# Patient Record
Sex: MALE | Race: WHITE | Employment: OTHER | URBAN - METROPOLITAN AREA
[De-identification: names, ages, dates, MRNs, and addresses within clinical notes are randomized per-mention and may not be internally consistent; named-entity substitution may affect disease eponyms.]

---

## 2017-02-22 DIAGNOSIS — I48.92 ATRIAL FLUTTER, PAROXYSMAL (HCC): ICD-10-CM

## 2017-02-22 RX ORDER — PRASUGREL 10 MG/1
TABLET, FILM COATED ORAL
Qty: 90 TAB | Refills: 3 | Status: SHIPPED | OUTPATIENT
Start: 2017-02-22 | End: 2017-09-26 | Stop reason: SDUPTHER

## 2017-02-22 RX ORDER — ATORVASTATIN CALCIUM 40 MG/1
TABLET, FILM COATED ORAL
Qty: 90 TAB | Refills: 3 | Status: SHIPPED | OUTPATIENT
Start: 2017-02-22 | End: 2017-12-26 | Stop reason: SDUPTHER

## 2017-02-22 RX ORDER — DILTIAZEM HYDROCHLORIDE 240 MG/1
CAPSULE, COATED, EXTENDED RELEASE ORAL
Qty: 90 CAP | Refills: 3 | Status: SHIPPED | OUTPATIENT
Start: 2017-02-22 | End: 2017-12-26 | Stop reason: SDUPTHER

## 2017-03-10 LAB
% ALBUMIN, 58A: 64 % (ref 54–71)
ALB/GLOBRATIO, 58C: 1.8 (ref 1.15–2.5)
ALBUMIN SERPL-MCNC: 4.4 G/DL (ref 3.7–5.1)
ALP SERPL-CCNC: 93 U/L (ref 35–117)
ALT SERPL-CCNC: 17 U/L
ANION GAP SERPL CALC-SCNC: 8 MMOL/L (ref 6–18)
APO B: APO A1 RATIO, 74: 0.39 (ref 0.61–0.8)
APOLIPOPROTEIN A-1, 6: 182 MG/DL
APOLIPOPROTEIN B , 48: 72 MG/DL
AST SERPL W P-5'-P-CCNC: 22 U/L (ref 5–40)
BILIRUB SERPL-MCNC: 0.9 MG/DL
BUN SERPL-MCNC: 9 MG/DL (ref 6–20)
BUN/CREATININE RATIO,BUCR: 12 (ref 10–27)
CALCIUM SERPL-MCNC: 10.2 MG/DL (ref 8.8–10.5)
CHLORIDE SERPL-SCNC: 100 MMOL/L (ref 98–110)
CO2 SERPL-SCNC: 31 MMOL/L (ref 19–31)
CREAT SERPL-MCNC: 0.8 MG/DL (ref 0.7–1.2)
FASTING-Y/N/HRS: ABNORMAL
FASTING-Y/N/HRS: ABNORMAL
FASTING-Y/N/HRS: NORMAL
FFA FREE FATTY ACIDS, 64: 0.42 MMOL/L
GLOBCALC, 58B: 2.4 G/DL (ref 1.9–3.5)
GLUCOSE SERPL-MCNC: 88 MG/DL (ref 70–99)
HDL 2 SUBCLASS, 65: 33 MG/DL
INSULIN,INS: 4 UU/ML (ref 3–9)
LP-PLA2, 123241: 177 NG/ML
POTASSIUM SERPL-SCNC: 4.2 MMOL/L (ref 3.5–5.3)
PRO BNP NT,BNPNT: 556 PG/ML
PROT SERPL-MCNC: 6.8 G/DL (ref 6.1–8)
SODIUM SERPL-SCNC: 139 MMOL/L (ref 133–145)

## 2017-03-11 LAB
25(OH)D3 SERPL-MCNC: 48 NG/ML (ref 30–100)
CAMPESTEROL, HDL1302: 6.64 UG/ML (ref 2.11–4.43)
CHOLEST SERPL-MCNC: 173 MG/DL
CHOLESTANOL, HDL1304: 3.99 UG/ML (ref 2.02–3.47)
DESMOSTEROL, HDL1306: 0.53 UG/ML
FASTING-Y/N/HRS: ABNORMAL
FASTING-Y/N/HRS: ABNORMAL
FASTING-Y/N/HRS: NORMAL
HDL HDL-P, HDL5001: 43.3 UMOL/L
HDL LDL-P, HDL5000: 758 NMOL/L
HDL SLDL-P, HDL5002: < 200 NMOL/L
HDLC SERPL-MCNC: 83 MG/DL
HSCRP-TX: 0.8 MG/L
LDLC SERPL CALC-MCNC: 78 MG/DL
N-HDL-C: 90 MG/DL
SDLDL: 25.2 MG/DL
SITOSTEROL, HDL1300: 3.9 UG/ML (ref 1.43–3.17)
TRIGL SERPL-MCNC: 70 MG/DL (ref ?–150)

## 2017-03-12 LAB
ALPHA LINOLEIC ACID N3, HDL1202: 0.12 % (ref 0.1–0.4)
CIS-MONO-UNSATURATED FATTY ACID TOTAL, HDL1205: 16.2 (ref 11.5–20.5)
DOCOSAPENTAENOIC ACID N3, HDL1206: 2.78 % (ref 0.6–4.1)
DOCOSAPENTAENOIC ACID N6, HDL1207: 0.39 % (ref 0.1–1.3)
FASTING-Y/N/HRS: NORMAL
OMEGA-3 FATTY ACID TOTAL, HDL1220: 9.6 (ref 0.1–14.1)
OMEGA-3 INDEX, HDL1219: 6.7 (ref 0.1–10.4)
OMEGA-6 FATTY ACID TOTAL, HDL1222: 31.2 (ref 28.6–44.5)
SATURATED FATTY ACID TOTAL, HDL1226: 41.9 (ref 36.6–42)
TRANS FATTY ACID TOTAL, HDL1232: 1 (ref 0.1–1.8)
TRANSLINOLEIC ACID, HDL1229: <0.1 %
TRANSOLEIC ACID, HDL1230: 0.78 % (ref 0.1–1.3)

## 2017-03-20 DIAGNOSIS — I25.10 CORONARY ARTERY DISEASE INVOLVING NATIVE CORONARY ARTERY OF NATIVE HEART WITHOUT ANGINA PECTORIS: Primary | ICD-10-CM

## 2017-03-20 DIAGNOSIS — E78.5 DYSLIPIDEMIA: ICD-10-CM

## 2017-03-21 ENCOUNTER — OFFICE VISIT (OUTPATIENT)
Dept: CARDIOLOGY CLINIC | Age: 67
End: 2017-03-21

## 2017-03-21 VITALS
OXYGEN SATURATION: 98 % | HEIGHT: 70 IN | DIASTOLIC BLOOD PRESSURE: 74 MMHG | RESPIRATION RATE: 16 BRPM | BODY MASS INDEX: 22.62 KG/M2 | HEART RATE: 88 BPM | SYSTOLIC BLOOD PRESSURE: 122 MMHG | WEIGHT: 158 LBS

## 2017-03-21 DIAGNOSIS — I47.1 SVT (SUPRAVENTRICULAR TACHYCARDIA) (HCC): ICD-10-CM

## 2017-03-21 DIAGNOSIS — I48.92 ATRIAL FLUTTER, PAROXYSMAL (HCC): Primary | ICD-10-CM

## 2017-03-21 DIAGNOSIS — E78.5 DYSLIPIDEMIA: ICD-10-CM

## 2017-03-21 DIAGNOSIS — I25.10 CORONARY ARTERY DISEASE INVOLVING NATIVE CORONARY ARTERY OF NATIVE HEART WITHOUT ANGINA PECTORIS: ICD-10-CM

## 2017-03-21 DIAGNOSIS — E55.9 VITAMIN D DEFICIENCY: ICD-10-CM

## 2017-03-21 DIAGNOSIS — J44.9 CHRONIC OBSTRUCTIVE PULMONARY DISEASE, UNSPECIFIED COPD TYPE (HCC): ICD-10-CM

## 2017-03-21 DIAGNOSIS — R07.89 OTHER CHEST PAIN: ICD-10-CM

## 2017-03-21 NOTE — PROGRESS NOTES
Progress Note      Horacio Castro is a 61 y.o. male. Last seen 6 months ago. Problem List  Date Reviewed: 9/13/2016          Codes Class Noted    Atrial flutter, paroxysmal ([facility] ICD-10-CM: I48.92  ICD-9-CM: 427.32  3/31/2016        SVT (supraventricular tachycardia) ICD-10-CM: I47.1  ICD-9-CM: 427.89  6/24/2015        Vitamin D deficiency ICD-10-CM: E55.9  ICD-9-CM: 268.9  3/27/2014        Dyslipidemia ICD-10-CM: E78.5  ICD-9-CM: 272.4  3/27/2014        CAD (coronary artery disease), native coronary artery ICD-10-CM: I25.10  ICD-9-CM: 414.01  3/12/2014        COPD (chronic obstructive pulmonary disease) ([facility] ICD-10-CM: J44.9  ICD-9-CM: 496  2/27/2014              Cardiac testing:  CATH: 2/12/2014: L Main: MLI, LAD: Mid 50%; MLI; Med size; D1 and D2 - MLI (small),   LCflex: Med ; Prox 40%; Mid 40% OM1 - med; distally OM1 divides to 3 branches (prox branch - med)- distal 2 branches - small; RCA: Med; Mid 99%; Distally 40% PDA and PLB - small to med, LVEDP: 11. LVEF: 45%; Ant HK, Inf HK, no signif grad across AV   --- MARCO (Resolute 3 x 22) -> RCA   ECHO: 2/12/14: EF 50%, mild HK basal-mid anteroseptal and basal-mid inferior wall(s). mild MR/TR     Health fair screening 3/13/15, carotid duplex showed mild stenosis, aortic duplex normal, ZENON normal, EKG normal   Echo 6/24/15 - EF 55%, normal wall motion   Holter 7/23/15 - SR , rare episodes of AFL/AF  CT Heart Scan - Calcium score 2041, incidental pulmonary findings (see full report). HPI    Mr. Jarome Osler reports random episodes of left-sided sharp chest pain lasting for few seconds. He has stable pattern of mild-moderate PATEL and wears oxygen intermittently. No issues with Aspirin or Effient. He takes 3 tablets of Fish oil daily. His activity level is limited by lung disease. He continues to work on his diet, avoiding salt and reducing simple carbohydrates as often as possible. He remains smoke-free.  Patient denies any palpitations, syncope, orthopnea, edema or paroxysmal nocturnal dyspnea. Current Outpatient Prescriptions   Medication Sig    atorvastatin (LIPITOR) 40 mg tablet TAKE 1 TABLET NIGHTLY    dilTIAZem CD (CARTIA XT) 240 mg ER capsule TAKE 1 CAPSULE DAILY    prasugrel (EFFIENT) 10 mg tablet TAKE 1 TABLET DAILY    omega-3 fatty acids-vitamin e (FISH OIL) 1,000 mg cap Take 3 Caps by mouth daily. Two capsules in the morning and one capsule in the evening.  cholecalciferol, vitamin D3, (VITAMIN D3) 2,000 unit tab Take  by mouth two (2) times a day.  multivitamin (ONE A DAY) tablet Take 1 tablet by mouth daily.  albuterol (PROVENTIL VENTOLIN) 2.5 mg /3 mL (0.083 %) nebulizer solution 2.5 mg by Nebulization route every four (4) hours as needed for Wheezing or Shortness of Breath.  aspirin 81 mg chewable tablet Take 1 Tab by mouth daily.  guaiFENesin (MUCINEX) 1,200 mg TM12 ER tablet Take 1,200 mg by mouth two (2) times a day.  TIOTROPIUM BROMIDE (SPIRIVA WITH HANDIHALER IN) Take 1 Puff by inhalation daily. No current facility-administered medications for this visit. No Known Allergies     Review of Systems   Constitutional: Negative for weight loss, malaise/fatigue and diaphoresis. Respiratory: Positive for PATEL. Negative for cough, hemoptysis, sputum production, and wheezing. Cardiovascular: Negative for palpitations, orthopnea, claudication, leg swelling and PND. Positive for chest pain. Gastrointestinal: Negative for heartburn, nausea, vomiting, blood in stool and melena. Genitourinary: Negative for dysuria, hematuria and flank pain. Musculoskeletal: Negative for back pain and joint pain. Neurological: Negative for dizziness, focal weakness, seizures, loss of consciousness, weakness and headaches. Endo/Heme/Allergies: Does not bruise/bleed easily. Psychiatric/Behavioral: Negative for mood disorders.     Visit Vitals    /74 (BP 1 Location: Left arm, BP Patient Position: Sitting)    Pulse 88    Resp 16    Ht 5' 10\" (1.778 m)    Wt 158 lb (71.7 kg)    SpO2 98%    BMI 22.67 kg/m2      Wt Readings from Last 3 Encounters:   03/21/17 158 lb (71.7 kg)   09/13/16 157 lb (71.2 kg)   03/30/16 146 lb 9.6 oz (66.5 kg)     Physical Exam   Vitals reviewed. Constitutional: He is oriented to person, place, and time. He appears well-developed. HENT:    Head: Normocephalic. Neck: Neck supple. No JVD present. No tracheal deviation present. Heart: normal rate, regular rhythm, normal S1, S2, no murmurs, rubs, clicks or gallops. Pulses:Carotid pulses are 2+ on the right side, and 2+ on the left side. Pulmonary/Chest: Effort normal and breath sounds normal. He has no wheezes, rhonchi or rales. Abdominal: Soft. Bowel sounds are normal. No tenderness. He has no rebound. Musculoskeletal: He exhibits no edema. Neurological: He is alert and oriented to person, place, and time. Skin: Skin is warm and dry. Psychiatric: He has a normal mood and affect.     Labs drawn 3/10/16    High Risk  Intermediate Risk  Optimal    Total Cholesterol      165    LDL      52    HDL      98    TG      51    Non-HDL      67    Apo B      43    LDL-P      450    Small LDL-P          sdLDL-C      15    Apo A-I      208    HDL-P      42.5    HDL2-C      46    Apo B: Apo A-I ratio      0.21    Lp(a)-P      <50    Lp Cholesterol          Myeloperoxidase          Lp-JD      129    Hs-CRP      0.4    Fibrinogen          proBNP      69    AspirinWorks          Apolipoprotein E          [id number]*2*3*          [id number]*17*          Factor V Leiden          Prothrombin Mutation          Insulin  18        Free Fatty Acid      0.58    Glucose      84    HbA1c          Estimated Average Glucose          25-hydroxy-Vitamin D      58    TSH          Homocysteine      7    Creatinine, serum          Campesterol  5.83        Campesterol Ratio          Sitosterol  4.43        Sitosterol Ratio          Cholestanol      3.31    Cholestanol Ratio        Desmosterol      <0.50 (hypo)    Omega 3                     Cardiographics  EKG 6/12/14 - sinus with PAC's  EKG 12/18/14 - SR, isolated PVC, otherwise normal   EKG 9/13/16 - SR 68    ASSESSMENT and PLAN  Encounter Diagnoses   Name Primary?  Atrial flutter, paroxysmal (HCC) Yes    SVT (supraventricular tachycardia)     Dyslipidemia     Coronary artery disease involving native coronary artery of native heart without angina pectoris     Vitamin D deficiency     Chronic obstructive pulmonary disease, unspecified COPD type ([address]       Mr. Concepción Major has known CAD with MI 2014 treated with RCA stenting. He has had no symptoms of angina at a fair functional capacity limited by COPD. However, I am concerned about his 10 fold elevation in pro-BNP. He has no clear cut findings of HF. Will reassess LV function with echo in the near future. Advanced lipid testing demonstrates optimized lipids and lipoproteins. Elevated insulin level has normalized. He has persistent sterol hyperabsorption. I would not start Zetia unless his Apo(b) and LDL-p climb up into the intermediate risk range. Repeat numbers in 6 months. Phone follow up after reviewing tests. Follow-up Disposition:  Return in about 6 months (around 9/21/2017). Echo near future. [facility] labs with visit.      Written by Nathaly Varghese, as dictated by Sean May MD.   Sean May MD

## 2017-03-21 NOTE — MR AVS SNAPSHOT
Visit Information Date & Time Provider Department Dept. Phone Encounter #  
 3/21/2017  1:20 PM Victorina Hill MD [facility] [phone] [id number] Follow-up Instructions Return in about 6 months (around 9/21/2017). Upcoming Health Maintenance Date Due Hepatitis C Screening 1950 DTaP/Tdap/Td series (1 - Tdap) 5/7/1973 FOBT Q 1 YEAR AGE 50-75 5/7/2002 ZOSTER VACCINE AGE 60> 5/7/2012 INFLUENZA AGE 9 TO ADULT 8/1/2016 Allergies as of 3/21/2017  Review Complete On: 3/21/2017 By: Robbie Andrade LPN No Known Allergies Current Immunizations  Reviewed on 2/14/2014 Name Date Influenza Vaccine 12/14/2013 Pneumococcal Vaccine (Unspecified Type) 12/14/2012 Not reviewed this visit You Were Diagnosed With   
  
 Codes Comments Atrial flutter, paroxysmal (HCC)    -  Primary ICD-10-CM: I48.92 
ICD-9-CM: 427.32   
 SVT (supraventricular tachycardia)     ICD-10-CM: I47.1 ICD-9-CM: 427.89 Dyslipidemia     ICD-10-CM: E78.5 ICD-9-CM: 272.4 Coronary artery disease involving native coronary artery of native heart without angina pectoris     ICD-10-CM: I25.10 ICD-9-CM: 414.01 Vitamin D deficiency     ICD-10-CM: E55.9 ICD-9-CM: 268.9 Chronic obstructive pulmonary disease, unspecified COPD type ([facility]     ICD-10-CM: J44.9 ICD-9-CM: 409 Other chest pain     ICD-10-CM: R07.89 ICD-9-CM: 786.59 Vitals BP Pulse Resp Height(growth percentile) Weight(growth percentile) SpO2  
 122/74 (BP 1 Location: Left arm, BP Patient Position: Sitting) 88 16 5' 10\" (1.778 m) 158 lb (71.7 kg) 98% BMI Smoking Status 22.67 kg/m2 Former Smoker BMI and BSA Data Body Mass Index Body Surface Area  
 22.67 kg/m 2 1.88 m 2 Preferred Pharmacy Pharmacy Name Phone [facility] [address] [place] [facility] [phone] Your Updated Medication List  
  
   
This list is accurate as of: 3/21/17  1:33 PM.  Always use your most recent med list.  
  
  
  
  
 albuterol 2.5 mg /3 mL (0.083 %) nebulizer solution Commonly known as:  PROVENTIL VENTOLIN  
2.5 mg by Nebulization route every four (4) hours as needed for Wheezing or Shortness of Breath. aspirin 81 mg chewable tablet Take 1 Tab by mouth daily. atorvastatin 40 mg tablet Commonly known as:  LIPITOR  
TAKE 1 TABLET NIGHTLY  
  
 dilTIAZem  mg ER capsule Commonly known as:  CARTIA XT  
TAKE 1 CAPSULE DAILY FISH OIL 1,000 mg Cap Generic drug:  omega-3 fatty acids-vitamin e Take 3 Caps by mouth daily. Two capsules in the morning and one capsule in the evening. MUCINEX 1,200 mg Ta12 ER tablet Generic drug:  guaiFENesin Take 1,200 mg by mouth two (2) times a day. multivitamin tablet Commonly known as:  ONE A DAY Take 1 tablet by mouth daily. prasugrel 10 mg tablet Commonly known as:  EFFIENT TAKE 1 TABLET DAILY [address] [place] [address] Take 1 Puff by inhalation daily. VITAMIN D3 2,000 unit Tab Generic drug:  cholecalciferol (vitamin D3) Take  by mouth two (2) times a day. Follow-up Instructions Return in about 6 months (around 9/21/2017). Introducing [facility]! Dear Sebas Chen: Thank you for requesting a [employer] account. Our records indicate that you already have an active [employer] account. You can access your account anytime at [email or web address] Did you know that you can access your hospital and ER discharge instructions at any time in [employer]? You can also review all of your test results from your hospital stay or ER visit. Additional Information If you have questions, please visit the Frequently Asked Questions section of the [employer] website at [email or web address] Remember, [employer] is NOT to be used for urgent needs. For medical emergencies, dial 911. Now available from your iPhone and Android! Please provide this summary of care documentation to your next provider. Your primary care clinician is listed as Eugenia Anguiano. If you have any questions after today's visit, please call [phone].

## 2017-04-03 ENCOUNTER — CLINICAL SUPPORT (OUTPATIENT)
Dept: CARDIOLOGY CLINIC | Age: 67
End: 2017-04-03

## 2017-04-03 DIAGNOSIS — R07.89 OTHER CHEST PAIN: ICD-10-CM

## 2017-04-03 DIAGNOSIS — I25.10 CORONARY ARTERY DISEASE INVOLVING NATIVE CORONARY ARTERY OF NATIVE HEART WITHOUT ANGINA PECTORIS: ICD-10-CM

## 2017-04-03 DIAGNOSIS — I48.92 ATRIAL FLUTTER, PAROXYSMAL (HCC): ICD-10-CM

## 2017-04-03 DIAGNOSIS — E55.9 VITAMIN D DEFICIENCY: ICD-10-CM

## 2017-04-03 DIAGNOSIS — I47.1 SVT (SUPRAVENTRICULAR TACHYCARDIA) (HCC): ICD-10-CM

## 2017-04-03 DIAGNOSIS — J44.9 CHRONIC OBSTRUCTIVE PULMONARY DISEASE, UNSPECIFIED COPD TYPE (HCC): ICD-10-CM

## 2017-04-03 DIAGNOSIS — E78.5 DYSLIPIDEMIA: ICD-10-CM

## 2017-04-07 ENCOUNTER — TELEPHONE (OUTPATIENT)
Dept: CARDIOLOGY CLINIC | Age: 67
End: 2017-04-07

## 2017-04-21 ENCOUNTER — OFFICE VISIT (OUTPATIENT)
Dept: NEUROLOGY | Age: 67
End: 2017-04-21

## 2017-04-21 VITALS
SYSTOLIC BLOOD PRESSURE: 126 MMHG | HEIGHT: 70 IN | HEART RATE: 78 BPM | TEMPERATURE: 98.2 F | DIASTOLIC BLOOD PRESSURE: 70 MMHG | OXYGEN SATURATION: 95 % | BODY MASS INDEX: 23.16 KG/M2 | RESPIRATION RATE: 22 BRPM | WEIGHT: 161.8 LBS

## 2017-04-21 DIAGNOSIS — R93.0 ABNORMAL CT OF THE HEAD: Primary | ICD-10-CM

## 2017-04-21 RX ORDER — MONTELUKAST SODIUM 10 MG/1
10 TABLET ORAL
COMMUNITY

## 2017-04-21 RX ORDER — BUDESONIDE 0.5 MG/2ML
500 INHALANT ORAL 2 TIMES DAILY
COMMUNITY

## 2017-04-21 RX ORDER — ARFORMOTEROL TARTRATE 15 UG/2ML
15 SOLUTION RESPIRATORY (INHALATION) 2 TIMES DAILY
COMMUNITY
End: 2019-02-15

## 2017-04-21 NOTE — PATIENT INSTRUCTIONS
Information Regarding Testing     If you have physican order for a test or a medication denied by your insurance company, this does not mean the test or medication is not appropriate for you as that is a medical decision, not a decision to be made by an insurance company representative or by an [facility] physician who has not interviewed and examined you. This is a decision to be made between you and your physician. The denial of services is a contractual matter between you and your insurance company, not an issue between your physician and the insurance company. If your test or medication is denied, you can take the following steps to help resolve the issue:    1. File a complaint with the [facility] regarding your insurance company's denial of services ordered for you. You can do this either by calling them directly or by completing an on-line complaint form on the [employer]. This can be found at [email or web address]    2. Also file a formal complaint with your insurance company and ask to have the name of the person denying the service so that you may explore a legal option should you be harmed by this denial of service. Again, the fact the insurance company will not pay for the service does not mean it is not medically necessary and I would encourage you to follow through with the plan that was made with your physician    3. File a written complaint with your employer so your employer and benefit manager is aware of the poor coverage they are providing their employees. If you have medicare/medicaid, complain to your representative in the House and to your Basilia Jasso. If we have ordered testing for you, we do not call patients with results and we do not give test results over the phone. We schedule follow up appointments so that your results can be discussed in person and any questions you have regarding them may be addressed.   If something of concern is revealed on your test, we will call you for a sooner follow up appointment. Additionally, results may be found by using the My Chart feature and one of our patient service representatives at the  can give you instructions on how to access this feature of our electronic medical record system. A Healthy Lifestyle: Care Instructions  Your Care Instructions  A healthy lifestyle can help you feel good, stay at a healthy weight, and have plenty of energy for both work and play. A healthy lifestyle is something you can share with your whole family. A healthy lifestyle also can lower your risk for serious health problems, such as high blood pressure, heart disease, and diabetes. You can follow a few steps listed below to improve your health and the health of your family. Follow-up care is a key part of your treatment and safety. Be sure to make and go to all appointments, and call your doctor if you are having problems. Its also a good idea to know your test results and keep a list of the medicines you take. How can you care for yourself at home? · Do not eat too much sugar, fat, or fast foods. You can still have dessert and treats now and then. The goal is moderation. · Start small to improve your eating habits. Pay attention to portion sizes, drink less juice and soda pop, and eat more fruits and vegetables. ¨ Eat a healthy amount of food. A 3-ounce serving of meat, for example, is about the size of a deck of cards. Fill the rest of your plate with vegetables and whole grains. ¨ Limit the amount of soda and sports drinks you have every day. Drink more water when you are thirsty. ¨ Eat at least 5 servings of fruits and vegetables every day. It may seem like a lot, but it is not hard to reach this goal. A serving or helping is 1 piece of fruit, 1 cup of vegetables, or 2 cups of leafy, raw vegetables.  Have an apple or some carrot sticks as an afternoon snack instead of a candy bar. Try to have fruits and/or vegetables at every meal.  · Make exercise part of your daily routine. You may want to start with simple activities, such as walking, bicycling, or slow swimming. Try to be active 30 to 60 minutes every day. You do not need to do all 30 to 60 minutes all at once. For example, you can exercise 3 times a day for 10 or 20 minutes. Moderate exercise is safe for most people, but it is always a good idea to talk to your doctor before starting an exercise program.  · Keep moving. Kalani Shorten the lawn, work in the garden, or [employer]. Take the stairs instead of the elevator at work. · If you smoke, quit. People who smoke have an increased risk for heart attack, stroke, cancer, and other lung illnesses. Quitting is hard, but there are ways to boost your chance of quitting tobacco for good. ¨ Use nicotine gum, patches, or lozenges. ¨ Ask your doctor about stop-smoking programs and medicines. ¨ Keep trying. In addition to reducing your risk of diseases in the future, you will notice some benefits soon after you stop using tobacco. If you have shortness of breath or asthma symptoms, they will likely get better within a few weeks after you quit. · Limit how much alcohol you drink. Moderate amounts of alcohol (up to 2 drinks a day for men, 1 drink a day for women) are okay. But drinking too much can lead to liver problems, high blood pressure, and other health problems. Family health  If you have a family, there are many things you can do together to improve your health. · Eat meals together as a family as often as possible. · Eat healthy foods. This includes fruits, vegetables, lean meats and dairy, and whole grains. · Include your family in your fitness plan. Most people think of activities such as jogging or tennis as the way to fitness, but there are many ways you and your family can be more active. Anything that makes you breathe hard and gets your heart pumping is exercise.  Here are some tips:  ¨ Walk to do errands or to take your child to school or the bus. ¨ Go for a family bike ride after dinner instead of watching TV. Where can you learn more? Go to http://kathleen-donald.info/. Enter O342 in the search box to learn more about \"A Healthy Lifestyle: Care Instructions. \"  Current as of: July 26, 2016  Content Version: 11.2  © 8798-9951 [employer]. Care instructions adapted under license by [employer] (which disclaims liability or warranty for this information). If you have questions about a medical condition or this instruction, always ask your healthcare professional. [place] [address] any warranty or liability for your use of this information. Patient will secure a CD copy of his head CT at the imaging center. Will drop it off and I will take a look at it for myself. Otherwise does not have an exam or history for the white matter disease changes as stipulated by the reading. If there is some type of intriguing aspects or significance to the scan will ask for a revisit but otherwise stipulated controlling risk factors for white matter changes beyond aging such as blood pressure cholesterol avoid secondhand smoke traumatic brain injury etc. revisit will be pended for now.

## 2017-04-21 NOTE — MR AVS SNAPSHOT
Visit Information Date & Time Provider Department Dept. Phone Encounter #  
 4/21/2017 11:00 AM Neal Contreras MD Neurology [facility] [place] [facility] [phone] [id number] Follow-up Instructions Return if symptoms worsen or fail to improve. Your Appointments 9/26/2017  2:00 PM  
ESTABLISHED PATIENT with Joao Blackburn MD  
[facility] ([address]) Appt Note: 6 mo fup  
 [address] [facility] [place] [address]  
54 Rumik Sykes [facility] [address] [address] Upcoming Health Maintenance Date Due Hepatitis C Screening 1950 DTaP/Tdap/Td series (1 - Tdap) 5/7/1973 FOBT Q 1 YEAR AGE 50-75 5/7/2002 ZOSTER VACCINE AGE 60> 5/7/2012 INFLUENZA AGE 9 TO ADULT 8/1/2016 Allergies as of 4/21/2017  Review Complete On: 4/21/2017 By: Neal Contreras MD  
 No Known Allergies Current Immunizations  Reviewed on 2/14/2014 Name Date Influenza Vaccine 12/14/2013 Pneumococcal Vaccine (Unspecified Type) 12/14/2012 Not reviewed this visit You Were Diagnosed With   
  
 Codes Comments Abnormal CT of the head    -  Primary ICD-10-CM: R93.0 ICD-9-CM: 793.0 Vitals BP Pulse Temp Resp Height(growth percentile) Weight(growth percentile) 126/70 78 98.2 °F (36.8 °C) (Oral) 22 5' 10\" (1.778 m) 161 lb 12.8 oz (73.4 kg) SpO2 BMI Smoking Status 95% 23.22 kg/m2 Former Smoker Vitals History BMI and BSA Data Body Mass Index Body Surface Area  
 23.22 kg/m 2 1.9 m 2 Preferred Pharmacy Pharmacy Name Phone [facility] [place], [address] [place] AT [facility] [phone] Your Updated Medication List  
  
   
This list is accurate as of: 4/21/17 11:49 AM.  Always use your most recent med list.  
  
  
  
  
 albuterol 2.5 mg /3 mL (0.083 %) nebulizer solution Commonly known as:  PROVENTIL VENTOLIN  
2.5 mg by Nebulization route every four (4) hours as needed for Wheezing or Shortness of Breath. aspirin 81 mg chewable tablet Take 1 Tab by mouth daily. atorvastatin 40 mg tablet Commonly known as:  LIPITOR  
TAKE 1 TABLET NIGHTLY  
  
 BROVANA 15 mcg/2 mL Nebu neb solution Generic drug:  arformoterol 15 mcg by Nebulization route. budesonide 0.5 mg/2 mL Nbsp Commonly known as:  PULMICORT  
500 mcg by Nebulization route. dilTIAZem  mg ER capsule Commonly known as:  CARTIA XT  
TAKE 1 CAPSULE DAILY FISH OIL 1,000 mg Cap Generic drug:  omega-3 fatty acids-vitamin e Take 3 Caps by mouth daily. Two capsules in the morning and one capsule in the evening.  
  
 montelukast 10 mg tablet Commonly known as:  SINGULAIR Take 10 mg by mouth daily. MUCINEX 1,200 mg Ta12 ER tablet Generic drug:  guaiFENesin Take 1,200 mg by mouth two (2) times a day. multivitamin tablet Commonly known as:  ONE A DAY Take 1 tablet by mouth daily. prasugrel 10 mg tablet Commonly known as:  EFFIENT TAKE 1 TABLET DAILY [address] [place] [address] Take 1 Puff by inhalation daily. VITAMIN D3 2,000 unit Tab Generic drug:  cholecalciferol (vitamin D3) Take  by mouth two (2) times a day. Follow-up Instructions Return if symptoms worsen or fail to improve. Patient Instructions Information Regarding Testing If you have physican order for a test or a medication denied by your insurance company, this does not mean the test or medication is not appropriate for you as that is a medical decision, not a decision to be made by an insurance company representative or by an [employer] physician who has not interviewed and examined you. This is a decision to be made between you and your physician.   
 
The denial of services is a contractual matter between you and your insurance company, not an issue between your physician and the insurance company. If your test or medication is denied, you can take the following steps to help resolve the issue: 1. File a complaint with the [facility] [employer] [employer] regarding your insurance company's denial of services ordered for you. You can do this either by calling them directly or by completing an on-line complaint form on the [employer]. This can be found at [email or web address].Clinked 2. Also file a formal complaint with your insurance company and ask to have the name of the person denying the service so that you may explore a legal option should you be harmed by this denial of service. Again, the fact the insurance company will not pay for the service does not mean it is not medically necessary and I would encourage you to follow through with the plan that was made with your physician 3. File a written complaint with your employer so your employer and benefit manager is aware of the poor coverage they are providing their employees. If you have medicare/medicaid, complain to your representative in the House and to your Basilia Jasso. If we have ordered testing for you, we do not call patients with results and we do not give test results over the phone. We schedule follow up appointments so that your results can be discussed in person and any questions you have regarding them may be addressed. If something of concern is revealed on your test, we will call you for a sooner follow up appointment. Additionally, results may be found by using the My Chart feature and one of our patient service representatives at the  can give you instructions on how to access this feature of our electronic medical record system. A Healthy Lifestyle: Care Instructions Your Care Instructions A healthy lifestyle can help you feel good, stay at a healthy weight, and have plenty of energy for both work and play. A healthy lifestyle is something you can share with your whole family. A healthy lifestyle also can lower your risk for serious health problems, such as high blood pressure, heart disease, and diabetes. You can follow a few steps listed below to improve your health and the health of your family. Follow-up care is a key part of your treatment and safety. Be sure to make and go to all appointments, and call your doctor if you are having problems. Its also a good idea to know your test results and keep a list of the medicines you take. How can you care for yourself at home? · Do not eat too much sugar, fat, or fast foods. You can still have dessert and treats now and then. The goal is moderation. · Start small to improve your eating habits. Pay attention to portion sizes, drink less juice and soda pop, and eat more fruits and vegetables. ¨ Eat a healthy amount of food. A 3-ounce serving of meat, for example, is about the size of a deck of cards. Fill the rest of your plate with vegetables and whole grains. ¨ Limit the amount of soda and sports drinks you have every day. Drink more water when you are thirsty. ¨ Eat at least 5 servings of fruits and vegetables every day. It may seem like a lot, but it is not hard to reach this goal. A serving or helping is 1 piece of fruit, 1 cup of vegetables, or 2 cups of leafy, raw vegetables. Have an apple or some carrot sticks as an afternoon snack instead of a candy bar. Try to have fruits and/or vegetables at every meal. 
· Make exercise part of your daily routine. You may want to start with simple activities, such as walking, bicycling, or slow swimming. Try to be active 30 to 60 minutes every day. You do not need to do all 30 to 60 minutes all at once. For example, you can exercise 3 times a day for 10 or 20 minutes.  Moderate exercise is safe for most people, but it is always a good idea to talk to your doctor before starting an exercise program. 
· Keep moving. Tiffanie Akers the lawn, work in the garden, or Koko[place]. Take the stairs instead of the elevator at work. · If you smoke, quit. People who smoke have an increased risk for heart attack, stroke, cancer, and other lung illnesses. Quitting is hard, but there are ways to boost your chance of quitting tobacco for good. ¨ Use nicotine gum, patches, or lozenges. ¨ Ask your doctor about stop-smoking programs and medicines. ¨ Keep trying. In addition to reducing your risk of diseases in the future, you will notice some benefits soon after you stop using tobacco. If you have shortness of breath or asthma symptoms, they will likely get better within a few weeks after you quit. · Limit how much alcohol you drink. Moderate amounts of alcohol (up to 2 drinks a day for men, 1 drink a day for women) are okay. But drinking too much can lead to liver problems, high blood pressure, and other health problems. Family health If you have a family, there are many things you can do together to improve your health. · Eat meals together as a family as often as possible. · Eat healthy foods. This includes fruits, vegetables, lean meats and dairy, and whole grains. · Include your family in your fitness plan. Most people think of activities such as jogging or tennis as the way to fitness, but there are many ways you and your family can be more active. Anything that makes you breathe hard and gets your heart pumping is exercise. Here are some tips: 
¨ Walk to do errands or to take your child to school or the bus. ¨ Go for a family bike ride after dinner instead of watching TV. Where can you learn more? Go to http://kathleen-donald.info/. Enter O453 in the search box to learn more about \"A Healthy Lifestyle: Care Instructions. \" Current as of: July 26, 2016 Content Version: 11.2 © 0341-6628 Healthwise, Incorporated. Care instructions adapted under license by [employer] (which disclaims liability or warranty for this information). If you have questions about a medical condition or this instruction, always ask your healthcare professional. [address] any warranty or liability for your use of this information. Patient will secure a CD copy of his head CT at the imaging center. Will drop it off and I will take a look at it for myself. Otherwise does not have an exam or history for the white matter disease changes as stipulated by the reading. If there is some type of intriguing aspects or significance to the scan will ask for a revisit but otherwise stipulated controlling risk factors for white matter changes beyond aging such as blood pressure cholesterol avoid secondhand smoke traumatic brain injury etc. revisit will be pended for now. Introducing [facility] & HEALTH SERVICES! Dear Thelam Colby: Thank you for requesting a [employer] account. Our records indicate that you already have an active [employer] account. You can access your account anytime at [email or web address]. Did you know that you can access your hospital and ER discharge instructions at any time in [employer]? You can also review all of your test results from your hospital stay or ER visit. Additional Information If you have questions, please visit the Frequently Asked Questions section of the [employer] website at [email or web address]. Remember, [employer] is NOT to be used for urgent needs. For medical emergencies, dial 911. Now available from your iPhone and Android! Please provide this summary of care documentation to your next provider. Your primary care clinician is listed as Joe Bejarano. If you have any questions after today's visit, please call [phone].

## 2017-04-21 NOTE — PROGRESS NOTES
Neurology Consult      Subjective:      Tamela Morris is a 61 y.o. male who comes in with the following history. Went to his local urgent care and was notable of what was called a quarter size indentation of the right temporalis muscle area without pain without trauma or any attributes. Ended up with a head CT that by description was quoted as showing extensive subcortical and periventricular white matter changes that could represent small vessel ischemia. Thus he is here today on referral from urgent care. Does have background hypertension and cholesterol and was a remote smoker. No diabetes no traumatic brain injury. Does have background coronary artery disease and COPD. No stroke history or TIAs. He is walking his baseline except he has shortness of breath and dyspnea on exertion with his COPD. Balance is good cognition is good. Saw me remotely about 17 years ago at my old practice with essential tremor. Ended up treating it but apparently between taking the drug and the results just decided to live with the tremor. Today's tremor was extremely subtle or mild so I can understand the decision to forego medicine. Went over the significance of white matter changes on imaging and how it might relate to clinical discovery through history and exam performance. I do not see any issues on today's history or exam taking for white matter changes. I did encourage him to keep his vascular risk under control and follow-up with cardiology etc. will arrange a look at the CD of the CT that he will get accomplished through the imaging center on [address] [place] [address]. Current Outpatient Prescriptions   Medication Sig Dispense Refill    arformoterol (BROVANA) 15 mcg/2 mL nebu neb solution 15 mcg by Nebulization route.  budesonide (PULMICORT) 0.5 mg/2 mL nbsp 500 mcg by Nebulization route.  montelukast (SINGULAIR) 10 mg tablet Take 10 mg by mouth daily.       atorvastatin (LIPITOR) 40 mg tablet TAKE 1 TABLET NIGHTLY 90 Tab 3    dilTIAZem CD (CARTIA XT) 240 mg ER capsule TAKE 1 CAPSULE DAILY 90 Cap 3    prasugrel (EFFIENT) 10 mg tablet TAKE 1 TABLET DAILY 90 Tab 3    omega-3 fatty acids-vitamin e (FISH OIL) 1,000 mg cap Take 3 Caps by mouth daily. Two capsules in the morning and one capsule in the evening.  cholecalciferol, vitamin D3, (VITAMIN D3) 2,000 unit tab Take  by mouth two (2) times a day.  multivitamin (ONE A DAY) tablet Take 1 tablet by mouth daily.  albuterol (PROVENTIL VENTOLIN) 2.5 mg /3 mL (0.083 %) nebulizer solution 2.5 mg by Nebulization route every four (4) hours as needed for Wheezing or Shortness of Breath.  aspirin 81 mg chewable tablet Take 1 Tab by mouth daily. 30 Tab 6    guaiFENesin (MUCINEX) 1,200 mg TM12 ER tablet Take 1,200 mg by mouth two (2) times a day.  TIOTROPIUM BROMIDE (SPIRIVA WITH HANDIHALER IN) Take 1 Puff by inhalation daily. No Known Allergies  Past Medical History:   Diagnosis Date    Arrhythmia     Asthma     COPD (chronic obstructive pulmonary disease) (HCC)     severe    Hypertension     Insulin resistance 3/27/2014    Lung mass 9/2012    MAY resection, + mycobacterial orgs, neg malignancy    Melanoma ([facility] 2007    Non-STEMI (non-ST elevated myocardial infarction) ([facility] [facility] 2/14    On home O2 prn    Pneumonia     Snoring     Tinnitus     Tuberculosis     Vitamin D deficiency 3/27/2014      Past Surgical History:   Procedure Laterality Date    HX OTHER SURGICAL      EXC MELANOMA RIGHT CHEEK    HX OTHER SURGICAL      NEEDLE BX LEFT LUNG    HX OTHER SURGICAL  9/11/2012    apical segmentectomy, MAY      Social History     Social History    Marital status: SINGLE     Spouse name: N/A    Number of children: N/A    Years of education: N/A     Occupational History    Not on file.      Social History Main Topics    Smoking status: Former Smoker     Packs/day: 0.25     Years: 40.00     Types: Cigarettes     Quit date: 2014    Smokeless tobacco: Not on file    Alcohol use 15.6 oz/week     21 Cans of beer, 5 Shots of liquor per week    Drug use: No    Sexual activity: No     Other Topics Concern    Not on file     Social History Narrative      Family History   Problem Relation Age of Onset    Glaucoma Mother     Dementia Mother     Heart Attack Father     Heart Disease Father       Visit Vitals    /70    Pulse 78    Temp 98.2 °F (36.8 °C) (Oral)    Resp 22    Ht 5' 10\" (1.778 m)    Wt 73.4 kg (161 lb 12.8 oz)    SpO2 95%    BMI 23.22 kg/m2        Review of Systems:   A comprehensive review of systems was negative except for that written in the HPI. Neuro Exam:     Appearance: The patient is well developed, well nourished, provides a coherent history and is in no acute distress. Mental Status: Oriented to time, place and person. Mood and affect appropriate. Cranial Nerves:   Intact visual fields. Fundi are benign. TIANA, EOM's full, no nystagmus, no ptosis. Facial sensation is normal. Corneal reflexes are intact. Facial movement is symmetric. Hearing is normal bilaterally. Palate is midline with normal sternocleidomastoid and trapezius muscles are normal. Tongue is midline. Motor:  5/5 strength in upper and lower proximal and distal muscles. Normal bulk and tone. No fasciculations. Reflexes:   Deep tendon reflexes 2+/4 and symmetrical.   Sensory:   Normal to touch, pinprick and vibration. Gait:  Normal gait. Tremor:     today had a subtle postural and kinetic tremor noted of both hands . Cerebellar:  No cerebellar signs present. Neurovascular:  Normal heart sounds and regular rhythm, peripheral pulses intact, and no carotid bruits. Patient has a subtle discrepancy in the temporalis muscle size more prominent on the left than the right side. Assessment:   Abnormal head CT. Will get patient to get a copy of this head CT with CT and will drop it off with Mady.   I will review the scan and if there is any issues beyond the reading will arrange a follow-up. Otherwise just went over the issues that go with white matter changes on a head CT that included aging blood pressure cholesterol smoking diabetes traumatic brain injury etc. he does not have a current history or exam that betrays white matter disease as I know it. Does have baseline essential tremor which I saw him for many years ago at my old practice. Plan:   Revisit pended.   Signed by :  Godfrey Bird MD

## 2017-04-21 NOTE — PROGRESS NOTES
Reviewed record in preparation for visit and have necessary documentation  Pt did not bring medication to office visit for review   Advanced Directives, Living Will on file in chart  Opportunity was given for questions

## 2017-09-12 LAB
% ALBUMIN, 58A: 64 % (ref 54–71)
ALB/GLOBRATIO, 58C: 1.81 (ref 1.15–2.5)
ALBUMIN SERPL-MCNC: 4.5 G/DL (ref 3.7–5.1)
ALP SERPL-CCNC: 103 U/L (ref 35–117)
ALT SERPL-CCNC: 16 U/L
ANION GAP SERPL CALC-SCNC: 16 MMOL/L (ref 6–18)
APO B: APO A1 RATIO, 74: 0.34 (ref 0.61–0.8)
APOLIPOPROTEIN A-1, 6: 186 MG/DL
APOLIPOPROTEIN B , 48: 63 MG/DL
AST SERPL W P-5'-P-CCNC: 20 U/L (ref 5–40)
BILIRUB SERPL-MCNC: 0.9 MG/DL
BUN SERPL-MCNC: 6 MG/DL (ref 6–20)
BUN/CREATININE RATIO,BUCR: 9 (ref 10–27)
CALCIUM SERPL-MCNC: 9.8 MG/DL (ref 8.8–10.5)
CHLORIDE SERPL-SCNC: 100 MMOL/L (ref 98–110)
CO2 SERPL-SCNC: 25 MMOL/L (ref 19–31)
CREAT SERPL-MCNC: 0.7 MG/DL (ref 0.7–1.2)
FFA FREE FATTY ACIDS, 64: 0.79 MMOL/L
GLOBCALC, 58B: 2.5 G/DL (ref 1.9–3.5)
GLUCOSE SERPL-MCNC: 94 MG/DL (ref 70–99)
HDL 2 SUBCLASS, 65: 36 MG/DL
INSULIN,INS: 4 UU/ML (ref 3–9)
LP-PLA2, 123241: 119 NG/ML
POTASSIUM SERPL-SCNC: 4.1 MMOL/L (ref 3.5–5.3)
PRO BNP NT,BNPNT: 742 PG/ML
PROT SERPL-MCNC: 6.9 G/DL (ref 6.1–8)
SODIUM SERPL-SCNC: 140 MMOL/L (ref 133–145)

## 2017-09-13 LAB
25(OH)D3 SERPL-MCNC: 49 NG/ML (ref 30–100)
AA2: 13.25 % (ref 10.5–23.3)
ALPHA LINOLEIC ACID N3, HDL1202: 0.17 % (ref 0.1–0.4)
CAMPESTEROL, HDL1302: 5.83 UG/ML (ref 2.11–4.43)
CHOLEST SERPL-MCNC: 159 MG/DL
CHOLESTANOL, HDL1304: 3.58 UG/ML (ref 2.02–3.47)
CIS-MONO-UNSATURATED FATTY ACID TOTAL, HDL1205: 15.7 (ref 11.5–20.5)
DESMOSTEROL, HDL1306: < 0.5 UG/ML
DOCOSAHEXAENOIC ACID N3, HDL1208: 6.25 % (ref 0.1–8.4)
DOCOSAPENTAENOIC ACID N3, HDL1206: 2.46 % (ref 0.6–4.1)
DOCOSAPENTAENOIC ACID N6, HDL1207: 0.49 % (ref 0.1–1.3)
EPA2: 1.19 % (ref 0.1–2.5)
HDL HDL-P, HDL5001: 41.1 UMOL/L
HDL LDL-P, HDL5000: 707 NMOL/L
HDL SLDL-P, HDL5002: < 200 NMOL/L
HDLC SERPL-MCNC: 84 MG/DL
HSCRP-TX: 2.1 MG/L
LDLC SERPL CALC-MCNC: 62 MG/DL
LINOLEIC ACID C6, HDL1216: 12.48 % (ref 4.6–21.3)
N-HDL-C: 75 MG/DL
OMEGA-3 FATTY ACID TOTAL, HDL1220: 10.1 (ref 0.1–14.1)
OMEGA-3 INDEX, HDL1219: 7.4 (ref 0.1–10.4)
OMEGA-6 FATTY ACID TOTAL, HDL1222: 30.3 (ref 28.6–44.5)
SATURATED FATTY ACID TOTAL, HDL1226: 43.1 (ref 36.6–42)
SDLDL: 16.3 MG/DL
SITOSTEROL, HDL1300: 3.24 UG/ML (ref 1.43–3.17)
TRANS FATTY ACID TOTAL, HDL1232: 0.8 (ref 0.1–1.8)
TRANSLINOLEIC ACID, HDL1229: <0.1 %
TRANSOLEIC ACID, HDL1230: 0.59 % (ref 0.1–1.3)
TRIGL SERPL-MCNC: 64 MG/DL (ref ?–150)

## 2017-09-25 ENCOUNTER — DOCUMENTATION ONLY (OUTPATIENT)
Dept: CARDIOLOGY CLINIC | Age: 67
End: 2017-09-25

## 2017-09-25 DIAGNOSIS — E78.5 DYSLIPIDEMIA: ICD-10-CM

## 2017-09-25 DIAGNOSIS — E55.9 VITAMIN D DEFICIENCY: ICD-10-CM

## 2017-09-25 DIAGNOSIS — I25.10 CORONARY ARTERY DISEASE INVOLVING NATIVE CORONARY ARTERY OF NATIVE HEART WITHOUT ANGINA PECTORIS: Primary | ICD-10-CM

## 2017-09-25 NOTE — PROGRESS NOTES
Labs drawn 9/11/17       High Risk Intermediate Risk Optimal   Total Cholesterol   159   LDL   62   HDL   84   TG   64   Non-HDL   75   Apo B   63   LDL-P   707   Small LDL-P   <200   sdLDL-C   16   Apo A-I   186   HDL-P   41.1   HDL2-C   36   Apo B: Apo A-I ratio   0.34   Lp(a)-P      Hs-CRP  2.1    Lp-PLA2   119   NT-proBNP 742     Apolipoprotein E      [id number]*2*7*      [id number]*17*      Factor V Leiden      Prothrombin Mutation      MTHFR      Vitamin D   49   Creatinine, serum   0.7   Campesterol 5.83 (hyper)     Sitosterol 3.24 (hyper)      Cholestanol 3.58 (hyper)     Desmosterol   < 0.50 (hyper)   Glucose   94   Free Fatty Acid 0.79     Insulin   4   Omega 3  7.4

## 2017-09-26 ENCOUNTER — DOCUMENTATION ONLY (OUTPATIENT)
Dept: CARDIOLOGY CLINIC | Age: 67
End: 2017-09-26

## 2017-09-26 ENCOUNTER — OFFICE VISIT (OUTPATIENT)
Dept: CARDIOLOGY CLINIC | Age: 67
End: 2017-09-26

## 2017-09-26 VITALS
OXYGEN SATURATION: 98 % | HEIGHT: 70 IN | BODY MASS INDEX: 23.62 KG/M2 | SYSTOLIC BLOOD PRESSURE: 128 MMHG | DIASTOLIC BLOOD PRESSURE: 82 MMHG | WEIGHT: 165 LBS | HEART RATE: 62 BPM | RESPIRATION RATE: 16 BRPM

## 2017-09-26 DIAGNOSIS — I25.10 CORONARY ARTERY DISEASE INVOLVING NATIVE CORONARY ARTERY OF NATIVE HEART WITHOUT ANGINA PECTORIS: ICD-10-CM

## 2017-09-26 DIAGNOSIS — I48.92 ATRIAL FLUTTER, PAROXYSMAL (HCC): ICD-10-CM

## 2017-09-26 DIAGNOSIS — R06.09 DOE (DYSPNEA ON EXERTION): Primary | ICD-10-CM

## 2017-09-26 DIAGNOSIS — E55.9 VITAMIN D DEFICIENCY: ICD-10-CM

## 2017-09-26 DIAGNOSIS — E78.5 DYSLIPIDEMIA: ICD-10-CM

## 2017-09-26 DIAGNOSIS — I47.1 SVT (SUPRAVENTRICULAR TACHYCARDIA) (HCC): ICD-10-CM

## 2017-09-26 DIAGNOSIS — J44.9 CHRONIC OBSTRUCTIVE PULMONARY DISEASE, UNSPECIFIED COPD TYPE (HCC): ICD-10-CM

## 2017-09-26 NOTE — MR AVS SNAPSHOT
Visit Information Date & Time Provider Department Dept. Phone Encounter #  
 9/26/2017  2:00 PM Merry Reeves MD [facility] [place] [phone] [id number] Follow-up Instructions Return in about 6 months (around 3/26/2018). Upcoming Health Maintenance Date Due Hepatitis C Screening 1950 DTaP/Tdap/Td series (1 - Tdap) 5/7/1973 FOBT Q 1 YEAR AGE 50-75 5/7/2002 ZOSTER VACCINE AGE 60> 3/7/2012 GLAUCOMA SCREENING Q2Y 5/7/2017 MEDICARE YEARLY EXAM 5/7/2017 INFLUENZA AGE 9 TO ADULT 8/1/2017 Pneumococcal 65+ Low/Medium Risk (2 of 2 - PPSV23) 12/14/2017 Allergies as of 9/26/2017  Review Complete On: 9/26/2017 By: Lotus Castañeda LPN No Known Allergies Current Immunizations  Reviewed on 2/14/2014 Name Date Influenza Vaccine 12/14/2013 Pneumococcal Vaccine (Unspecified Type) 12/14/2012 Not reviewed this visit You Were Diagnosed With   
  
 Codes Comments Coronary artery disease involving native coronary artery of native heart without angina pectoris    -  Primary ICD-10-CM: I25.10 ICD-9-CM: 414.01 Atrial flutter, paroxysmal (HCC)     ICD-10-CM: I48.92 
ICD-9-CM: 427.32   
 SVT (supraventricular tachycardia) (HCC)     ICD-10-CM: I47.1 ICD-9-CM: 427.89 Dyslipidemia     ICD-10-CM: E78.5 ICD-9-CM: 272.4 Vitamin D deficiency     ICD-10-CM: E55.9 ICD-9-CM: 268.9 Chronic obstructive pulmonary disease, unspecified COPD type ([facility]     ICD-10-CM: J44.9 ICD-9-CM: 469 Vitals BP Pulse Resp Height(growth percentile) Weight(growth percentile) SpO2  
 128/82 (BP 1 Location: Left arm, BP Patient Position: Sitting) 62 16 5' 10\" (1.778 m) 165 lb (74.8 kg) 98% BMI Smoking Status 23.68 kg/m2 Former Smoker BMI and BSA Data Body Mass Index Body Surface Area  
 23.68 kg/m 2 1.92 m 2 Preferred Pharmacy Pharmacy Name Phone [facility] [address] [place] [facility] [phone] Your Updated Medication List  
  
   
This list is accurate as of: 9/26/17  2:34 PM.  Always use your most recent med list.  
  
  
  
  
 albuterol 2.5 mg /3 mL (0.083 %) nebulizer solution Commonly known as:  PROVENTIL VENTOLIN  
2.5 mg by Nebulization route every four (4) hours as needed for Wheezing or Shortness of Breath. aspirin 81 mg chewable tablet Take 1 Tab by mouth daily. atorvastatin 40 mg tablet Commonly known as:  LIPITOR  
TAKE 1 TABLET NIGHTLY  
  
 BROVANA 15 mcg/2 mL Nebu neb solution Generic drug:  arformoterol 15 mcg by Nebulization route two (2) times a day. budesonide 0.5 mg/2 mL Nbsp Commonly known as:  PULMICORT  
500 mcg by Nebulization route two (2) times a day. dilTIAZem  mg ER capsule Commonly known as:  CARTIA XT  
TAKE 1 CAPSULE DAILY FISH OIL 1,000 mg Cap Generic drug:  omega-3 fatty acids-vitamin e Take 3 Caps by mouth daily. Two capsules in the morning and one capsule in the evening.  
  
 montelukast 10 mg tablet Commonly known as:  SINGULAIR Take 10 mg by mouth daily. MUCINEX 1,200 mg Ta12 ER tablet Generic drug:  guaiFENesin Take 1,200 mg by mouth two (2) times a day. multivitamin tablet Commonly known as:  ONE A DAY Take 1 tablet by mouth daily. prasugrel 10 mg tablet Commonly known as:  EFFIENT TAKE 1 TABLET DAILY [address] [place] [address] Take 1 Puff by inhalation daily. VITAMIN D3 2,000 unit Tab Generic drug:  cholecalciferol (vitamin D3) Take  by mouth two (2) times a day. We Performed the Following AMB POC EKG ROUTINE W/ 12 LEADS, INTER & REP [19761 CPT(R)] Follow-up Instructions Return in about 6 months (around 3/26/2018). Introducing [facility] & HEALTH SERVICES! Dear Sahil Chu: Thank you for requesting a [employer] account. Our records indicate that you already have an active [employer] account. You can access your account anytime at [email or web address] Did you know that you can access your hospital and ER discharge instructions at any time in [employer]? You can also review all of your test results from your hospital stay or ER visit. Additional Information If you have questions, please visit the Frequently Asked Questions section of the [employer] website at [email or web address] Remember, [employer] is NOT to be used for urgent needs. For medical emergencies, dial 911. Now available from your iPhone and Android! Please provide this summary of care documentation to your next provider. Your primary care clinician is listed as Jose Luis London. If you have any questions after today's visit, please call [phone].

## 2017-09-26 NOTE — PROGRESS NOTES
Progress Note      Pedro Jean is a 74 y.o. male. Last seen 6 months ago. Problem List  Date Reviewed: 4/21/2017          Codes Class Noted    Atrial flutter, paroxysmal ([facility] ICD-10-CM: I48.92  ICD-9-CM: 427.32  3/31/2016        SVT (supraventricular tachycardia) ([facility]) ICD-10-CM: I47.1  ICD-9-CM: 427.89  6/24/2015        Vitamin D deficiency ICD-10-CM: E55.9  ICD-9-CM: 268.9  3/27/2014        Dyslipidemia ICD-10-CM: E78.5  ICD-9-CM: 272.4  3/27/2014        CAD (coronary artery disease), native coronary artery ICD-10-CM: I25.10  ICD-9-CM: 414.01  3/12/2014        COPD (chronic obstructive pulmonary disease) ([facility] ICD-10-CM: J44.9  ICD-9-CM: 496  2/27/2014              Cardiac testing:  CATH: 2/12/2014: L Main: MLI, LAD: Mid 50%; MLI; Med size; D1 and D2 - MLI (small),   LCflex: Med ; Prox 40%; Mid 40% OM1 - med; distally OM1 divides to 3 branches (prox branch - med)- distal 2 branches - small; RCA: Med; Mid 99%; Distally 40% PDA and PLB - small to med, LVEDP: 11. LVEF: 45%; Ant HK, Inf HK, no signif grad across AV   --- MARCO (Resolute 3 x 22) -> RCA   ECHO: 2/12/14: EF 50%, mild HK basal-mid anteroseptal and basal-mid inferior wall(s). mild MR/TR     Health fair screening 3/13/15, carotid duplex showed mild stenosis, aortic duplex normal, ZENON normal, EKG normal   Echo 6/24/15 - EF 55%, normal wall motion   Holter 7/23/15 - SR , rare episodes of AFL/AF  CT Heart Scan - Calcium score 2041, incidental pulmonary findings (see full report). Echo 4/3/17 - EF 55%. No WMA. Grade 1 diastolic dysfunction. HPI     He has chronic, progressively worsening PATEL due to COPD which can wax and wane. He occasionally wheezes. He occasionally has chest pain at rest for which will resolve after taking ASA. He has not been exercising and notes he was released from pulmonary rehab in the past after having a \"bad episode. \" He is limited from most activity by PATEL.  Patient denies any exertional chest pain, palpitations, syncope, orthopnea, edema or paroxysmal nocturnal dyspnea. COPD followed by Dr. Emy Vera. Next appointment is scheduled for January. Current Outpatient Prescriptions   Medication Sig    prasugrel (EFFIENT) 10 mg tablet TAKE 1 TABLET DAILY    arformoterol (BROVANA) 15 mcg/2 mL nebu neb solution 15 mcg by Nebulization route two (2) times a day.  budesonide (PULMICORT) 0.5 mg/2 mL nbsp 500 mcg by Nebulization route two (2) times a day.  montelukast (SINGULAIR) 10 mg tablet Take 10 mg by mouth daily.  atorvastatin (LIPITOR) 40 mg tablet TAKE 1 TABLET NIGHTLY    dilTIAZem CD (CARTIA XT) 240 mg ER capsule TAKE 1 CAPSULE DAILY    omega-3 fatty acids-vitamin e (FISH OIL) 1,000 mg cap Take 3 Caps by mouth daily. Two capsules in the morning and one capsule in the evening.  cholecalciferol, vitamin D3, (VITAMIN D3) 2,000 unit tab Take  by mouth two (2) times a day.  multivitamin (ONE A DAY) tablet Take 1 tablet by mouth daily.  albuterol (PROVENTIL VENTOLIN) 2.5 mg /3 mL (0.083 %) nebulizer solution 2.5 mg by Nebulization route every four (4) hours as needed for Wheezing or Shortness of Breath.  aspirin 81 mg chewable tablet Take 1 Tab by mouth daily.  guaiFENesin (MUCINEX) 1,200 mg TM12 ER tablet Take 1,200 mg by mouth two (2) times a day.  TIOTROPIUM BROMIDE (SPIRIVA WITH HANDIHALER IN) Take 1 Puff by inhalation daily. No current facility-administered medications for this visit. No Known Allergies     Review of Systems   Constitutional: Negative for weight loss, malaise/fatigue and diaphoresis. Respiratory: Positive for chronic PATEL. Negative for cough, hemoptysis, sputum production, and wheezing. Cardiovascular: Negative for palpitations, orthopnea, claudication, leg swelling and PND. Positive for chest pain. Gastrointestinal: Negative for heartburn, nausea, vomiting, blood in stool and melena. Genitourinary: Negative for dysuria, hematuria and flank pain. Musculoskeletal: Negative for back pain and joint pain. Neurological: Negative for dizziness, focal weakness, seizures, loss of consciousness, weakness and headaches. Endo/Heme/Allergies: Does not bruise/bleed easily. Psychiatric/Behavioral: Negative for mood disorders. Visit Vitals    /82 (BP 1 Location: Left arm, BP Patient Position: Sitting)    Pulse 62    Resp 16    Ht 5' 10\" (1.778 m)    Wt 165 lb (74.8 kg)    SpO2 98%    BMI 23.68 kg/m2      Wt Readings from Last 3 Encounters:   09/26/17 165 lb (74.8 kg)   04/21/17 161 lb 12.8 oz (73.4 kg)   03/21/17 158 lb (71.7 kg)     Physical Exam   Vitals reviewed. Constitutional: He is oriented to person, place, and time. He appears well-developed. HENT:    Head: Normocephalic. Neck: Neck supple. No JVD present. No tracheal deviation present. Heart: normal rate, regular rhythm, normal S1, S2, no murmurs, rubs, clicks or gallops. Pulses:Carotid pulses are 2+ on the right side, and 2+ on the left side. Pulmonary/Chest: Effort normal and breath sounds normal. He has no wheezes, rhonchi or rales. Abdominal: Soft. Bowel sounds are normal. No tenderness. He has no rebound. Musculoskeletal: He exhibits no edema. Neurological: He is alert and oriented to person, place, and time. Skin: Skin is warm and dry. Psychiatric: He has a normal mood and affect.     Labs drawn 9/11/17       High Risk Intermediate Risk Optimal   Total Cholesterol     159   LDL     62   HDL     84   TG     64   Non-HDL     75   Apo B     63   LDL-P     707   Small LDL-P     <200   sdLDL-C     16   Apo A-I     186   HDL-P     41.1   HDL2-C     36   Apo B: Apo A-I ratio     0.34   Lp(a)-P         Hs-CRP   2.1     Lp-PLA2     119   NT-proBNP 742       Apolipoprotein E         [id number]*3*4*         [id number]*17*         Factor V Leiden         Prothrombin Mutation         MTHFR         Vitamin D     49   Creatinine, serum     0.7   Campesterol 5.83 (hyper)       Sitosterol 3.24 (hyper)        Cholestanol 3.58 (hyper)       Desmosterol     < 0.50 (hyper)   Glucose     94   Free Fatty Acid 0.79       Insulin     4   Omega 3   7.4           Labs drawn 3/10/16    High Risk  Intermediate Risk  Optimal    Total Cholesterol      165    LDL      52    HDL      98    TG      51    Non-HDL      67    Apo B      43    LDL-P      450    Small LDL-P          sdLDL-C      15    Apo A-I      208    HDL-P      42.5    HDL2-C      46    Apo B: Apo A-I ratio      0.21    Lp(a)-P      <50    Lp Cholesterol          Myeloperoxidase          Lp-JD      129    Hs-CRP      0.4    Fibrinogen          proBNP      69    AspirinWorks          Apolipoprotein E          NPY1Z02*2*3*          NBH6H54*17*          Factor V Leiden          Prothrombin Mutation          Insulin  18        Free Fatty Acid      0.58    Glucose      84    HbA1c          Estimated Average Glucose          25-hydroxy-Vitamin D      58    TSH          Homocysteine      7    Creatinine, serum          Campesterol  5.83        Campesterol Ratio          Sitosterol  4.43        Sitosterol Ratio          Cholestanol      3.31    Cholestanol Ratio          Desmosterol      <0.50 (hypo)    Omega 3                     Cardiographics  EKG 6/12/14 - sinus with PAC's  EKG 12/18/14 - SR, isolated PVC, otherwise normal   EKG 9/13/16 - SR 68  EKG 9/26/17- SR 63    ASSESSMENT and PLAN  Encounter Diagnoses   Name Primary?  Atrial flutter, paroxysmal ([address]     Coronary artery disease involving native coronary artery of native heart without angina pectoris Yes    SVT (supraventricular tachycardia) (HCC)     Dyslipidemia     Vitamin D deficiency     Chronic obstructive pulmonary disease, unspecified COPD type ([address]     PATEL (dyspnea on exertion)       Mr. Coral Marrero has known CAD with MI 2014 treated with RCA stenting. He has no clearcut symptoms of angina but has PATEL attributed to advanced COPD. Echo April 2017 demonstrated normal LV function.  ProBNP remains chronically elevated although he has no hx of HF. Will evaluate ischemic potential with dobutamine cardiolite in the near future. Advanced lipid testing demonstrates optimized lipids and lipoproteins. He has persistent sterol hyperabsorption. Insulin markers remain normal. Repeat numbers in 6 months. We discussed his COPD. He has pulmonary follow up with Dr. Suman Smith in January. Pulmonary rehab in the past has not been particularly helpful. Phone follow up after reviewing tests. Follow-up Disposition:  Return in about 6 months (around 3/26/2018). [facility] labs with visit.      Written by Rayshawn Pang, as dictated by Viridiana Maciel MD.   Viridiana Maciel MD

## 2017-09-30 RX ORDER — PRASUGREL 10 MG/1
TABLET, FILM COATED ORAL
Qty: 90 TAB | Refills: 3 | Status: SHIPPED | OUTPATIENT
Start: 2017-09-30 | End: 2017-10-17 | Stop reason: ALTCHOICE

## 2017-10-05 ENCOUNTER — TELEPHONE (OUTPATIENT)
Dept: CARDIOLOGY CLINIC | Age: 67
End: 2017-10-05

## 2017-10-12 ENCOUNTER — CLINICAL SUPPORT (OUTPATIENT)
Dept: CARDIOLOGY CLINIC | Age: 67
End: 2017-10-12

## 2017-10-12 VITALS — BODY MASS INDEX: 23.68 KG/M2 | WEIGHT: 165 LBS

## 2017-10-12 DIAGNOSIS — R06.09 DOE (DYSPNEA ON EXERTION): Primary | ICD-10-CM

## 2017-10-12 DIAGNOSIS — J44.9 CHRONIC OBSTRUCTIVE PULMONARY DISEASE, UNSPECIFIED COPD TYPE (HCC): ICD-10-CM

## 2017-10-12 DIAGNOSIS — I25.10 CORONARY ARTERY DISEASE INVOLVING NATIVE CORONARY ARTERY OF NATIVE HEART WITHOUT ANGINA PECTORIS: ICD-10-CM

## 2017-10-12 RX ORDER — DOBUTAMINE HYDROCHLORIDE 200 MG/100ML
0-10 INJECTION INTRAVENOUS ONCE
Qty: 10 ML | Refills: 0
Start: 2017-10-12 | End: 2017-10-12

## 2017-10-12 NOTE — PROGRESS NOTES
See scanned report. Dr. Beto Valencia ordered study and Dr. Beto Valencia read study. ID verified per protocol. Test and risks explained. Consent signed after all patient questions answered. Per protocol, Dobutamine started 10 mcg/kg/min and ended at 30 mcg/kg/min. Atropine 0.5 mgs IV given. Patient tolerated test without symptoms. At 8 minutes in recovey, patient without symptoms or complaints voiced. Did utilize o2 2L/M NC during test- uses O2 at home.

## 2017-10-13 ENCOUNTER — CLINICAL SUPPORT (OUTPATIENT)
Dept: CARDIOLOGY CLINIC | Age: 67
End: 2017-10-13

## 2017-10-13 DIAGNOSIS — I25.10 CORONARY ARTERY DISEASE INVOLVING NATIVE CORONARY ARTERY OF NATIVE HEART WITHOUT ANGINA PECTORIS: Primary | ICD-10-CM

## 2017-10-13 DIAGNOSIS — I47.1 SVT (SUPRAVENTRICULAR TACHYCARDIA) (HCC): ICD-10-CM

## 2017-10-13 DIAGNOSIS — J44.9 CHRONIC OBSTRUCTIVE PULMONARY DISEASE, UNSPECIFIED COPD TYPE (HCC): ICD-10-CM

## 2017-10-13 DIAGNOSIS — R06.09 DOE (DYSPNEA ON EXERTION): ICD-10-CM

## 2017-10-13 DIAGNOSIS — I48.92 ATRIAL FLUTTER, PAROXYSMAL (HCC): ICD-10-CM

## 2017-10-13 DIAGNOSIS — E78.5 DYSLIPIDEMIA: ICD-10-CM

## 2017-10-16 ENCOUNTER — TELEPHONE (OUTPATIENT)
Dept: CARDIOLOGY CLINIC | Age: 67
End: 2017-10-16

## 2017-10-16 DIAGNOSIS — I48.92 ATRIAL FLUTTER, PAROXYSMAL (HCC): Primary | ICD-10-CM

## 2017-10-16 NOTE — TELEPHONE ENCOUNTER
Cardiac testing  CATH: 2/12/2014: L Main: MLI, LAD: Mid 50%; MLI; Med size; D1 and D2 - MLI (small),   LCflex: Med ; Prox 40%; Mid 40% OM1 - med; distally OM1 divides to 3 branches (prox branch - med)- distal 2 branches - small; RCA: Med; Mid 99%; Distally 40% PDA and PLB - small to med, LVEDP: 11. LVEF: 45%; Ant HK, Inf HK, no signif grad across AV   --- MARCO (Resolute 3 x 22) -> RCA   ECHO: 2/12/14: EF 50%, mild HK basal-mid anteroseptal and basal-mid inferior wall(s). mild MR/TR     Health fair screening 3/13/15, carotid duplex showed mild stenosis, aortic duplex normal, ZENON normal, EKG normal   Echo 6/24/15 - EF 55%, normal wall motion   Holter 7/23/15 - SR , rare episodes of AFL/AF  CT Heart Scan - Calcium score 2041, incidental pulmonary findings (see full report). Echo 4/3/17 - EF 55%. No WMA. Grade 1 diastolic dysfunction. Dobutamine cardiolite 10/12/17 - normal perfusion, EF 61%, but new AF noted at rest, persistent with stress      No ischemia, normal EF but new AF, asx. Echo April was normal with normal atrial dimensions. Favor stroke prevention with NOAC - would replace Effient with NOAC, continue aspirin 81/d    Message left for patient to call.

## 2017-10-17 NOTE — TELEPHONE ENCOUNTER
I spoke with Mr. Barbara Stewart re: stress test results. Notified him of normal perfusion study. Normal EF. Found to be in AF at baseline and then during stress test. Discussed this finding with him - he notes that he has been told that he has had atrial flutter back when he had a heart attack in 2014. Never on AC. Denies any bleeding or bruising concerns on ASA and Effient since that time. Discussed Dr. Chasidy Bassett recommendations to begin Eliquis 5 mg BID. Will discontinue Effient at that time. Continue ASA 81 mg daily. Advised him to continue to monitor for any increased in bruising or abnormal bleeding. He voices understanding of the plan. Will submit Rx to [facility] pharmacy. Lab slip mailed to Mr. Barbara Stewart for baseline CBC. Last evaluated in 2015. Will arrange for him to return to see us in the office in 3 months time with another repeat CBC at that time. Advised him to call with any questions or concerns prior to that time.

## 2017-11-10 LAB
ERYTHROCYTE [DISTWIDTH] IN BLOOD BY AUTOMATED COUNT: 14.7 % (ref 12.3–15.4)
HCT VFR BLD AUTO: 40.5 % (ref 37.5–51)
HGB BLD-MCNC: 13.6 G/DL (ref 12.6–17.7)
MCH RBC QN AUTO: 32.2 PG (ref 26.6–33)
MCHC RBC AUTO-ENTMCNC: 33.6 G/DL (ref 31.5–35.7)
MCV RBC AUTO: 96 FL (ref 79–97)
PLATELET # BLD AUTO: 246 X10E3/UL (ref 150–379)
RBC # BLD AUTO: 4.23 X10E6/UL (ref 4.14–5.8)
WBC # BLD AUTO: 8 X10E3/UL (ref 3.4–10.8)

## 2017-11-16 ENCOUNTER — TELEPHONE (OUTPATIENT)
Dept: CARDIOLOGY CLINIC | Age: 67
End: 2017-11-16

## 2017-11-16 NOTE — TELEPHONE ENCOUNTER
Kaveh Linares, ROSS Dixon, RUFINO                   Please notify Mr. Cosme Lopez that CBC looks nice and stable.  Continue newly Rx Eliquis and ASA.  Follow up in 3 months with a repeat CBC 1-2 weeks prior to that visit as well.  Remind him to continue to monitor for any bleeding concerns and call us with any issues prior to his return.         Patient notified. He voices understanding.

## 2017-12-22 ENCOUNTER — TELEPHONE (OUTPATIENT)
Dept: CARDIOLOGY CLINIC | Age: 67
End: 2017-12-22

## 2017-12-22 DIAGNOSIS — I48.92 ATRIAL FLUTTER, PAROXYSMAL (HCC): ICD-10-CM

## 2017-12-22 NOTE — TELEPHONE ENCOUNTER
Patient called in stating that his insurance company needs his [facility] [facility] [facility] Insurance company won't let them transfer.   Phone [phone]

## 2017-12-26 RX ORDER — DILTIAZEM HYDROCHLORIDE 240 MG/1
CAPSULE, COATED, EXTENDED RELEASE ORAL
Qty: 90 CAP | Refills: 3 | Status: SHIPPED | OUTPATIENT
Start: 2017-12-26 | End: 2018-12-05 | Stop reason: SDUPTHER

## 2017-12-26 RX ORDER — ATORVASTATIN CALCIUM 40 MG/1
TABLET, FILM COATED ORAL
Qty: 90 TAB | Refills: 3 | Status: SHIPPED | OUTPATIENT
Start: 2017-12-26 | End: 2019-01-29 | Stop reason: SDUPTHER

## 2018-01-09 DIAGNOSIS — I48.92 ATRIAL FLUTTER, PAROXYSMAL (HCC): ICD-10-CM

## 2018-04-10 LAB
% ALBUMIN, 58A: 66 % (ref 54–71)
ALB/GLOBRATIO, 58C: 1.9 (ref 1.15–2.5)
ALBUMIN SERPL-MCNC: 4.5 G/DL (ref 3.7–5.1)
ALP SERPL-CCNC: 98 U/L (ref 35–117)
ALT SERPL-CCNC: 18 U/L
ANION GAP SERPL CALC-SCNC: 12 MMOL/L (ref 6–18)
APOLIPOPROTEIN A-1, 6: 169 MG/DL
APOLIPOPROTEIN B , 48: 58 MG/DL
AST SERPL W P-5'-P-CCNC: 18 U/L (ref 5–40)
BILIRUB SERPL-MCNC: 0.8 MG/DL
BUN SERPL-MCNC: 7 MG/DL (ref 6–20)
BUN/CREATININE RATIO,BUCR: 10 (ref 10–27)
CALCIUM SERPL-MCNC: 10.1 MG/DL (ref 8.8–10.5)
CHLORIDE SERPL-SCNC: 98 MMOL/L (ref 98–110)
CHOLEST SERPL-MCNC: 150 MG/DL
CO2 SERPL-SCNC: 31 MMOL/L (ref 19–31)
CREAT SERPL-MCNC: 0.7 MG/DL (ref 0.7–1.2)
CRP SERPL HS-MCNC: 1.1 MG/L
FFA FREE FATTY ACIDS, 64: 0.43 MMOL/L
GLOBCALC, 58B: 2.4 G/DL (ref 1.9–3.5)
GLUCOSE SERPL-MCNC: 90 MG/DL (ref 70–99)
HDL 2 SUBCLASS, 65: 40 MG/DL
HDLC SERPL-MCNC: 83 MG/DL
INSULIN,INS: 4 UU/ML (ref 3–9)
LDLC SERPL CALC-MCNC: 56 MG/DL
LP-PLA2, 123241: 91 NG/ML
NON-HDL CHOLESTEROL, 011976: 67 MG/DL
POTASSIUM SERPL-SCNC: 4.2 MMOL/L (ref 3.5–5.3)
PRO BNP NT,BNPNT: 449 PG/ML
PROT SERPL-MCNC: 6.8 G/DL (ref 6.1–8)
SMALL DENSE LDL, 47: 16 MG/DL
SODIUM SERPL-SCNC: 141 MMOL/L (ref 133–145)
TRIGL SERPL-MCNC: 61 MG/DL (ref ?–150)
VIT D 25-HYDROXY, VDLT: 64 NG/ML (ref 30–100)

## 2018-04-11 LAB
AA2: 13.87 % (ref 10.5–23.3)
ALPHA LINOLEIC ACID N3, HDL1202: 0.13 % (ref 0.1–0.4)
CIS-MONO-UNSATURATED FATTY ACID TOTAL, HDL1205: 16.2 (ref 11.5–20.5)
DOCOSAHEXAENOIC ACID N3, HDL1208: 6.92 % (ref 0.1–8.4)
DOCOSAPENTAENOIC ACID N3, HDL1206: 2.46 % (ref 0.6–4.1)
DOCOSAPENTAENOIC ACID N6, HDL1207: 0.46 % (ref 0.1–1.3)
EPA2: 1.12 % (ref 0.1–2.5)
HDL HDL-P, HDL5001: 36.6 UMOL/L
HDL LDL-P, HDL5000: 509 NMOL/L
HDL SLDL-P, HDL5002: < 200 NMOL/L
LINOLEIC ACID C6, HDL1216: 10.36 % (ref 4.6–21.3)
OMEGA-3 FATTY ACID TOTAL, HDL1220: 10.6 (ref 0.1–14.1)
OMEGA-3 INDEX, HDL1219: 8 (ref 0.1–10.4)
OMEGA-6 FATTY ACID TOTAL, HDL1222: 28.7 (ref 28.6–44.5)
SATURATED FATTY ACID TOTAL, HDL1226: 43.7 (ref 36.6–42)
TRANS FATTY ACID TOTAL, HDL1232: 0.7 (ref 0.1–1.8)
TRANSLINOLEIC ACID, HDL1229: <0.1 %
TRANSOLEIC ACID, HDL1230: 0.52 % (ref 0.1–1.3)

## 2018-04-12 LAB
CAMPESTEROL, HDL1302: 4.67 UG/ML (ref 2.11–4.43)
CHOLESTANOL, HDL1304: 3.29 UG/ML (ref 2.02–3.47)
DESMOSTEROL, HDL1306: < 0.5 UG/ML
SITOSTEROL, HDL1300: 2.68 UG/ML (ref 1.43–3.17)

## 2018-04-24 ENCOUNTER — DOCUMENTATION ONLY (OUTPATIENT)
Dept: CARDIOLOGY CLINIC | Age: 68
End: 2018-04-24

## 2018-04-24 NOTE — PROGRESS NOTES
Labs drawn 4/9/18       High Risk Intermediate Risk Optimal   Total Cholesterol   150   LDL   56   HDL   83   TG   61   Non-HDL   67   Apo B   58   LDL-P   509   Small LDL-P   <200   sdLDL-C   16   Apo A-I   169   HDL-P  36.6    HDL2-C   40   Apo B: Apo A-I ratio      Lp(a)-P      Hs-CRP  1.1    Lp-PLA2   91   NT-proBNP  449    Apolipoprotein E      [id number]*1*1*      [id number]*17*      Factor V Leiden      Prothrombin Mutation      MTHFR      25-hydroxy-Vitamin D   64   Creatinine, serum   0.7   Campesterol 4.67     Sitosterol   2.68   Cholestanol   3.29   Desmosterol   <0.50(hypo)   Glucose   90   Free Fatty Acid   0.43   Insulin   4   Omega 3  8.0

## 2018-06-01 ENCOUNTER — OFFICE VISIT (OUTPATIENT)
Dept: CARDIOLOGY CLINIC | Age: 68
End: 2018-06-01

## 2018-06-01 VITALS
RESPIRATION RATE: 16 BRPM | DIASTOLIC BLOOD PRESSURE: 78 MMHG | OXYGEN SATURATION: 95 % | HEIGHT: 70 IN | SYSTOLIC BLOOD PRESSURE: 130 MMHG | WEIGHT: 155.4 LBS | HEART RATE: 88 BPM | BODY MASS INDEX: 22.25 KG/M2

## 2018-06-01 DIAGNOSIS — I47.1 SVT (SUPRAVENTRICULAR TACHYCARDIA) (HCC): Primary | ICD-10-CM

## 2018-06-01 DIAGNOSIS — E78.5 DYSLIPIDEMIA: ICD-10-CM

## 2018-06-01 DIAGNOSIS — E55.9 VITAMIN D DEFICIENCY: ICD-10-CM

## 2018-06-01 DIAGNOSIS — I25.10 CORONARY ARTERY DISEASE INVOLVING NATIVE CORONARY ARTERY OF NATIVE HEART WITHOUT ANGINA PECTORIS: ICD-10-CM

## 2018-06-01 DIAGNOSIS — I48.92 ATRIAL FLUTTER, PAROXYSMAL (HCC): ICD-10-CM

## 2018-06-01 DIAGNOSIS — I48.91 ATRIAL FIBRILLATION, UNSPECIFIED TYPE (HCC): ICD-10-CM

## 2018-06-01 DIAGNOSIS — I25.10 CORONARY ARTERY DISEASE INVOLVING NATIVE CORONARY ARTERY OF NATIVE HEART WITHOUT ANGINA PECTORIS: Primary | ICD-10-CM

## 2018-06-01 DIAGNOSIS — R07.89 OTHER CHEST PAIN: ICD-10-CM

## 2018-06-01 DIAGNOSIS — J44.9 CHRONIC OBSTRUCTIVE PULMONARY DISEASE, UNSPECIFIED COPD TYPE (HCC): ICD-10-CM

## 2018-06-01 RX ORDER — NITROGLYCERIN 0.4 MG/1
0.4 TABLET SUBLINGUAL
Qty: 1 BOTTLE | Refills: 6 | Status: SHIPPED | OUTPATIENT
Start: 2018-06-01 | End: 2020-09-28 | Stop reason: SDUPTHER

## 2018-06-01 NOTE — PATIENT INSTRUCTIONS
Please keep a close eye on your weight loss. This may be something to discuss with Dr. Daniel Alves if it persists. Keep an eye on your frequency of chest pain. I am going to provide you with a new Rx for sublingual nitroglycerin. Use these tablets, as per the bottle instructions, with any onset of chest pain. We will plan to see you again in 6 months but please call with any questions or concerns.

## 2018-06-01 NOTE — MR AVS SNAPSHOT
[address] [facility] [address] [facility] 
[phone] Patient: Johnna Hutson MRN: [id number] GED:[birth year] Visit Information Date & Time Provider Department Dept. Phone Encounter #  
 6/1/2018  2:40 PM Cruz Farfan MD [facility] Corinne Lyn [phone] [id number] Upcoming Health Maintenance Date Due Hepatitis C Screening 1950 DTaP/Tdap/Td series (1 - Tdap) 5/7/1973 FOBT Q 1 YEAR AGE 50-75 5/7/2002 ZOSTER VACCINE AGE 60> 3/7/2012 GLAUCOMA SCREENING Q2Y 5/7/2017 Pneumococcal 65+ Low/Medium Risk (2 of 2 - PPSV23) 12/14/2017 MEDICARE YEARLY EXAM 3/14/2018 Influenza Age 5 to Adult 8/1/2018 Allergies as of 6/1/2018  Review Complete On: 6/1/2018 By: Amita Blood NP No Known Allergies Current Immunizations  Reviewed on 2/14/2014 Name Date Influenza Vaccine 12/14/2013 Pneumococcal Vaccine (Unspecified Type) 12/14/2012 Not reviewed this visit You Were Diagnosed With   
  
 Codes Comments SVT (supraventricular tachycardia) ([facility]    -  Primary ICD-10-CM: I47.1 ICD-9-CM: 427.89 Coronary artery disease involving native coronary artery of native heart without angina pectoris     ICD-10-CM: I25.10 ICD-9-CM: 414.01 Dyslipidemia     ICD-10-CM: E78.5 ICD-9-CM: 272.4 Vitamin D deficiency     ICD-10-CM: E55.9 ICD-9-CM: 268.9 Chronic obstructive pulmonary disease, unspecified COPD type ([facility]     ICD-10-CM: J44.9 ICD-9-CM: 538 Atrial flutter, paroxysmal (HCC)     ICD-10-CM: I48.92 
ICD-9-CM: 427.32 Atrial fibrillation, unspecified type ([facility]     ICD-10-CM: I48.91 
ICD-9-CM: 427.31 Vitals BP Pulse Resp Height(growth percentile) Weight(growth percentile) SpO2  
 130/78 (BP 1 Location: Left arm, BP Patient Position: Sitting) 88 16 5' 10\" (1.778 m) 155 lb 6.4 oz (70.5 kg) 95% BMI Smoking Status 22.3 kg/m2 Former Smoker Vitals History BMI and BSA Data Body Mass Index Body Surface Area  
 22.3 kg/m 2 1.87 m 2 Preferred Pharmacy Pharmacy Name Phone [facility] [address] [facility] Kelsi Nair, [address] [place] [address] [phone] Your Updated Medication List  
  
   
This list is accurate as of 6/1/18  3:18 PM.  Always use your most recent med list.  
  
  
  
  
 albuterol 2.5 mg /3 mL (0.083 %) nebulizer solution Commonly known as:  PROVENTIL VENTOLIN  
2.5 mg by Nebulization route every four (4) hours as needed for Wheezing or Shortness of Breath. apixaban 5 mg tablet Commonly known as:  Amanda Haver Take 1 Tab by mouth two (2) times a day. Indications: PULMONARY THROMBOEMBOLISM PREVENTION  
  
 aspirin 81 mg chewable tablet Take 1 Tab by mouth daily. atorvastatin 40 mg tablet Commonly known as:  LIPITOR  
TAKE 1 TABLET NIGHTLY  
  
 BROVANA 15 mcg/2 mL Nebu neb solution Generic drug:  arformoterol 15 mcg by Nebulization route two (2) times a day. budesonide 0.5 mg/2 mL Nbsp Commonly known as:  PULMICORT  
500 mcg by Nebulization route two (2) times a day. dilTIAZem  mg ER capsule Commonly known as:  CARTIA XT  
TAKE 1 CAPSULE DAILY FISH OIL 1,000 mg Cap Generic drug:  omega-3 fatty acids-vitamin e Take 3 Caps by mouth daily. Two capsules in the morning and one capsule in the evening.  
  
 montelukast 10 mg tablet Commonly known as:  SINGULAIR Take 10 mg by mouth daily. MUCINEX 1,200 mg Ta12 ER tablet Generic drug:  guaiFENesin Take 1,200 mg by mouth two (2) times a day. multivitamin tablet Commonly known as:  ONE A DAY Take 1 tablet by mouth daily. [address] [place], [place] [address]way Take 1 Puff by inhalation daily. VITAMIN D3 2,000 unit Tab Generic drug:  cholecalciferol (vitamin D3) Take  by mouth two (2) times a day. Patient Instructions Please keep a close eye on your weight loss. This may be something to discuss with Dr. Andra Chambers if it persists. Keep an eye on your frequency of chest pain. I am going to provide you with a new Rx for sublingual nitroglycerin. Use these tablets, as per the bottle instructions, with any onset of chest pain. We will plan to see you again in 6 months but please call with any questions or concerns. Introducing [facility]! Dear Anali Nichols: Thank you for requesting a [employer] account. Our records indicate that you already have an active [employer] account. You can access your account anytime at [email or web address] Did you know that you can access your hospital and ER discharge instructions at any time in [employer]? You can also review all of your test results from your hospital stay or ER visit. Additional Information If you have questions, please visit the Frequently Asked Questions section of the [employer] website at [email or web address] Remember, [employer] is NOT to be used for urgent needs. For medical emergencies, dial 911. Now available from your iPhone and Android! Please provide this summary of care documentation to your next provider. Your primary care clinician is listed as Kenneth Marin. If you have any questions after today's visit, please call [phone].

## 2018-06-01 NOTE — PROGRESS NOTES
Progress Note      Jd Alexis is a 79 y.o. male. Last seen 9 months ago. Problem List  Date Reviewed: 6/1/2018          Codes Class Noted    Atrial flutter, paroxysmal ([facility] ICD-10-CM: I48.92  ICD-9-CM: 427.32  3/31/2016        SVT (supraventricular tachycardia) ([facility]) ICD-10-CM: I47.1  ICD-9-CM: 427.89  6/24/2015        Vitamin D deficiency ICD-10-CM: E55.9  ICD-9-CM: 268.9  3/27/2014        Dyslipidemia ICD-10-CM: E78.5  ICD-9-CM: 272.4  3/27/2014        CAD (coronary artery disease), native coronary artery ICD-10-CM: I25.10  ICD-9-CM: 414.01  3/12/2014        COPD (chronic obstructive pulmonary disease) ([facility] ICD-10-CM: J44.9  ICD-9-CM: 496  2/27/2014              Cardiac testing:  CATH: 2/12/2014: L Main: MLI, LAD: Mid 50%; MLI; Med size; D1 and D2 - MLI (small),   LCflex: Med ; Prox 40%; Mid 40% OM1 - med; distally OM1 divides to 3 branches (prox branch - med)- distal 2 branches - small; RCA: Med; Mid 99%; Distally 40% PDA and PLB - small to med, LVEDP: 11. LVEF: 45%; Ant HK, Inf HK, no signif grad across AV   --- MARCO (Resolute 3 x 22) -> RCA   ECHO: 2/12/14: EF 50%, mild HK basal-mid anteroseptal and basal-mid inferior wall(s). mild MR/TR     Health fair screening 3/13/15, carotid duplex showed mild stenosis, aortic duplex normal, ZENON normal, EKG normal   Echo 6/24/15 - EF 55%, normal wall motion   Holter 7/23/15 - SR , rare episodes of AFL/AF  CT Heart Scan - Calcium score 2041, incidental pulmonary findings (see full report). Echo 4/3/17 - EF 55%. No WMA. Grade 1 diastolic dysfunction. Dobutamine cardiolite 10/12/17 - normal perfusion, EF 61%, but new AF noted at rest, persistent with stress    HPI  Mr. Gloria continues to lead a mostly sedentary lifestyle due to significant PATEL in the setting of COPD. He continues to have \"good and bad days\". He reports intermittent wheezing. He occasionally has chest pain at rest for which will resolve after taking ASA. No exertional component.  No change in frequency or intensity in angina. He has not been exercising and notes he was released from pulmonary rehab in the past after having a \"bad episode. \"    He denies any palpitations, syncope, orthopnea, edema or paroxysmal nocturnal dyspnea. Notes a weight loss of 10 pounds since his last visit. He reports appetite is fair. States that his weight has been as low as 130 when he was first diagnosed with COPD. No nausea, vomiting or abdominal pain. Normal bowel pattern. He denies any bleeding or bruising concerns, now on Eliquis. COPD followed by Dr. Pretty Ascencio. Current Outpatient Prescriptions   Medication Sig    apixaban (ELIQUIS) 5 mg tablet Take 1 Tab by mouth two (2) times a day. Indications: PULMONARY THROMBOEMBOLISM PREVENTION    atorvastatin (LIPITOR) 40 mg tablet TAKE 1 TABLET NIGHTLY    dilTIAZem CD (CARTIA XT) 240 mg ER capsule TAKE 1 CAPSULE DAILY    arformoterol (BROVANA) 15 mcg/2 mL nebu neb solution 15 mcg by Nebulization route two (2) times a day.  budesonide (PULMICORT) 0.5 mg/2 mL nbsp 500 mcg by Nebulization route two (2) times a day.  montelukast (SINGULAIR) 10 mg tablet Take 10 mg by mouth daily.  omega-3 fatty acids-vitamin e (FISH OIL) 1,000 mg cap Take 3 Caps by mouth daily. Two capsules in the morning and one capsule in the evening.  cholecalciferol, vitamin D3, (VITAMIN D3) 2,000 unit tab Take  by mouth two (2) times a day.  multivitamin (ONE A DAY) tablet Take 1 tablet by mouth daily.  albuterol (PROVENTIL VENTOLIN) 2.5 mg /3 mL (0.083 %) nebulizer solution 2.5 mg by Nebulization route every four (4) hours as needed for Wheezing or Shortness of Breath.  aspirin 81 mg chewable tablet Take 1 Tab by mouth daily.  guaiFENesin (MUCINEX) 1,200 mg TM12 ER tablet Take 1,200 mg by mouth two (2) times a day.  TIOTROPIUM BROMIDE (SPIRIVA WITH HANDIHALER IN) Take 1 Puff by inhalation daily.      No current facility-administered medications for this visit. No Known Allergies     Review of Systems  Constitutional: Negative for weight loss, malaise/fatigue and diaphoresis. Respiratory: Positive for chronic PATEL. Negative for cough, hemoptysis, sputum production, and wheezing. Cardiovascular: Negative for palpitations, orthopnea, claudication, leg swelling and PND. Positive for chest pain. Gastrointestinal: Negative for heartburn, nausea, vomiting, blood in stool and melena. Genitourinary: Negative for dysuria, hematuria and flank pain. Musculoskeletal: Negative for back pain and joint pain. Neurological: Negative for dizziness, focal weakness, seizures, loss of consciousness, weakness and headaches. Endo/Heme/Allergies: Does not bruise/bleed easily. Psychiatric/Behavioral: Negative for mood disorders. Visit Vitals    /78 (BP 1 Location: Left arm, BP Patient Position: Sitting)    Pulse 88    Resp 16    Ht 5' 10\" (1.778 m)    Wt 155 lb 6.4 oz (70.5 kg)    SpO2 95%    BMI 22.3 kg/m2      Wt Readings from Last 3 Encounters:   06/01/18 155 lb 6.4 oz (70.5 kg)   10/12/17 165 lb (74.8 kg)   09/26/17 165 lb (74.8 kg)     Physical Exam  Vitals reviewed. Constitutional: He is oriented to person, place, and time. He appears well-developed. HENT:    Head: Normocephalic. Neck: Neck supple. No JVD present. No tracheal deviation present. Heart: normal rate, regular rhythm, normal S1, S2, no murmurs, rubs, clicks or gallops. Pulses:Carotid pulses are 2+ on the right side, and 2+ on the left side. Pulmonary/Chest: Effort normal and breath sounds normal. He has no wheezes, rhonchi or rales. Abdominal: Soft. Bowel sounds are normal. No tenderness. He has no rebound. Musculoskeletal: He exhibits no edema. Neurological: He is alert and oriented to person, place, and time. Skin: Skin is warm and dry. Psychiatric: He has a normal mood and affect.     Labs drawn 9/11/17    High Risk Intermediate Risk Optimal   Total Cholesterol     159   LDL     62   HDL     84   TG     64   Non-HDL     75   Apo B     63   LDL-P     707   Small LDL-P     <200   sdLDL-C     16   Apo A-I     186   HDL-P     41.1   HDL2-C     36   Apo B: Apo A-I ratio     0.34   Lp(a)-P         Hs-CRP   2.1     Lp-PLA2     119   NT-proBNP 742       Apolipoprotein E         [id number]*9*3*         [id number]*17*         Factor V Leiden         Prothrombin Mutation         MTHFR         Vitamin D     49   Creatinine, serum     0.7   Campesterol 5.83 (hyper)       Sitosterol 3.24 (hyper)        Cholestanol 3.58 (hyper)       Desmosterol     < 0.50 (hyper)   Glucose     94   Free Fatty Acid 0.79       Insulin     4   Omega 3   7.4        Labs drawn 4/9/18    High Risk Intermediate Risk Optimal   Total Cholesterol     150   LDL     56   HDL     83   TG     61   Non-HDL     67   Apo B     58   LDL-P     509   Small LDL-P     <200   sdLDL-C     16   Apo A-I     169   HDL-P   36.6     HDL2-C     40   Apo B: Apo A-I ratio         Lp(a)-P         Hs-CRP   1.1     Lp-PLA2     91   NT-proBNP   449     Apolipoprotein E         [id number]*2*0*         [id number]*17*         Factor V Leiden         Prothrombin Mutation         MTHFR         25-hydroxy-Vitamin D     64   Creatinine, serum     0.7   Campesterol 4.67       Sitosterol     2.68   Cholestanol     3.29   Desmosterol     <0.50(hypo)   Glucose     90   Free Fatty Acid     0.43   Insulin     4   Omega 3   8.0       Cardiographics  EKG 6/12/14 - sinus with PAC's  EKG 12/18/14 - SR, isolated PVC, otherwise normal   EKG 9/13/16 - SR 68  EKG 9/26/17- SR 63    ASSESSMENT and PLAN  Encounter Diagnoses   Name Primary?     SVT (supraventricular tachycardia) (HCC) Yes    Coronary artery disease involving native coronary artery of native heart without angina pectoris     Dyslipidemia     Vitamin D deficiency     Chronic obstructive pulmonary disease, unspecified COPD type ([address]     Atrial flutter, paroxysmal (HCC)     Atrial fibrillation, unspecified type [facility])       Mr. Cosme Lopez has known CAD with MI 2014 treated with RCA stenting. Pattern of non-exertional angina and PATEL is unchanged. Dobutamine Cardiolite 10/2017 showed normal perfusion. Continue ASA and statin therapy. Encouraged him to follow a heart healthy diet and remain as active as possible. Rx for SL nitroglycerin should his angina occur with exertion or not resolve with rest.  Reviewed dosing instructions and potential side effects. New onset AF, discovered during Cardiolite 10/2017. No s/sxs of recurrent AF. SR by exam today. Continue stroke prevention with Eliquis 5 mg BID. Repeat advanced lipid testing demonstrates optimized lipids and lipoproteins. 2/3 sterol markers have not normalized. Insulin markers remain normal. CPR has improved and is near normal.  ProBNP remains chronically elevated, likely now attributed to AF. Echo April 2017 demonstrated normal LV function. EF with Cardiolite 10/2017 was normal. Repeat numbers in 6 months. Encouraged him to closely monitor his weight loss and advised that he discuss this with other specialists. Follow-up Disposition:  Return in about 6 months (around 12/1/2018).     ROSS Thompson MD

## 2018-06-01 NOTE — PROGRESS NOTES
Chief Complaint   Patient presents with    Follow-up     3 mths,SVT,    Coronary Artery Disease    Cholesterol Problem    Irregular Heart Beat     paraoxysmal     1. Have you been to the ER, urgent care clinic since your last visit? Hospitalized since your last visit? No    2. Have you seen or consulted any other health care providers outside of the [facility] since your last visit? Include any pap smears or colon screening.  No    Visit Vitals    /78 (BP 1 Location: Left arm, BP Patient Position: Sitting)    Pulse 88    Resp 16    Ht 5' 10\" (1.778 m)    Wt 155 lb 6.4 oz (70.5 kg)    SpO2 95%    BMI 22.3 kg/m2

## 2018-06-05 PROBLEM — R07.89 OTHER CHEST PAIN: Status: ACTIVE | Noted: 2018-06-05

## 2018-07-26 ENCOUNTER — HOSPITAL ENCOUNTER (OUTPATIENT)
Dept: CT IMAGING | Age: 68
Discharge: HOME | End: 2018-07-26
Attending: NURSE PRACTITIONER
Payer: MEDICARE

## 2018-07-26 DIAGNOSIS — R06.02 SHORTNESS OF BREATH: ICD-10-CM

## 2018-07-26 PROCEDURE — 71250 CT THORAX DX C-: CPT

## 2018-11-21 LAB
% ALBUMIN, 58A: 67 % (ref 54–71)
ALB/GLOBRATIO, 58C: 2.06 (ref 1.15–2.5)
ALBUMIN SERPL-MCNC: 4.7 G/DL (ref 3.7–5.1)
ALP SERPL-CCNC: 103 U/L (ref 35–117)
ALT SERPL-CCNC: 19 U/L
ANION GAP SERPL CALC-SCNC: 18 MMOL/L (ref 6–18)
APOLIPOPROTEIN A-1, 6: 182 MG/DL
APOLIPOPROTEIN B , 48: 60 MG/DL
AST SERPL W P-5'-P-CCNC: 20 U/L (ref 5–40)
BILIRUB SERPL-MCNC: 0.8 MG/DL
BUN SERPL-MCNC: 10 MG/DL (ref 6–20)
BUN/CREATININE RATIO,BUCR: 13 (ref 10–27)
CALCIUM SERPL-MCNC: 10.3 MG/DL (ref 8.8–10.5)
CHLORIDE SERPL-SCNC: 99 MMOL/L (ref 98–110)
CO2 SERPL-SCNC: 25 MMOL/L (ref 19–31)
CREAT SERPL-MCNC: 0.7 MG/DL (ref 0.7–1.2)
EGFRAACREAT: 112 ML/MIN/1.73M^2
EGFRNACREAT: 96 ML/MIN/1.73M^2
FFA FREE FATTY ACIDS, 64: 0.51 MMOL/L
GLOBCALC, 58B: 2.3 G/DL (ref 1.9–3.5)
GLUCOSE SERPL-MCNC: 88 MG/DL (ref 70–99)
HOMOCYSTEINE,PLASMA,HOMCY: 7 UMOL/L
INSULIN,INS: 5 UU/ML (ref 3–9)
LP-PLA2 ACTIVITY: 105 (NMOL/MIN/ML) (ref 196–225)
MPOAU: 278 PMOL/L
POTASSIUM SERPL-SCNC: 4.4 MMOL/L (ref 3.5–5.3)
PRO BNP NT,BNPNT: 378 PG/ML
PROT SERPL-MCNC: 7 G/DL (ref 6.1–8)
SODIUM SERPL-SCNC: 143 MMOL/L (ref 133–145)

## 2018-11-23 LAB
CAMPESTEROL, HDL1302: 4.32 UG/ML (ref 2.11–4.43)
CHOLESTANOL, HDL1304: 3.27 UG/ML (ref 2.02–3.47)
DESMOSTEROL, HDL1306: < 0.5 UG/ML
HDL HDL-P, HDL5001: 39.5 UMOL/L
HDL LDL-P, HDL5000: 569 NMOL/L
HDL SLDL-P, HDL5002: <200 NMOL/L
LP(A)-P, HDL4000: 73 NMOL/L
SITOSTEROL, HDL1300: 2.31 UG/ML (ref 1.43–3.17)

## 2018-11-24 LAB
AA2: 14.58 % (ref 10.5–23.3)
ALPHA LINOLEIC ACID N3, HDL1202: <0.1 %
CIS-MONO-UNSATURATED FATTY ACID TOTAL, HDL1205: 16.8 (ref 11.5–20.5)
DOCOSAHEXAENOIC ACID N3, HDL1208: 7 % (ref 0.1–8.4)
DOCOSAPENTAENOIC ACID N3, HDL1206: 2.49 % (ref 0.6–4.1)
DOCOSAPENTAENOIC ACID N6, HDL1207: 0.54 % (ref 0.1–1.3)
EPA2: 1.13 % (ref 0.1–2.5)
LINOLEIC ACID C6, HDL1216: 10.25 % (ref 4.6–21.3)
OMEGA-3 FATTY ACID TOTAL, HDL1220: 10.7 (ref 0.1–14.1)
OMEGA-3 INDEX, HDL1219: 8.1 (ref 0.1–10.4)
OMEGA-6 FATTY ACID TOTAL, HDL1222: 29.8 (ref 28.6–44.5)
SATURATED FATTY ACID TOTAL, HDL1226: 42 (ref 36.6–42)
TRANS FATTY ACID TOTAL, HDL1232: 0.7 (ref 0.1–1.8)
TRANSLINOLEIC ACID, HDL1229: <0.1 %
TRANSOLEIC ACID, HDL1230: 0.45 % (ref 0.1–1.3)

## 2018-11-25 LAB
25(OH)D3 SERPL-MCNC: 65 NG/ML (ref 30–100)
CHOLEST SERPL-MCNC: 153 MG/DL
HDL2: 33 MG/DL
HDLC SERPL-MCNC: 87 MG/DL
HDLC SERPL-MCNC: 87 MG/DL
HSCRP-TX: 1 MG/L
LDL-CT: 52 MG/DL
N-HDL-C: 66 MG/DL
SDLDL: 15 MG/DL
TRIGL SERPL-MCNC: 69 MG/DL (ref ?–150)

## 2018-11-30 ENCOUNTER — HOSPITAL ENCOUNTER (OUTPATIENT)
Dept: PULMONOLOGY | Age: 68
Discharge: HOME | End: 2018-11-30
Attending: INTERNAL MEDICINE
Payer: MEDICARE

## 2018-11-30 DIAGNOSIS — I48.92 ATRIAL FLUTTER, PAROXYSMAL (HCC): ICD-10-CM

## 2018-11-30 DIAGNOSIS — J44.9 COPD, SEVERE (HCC): ICD-10-CM

## 2018-11-30 LAB
ARTERIAL PATENCY WRIST A: YES
BASE EXCESS BLDA CALC-SCNC: 6.2 MMOL/L
BDY SITE: ABNORMAL
GAS FLOW.O2 O2 DELIVERY SYS: 2 L/MIN
HCO3 BLDA-SCNC: 33 MMOL/L (ref 22–26)
PCO2 BLDA: 54 MMHG (ref 35–45)
PH BLDA: 7.4 [PH] (ref 7.35–7.45)
PO2 BLDA: 102 MMHG (ref 80–100)
SAO2 % BLD: 98 % (ref 92–97)
SAO2% DEVICE SAO2% SENSOR NAME: ABNORMAL
SPECIMEN SITE: ABNORMAL

## 2018-11-30 PROCEDURE — 36600 WITHDRAWAL OF ARTERIAL BLOOD: CPT

## 2018-11-30 PROCEDURE — 82803 BLOOD GASES ANY COMBINATION: CPT

## 2018-11-30 RX ORDER — APIXABAN 5 MG/1
TABLET, FILM COATED ORAL
Qty: 180 TAB | Refills: 0 | Status: SHIPPED | OUTPATIENT
Start: 2018-11-30 | End: 2019-03-01 | Stop reason: SDUPTHER

## 2018-12-03 ENCOUNTER — DOCUMENTATION ONLY (OUTPATIENT)
Dept: CARDIOLOGY CLINIC | Age: 68
End: 2018-12-03

## 2018-12-03 DIAGNOSIS — I48.91 ATRIAL FIBRILLATION, UNSPECIFIED TYPE (HCC): ICD-10-CM

## 2018-12-03 DIAGNOSIS — E78.5 DYSLIPIDEMIA: Primary | ICD-10-CM

## 2018-12-03 DIAGNOSIS — I25.10 CORONARY ARTERY DISEASE INVOLVING NATIVE CORONARY ARTERY OF NATIVE HEART WITHOUT ANGINA PECTORIS: ICD-10-CM

## 2018-12-03 NOTE — PROGRESS NOTES
Labs drawn 11/20/18       High Risk Intermediate Risk Optimal   Total Cholesterol   153   LDL   52   HDL   87   TG   69   Non-HDL   66   Apo B   60   LDL-P   569   Small LDL-P   <200   sdLDL-C   15   Apo A-I   182   HDL-P   39.5   HDL2-C   33   Apo B: Apo A-I ratio      Lp(a)-P   73   Hs-CRP  1.0    Lp-PLA2   105   Myeloperoxidase  278    NT-proBNP  378    Apolipoprotein E      [id number]*5*4*      [id number]*17*      Factor V Leiden      Prothrombin Mutation      MTHFR      25-hydroxy-Vitamin D   65   Homocysteine   7   Creatinine, serum   0.7   Campesterol   4.32   Sitosterol   2.31   Cholestanol   3.27   Desmosterol   <0.50 (hypo)   Glucose   88   Free Fatty Acid   0.51   Insulin   5   Omega 3   8.1

## 2018-12-03 NOTE — PROGRESS NOTES
Progress Note      Valentín Pyle is a 79 y.o. male. Last seen 6 months ago. Problem List  Date Reviewed: 12/6/2018          Codes Class Noted    Atrial flutter, paroxysmal ([facility] ICD-10-CM: I48.92  ICD-9-CM: 427.32  3/31/2016        SVT (supraventricular tachycardia) (HCC) ICD-10-CM: I47.1  ICD-9-CM: 427.89  6/24/2015        Vitamin D deficiency ICD-10-CM: E55.9  ICD-9-CM: 268.9  3/27/2014        Dyslipidemia ICD-10-CM: E78.5  ICD-9-CM: 272.4  3/27/2014        CAD (coronary artery disease), native coronary artery ICD-10-CM: I25.10  ICD-9-CM: 414.01  3/12/2014        COPD (chronic obstructive pulmonary disease) ([facility] ICD-10-CM: J44.9  ICD-9-CM: 665  2/27/2014        Atrial fibrillation ([facility] ICD-10-CM: I48.91  ICD-9-CM: 427.31  2/16/2014              Cardiac testing:  CATH: 2/12/2014: L Main: MLI, LAD: Mid 50%; MLI; Med size; D1 and D2 - MLI (small),   LCflex: Med ; Prox 40%; Mid 40% OM1 - med; distally OM1 divides to 3 branches (prox branch - med)- distal 2 branches - small; RCA: Med; Mid 99%; Distally 40% PDA and PLB - small to med, LVEDP: 11. LVEF: 45%; Ant HK, Inf HK, no signif grad across AV   --- MARCO (Resolute 3 x 22) -> RCA   ECHO: 2/12/14: EF 50%, mild HK basal-mid anteroseptal and basal-mid inferior wall(s). mild MR/TR     Health fair screening 3/13/15, carotid duplex showed mild stenosis, aortic duplex normal, ZENON normal, EKG normal   Echo 6/24/15 - EF 55%, normal wall motion   Holter 7/23/15 - SR , rare episodes of AFL/AF  CT Heart Scan - Calcium score 2041, incidental pulmonary findings (see full report). Echo 4/3/17 - EF 55%. No WMA. Grade 1 diastolic dysfunction. Dobutamine cardiolite 10/12/17 - normal perfusion, EF 61%, but new AF noted at rest, persistent with stress  EKG 12/4/18 - AF 76s    HPI  Mr. Denise Guerin continues to have significant shortness of breath due to COPD, followed by Dr. Debi Douglas. He occasionally uses oxygen. He enjoys spending time at the [employer] close to his house. Patient denies any exertional chest pain, palpitations, syncope, orthopnea, edema or paroxysmal nocturnal dyspnea. Current Outpatient Medications   Medication Sig    glycopyrrolate-formoterol (BEVESPI AEROSPHERE) 9-4.8 mcg HFAA Take 2 Puffs by inhalation two (2) times a day.  ELIQUIS 5 mg tablet TAKE 1 TAB BY MOUTH TWO TIMES A DAY. INDICATIONS: PULMONARY THROMBOEMBOLISM PREVENTION    nitroglycerin (NITROSTAT) 0.4 mg SL tablet 1 Tab by SubLINGual route every five (5) minutes as needed for Chest Pain. Up to 3 doses.  atorvastatin (LIPITOR) 40 mg tablet TAKE 1 TABLET NIGHTLY    budesonide (PULMICORT) 0.5 mg/2 mL nbsp 500 mcg by Nebulization route two (2) times a day.  montelukast (SINGULAIR) 10 mg tablet Take 10 mg by mouth daily.  omega-3 fatty acids-vitamin e (FISH OIL) 1,000 mg cap Take 3 Caps by mouth daily. Two capsules in the morning and one capsule in the evening.  cholecalciferol, vitamin D3, (VITAMIN D3) 2,000 unit tab Take  by mouth two (2) times a day.  multivitamin (ONE A DAY) tablet Take 1 tablet by mouth daily.  albuterol (PROVENTIL VENTOLIN) 2.5 mg /3 mL (0.083 %) nebulizer solution 2.5 mg by Nebulization route every four (4) hours as needed for Wheezing or Shortness of Breath.  aspirin 81 mg chewable tablet Take 1 Tab by mouth daily.  guaiFENesin (MUCINEX) 1,200 mg TM12 ER tablet Take 1,200 mg by mouth two (2) times a day.  dilTIAZem CD (CARDIZEM CD) 240 mg ER capsule TAKE 1 CAPSULE BY MOUTH DAILY    arformoterol (BROVANA) 15 mcg/2 mL nebu neb solution 15 mcg by Nebulization route two (2) times a day.  TIOTROPIUM BROMIDE (SPIRIVA WITH HANDIHALER IN) Take 1 Puff by inhalation daily. No current facility-administered medications for this visit. No Known Allergies     Review of Systems  Constitutional: Negative for weight loss, malaise/fatigue and diaphoresis. Respiratory: Positive for chronic PATEL.   Negative for cough, hemoptysis, sputum production, and wheezing. Cardiovascular: Negative for palpitations, orthopnea, claudication, leg swelling and PND. Gastrointestinal: Negative for heartburn, nausea, vomiting, blood in stool and melena. Genitourinary: Negative for dysuria, hematuria and flank pain. Musculoskeletal: Negative for back pain and joint pain. Neurological: Negative for dizziness, focal weakness, seizures, loss of consciousness, weakness and headaches. Endo/Heme/Allergies: Does not bruise/bleed easily. Psychiatric/Behavioral: Negative for mood disorders. Visit Vitals  /80 (BP 1 Location: Left arm, BP Patient Position: Sitting)   Pulse 69   Ht 5' 10\" (1.778 m)   Wt 159 lb (72.1 kg)   SpO2 93%   BMI 22.81 kg/m²      Wt Readings from Last 3 Encounters:   12/04/18 159 lb (72.1 kg)   06/01/18 155 lb 6.4 oz (70.5 kg)   10/12/17 165 lb (74.8 kg)     Physical Exam  Vitals reviewed. Constitutional: He is oriented to person, place, and time. He appears well-developed. HENT:    Head: Normocephalic. Neck: Neck supple. No JVD present. No tracheal deviation present. Heart: Irregular S1, S2. No murmurs, rubs, clicks or gallops. Pulses:Carotid pulses are 2+ on the right side, and 2+ on the left side. Pulmonary/Chest: Effort normal and breath sounds normal. He has no wheezes, rhonchi or rales. Abdominal: Soft. Bowel sounds are normal. No tenderness. He has no rebound. Musculoskeletal: He exhibits no edema. Neurological: He is alert and oriented to person, place, and time. Skin: Skin is warm and dry. Psychiatric: He has a normal mood and affect.     Labs drawn 9/11/17    High Risk Intermediate Risk Optimal   Total Cholesterol     159   LDL     62   HDL     84   TG     64   Non-HDL     75   Apo B     63   LDL-P     707   Small LDL-P     <200   sdLDL-C     16   Apo A-I     186   HDL-P     41.1   HDL2-C     36   Apo B: Apo A-I ratio     0.34   Lp(a)-P         Hs-CRP   2.1     Lp-PLA2     119   NT-proBNP 742       Apolipoprotein E         [id number]*2*6*         [id number]*17*         Factor V Leiden         Prothrombin Mutation         MTHFR         Vitamin D     49   Creatinine, serum     0.7   Campesterol 5.83 (hyper)       Sitosterol 3.24 (hyper)        Cholestanol 3.58 (hyper)       Desmosterol     < 0.50 (hyper)   Glucose     94   Free Fatty Acid 0.79       Insulin     4   Omega 3   7.4        Labs drawn 4/9/18    High Risk Intermediate Risk Optimal   Total Cholesterol     150   LDL     56   HDL     83   TG     61   Non-HDL     67   Apo B     58   LDL-P     509   Small LDL-P     <200   sdLDL-C     16   Apo A-I     169   HDL-P   36.6     HDL2-C     40   Apo B: Apo A-I ratio         Lp(a)-P         Hs-CRP   1.1     Lp-PLA2     91   NT-proBNP   449     Apolipoprotein E         [id number]*3*8*         [id number]*17*         Factor V Leiden         Prothrombin Mutation         MTHFR         25-hydroxy-Vitamin D     64   Creatinine, serum     0.7   Campesterol 4.67       Sitosterol     2.68   Cholestanol     3.29   Desmosterol     <0.50(hypo)   Glucose     90   Free Fatty Acid     0.43   Insulin     4   Omega 3   8.0       Cardiographics  EKG 6/12/14 - sinus with PAC's  EKG 12/18/14 - SR, isolated PVC, otherwise normal   EKG 9/13/16 - SR 68  EKG 9/26/17- SR 63  EKG 12/4/18 - AF 70s    ASSESSMENT and PLAN  Encounter Diagnoses   Name Primary?  Atrial flutter, paroxysmal (HCC) Yes    SVT (supraventricular tachycardia) ([address]     Coronary artery disease involving native coronary artery of native heart without angina pectoris     Dyslipidemia     Chronic obstructive pulmonary disease, unspecified COPD type [facility])       Mr. Neel Grijalva has known CAD with MI 2014 treated with RCA stenting. Dobutamine Cardiolite 10/2017 showed normal perfusion. He has no sxs of angina, but his functional capacity is limited by severe COPD. Continue ASA and statin therapy. Paroxysmal AF, first noted during his stress test one year ago.  He is back in AF today, rate controlled and completely asx. Rhythm control unlikely due to advancing COPD. Continue Diltiazem plus Eliquis. Severe COPD managed by Dr. Kendall Neal. Dyslipidemia. Optimized lipids and lipoproteins. Sterol and insulin marker remain normal. Continue Atorva 40 mg/d. Repeat numbers in 6 months. Follow-up Disposition:  Return in about 6 months (around 6/4/2019). Written by Marilee Evans, as dictated by Dr. Shaq Foreman.      Shaq Foreman MD

## 2018-12-04 ENCOUNTER — OFFICE VISIT (OUTPATIENT)
Dept: CARDIOLOGY CLINIC | Age: 68
End: 2018-12-04

## 2018-12-04 VITALS
HEIGHT: 70 IN | DIASTOLIC BLOOD PRESSURE: 80 MMHG | SYSTOLIC BLOOD PRESSURE: 110 MMHG | WEIGHT: 159 LBS | OXYGEN SATURATION: 93 % | BODY MASS INDEX: 22.76 KG/M2 | HEART RATE: 69 BPM

## 2018-12-04 DIAGNOSIS — I48.92 ATRIAL FLUTTER, PAROXYSMAL (HCC): Primary | ICD-10-CM

## 2018-12-04 DIAGNOSIS — E78.5 DYSLIPIDEMIA: ICD-10-CM

## 2018-12-04 DIAGNOSIS — I25.10 CORONARY ARTERY DISEASE INVOLVING NATIVE CORONARY ARTERY OF NATIVE HEART WITHOUT ANGINA PECTORIS: ICD-10-CM

## 2018-12-04 DIAGNOSIS — I47.1 SVT (SUPRAVENTRICULAR TACHYCARDIA) (HCC): ICD-10-CM

## 2018-12-04 DIAGNOSIS — J44.9 CHRONIC OBSTRUCTIVE PULMONARY DISEASE, UNSPECIFIED COPD TYPE (HCC): ICD-10-CM

## 2018-12-04 NOTE — PROGRESS NOTES
Patient has shortness of breath     Visit Vitals  /80 (BP 1 Location: Left arm, BP Patient Position: Sitting)   Pulse 69   Ht 5' 10\" (1.778 m)   Wt 159 lb (72.1 kg)   SpO2 93%   BMI 22.81 kg/m²

## 2018-12-06 RX ORDER — DILTIAZEM HYDROCHLORIDE 240 MG/1
CAPSULE, COATED, EXTENDED RELEASE ORAL
Qty: 90 CAP | Refills: 1 | Status: SHIPPED | OUTPATIENT
Start: 2018-12-06 | End: 2019-06-19 | Stop reason: SDUPTHER

## 2019-01-29 RX ORDER — ATORVASTATIN CALCIUM 40 MG/1
TABLET, FILM COATED ORAL
Qty: 90 TAB | Refills: 1 | Status: SHIPPED | OUTPATIENT
Start: 2019-01-29 | End: 2019-07-26 | Stop reason: SDUPTHER

## 2019-02-15 ENCOUNTER — HOSPITAL ENCOUNTER (INPATIENT)
Age: 69
LOS: 6 days | Discharge: NURSING, REHAB OR LONG-TERM CARE | DRG: 193 | End: 2019-02-21
Attending: EMERGENCY MEDICINE | Admitting: INTERNAL MEDICINE
Payer: MEDICARE

## 2019-02-15 ENCOUNTER — APPOINTMENT (OUTPATIENT)
Dept: GENERAL RADIOLOGY | Age: 69
DRG: 193 | End: 2019-02-15
Attending: PHYSICIAN ASSISTANT
Payer: MEDICARE

## 2019-02-15 DIAGNOSIS — Z71.89 DNR (DO NOT RESUSCITATE) DISCUSSION: ICD-10-CM

## 2019-02-15 DIAGNOSIS — J44.1 COPD EXACERBATION (HCC): ICD-10-CM

## 2019-02-15 DIAGNOSIS — Z71.89 ADVANCED CARE PLANNING/COUNSELING DISCUSSION: ICD-10-CM

## 2019-02-15 DIAGNOSIS — R06.02 SOB (SHORTNESS OF BREATH): ICD-10-CM

## 2019-02-15 DIAGNOSIS — Z71.89 GOALS OF CARE, COUNSELING/DISCUSSION: ICD-10-CM

## 2019-02-15 DIAGNOSIS — J18.9 COMMUNITY ACQUIRED PNEUMONIA OF RIGHT LOWER LOBE OF LUNG: Primary | ICD-10-CM

## 2019-02-15 PROBLEM — J96.21 ACUTE ON CHRONIC RESPIRATORY FAILURE WITH HYPOXEMIA (HCC): Status: ACTIVE | Noted: 2019-02-15

## 2019-02-15 PROBLEM — R79.89 ELEVATED LACTIC ACID LEVEL: Status: ACTIVE | Noted: 2019-02-15

## 2019-02-15 PROBLEM — R65.10 SIRS (SYSTEMIC INFLAMMATORY RESPONSE SYNDROME) (HCC): Status: ACTIVE | Noted: 2019-02-15

## 2019-02-15 LAB
ALBUMIN SERPL-MCNC: 3.3 G/DL (ref 3.5–5)
ALBUMIN/GLOB SERPL: 0.8 {RATIO} (ref 1.1–2.2)
ALP SERPL-CCNC: 109 U/L (ref 45–117)
ALT SERPL-CCNC: 20 U/L (ref 12–78)
ANION GAP SERPL CALC-SCNC: 10 MMOL/L (ref 5–15)
ARTERIAL PATENCY WRIST A: ABNORMAL
AST SERPL-CCNC: 15 U/L (ref 15–37)
BASE EXCESS BLDA CALC-SCNC: 6.9 MMOL/L
BASOPHILS # BLD: 0 K/UL (ref 0–0.1)
BASOPHILS NFR BLD: 0 % (ref 0–1)
BDY SITE: ABNORMAL
BILIRUB SERPL-MCNC: 1 MG/DL (ref 0.2–1)
BUN SERPL-MCNC: 13 MG/DL (ref 6–20)
BUN/CREAT SERPL: 16 (ref 12–20)
CALCIUM SERPL-MCNC: 10 MG/DL (ref 8.5–10.1)
CHLORIDE SERPL-SCNC: 98 MMOL/L (ref 97–108)
CO2 SERPL-SCNC: 31 MMOL/L (ref 21–32)
COMMENT, HOLDF: NORMAL
COMMENT, HOLDF: NORMAL
CREAT SERPL-MCNC: 0.82 MG/DL (ref 0.7–1.3)
DIFFERENTIAL METHOD BLD: ABNORMAL
EOSINOPHIL # BLD: 0.2 K/UL (ref 0–0.4)
EOSINOPHIL NFR BLD: 1 % (ref 0–7)
ERYTHROCYTE [DISTWIDTH] IN BLOOD BY AUTOMATED COUNT: 14 % (ref 11.5–14.5)
GAS FLOW.O2 O2 DELIVERY SYS: 4 L/MIN
GLOBULIN SER CALC-MCNC: 4.3 G/DL (ref 2–4)
GLUCOSE SERPL-MCNC: 98 MG/DL (ref 65–100)
HCO3 BLDA-SCNC: 33 MMOL/L (ref 22–26)
HCT VFR BLD AUTO: 41 % (ref 36.6–50.3)
HGB BLD-MCNC: 13.6 G/DL (ref 12.1–17)
IMM GRANULOCYTES # BLD AUTO: 0 K/UL
IMM GRANULOCYTES NFR BLD AUTO: 0 %
LACTATE BLD-SCNC: 2.05 MMOL/L (ref 0.4–2)
LACTATE SERPL-SCNC: 1.4 MMOL/L (ref 0.4–2)
LYMPHOCYTES # BLD: 0.6 K/UL (ref 0.8–3.5)
LYMPHOCYTES NFR BLD: 4 % (ref 12–49)
MCH RBC QN AUTO: 31.7 PG (ref 26–34)
MCHC RBC AUTO-ENTMCNC: 33.2 G/DL (ref 30–36.5)
MCV RBC AUTO: 95.6 FL (ref 80–99)
MONOCYTES # BLD: 1.9 K/UL (ref 0–1)
MONOCYTES NFR BLD: 12 % (ref 5–13)
NEUTS SEG # BLD: 13.1 K/UL (ref 1.8–8)
NEUTS SEG NFR BLD: 83 % (ref 32–75)
NRBC # BLD: 0 K/UL (ref 0–0.01)
NRBC BLD-RTO: 0 PER 100 WBC
PCO2 BLDA: 53 MMHG (ref 35–45)
PH BLDA: 7.41 [PH] (ref 7.35–7.45)
PLATELET # BLD AUTO: 236 K/UL (ref 150–400)
PMV BLD AUTO: 9.6 FL (ref 8.9–12.9)
PO2 BLDA: 90 MMHG (ref 80–100)
POTASSIUM SERPL-SCNC: 3.7 MMOL/L (ref 3.5–5.1)
PROT SERPL-MCNC: 7.6 G/DL (ref 6.4–8.2)
RBC # BLD AUTO: 4.29 M/UL (ref 4.1–5.7)
RBC MORPH BLD: ABNORMAL
SAMPLES BEING HELD,HOLD: NORMAL
SAMPLES BEING HELD,HOLD: NORMAL
SAO2 % BLD: 97 % (ref 92–97)
SAO2% DEVICE SAO2% SENSOR NAME: ABNORMAL
SODIUM SERPL-SCNC: 139 MMOL/L (ref 136–145)
SPECIMEN SITE: ABNORMAL
TROPONIN I SERPL-MCNC: <0.05 NG/ML
WBC # BLD AUTO: 15.8 K/UL (ref 4.1–11.1)

## 2019-02-15 PROCEDURE — 85025 COMPLETE CBC W/AUTO DIFF WBC: CPT

## 2019-02-15 PROCEDURE — 36600 WITHDRAWAL OF ARTERIAL BLOOD: CPT

## 2019-02-15 PROCEDURE — 74011250637 HC RX REV CODE- 250/637: Performed by: INTERNAL MEDICINE

## 2019-02-15 PROCEDURE — 74011000250 HC RX REV CODE- 250: Performed by: PHYSICIAN ASSISTANT

## 2019-02-15 PROCEDURE — 65660000000 HC RM CCU STEPDOWN

## 2019-02-15 PROCEDURE — 71046 X-RAY EXAM CHEST 2 VIEWS: CPT

## 2019-02-15 PROCEDURE — 93005 ELECTROCARDIOGRAM TRACING: CPT

## 2019-02-15 PROCEDURE — 87040 BLOOD CULTURE FOR BACTERIA: CPT

## 2019-02-15 PROCEDURE — 80053 COMPREHEN METABOLIC PANEL: CPT

## 2019-02-15 PROCEDURE — 94640 AIRWAY INHALATION TREATMENT: CPT

## 2019-02-15 PROCEDURE — 87633 RESP VIRUS 12-25 TARGETS: CPT

## 2019-02-15 PROCEDURE — 84484 ASSAY OF TROPONIN QUANT: CPT

## 2019-02-15 PROCEDURE — 74011250636 HC RX REV CODE- 250/636: Performed by: PHYSICIAN ASSISTANT

## 2019-02-15 PROCEDURE — 83605 ASSAY OF LACTIC ACID: CPT

## 2019-02-15 PROCEDURE — 99285 EMERGENCY DEPT VISIT HI MDM: CPT

## 2019-02-15 PROCEDURE — 74011000250 HC RX REV CODE- 250: Performed by: INTERNAL MEDICINE

## 2019-02-15 PROCEDURE — 36415 COLL VENOUS BLD VENIPUNCTURE: CPT

## 2019-02-15 PROCEDURE — 82803 BLOOD GASES ANY COMBINATION: CPT

## 2019-02-15 RX ORDER — SODIUM CHLORIDE 0.9 % (FLUSH) 0.9 %
5-40 SYRINGE (ML) INJECTION EVERY 8 HOURS
Status: DISCONTINUED | OUTPATIENT
Start: 2019-02-15 | End: 2019-02-21 | Stop reason: HOSPADM

## 2019-02-15 RX ORDER — NALOXONE HYDROCHLORIDE 0.4 MG/ML
0.4 INJECTION, SOLUTION INTRAMUSCULAR; INTRAVENOUS; SUBCUTANEOUS AS NEEDED
Status: DISCONTINUED | OUTPATIENT
Start: 2019-02-15 | End: 2019-02-21 | Stop reason: HOSPADM

## 2019-02-15 RX ORDER — GUAIFENESIN 600 MG/1
1200 TABLET, EXTENDED RELEASE ORAL 2 TIMES DAILY
Status: DISCONTINUED | OUTPATIENT
Start: 2019-02-15 | End: 2019-02-21 | Stop reason: HOSPADM

## 2019-02-15 RX ORDER — GLUCOSAM/CHONDRO/HERB 149/HYAL 750-100 MG
2 TABLET ORAL DAILY
COMMUNITY

## 2019-02-15 RX ORDER — DILTIAZEM HYDROCHLORIDE 240 MG/1
240 CAPSULE, COATED, EXTENDED RELEASE ORAL DAILY
Status: DISCONTINUED | OUTPATIENT
Start: 2019-02-16 | End: 2019-02-17

## 2019-02-15 RX ORDER — IPRATROPIUM BROMIDE AND ALBUTEROL SULFATE 2.5; .5 MG/3ML; MG/3ML
3 SOLUTION RESPIRATORY (INHALATION)
Status: DISCONTINUED | OUTPATIENT
Start: 2019-02-16 | End: 2019-02-21 | Stop reason: HOSPADM

## 2019-02-15 RX ORDER — MELATONIN
1000
COMMUNITY
End: 2022-04-18

## 2019-02-15 RX ORDER — THERA TABS 400 MCG
1 TAB ORAL DAILY
Status: DISCONTINUED | OUTPATIENT
Start: 2019-02-16 | End: 2019-02-21 | Stop reason: HOSPADM

## 2019-02-15 RX ORDER — MELATONIN
2000 DAILY
Status: DISCONTINUED | OUTPATIENT
Start: 2019-02-16 | End: 2019-02-21 | Stop reason: HOSPADM

## 2019-02-15 RX ORDER — SODIUM CHLORIDE 0.9 % (FLUSH) 0.9 %
5-40 SYRINGE (ML) INJECTION AS NEEDED
Status: DISCONTINUED | OUTPATIENT
Start: 2019-02-15 | End: 2019-02-21 | Stop reason: HOSPADM

## 2019-02-15 RX ORDER — ATORVASTATIN CALCIUM 20 MG/1
40 TABLET, FILM COATED ORAL
Status: DISCONTINUED | OUTPATIENT
Start: 2019-02-15 | End: 2019-02-21 | Stop reason: HOSPADM

## 2019-02-15 RX ORDER — GUAIFENESIN 100 MG/5ML
81 LIQUID (ML) ORAL
Status: DISCONTINUED | OUTPATIENT
Start: 2019-02-15 | End: 2019-02-21 | Stop reason: HOSPADM

## 2019-02-15 RX ORDER — ALBUTEROL SULFATE 0.83 MG/ML
2.5 SOLUTION RESPIRATORY (INHALATION)
Status: DISCONTINUED | OUTPATIENT
Start: 2019-02-15 | End: 2019-02-21 | Stop reason: HOSPADM

## 2019-02-15 RX ORDER — ACETAMINOPHEN 325 MG/1
650 TABLET ORAL
Status: DISCONTINUED | OUTPATIENT
Start: 2019-02-15 | End: 2019-02-21 | Stop reason: HOSPADM

## 2019-02-15 RX ORDER — GLUCOSAM/CHONDRO/HERB 149/HYAL 750-100 MG
1 TABLET ORAL
Status: DISCONTINUED | OUTPATIENT
Start: 2019-02-15 | End: 2019-02-21 | Stop reason: HOSPADM

## 2019-02-15 RX ORDER — IPRATROPIUM BROMIDE AND ALBUTEROL SULFATE 2.5; .5 MG/3ML; MG/3ML
3 SOLUTION RESPIRATORY (INHALATION)
Status: COMPLETED | OUTPATIENT
Start: 2019-02-15 | End: 2019-02-15

## 2019-02-15 RX ORDER — GLUCOSAM/CHONDRO/HERB 149/HYAL 750-100 MG
2 TABLET ORAL DAILY
Status: DISCONTINUED | OUTPATIENT
Start: 2019-02-16 | End: 2019-02-21 | Stop reason: HOSPADM

## 2019-02-15 RX ORDER — MONTELUKAST SODIUM 10 MG/1
10 TABLET ORAL
Status: DISCONTINUED | OUTPATIENT
Start: 2019-02-15 | End: 2019-02-21 | Stop reason: HOSPADM

## 2019-02-15 RX ORDER — MELATONIN
1000
Status: DISCONTINUED | OUTPATIENT
Start: 2019-02-15 | End: 2019-02-21 | Stop reason: HOSPADM

## 2019-02-15 RX ORDER — BUDESONIDE 0.5 MG/2ML
500 INHALANT ORAL
Status: DISCONTINUED | OUTPATIENT
Start: 2019-02-15 | End: 2019-02-21 | Stop reason: HOSPADM

## 2019-02-15 RX ORDER — GLUCOSAM/CHONDRO/HERB 149/HYAL 750-100 MG
1 TABLET ORAL
COMMUNITY
End: 2019-08-15 | Stop reason: DRUGHIGH

## 2019-02-15 RX ORDER — GUAIFENESIN 100 MG/5ML
81 LIQUID (ML) ORAL
COMMUNITY

## 2019-02-15 RX ORDER — MELATONIN
2000 DAILY
COMMUNITY

## 2019-02-15 RX ADMIN — IPRATROPIUM BROMIDE AND ALBUTEROL SULFATE 3 ML: .5; 3 SOLUTION RESPIRATORY (INHALATION) at 20:43

## 2019-02-15 RX ADMIN — AZITHROMYCIN MONOHYDRATE 500 MG: 500 INJECTION, POWDER, LYOPHILIZED, FOR SOLUTION INTRAVENOUS at 20:44

## 2019-02-15 RX ADMIN — METHYLPREDNISOLONE SODIUM SUCCINATE 125 MG: 125 INJECTION, POWDER, FOR SOLUTION INTRAMUSCULAR; INTRAVENOUS at 20:41

## 2019-02-15 RX ADMIN — SODIUM CHLORIDE 1000 ML: 900 INJECTION, SOLUTION INTRAVENOUS at 20:26

## 2019-02-15 RX ADMIN — APIXABAN 5 MG: 5 TABLET, FILM COATED ORAL at 22:38

## 2019-02-15 RX ADMIN — VITAMIN D, TAB 1000IU (100/BT) 1000 UNITS: 25 TAB at 22:40

## 2019-02-15 RX ADMIN — ATORVASTATIN CALCIUM 40 MG: 20 TABLET, FILM COATED ORAL at 22:42

## 2019-02-15 RX ADMIN — ASPIRIN 81 MG 81 MG: 81 TABLET ORAL at 22:39

## 2019-02-15 RX ADMIN — BUDESONIDE 500 MCG: 0.5 INHALANT RESPIRATORY (INHALATION) at 22:44

## 2019-02-15 RX ADMIN — GUAIFENESIN 1200 MG: 600 TABLET, EXTENDED RELEASE ORAL at 22:40

## 2019-02-15 RX ADMIN — CEFTRIAXONE 2 G: 2 INJECTION, POWDER, FOR SOLUTION INTRAMUSCULAR; INTRAVENOUS at 20:48

## 2019-02-15 RX ADMIN — MONTELUKAST 10 MG: 10 TABLET, FILM COATED ORAL at 22:41

## 2019-02-15 NOTE — ED TRIAGE NOTES
Arrives via EMS with cc of SOB x 3 days and pursed lip breathing. [facility] came to evaluate pt and sent him here. Per pr, his temp PTA was 100.8. Pt is on 2.5 L O2 at baseline. Pt did home neb PTA (3 times since noon). PMH COPD and Afib

## 2019-02-16 LAB
ALBUMIN SERPL-MCNC: 2.7 G/DL (ref 3.5–5)
ALBUMIN/GLOB SERPL: 0.7 {RATIO} (ref 1.1–2.2)
ALP SERPL-CCNC: 93 U/L (ref 45–117)
ALT SERPL-CCNC: 16 U/L (ref 12–78)
ANION GAP SERPL CALC-SCNC: 9 MMOL/L (ref 5–15)
AST SERPL-CCNC: 13 U/L (ref 15–37)
B PERT DNA SPEC QL NAA+PROBE: NOT DETECTED
BASOPHILS # BLD: 0 K/UL (ref 0–0.1)
BASOPHILS NFR BLD: 0 % (ref 0–1)
BILIRUB SERPL-MCNC: 0.6 MG/DL (ref 0.2–1)
BUN SERPL-MCNC: 14 MG/DL (ref 6–20)
BUN/CREAT SERPL: 21 (ref 12–20)
C PNEUM DNA SPEC QL NAA+PROBE: NOT DETECTED
CALCIUM SERPL-MCNC: 9.6 MG/DL (ref 8.5–10.1)
CHLORIDE SERPL-SCNC: 101 MMOL/L (ref 97–108)
CO2 SERPL-SCNC: 30 MMOL/L (ref 21–32)
CREAT SERPL-MCNC: 0.66 MG/DL (ref 0.7–1.3)
DIFFERENTIAL METHOD BLD: ABNORMAL
EOSINOPHIL # BLD: 0 K/UL (ref 0–0.4)
EOSINOPHIL NFR BLD: 0 % (ref 0–7)
ERYTHROCYTE [DISTWIDTH] IN BLOOD BY AUTOMATED COUNT: 14.1 % (ref 11.5–14.5)
FLUAV H1 2009 PAND RNA SPEC QL NAA+PROBE: NOT DETECTED
FLUAV H1 RNA SPEC QL NAA+PROBE: NOT DETECTED
FLUAV H3 RNA SPEC QL NAA+PROBE: NOT DETECTED
FLUAV SUBTYP SPEC NAA+PROBE: NOT DETECTED
FLUBV RNA SPEC QL NAA+PROBE: NOT DETECTED
GLOBULIN SER CALC-MCNC: 4.1 G/DL (ref 2–4)
GLUCOSE SERPL-MCNC: 121 MG/DL (ref 65–100)
HADV DNA SPEC QL NAA+PROBE: NOT DETECTED
HCOV 229E RNA SPEC QL NAA+PROBE: NOT DETECTED
HCOV HKU1 RNA SPEC QL NAA+PROBE: NOT DETECTED
HCOV NL63 RNA SPEC QL NAA+PROBE: NOT DETECTED
HCOV OC43 RNA SPEC QL NAA+PROBE: NOT DETECTED
HCT VFR BLD AUTO: 37.9 % (ref 36.6–50.3)
HGB BLD-MCNC: 12.8 G/DL (ref 12.1–17)
HMPV RNA SPEC QL NAA+PROBE: NOT DETECTED
HPIV1 RNA SPEC QL NAA+PROBE: NOT DETECTED
HPIV2 RNA SPEC QL NAA+PROBE: NOT DETECTED
HPIV3 RNA SPEC QL NAA+PROBE: NOT DETECTED
HPIV4 RNA SPEC QL NAA+PROBE: NOT DETECTED
IMM GRANULOCYTES # BLD AUTO: 0.1 K/UL (ref 0–0.04)
IMM GRANULOCYTES NFR BLD AUTO: 1 % (ref 0–0.5)
LYMPHOCYTES # BLD: 0.4 K/UL (ref 0.8–3.5)
LYMPHOCYTES NFR BLD: 3 % (ref 12–49)
M PNEUMO DNA SPEC QL NAA+PROBE: NOT DETECTED
MAGNESIUM SERPL-MCNC: 1.9 MG/DL (ref 1.6–2.4)
MCH RBC QN AUTO: 32.2 PG (ref 26–34)
MCHC RBC AUTO-ENTMCNC: 33.8 G/DL (ref 30–36.5)
MCV RBC AUTO: 95.2 FL (ref 80–99)
MONOCYTES # BLD: 0.4 K/UL (ref 0–1)
MONOCYTES NFR BLD: 3 % (ref 5–13)
NEUTS SEG # BLD: 13.8 K/UL (ref 1.8–8)
NEUTS SEG NFR BLD: 93 % (ref 32–75)
NRBC # BLD: 0 K/UL (ref 0–0.01)
NRBC BLD-RTO: 0 PER 100 WBC
PHOSPHATE SERPL-MCNC: 3.4 MG/DL (ref 2.6–4.7)
PLATELET # BLD AUTO: 229 K/UL (ref 150–400)
PMV BLD AUTO: 9.7 FL (ref 8.9–12.9)
POTASSIUM SERPL-SCNC: 4.1 MMOL/L (ref 3.5–5.1)
PROT SERPL-MCNC: 6.8 G/DL (ref 6.4–8.2)
RBC # BLD AUTO: 3.98 M/UL (ref 4.1–5.7)
RBC MORPH BLD: ABNORMAL
RSV RNA SPEC QL NAA+PROBE: NOT DETECTED
RV+EV RNA SPEC QL NAA+PROBE: NOT DETECTED
SODIUM SERPL-SCNC: 140 MMOL/L (ref 136–145)
WBC # BLD AUTO: 14.7 K/UL (ref 4.1–11.1)

## 2019-02-16 PROCEDURE — 86738 MYCOPLASMA ANTIBODY: CPT

## 2019-02-16 PROCEDURE — 80053 COMPREHEN METABOLIC PANEL: CPT

## 2019-02-16 PROCEDURE — 74011250637 HC RX REV CODE- 250/637: Performed by: INTERNAL MEDICINE

## 2019-02-16 PROCEDURE — 74011250636 HC RX REV CODE- 250/636: Performed by: PHYSICIAN ASSISTANT

## 2019-02-16 PROCEDURE — 36415 COLL VENOUS BLD VENIPUNCTURE: CPT

## 2019-02-16 PROCEDURE — 74011000250 HC RX REV CODE- 250: Performed by: INTERNAL MEDICINE

## 2019-02-16 PROCEDURE — 87449 NOS EACH ORGANISM AG IA: CPT

## 2019-02-16 PROCEDURE — 74011000258 HC RX REV CODE- 258: Performed by: INTERNAL MEDICINE

## 2019-02-16 PROCEDURE — 65660000000 HC RM CCU STEPDOWN

## 2019-02-16 PROCEDURE — 87899 AGENT NOS ASSAY W/OPTIC: CPT

## 2019-02-16 PROCEDURE — 87070 CULTURE OTHR SPECIMN AEROBIC: CPT

## 2019-02-16 PROCEDURE — 74011250636 HC RX REV CODE- 250/636: Performed by: INTERNAL MEDICINE

## 2019-02-16 PROCEDURE — 84100 ASSAY OF PHOSPHORUS: CPT

## 2019-02-16 PROCEDURE — 74011000258 HC RX REV CODE- 258: Performed by: PHYSICIAN ASSISTANT

## 2019-02-16 PROCEDURE — 94640 AIRWAY INHALATION TREATMENT: CPT

## 2019-02-16 PROCEDURE — 85025 COMPLETE CBC W/AUTO DIFF WBC: CPT

## 2019-02-16 PROCEDURE — 83735 ASSAY OF MAGNESIUM: CPT

## 2019-02-16 RX ORDER — DILTIAZEM HYDROCHLORIDE 30 MG/1
60 TABLET, FILM COATED ORAL ONCE
Status: COMPLETED | OUTPATIENT
Start: 2019-02-16 | End: 2019-02-16

## 2019-02-16 RX ORDER — ARFORMOTEROL TARTRATE 15 UG/2ML
15 SOLUTION RESPIRATORY (INHALATION)
Status: DISCONTINUED | OUTPATIENT
Start: 2019-02-16 | End: 2019-02-21 | Stop reason: HOSPADM

## 2019-02-16 RX ADMIN — OMEGA-3 FATTY ACIDS CAP 1000 MG 1 CAPSULE: 1000 CAP at 21:31

## 2019-02-16 RX ADMIN — BUDESONIDE 500 MCG: 0.5 INHALANT RESPIRATORY (INHALATION) at 07:55

## 2019-02-16 RX ADMIN — METHYLPREDNISOLONE SODIUM SUCCINATE 60 MG: 40 INJECTION, POWDER, FOR SOLUTION INTRAMUSCULAR; INTRAVENOUS at 21:30

## 2019-02-16 RX ADMIN — APIXABAN 5 MG: 5 TABLET, FILM COATED ORAL at 08:02

## 2019-02-16 RX ADMIN — ATORVASTATIN CALCIUM 40 MG: 20 TABLET, FILM COATED ORAL at 21:29

## 2019-02-16 RX ADMIN — VITAMIN D, TAB 1000IU (100/BT) 1000 UNITS: 25 TAB at 21:29

## 2019-02-16 RX ADMIN — Medication 10 ML: at 16:22

## 2019-02-16 RX ADMIN — GUAIFENESIN 1200 MG: 600 TABLET, EXTENDED RELEASE ORAL at 08:01

## 2019-02-16 RX ADMIN — VITAMIN D, TAB 1000IU (100/BT) 2000 UNITS: 25 TAB at 08:01

## 2019-02-16 RX ADMIN — MONTELUKAST 10 MG: 10 TABLET, FILM COATED ORAL at 21:31

## 2019-02-16 RX ADMIN — IPRATROPIUM BROMIDE AND ALBUTEROL SULFATE 3 ML: .5; 3 SOLUTION RESPIRATORY (INHALATION) at 23:39

## 2019-02-16 RX ADMIN — IPRATROPIUM BROMIDE AND ALBUTEROL SULFATE 3 ML: .5; 3 SOLUTION RESPIRATORY (INHALATION) at 19:22

## 2019-02-16 RX ADMIN — CEFTRIAXONE SODIUM 2 G: 2 INJECTION, POWDER, FOR SOLUTION INTRAMUSCULAR; INTRAVENOUS at 19:43

## 2019-02-16 RX ADMIN — IPRATROPIUM BROMIDE AND ALBUTEROL SULFATE 3 ML: .5; 3 SOLUTION RESPIRATORY (INHALATION) at 15:27

## 2019-02-16 RX ADMIN — APIXABAN 5 MG: 5 TABLET, FILM COATED ORAL at 18:45

## 2019-02-16 RX ADMIN — BUDESONIDE 500 MCG: 0.5 INHALANT RESPIRATORY (INHALATION) at 19:22

## 2019-02-16 RX ADMIN — METHYLPREDNISOLONE SODIUM SUCCINATE 60 MG: 40 INJECTION, POWDER, FOR SOLUTION INTRAMUSCULAR; INTRAVENOUS at 16:21

## 2019-02-16 RX ADMIN — ASPIRIN 81 MG 81 MG: 81 TABLET ORAL at 21:28

## 2019-02-16 RX ADMIN — Medication 10 ML: at 01:27

## 2019-02-16 RX ADMIN — THERA TABS 1 TABLET: TAB at 08:01

## 2019-02-16 RX ADMIN — Medication 10 ML: at 21:27

## 2019-02-16 RX ADMIN — METHYLPREDNISOLONE SODIUM SUCCINATE 60 MG: 40 INJECTION, POWDER, FOR SOLUTION INTRAMUSCULAR; INTRAVENOUS at 04:34

## 2019-02-16 RX ADMIN — METHYLPREDNISOLONE SODIUM SUCCINATE 60 MG: 40 INJECTION, POWDER, FOR SOLUTION INTRAMUSCULAR; INTRAVENOUS at 08:01

## 2019-02-16 RX ADMIN — DILTIAZEM HYDROCHLORIDE 240 MG: 120 CAPSULE, COATED, EXTENDED RELEASE ORAL at 08:01

## 2019-02-16 RX ADMIN — OMEGA-3 FATTY ACIDS CAP 1000 MG 1 CAPSULE: 1000 CAP at 00:13

## 2019-02-16 RX ADMIN — OMEGA-3 FATTY ACIDS CAP 1000 MG 2 CAPSULE: 1000 CAP at 09:37

## 2019-02-16 RX ADMIN — AZITHROMYCIN MONOHYDRATE 500 MG: 500 INJECTION, POWDER, LYOPHILIZED, FOR SOLUTION INTRAVENOUS at 19:41

## 2019-02-16 RX ADMIN — ARFORMOTEROL TARTRATE 15 MCG: 15 SOLUTION RESPIRATORY (INHALATION) at 11:21

## 2019-02-16 RX ADMIN — IPRATROPIUM BROMIDE AND ALBUTEROL SULFATE 3 ML: .5; 3 SOLUTION RESPIRATORY (INHALATION) at 01:27

## 2019-02-16 RX ADMIN — DILTIAZEM HYDROCHLORIDE 2.5 MG/HR: 5 INJECTION INTRAVENOUS at 10:12

## 2019-02-16 RX ADMIN — IPRATROPIUM BROMIDE AND ALBUTEROL SULFATE 3 ML: .5; 3 SOLUTION RESPIRATORY (INHALATION) at 07:55

## 2019-02-16 RX ADMIN — DILTIAZEM HYDROCHLORIDE 60 MG: 30 TABLET, FILM COATED ORAL at 11:21

## 2019-02-16 RX ADMIN — Medication 10 ML: at 04:34

## 2019-02-16 RX ADMIN — GUAIFENESIN 1200 MG: 600 TABLET, EXTENDED RELEASE ORAL at 21:30

## 2019-02-16 NOTE — PROGRESS NOTES
Tyrone Abdullahi [place] [address] 
[address], [address] [place] [address] 
[phone] Medical Progress Note NAME: Valdemar Vasquez :  1950 MRM:  [id number] Date/Time: 2019  11:03 AM 
 
  
Subjective: Chief Complaint:  F/u sob Pt admitted overnight for sob. His breathing is the same as last night, no worse. Denies sputum pdn. No cp. HR elevated in ER. Objective:  
 
 
Vitals:  
 
 
  
Last 24hrs VS reviewed since prior progress note. Most recent are: 
 
Visit Vitals /68 Pulse (!) 106 Temp 97.9 °F (36.6 °C) Resp 13 SpO2 95% SpO2 Readings from Last 6 Encounters:  
19 95% 18 93% 18 95% 17 98% 17 95% 17 98% O2 Flow Rate (L/min): 4 l/min Intake/Output Summary (Last 24 hours) at 2019 1103 Last data filed at 2019 9867 Gross per 24 hour Intake 1240 ml Output 500 ml Net 740 ml Exam:  
 
Physical Exam: 
 
Gen:  Well-developed, well-nourished, in no acute distress HEENT:  Pink conjunctivae, PERRL, hearing intact to voice, moist mucous membranes Neck:  Supple, without masses, thyroid non-tender Resp:  No accessory muscle use, decreased air movement. No wheezing Card:  No murmurs, normal S1, S2 without thrills, bruits or peripheral edema Abd:  Soft, non-tender, non-distended, normoactive bowel sounds are present Musc:  No cyanosis or clubbing Skin:  No rashes or ulcers, skin turgor is good Neuro:  No focal deficits, follows commands appropriately Psych:  Good insight, oriented to person, place and time, alert Telemetry reviewed:   AFIB Medications Reviewed: (see below) Lab Data Reviewed: (see below) 
 
______________________________________________________________________ Medications:  
 
Current Facility-Administered Medications Medication Dose Route Frequency  dilTIAZem (CARDIZEM) 125 mg in dextrose 5% 125 mL infusion  0-15 mg/hr IntraVENous TITRATE  arformoterol (BROVANA) neb solution 15 mcg  15 mcg Nebulization BID RT  
 dilTIAZem (CARDIZEM) IR tablet 60 mg  60 mg Oral ONCE  
 azithromycin (ZITHROMAX) 500 mg in 0.9% sodium chloride (MBP/ADV) 250 mL  500 mg IntraVENous Q24H  cefTRIAXone (ROCEPHIN) 2 g in 0.9% sodium chloride (MBP/ADV) 50 mL  2 g IntraVENous Q24H  
 sodium chloride (NS) flush 5-40 mL  5-40 mL IntraVENous Q8H  
 sodium chloride (NS) flush 5-40 mL  5-40 mL IntraVENous PRN  
 acetaminophen (TYLENOL) tablet 650 mg  650 mg Oral Q6H PRN  
 naloxone (NARCAN) injection 0.4 mg  0.4 mg IntraVENous PRN  
 albuterol-ipratropium (DUO-NEB) 2.5 MG-0.5 MG/3 ML  3 mL Nebulization Q4H RT  
 albuterol (PROVENTIL VENTOLIN) nebulizer solution 2.5 mg  2.5 mg Nebulization Q2H PRN  
 methylPREDNISolone (PF) (SOLU-MEDROL) injection 60 mg  60 mg IntraVENous Q6H  
 aspirin chewable tablet 81 mg  81 mg Oral QHS  atorvastatin (LIPITOR) tablet 40 mg  40 mg Oral QHS  budesonide (PULMICORT) 500 mcg/2 ml nebulizer suspension  500 mcg Nebulization BID RT  
 cholecalciferol (VITAMIN D3) tablet 1,000 Units  1,000 Units Oral QHS  cholecalciferol (VITAMIN D3) tablet 2,000 Units  2,000 Units Oral DAILY  dilTIAZem CD (CARDIZEM CD) capsule 240 mg  240 mg Oral DAILY  apixaban (ELIQUIS) tablet 5 mg  5 mg Oral BID  
 guaiFENesin ER (MUCINEX) tablet 1,200 mg  1,200 mg Oral BID  montelukast (SINGULAIR) tablet 10 mg  10 mg Oral QHS  therapeutic multivitamin (THERAGRAN) tablet 1 Tab  1 Tab Oral DAILY  omega 3-DHA-EPA-fish oil 1,000 mg (120 mg-180 mg) capsule 1 Cap  1 Cap Oral QHS  omega 3-DHA-EPA-fish oil 1,000 mg (120 mg-180 mg) capsule 2 Cap  2 Cap Oral DAILY Current Outpatient Medications Medication Sig  
 aspirin 81 mg chewable tablet Take 81 mg by mouth nightly.  cholecalciferol (VITAMIN D3) 1,000 unit tablet Take 1,000 Units by mouth nightly.  omega 3-DHA-EPA-fish oil 1,000 mg (120 mg-180 mg) capsule Take 1 Cap by mouth nightly.  omega 3-DHA-EPA-fish oil 1,000 mg (120 mg-180 mg) capsule Take 2 Caps by mouth daily.  cholecalciferol (VITAMIN D3) 1,000 unit tablet Take 2,000 Units by mouth daily.  atorvastatin (LIPITOR) 40 mg tablet TAKE 1 TABLET NIGHTLY  dilTIAZem CD (CARDIZEM CD) 240 mg ER capsule TAKE 1 CAPSULE BY MOUTH DAILY  glycopyrrolate-formoterol (BEVESPI AEROSPHERE) 9-4.8 mcg HFAA Take 2 Puffs by inhalation two (2) times a day.  ELIQUIS 5 mg tablet TAKE 1 TAB BY MOUTH TWO TIMES A DAY. INDICATIONS: PULMONARY THROMBOEMBOLISM PREVENTION  nitroglycerin (NITROSTAT) 0.4 mg SL tablet 1 Tab by SubLINGual route every five (5) minutes as needed for Chest Pain. Up to 3 doses.  budesonide (PULMICORT) 0.5 mg/2 mL nbsp 500 mcg by Nebulization route two (2) times a day.  montelukast (SINGULAIR) 10 mg tablet Take 10 mg by mouth nightly.  multivitamin (ONE A DAY) tablet Take 1 tablet by mouth daily.  albuterol (PROVENTIL VENTOLIN) 2.5 mg /3 mL (0.083 %) nebulizer solution 2.5 mg by Nebulization route every four (4) hours as needed for Wheezing or Shortness of Breath.  guaiFENesin (MUCINEX) 1,200 mg TM12 ER tablet Take 1,200 mg by mouth two (2) times a day. Lab Review:  
 
Recent Labs  
  02/16/19 
0445 02/15/19 
1902 WBC 14.7* 15.8* HGB 12.8 13.6 HCT 37.9 41.0  
 236 Recent Labs  
  02/16/19 
0445 02/15/19 
1902  139  
K 4.1 3.7  98 CO2 30 31 * 98 BUN 14 13 CREA 0.66* 0.82 CA 9.6 10.0 MG 1.9  --   
PHOS 3.4  --   
ALB 2.7* 3.3* SGOT 13* 15 ALT 16 20 No components found for: [place] Assessment / Plan:  
 
 Acute on chronic respiratory failure with hypoxemia: on 2.5 L home O2 chronically. Supplemental O2 to maintain SpO2 >90%. Treat COPD and PNA as below. Pulm consult 
  
 COPD exacerbation: likely precipitated by PNA.  Continue duo-nebs, IV steroids. Continue home Singulair, LABA/ICS. Abx for PNA as below 
  
 Pneumonia: continue CTX/azithromycin for CAP. Send sputum culture, PNA workup (Legionella and Strep Pneumo urine ag, Mycoplasma IgM). RVP pending 
  
  Atrial fibrillation: rate elevated this morning. Give additional dose po dilt. Goal rate is 110 or less. Can start dilt gtt if needed. Continue diltiazem, Eliquis.   
  
  CAD (coronary artery disease), native coronary artery: no CP. On ASA, but not on BB likely due to COPD. Continue statin 
  
  Elevated lactic acid level: NOT from sepsis. Resolved  
  
  SIRS (systemic inflammatory response syndrome) ([facility]): No sepsis, but due to infectious process. Monitor closely on treatment as above Total time spent with patient: 36 Minutes Care Plan discussed with: Patient and Nursing Staff Discussed:  Care Plan Prophylaxis:  Lovenox Disposition:  Home w/Family 
        
___________________________________________________ Attending Physician: Jason Rivas MD

## 2019-02-16 NOTE — PROGRESS NOTES
SHIFT REPORT: 
1900- Bedside shift change report given to Shai Alfaro RN (oncoming nurse) by Naun Navarro RN (offgoing nurse). Report included the following information SBAR, Kardex, Procedure Summary, Intake/Output, Recent Results and Cardiac Rhythm. SHIFT SUMMARY: 
0100-venous blood drawn for AM labs. ; pt gets very sob when standing to void; take a good 10\" to recover END OF SHIFT REPORT: 
0700-Bedside shift change report given to Shabbir Rock RN (oncoming nurse) by Shai Alfaro RN (offgoing nurse). Report included the following information SBAR, Kardex, Procedure Summary, Intake/Output, Recent Results and Cardiac Rhythm .

## 2019-02-16 NOTE — ED PROVIDER NOTES
Yasmine Ravi is a 79 y.o. male  who presents by private vehicle to ER with c/o Patient presents with: 
Shortness of Breath Fever Patient presents with complaints of shortness of breath, cough and fever. Patient with history of COPD, wears 3L of oxygen at home. Patient seen by Adan borja and sent to ED for further evaluation. He specifically denies any , nausea, vomiting, chest pain,  headache, rash, diarrhea, abdominal pain, urinary/bowel changes, sweating or weight loss. PCP: Danielle Garner MD  
PMHx significant for: Past Medical History: 
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 
: Non-STEMI (non-ST elevated myocardial infarction) ([facility] 
prn: On home O2 No date: Pneumonia No date: Snoring No date: Tinnitus No date: Tuberculosis 3/27/2014: Vitamin D deficiency PSHx significant for: Past Surgical History: 
No date: HX OTHER SURGICAL Comment:  EXC MELANOMA RIGHT CHEEK No date: HX OTHER SURGICAL Comment:  NEEDLE BX LEFT LUNG 
2012: HX OTHER SURGICAL Comment:  apical segmentectomy, MAY Social Hx: Tobacco use: Social History Tobacco Use Smoking status: Former Smoker Packs/day: 0.25 Years: 40.00 Pack years: 10 Types: Cigarettes Quit date:  Years since quittin.1 Smokeless tobacco: Never Used 
; EtOH use: The patient states he drinks 0 per week.; Illicit Drug use: Allergies: 
No Known Allergies There are no other complaints, changes or physical findings at this time. Past Medical History:  
Diagnosis Date  Arrhythmia  Asthma  COPD (chronic obstructive pulmonary disease) (HCC) severe  Hypertension  Insulin resistance 3/27/2014  Lung mass 2012 MAY resection, + mycobacterial orgs, neg malignancy  Melanoma ([facility]   Non-STEMI (non-ST elevated myocardial infarction) ([facility]   On home O2 prn  Pneumonia  Snoring  Tinnitus  Tuberculosis  Vitamin D deficiency 3/27/2014 Past Surgical History:  
Procedure Laterality Date  HX OTHER SURGICAL EXC MELANOMA RIGHT CHEEK  
 HX OTHER SURGICAL NEEDLE BX LEFT LUNG  
 HX OTHER SURGICAL  2012  
 apical segmentectomy, MAY Family History:  
Problem Relation Age of Onset  Glaucoma Mother  Dementia Mother  Heart Attack Father  Heart Disease Father Social History Socioeconomic History  Marital status: SINGLE Spouse name: Not on file  Number of children: Not on file  Years of education: Not on file  Highest education level: Not on file Social Needs  Financial resource strain: Not on file  Food insecurity - worry: Not on file  Food insecurity - inability: Not on file  Transportation needs - medical: Not on file  Transportation needs - non-medical: Not on file Occupational History  Not on file Tobacco Use  Smoking status: Former Smoker Packs/day: 0.25 Years: 40.00 Pack years: 10.00 Types: Cigarettes Last attempt to quit:  Years since quittin.1  Smokeless tobacco: Never Used Substance and Sexual Activity  Alcohol use: Yes Alcohol/week: 15.6 oz Types: 21 Cans of beer, 5 Shots of liquor per week  Drug use: No  
 Sexual activity: No  
Other Topics Concern  Not on file Social History Narrative  Not on file ALLERGIES: Patient has no known allergies. Review of Systems Constitutional: Positive for chills and fever. Negative for activity change and appetite change. HENT: Negative for congestion and sore throat. Respiratory: Positive for shortness of breath and wheezing. Negative for cough. Cardiovascular: Negative for chest pain. Gastrointestinal: Negative for abdominal pain, diarrhea, nausea and vomiting. Genitourinary: Negative for dysuria. Musculoskeletal: Negative for arthralgias and myalgias. Skin: Negative for color change. Neurological: Negative for dizziness. Psychiatric/Behavioral: The patient is not nervous/anxious. All other systems reviewed and are negative. Vitals:  
 02/15/19 1845 02/15/19 1847 02/15/19 1849 BP: (!) 116/98 (!) 116/98 Pulse:  (!) 119 (!) 118 Resp:  28 24 Temp:  98.1 °F (36.7 °C) SpO2:  93% (!) 87% Physical Exam  
Constitutional: He is oriented to person, place, and time. He appears well-developed and well-nourished. He appears ill. He appears distressed. HENT:  
Head: Normocephalic and atraumatic. Right Ear: External ear normal.  
Left Ear: External ear normal.  
Mouth/Throat: Oropharynx is clear and moist. No oropharyngeal exudate. Eyes: Conjunctivae and EOM are normal. Pupils are equal, round, and reactive to light. Right eye exhibits no discharge. Left eye exhibits no discharge. No scleral icterus. Neck: Normal range of motion. Neck supple. No tracheal deviation present. No thyromegaly present. Cardiovascular: Normal heart sounds and intact distal pulses. An irregularly irregular rhythm present. Tachycardia present. No murmur heard. Pulmonary/Chest: Accessory muscle usage present. Tachypnea noted. No respiratory distress. He has wheezes. He has no rales. Abdominal: Soft. Bowel sounds are normal. He exhibits no distension. There is no tenderness. There is no rebound and no guarding. Musculoskeletal: Normal range of motion. He exhibits no edema or tenderness. Lymphadenopathy:  
  He has no cervical adenopathy. Neurological: He is alert and oriented to person, place, and time. No cranial nerve deficit. Coordination normal.  
Skin: Skin is warm. No rash noted. No erythema. Psychiatric: He has a normal mood and affect. His behavior is normal. Judgment and thought content normal.  
Nursing note and vitals reviewed. MDM Number of Diagnoses or Management Options Community acquired pneumonia of right lower lobe of lung ([facility]):  
COPD exacerbation [facility]):  
Diagnosis management comments: 10:57 PM 
Patient is being admitted to the hospital.  The results of their tests and reasons for their admission have been discussed with them and/or available family. They convey agreement and understanding for the need to be admitted and for their admission diagnosis. Consultation has been made with the inpatient physician specialist for hospitalization. LABORATORY TESTS: 
Recent Results (from the past 12 hour(s)) -SAMPLES BEING HELD Collection Time: 02/15/19  7:02 PM 
     Result                      Value             Ref Range SAMPLES BEING HELD          BLUE,RED,SST COMMENT Add-on orders for these samples will be processed based on acceptable specimen integrity and analyte stability, which may vary by analyte. -CBC WITH AUTOMATED DIFF Collection Time: 02/15/19  7:02 PM 
     Result                      Value             Ref Range WBC                         15.8 (H)          4.1 - 11.1 K* 
     RBC                         4.29              4.10 - 5.70 * HGB                         13.6              12.1 - 17.0 * HCT                         41.0              36.6 - 50.3 % MCV                         95.6              80.0 - 99.0 * MCH                         31.7              26.0 - 34.0 * MCHC                        33.2              30.0 - 36.5 * RDW                         14.0              11.5 - 14.5 % PLATELET                    236               150 - 400 K/*      MPV                         9.6               8.9 - 12.9 FL 
 NRBC                        0.0               0  WBC ABSOLUTE NRBC               0.00              0.00 - 0.01 * NEUTROPHILS                 83 (H)            32 - 75 % LYMPHOCYTES                 4 (L)             12 - 49 % MONOCYTES                   12                5 - 13 % EOSINOPHILS                 1                 0 - 7 % BASOPHILS                   0                 0 - 1 % IMMATURE GRANULOCYTES       0                 % ABS. NEUTROPHILS            13.1 (H)          1.8 - 8.0 K/* ABS. LYMPHOCYTES            0.6 (L)           0.8 - 3.5 K/* ABS. MONOCYTES              1.9 (H)           0.0 - 1.0 K/* ABS. EOSINOPHILS            0.2               0.0 - 0.4 K/* ABS. BASOPHILS              0.0               0.0 - 0.1 K/* ABS. IMM. GRANS.            0.0               K/UL          
     DF                          MANUAL                          
     RBC COMMENTS                                                
 NORMOCYTIC, NORMOCHROMIC 
-METABOLIC PANEL, COMPREHENSIVE Collection Time: 02/15/19  7:02 PM 
     Result                      Value             Ref Range Sodium                      139               136 - 145 mm* Potassium                   3.7               3.5 - 5.1 mm* Chloride                    98                97 - 108 mmo* CO2                         31                21 - 32 mmol* Anion gap                   10                5 - 15 mmol/L Glucose                     98                65 - 100 mg/* BUN                         13                6 - 20 MG/DL Creatinine                  0.82              0.70 - 1.30 * BUN/Creatinine ratio        16                12 - 20 GFR est AA                  >60               >60 ml/min/1* GFR est non-AA              >60               >60 ml/min/1* Calcium                     10.0              8.5 - 10.1 M* Bilirubin, total            1.0               0.2 - 1.0 MG* ALT (SGPT)                  20                12 - 78 U/L   
     AST (SGOT)                  15                15 - 37 U/L Alk. phosphatase            109               45 - 117 U/L Protein, total              7.6               6.4 - 8.2 g/* Albumin                     3.3 (L)           3.5 - 5.0 g/* Globulin                    4.3 (H)           2.0 - 4.0 g/* A-G Ratio                   0.8 (L)           1.1 - 2.2     
-TROPONIN I Collection Time: 02/15/19  7:02 PM 
     Result                      Value             Ref Range Troponin-I, Qt.             <0.05             <0.05 ng/mL   
-POC LACTIC ACID Collection Time: 02/15/19  7:03 PM 
     Result                      Value             Ref Range Lactic Acid (POC)           2.05 (HH)         0.40 - 2.00 * 
-BLOOD GAS, ARTERIAL Collection Time: 02/15/19  7:25 PM 
     Result                      Value             Ref Range pH                          7.41              7.35 - 7.45   
     PCO2                        53 (H)            35.0 - 45.0 * PO2                         90                80 - 100 mmHg O2 SAT                      97                92 - 97 % BICARBONATE                 33 (H)            22 - 26 mmol* BASE EXCESS                 6.9               mmol/L        
     O2 METHOD                   NASAL O2                        
     O2 FLOW RATE                4.00              L/min Sample source               ARTERIAL                        
     SITE                        RIGHT RADIAL KELSEY'S TEST                N/A IMAGING RESULTS: 
See chart MEDICATIONS GIVEN: 
Medications 
azithromycin (ZITHROMAX) 500 mg in 0.9% sodium chloride (MBP/ADV) 250 mL (500 mg IntraVENous New Bag 2/15/19 2044) cefTRIAXone (ROCEPHIN) 2 g in 0.9% sodium chloride (MBP/ADV) 50 mL (not administered) 
sodium chloride 0.9 % bolus infusion 1,000 mL (1,000 mL IntraVENous New Bag 2/15/19 2026) 
sodium chloride (NS) flush 5-40 mL (not administered) 
sodium chloride (NS) flush 5-40 mL (not administered) 
acetaminophen (TYLENOL) tablet 650 mg (not administered) 
naloxone (NARCAN) injection 0.4 mg (not administered) 
albuterol-ipratropium (DUO-NEB) 2.5 MG-0.5 MG/3 ML (not administered) 
albuterol (PROVENTIL VENTOLIN) nebulizer solution 2.5 mg (not administered) methylPREDNISolone (PF) (SOLU-MEDROL) injection 60 mg (not administered) aspirin chewable tablet 81 mg (81 mg Oral Given 2/15/19 2239) 
atorvastatin (LIPITOR) tablet 40 mg (40 mg Oral Given 2/15/19 2242) budesonide (PULMICORT) 500 mcg/2 ml nebulizer suspension (500 mcg Nebulization Given 2/15/19 2244) cholecalciferol (VITAMIN D3) tablet 1,000 Units (1,000 Units Oral Given 2/15/19 2240) cholecalciferol (VITAMIN D3) tablet 2,000 Units (not administered) dilTIAZem CD (CARDIZEM CD) capsule 240 mg (not administered) 
apixaban (ELIQUIS) tablet 5 mg (5 mg Oral Given 2/15/19 2238) guaiFENesin ER (MUCINEX) tablet 1,200 mg (1,200 mg Oral Given 2/15/19 2240) montelukast (SINGULAIR) tablet 10 mg (10 mg Oral Given 2/15/19 2241) therapeutic multivitamin (THERAGRAN) tablet 1 Tab (not administered) omega 3-DHA-EPA-fish oil 1,000 mg (120 mg-180 mg) capsule 1 Cap (not administered) omega 3-DHA-EPA-fish oil 1,000 mg (120 mg-180 mg) capsule 2 Cap (not administered) 
albuterol-ipratropium (DUO-NEB) 2.5 MG-0.5 MG/3 ML (3 mL Nebulization Given 2/15/19 2043) methylPREDNISolone (PF) (Solu-MEDROL) injection 125 mg (125 mg IntraVENous Given 2/15/19 2041) cefTRIAXone (ROCEPHIN) 2 g in sterile water (preservative free) 20 mL IV syringe (2 g IntraVENous Given 2/15/19 2048) IMPRESSION: 
 Community acquired pneumonia of right lower lobe of lung ([facility]  (primary encounter diagnosis) COPD exacerbation ([facility] PLAN: 
1. Admit to hospital 
 
 
  
Amount and/or Complexity of Data Reviewed Clinical lab tests: reviewed and ordered Tests in the radiology section of CPT®: reviewed and ordered Tests in the medicine section of CPT®: ordered and reviewed Procedures CONSULT NOTE:  
7:48 PM 
[facility] spoke with Dr. Pamella Cordero, Specialty: Hospitalist 
Discussed pt's hx, disposition, and available diagnostic and imaging results. Reviewed care plans. Consultant agrees with plans as outlined. Will see for admission.

## 2019-02-16 NOTE — ED NOTES
Bedside and Verbal shift change report given to Saud Hobbs (oncoming nurse) by Yamileth Arellano (offgoing nurse). Report included the following information SBAR, ED Summary, Intake/Output, MAR, Recent Results and Cardiac Rhythm AFib.

## 2019-02-16 NOTE — H&P
[facility] [address], [place] [address] 
[phone] Hospitalist Admission Note NAME:  Yasmine Ravi :   1950 MRN:  [id number] PCP:  Danielle Garner MD  
 
Date/Time:  2/15/2019 11:39 PM 
 
 
  
Assessment / Plan:   
  
 Acute on chronic respiratory failure with hypoxemia: on 2.5 L home O2. Supplemental O2 to maintain SpO2 >90%. Treat COPD and PNA as below. Pulm consult COPD exacerbation: likely precipitated by PNA. Start duo-nebs, IV steroids. Continue home Singulair, LABA/ICS. Abx for PNA as below Pneumonia: Start CTX/azithromycin for CAP. Send sputum culture, PNA workup (Legionella and Strep Pneumo urine ag, Mycoplasma IgM). Rule out Flu Atrial fibrillation ([facility]): with RVR triggered by resp failure. Continue diltiazem, Eliquis. CAD (coronary artery disease), native coronary artery: no CP. On ASA, but not on BB likely due to COPD. Continue statin Elevated lactic acid level: NOT from sepsis. Resolved SIRS (systemic inflammatory response syndrome) ([facility]): No sepsis, but due to infectious process. Monitor closely on treatment as above Code Status: FULL Surrogate decision maker: family ED notes and lab results reviewed. Total time spent with patient: 70 Minutes Time spent in the care of this patient included reviewing records, discussing with nursing, obtaining history and examining the patient, and discussing treatment plans, with >50% time spent counseling/coordinating care Risk of deterioration: High Care Plan discussed with: ED provider, Patient, Nursing Staff and >50% of time spent in counseling and coordination of care Discussed:  Care Plan and D/C Planning Prophylaxis:  Eliquis Disposition:  Home w/Family Subjective: CHIEF COMPLAINT: dyspnea HISTORY OF PRESENT ILLNESS:    
 Mr. Carter Johnston is a 79 y.o. male w/ hx of COPD on 2.5L home O2, AF, HTN who presents with dyspnea. Started 3 days ago, constant, worse with exertion, associated with cough productive of green sputum. No CP. No fevers or chills. ED workup showed pneumonia, SIRS. Mr. Carter Johnston is admitted for further evaluation and management of acute on chronic resp failure from PNA and COPD exacerbation. Past Medical History:  
Diagnosis Date  Arrhythmia  Asthma  COPD (chronic obstructive pulmonary disease) (HCC) severe  Hypertension  Insulin resistance 3/27/2014  Lung mass 2012 MAY resection, + mycobacterial orgs, neg malignancy  Melanoma ([facility]   Non-STEMI (non-ST elevated myocardial infarction) ([facility]   On home O2 prn  Pneumonia  Snoring  Tinnitus  Tuberculosis  Vitamin D deficiency 3/27/2014 Past Surgical History:  
Procedure Laterality Date  HX OTHER SURGICAL EXC MELANOMA RIGHT CHEEK  
 HX OTHER SURGICAL NEEDLE BX LEFT LUNG  
 HX OTHER SURGICAL  2012  
 apical segmentectomy, AMY Social History Tobacco Use  Smoking status: Former Smoker Packs/day: 0.25 Years: 40.00 Pack years: 10.00 Types: Cigarettes Last attempt to quit:  Years since quittin.1  Smokeless tobacco: Never Used Substance Use Topics  Alcohol use: Yes Alcohol/week: 15.6 oz Types: 21 Cans of beer, 5 Shots of liquor per week Family History Problem Relation Age of Onset  Glaucoma Mother  Dementia Mother  Heart Attack Father  Heart Disease Father No Known Allergies Prior to Admission medications Medication Sig Start Date End Date Taking? Authorizing Provider  
aspirin 81 mg chewable tablet Take 81 mg by mouth nightly. Yes Provider, Historical  
cholecalciferol (VITAMIN D3) 1,000 unit tablet Take 1,000 Units by mouth nightly.    Yes Provider, Historical  
 omega 3-DHA-EPA-fish oil 1,000 mg (120 mg-180 mg) capsule Take 1 Cap by mouth nightly. Yes Provider, Historical  
omega 3-DHA-EPA-fish oil 1,000 mg (120 mg-180 mg) capsule Take 2 Caps by mouth daily. Yes Provider, Historical  
cholecalciferol (VITAMIN D3) 1,000 unit tablet Take 2,000 Units by mouth daily. Yes Provider, Historical  
atorvastatin (LIPITOR) 40 mg tablet TAKE 1 TABLET NIGHTLY 1/29/19  Yes Meghna Andrade MD  
dilTIAZem CD (CARDIZEM CD) 240 mg ER capsule TAKE 1 CAPSULE BY MOUTH DAILY 12/6/18  Yes Meghna Andrade MD  
glycopyrrolate-formoterol (BEVESPI AEROSPHERE) 9-4.8 mcg HFAA Take 2 Puffs by inhalation two (2) times a day. Yes Provider, Historical  
ELIQUIS 5 mg tablet TAKE 1 TAB BY MOUTH TWO TIMES A DAY. INDICATIONS: PULMONARY THROMBOEMBOLISM PREVENTION 11/30/18  Yes Meghna Andrade MD  
nitroglycerin (NITROSTAT) 0.4 mg SL tablet 1 Tab by SubLINGual route every five (5) minutes as needed for Chest Pain. Up to 3 doses. 6/1/18  Yes Tanja Ryder NP  
budesonide (PULMICORT) 0.5 mg/2 mL nbsp 500 mcg by Nebulization route two (2) times a day. Yes Provider, Historical  
montelukast (SINGULAIR) 10 mg tablet Take 10 mg by mouth nightly. Yes Provider, Historical  
multivitamin (ONE A DAY) tablet Take 1 tablet by mouth daily. Yes Provider, Historical  
albuterol (PROVENTIL VENTOLIN) 2.5 mg /3 mL (0.083 %) nebulizer solution 2.5 mg by Nebulization route every four (4) hours as needed for Wheezing or Shortness of Breath. Yes Provider, Historical  
guaiFENesin (MUCINEX) 1,200 mg TM12 ER tablet Take 1,200 mg by mouth two (2) times a day. Yes Provider, Historical  
 
 
Review of Systems: 
(bold if positive, if negative) Gen:  Eyes:  ENT:  CVS:  Pulm:  Cough, dyspnea, sputum,wheezingGI:   
:   
MS:  Skin:  Psych:  Endo:   
Hem:  Renal:   
Neuro:    
 
 
  
Objective: VITALS:   
Vital signs reviewed; most recent are: 
 
Visit Vitals BP (!) 116/98 (BP 1 Location: Right arm, BP Patient Position: At rest) Pulse (!) 118 Temp 98.1 °F (36.7 °C) Resp 24 SpO2 (!) 87% SpO2 Readings from Last 6 Encounters:  
02/15/19 (!) 87% 12/04/18 93% 06/01/18 95% 09/26/17 98% 04/21/17 95% 03/21/17 98% O2 Flow Rate (L/min): 4 l/min No intake or output data in the 24 hours ending 02/15/19 4549 Exam:  
 
Physical Exam: 
 
Gen:  Well-developed, well-nourished, in no acute distress HEENT:  No scleral icterus, PERRL, hearing intact to voice, moist mucous membranes Neck:  Supple, without masses. Thyroid non-tender Resp:  No accessory muscle use. Increased WOB. Diminished breath sounds with wheezing and rhonchi 
Card: tachycardic, irregularly irregular. Without murmurs, rubs, or gallops. No peripheral lower extremity edema. No JVD. Peripheral pulses in tact. Abd:  Normoactive bowel sounds. Soft, non-tender, non-distended. No rebound, no guarding. No appreciable hepatosplenomegaly Lymph:  No cervical adenopathy Musc:  No cyanosis or clubbing Skin:  No rashes or ulcers; turgor intact. Cap refill ~2 secs Neuro:  Cranial nerves are grossly intact, no focal motor weakness, follows commands appropriately Psych:  Good insight, normal affect. Alert, oriented x 3. Answers questions appropriately Labs: 
 
Recent Labs  
  02/15/19 
1902 WBC 15.8* HGB 13.6 HCT 41.0  
 Recent Labs  
  02/15/19 
1902   
K 3.7 CL 98  
CO2 31  
GLU 98 BUN 13  
CREA 0.82  
CA 10.0 ALB 3.3* SGOT 15 ALT 20 No components found for: [place] Recent Labs  
  02/15/19 
1925 PH 7.41  
PCO2 53* PO2 90 HCO3 33* No results for input(s): INR in the last 72 hours. No lab exists for component: INREXT Lab Results Component Value Date/Time Specimen Description: NARES 02/12/2014 05:30 AM  
 Specimen Description: BLOOD 02/12/2014 12:08 AM  
 
Lab Results Component Value Date/Time Culture result: MIXED SKIN AYO ISOLATED 06/04/2015 11:15 PM  
 Culture result: NO GROWTH 6 DAYS 06/04/2015 09:21 PM  
 Culture result: HEAVY 10/28/2014 07:40 PM  
 Culture result: NORMAL RESPIRATORY AYO 10/28/2014 07:40 PM  
 
All other current labs reviewed in the computer. Imaging/Studies: CXR: RLL PNA Imaging personally reviewed EKG: AF with RVR EKG personally reviewed 
 
___________________________________________________ Attending Physician: Ele Smith MD

## 2019-02-16 NOTE — PROGRESS NOTES
[facility]: MED RECONCILIATION Medications added:  
 
· none Medications removed: · Arformoterol nebs - patient states his insurance would not pay for this and uses Bevespi inhaler in addition to budesonide nebs instead · Spiriva inhaler Medications adjusted: · Vitamin D3 - takes 2000 units in the morning and 1000 units at night · Fish oil - takes 2 caps in the morning and 1 cap at night · Aspirin 81 mg QHS (instead of in the morning) · Montelukast 10 mg QHS (instead of in the morning) Information obtained from: patient, Rx query Significant PMH/Disease States:  
Past Medical History:  
Diagnosis Date  Arrhythmia  Asthma  COPD (chronic obstructive pulmonary disease) (HCC) severe  Hypertension  Insulin resistance 3/27/2014  Lung mass 9/2012 MAY resection, + mycobacterial orgs, neg malignancy  Melanoma ([facility] [address] 2007  Non-STEMI (non-ST elevated myocardial infarction) ([facility] 2/14  On home O2 prn  Pneumonia  Snoring  Tinnitus  Tuberculosis  Vitamin D deficiency 3/27/2014 Chief Complaint for this Admission: Chief Complaint Patient presents with  Shortness of Breath  Fever Allergies: Patient has no known allergies. Prior to Admission Medications:  
 
Medication Documentation Review Audit Reviewed by Nahomy Munoz, PHARMD (Pharmacist) on 02/15/19 at 2004 Medication Sig Documenting Provider Last Dose Status Taking? albuterol (PROVENTIL VENTOLIN) 2.5 mg /3 mL (0.083 %) nebulizer solution 2.5 mg by Nebulization route every four (4) hours as needed for Wheezing or Shortness of Breath. Provider, Historical 2/15/2019 Unknown time Active Yes  
aspirin 81 mg chewable tablet Take 81 mg by mouth nightly.  Provider, Historical 2/14/2019 pm Active Yes  
atorvastatin (LIPITOR) 40 mg tablet TAKE 1 TABLET NIGHTLY Shira Carter MD 2/14/2019 pm Active Yes  
budesonide (PULMICORT) 0.5 mg/2 mL nbsp 500 mcg by Nebulization route two (2) times a day. Provider, Historical 2/15/2019 am Active Yes  
cholecalciferol (VITAMIN D3) 1,000 unit tablet Take 1,000 Units by mouth nightly. Provider, Historical 2/14/2019 pm Active Yes  
cholecalciferol (VITAMIN D3) 1,000 unit tablet Take 2,000 Units by mouth daily. Provider, Historical 2/15/2019 am Active Yes  
dilTIAZem CD (CARDIZEM CD) 240 mg ER capsule TAKE 1 CAPSULE BY MOUTH DAILY Anali Goddard MD 2/15/2019 am Active Yes ELIQUIS 5 mg tablet TAKE 1 TAB BY MOUTH TWO TIMES A DAY. INDICATIONS: PULMONARY THROMBOEMBOLISM PREVENTION Anali Goddard MD 2/15/2019 am Active Yes  
glycopyrrolate-formoterol (BEVESPI AEROSPHERE) 9-4.8 mcg HFAA Take 2 Puffs by inhalation two (2) times a day. Provider, Historical 2/15/2019 am Active Yes Med Note ([facility]   Fri Feb 15, 2019  8:01 PM) Pharmacy does not carry. Patient states that he is unable to bring this inhaler in at this time. guaiFENesin (MUCINEX) 1,200 mg TM12 ER tablet Take 1,200 mg by mouth two (2) times a day. Provider, Historical 2/15/2019 am Active Yes  
montelukast (SINGULAIR) 10 mg tablet Take 10 mg by mouth nightly. Provider, Historical 2/14/2019 pm Active Yes  
multivitamin (ONE A DAY) tablet Take 1 tablet by mouth daily. Provider, Historical 2/15/2019 am Active Yes  
nitroglycerin (NITROSTAT) 0.4 mg SL tablet 1 Tab by SubLINGual route every five (5) minutes as needed for Chest Pain. Up to 3 doses. Tushar Reyes NP  Active Yes  
omega 3-DHA-EPA-fish oil 1,000 mg (120 mg-180 mg) capsule Take 1 Cap by mouth nightly. Provider, Historical 2/14/2019 pm Active Yes  
omega 3-DHA-EPA-fish oil 1,000 mg (120 mg-180 mg) capsule Take 2 Caps by mouth daily. Provider, Historical 2/15/2019 am Active Yes Thank you for the consult, 
William Armstrong D, [address]

## 2019-02-16 NOTE — CONSULTS
[facility]  Pulmonary, Critical Care, and Sleep Medicine    Initial Patient Consult    Name: Edith Calle MRN: [id number]   : 1950 Hospital: [address] [facility]   Date: 2019        IMPRESSION:   · Pneumonia  · COPD with exacerbation  · Acute on chronic resp failure  · Chronic hypercapnea: compensated on ABG      RECOMMENDATIONS:   · Agree with treatment for community acquired pneumonia  · Taper steroids in the morning if improved  · Frequent nebs  · Follow up microbiologic labs (viral panel negative)  · He was set up with home non-invasive ventilation yesterday. He is currently well compensated so does not require acute NIV now. Will plan to resume home NIV upon discharge for long term management of his chronic resp failure     Subjective: This patient has been seen and evaluated at the request of Dr. Daryle Setter for COPD. Patient is a 79 y.o. male who presented with 7 days of worsening SOB. He has known COPD and respiratory failure with 2.5 LPM home O2 requirements and compensated hypercapnea (pCO2 54 in 2018). + cough with yellow/green sputum. No fever or chills. Currently he is resting comfortably on 4 lpm and feels a bit better. The patient is known to our office and was to be started on home non-invasive ventilation but he delayed set up until yesterday. He did not have much time to use the device. He has long standing significant dyspnea that continues to worsen.     PCO2 53 this admission, pH 7.41      Past Medical History:   Diagnosis Date    Arrhythmia     Asthma     COPD (chronic obstructive pulmonary disease) ([facility]     severe    Hypertension     Insulin resistance 3/27/2014    Lung mass 2012    MAY resection, + mycobacterial orgs, neg malignancy    Melanoma ([facility] [address]     Non-STEMI (non-ST elevated myocardial infarction) ([facility]     On home O2 prn    Pneumonia     Snoring     Tinnitus     Tuberculosis     Vitamin D deficiency 3/27/2014    Sebastien Harrington fibrillation    Past Surgical History:   Procedure Laterality Date    HX OTHER SURGICAL      EXC MELANOMA RIGHT CHEEK    HX OTHER SURGICAL      NEEDLE BX LEFT LUNG    HX OTHER SURGICAL  9/11/2012    apical segmentectomy, MAY      Prior to Admission medications    Medication Sig Start Date End Date Taking? Authorizing Provider   aspirin 81 mg chewable tablet Take 81 mg by mouth nightly. Yes Provider, Historical   cholecalciferol (VITAMIN D3) 1,000 unit tablet Take 1,000 Units by mouth nightly. Yes Provider, Historical   omega 3-DHA-EPA-fish oil 1,000 mg (120 mg-180 mg) capsule Take 1 Cap by mouth nightly. Yes Provider, Historical   omega 3-DHA-EPA-fish oil 1,000 mg (120 mg-180 mg) capsule Take 2 Caps by mouth daily. Yes Provider, Historical   cholecalciferol (VITAMIN D3) 1,000 unit tablet Take 2,000 Units by mouth daily. Yes Provider, Historical   atorvastatin (LIPITOR) 40 mg tablet TAKE 1 TABLET NIGHTLY 1/29/19  Yes Issa Perez MD   dilTIAZem CD (CARDIZEM CD) 240 mg ER capsule TAKE 1 CAPSULE BY MOUTH DAILY 12/6/18  Yes Issa Perez MD   glycopyrrolate-formoterol (BEVESPI AEROSPHERE) 9-4.8 mcg HFAA Take 2 Puffs by inhalation two (2) times a day. Yes Provider, Historical   ELIQUIS 5 mg tablet TAKE 1 TAB BY MOUTH TWO TIMES A DAY. INDICATIONS: PULMONARY THROMBOEMBOLISM PREVENTION 11/30/18  Yes Issa Perez MD   nitroglycerin (NITROSTAT) 0.4 mg SL tablet 1 Tab by SubLINGual route every five (5) minutes as needed for Chest Pain. Up to 3 doses. 6/1/18  Yes Ever ROSS Contreras   budesonide (PULMICORT) 0.5 mg/2 mL nbsp 500 mcg by Nebulization route two (2) times a day. Yes Provider, Historical   montelukast (SINGULAIR) 10 mg tablet Take 10 mg by mouth nightly. Yes Provider, Historical   multivitamin (ONE A DAY) tablet Take 1 tablet by mouth daily.    Yes Provider, Historical   albuterol (PROVENTIL VENTOLIN) 2.5 mg /3 mL (0.083 %) nebulizer solution 2.5 mg by Nebulization route every four (4) hours as needed for Wheezing or Shortness of Breath. Yes Provider, Historical   guaiFENesin (MUCINEX) 1,200 mg TM12 ER tablet Take 1,200 mg by mouth two (2) times a day. Yes Provider, Historical     No Known Allergies   Social History     Tobacco Use    Smoking status: Former Smoker     Packs/day: 0.25     Years: 40.00     Pack years: 10.00     Types: Cigarettes     Last attempt to quit:      Years since quittin.1    Smokeless tobacco: Never Used   Substance Use Topics    Alcohol use:  Yes     Alcohol/week: 15.6 oz     Types: 21 Cans of beer, 5 Shots of liquor per week      Family History   Problem Relation Age of Onset    Glaucoma Mother     Dementia Mother     Heart Attack Father     Heart Disease Father         Current Facility-Administered Medications   Medication Dose Route Frequency    dilTIAZem (CARDIZEM) 125 mg in dextrose 5% 125 mL infusion  0-15 mg/hr IntraVENous TITRATE    arformoterol (BROVANA) neb solution 15 mcg  15 mcg Nebulization BID RT    azithromycin (ZITHROMAX) 500 mg in 0.9% sodium chloride (MBP/ADV) 250 mL  500 mg IntraVENous Q24H    cefTRIAXone (ROCEPHIN) 2 g in 0.9% sodium chloride (MBP/ADV) 50 mL  2 g IntraVENous Q24H    sodium chloride (NS) flush 5-40 mL  5-40 mL IntraVENous Q8H    albuterol-ipratropium (DUO-NEB) 2.5 MG-0.5 MG/3 ML  3 mL Nebulization Q4H RT    methylPREDNISolone (PF) (SOLU-MEDROL) injection 60 mg  60 mg IntraVENous Q6H    aspirin chewable tablet 81 mg  81 mg Oral QHS    atorvastatin (LIPITOR) tablet 40 mg  40 mg Oral QHS    budesonide (PULMICORT) 500 mcg/2 ml nebulizer suspension  500 mcg Nebulization BID RT    cholecalciferol (VITAMIN D3) tablet 1,000 Units  1,000 Units Oral QHS    cholecalciferol (VITAMIN D3) tablet 2,000 Units  2,000 Units Oral DAILY    dilTIAZem CD (CARDIZEM CD) capsule 240 mg  240 mg Oral DAILY    apixaban (ELIQUIS) tablet 5 mg  5 mg Oral BID    guaiFENesin ER (MUCINEX) tablet 1,200 mg  1,200 mg Oral BID    montelukast (SINGULAIR) tablet 10 mg  10 mg Oral QHS    therapeutic multivitamin (THERAGRAN) tablet 1 Tab  1 Tab Oral DAILY    omega 3-DHA-EPA-fish oil 1,000 mg (120 mg-180 mg) capsule 1 Cap  1 Cap Oral QHS    omega 3-DHA-EPA-fish oil 1,000 mg (120 mg-180 mg) capsule 2 Cap  2 Cap Oral DAILY       Review of Systems:  No fever, chills ,abd pain or chest pain. No rash. No neuro changes. Further 12 pt ros negative except as per the HPI. Objective:   Vital Signs:    Visit Vitals  /78   Pulse (!) 108   Temp 98 °F (36.7 °C)   Resp 26   SpO2 97%       O2 Device: Nasal cannula   O2 Flow Rate (L/min): 4 l/min   Temp (24hrs), Av.9 °F (36.6 °C), Min:97.6 °F (36.4 °C), Max:98.1 °F (36.7 °C)         Physical Exam:   General:  Alert, cooperative, no distress, no accessory muscle use   Head:  Normocephalic, atraumatic. Eyes:  EOMs intact. Nose: Nares normal.   Nasal O2   Throat: Lips normal.   Neck: Nontender   Back:   Symmetric   Lungs:   Severely diminished breath sounds, no wheezes heard   Chest wall:  No tenderness or deformity. Heart:  irregular   Abdomen:   Soft, non-tender. Extremities: no cyanosis or edema.        Skin: Dry       Neurologic: Grossly nonfocal     Data review:     Lab Results   Component Value Date/Time    WBC 14.7 (H) 2019 04:45 AM    HGB 12.8 2019 04:45 AM    HCT 37.9 2019 04:45 AM    PLATELET 218  04:45 AM    MCV 95.2 2019 04:45 AM     Lab Results   Component Value Date/Time    Sodium 140 2019 04:45 AM    Potassium 4.1 2019 04:45 AM    Chloride 101 2019 04:45 AM    CO2 30 2019 04:45 AM    Anion gap 9 2019 04:45 AM    Glucose 121 (H) 2019 04:45 AM    BUN 14 2019 04:45 AM    Creatinine 0.66 (L) 2019 04:45 AM    BUN/Creatinine ratio 21 (H) 2019 04:45 AM    GFR est AA >60 2019 04:45 AM    GFR est non-AA >60 2019 04:45 AM    Calcium 9.6 2019 04:45 AM     COAGS:  No results found for: APTT, PTP, INR        Imaging:  I have personally reviewed the patients radiographs and have reviewed the reports.   Hyperinflation, RLL infiltrate         Donnie Valdivia MD

## 2019-02-16 NOTE — PROGRESS NOTES
Shift Summary Bedside and Verbal shift change report given to Mauri (oncoming nurse) by Yana Figueroa (offgoing nurse). Report included the following information SBAR, Kardex, MAR, Recent Results and Cardiac Rhythm  . Patient has not voided overnight. Patient able to stand side of bed with 500 cc out . Urine sent Patient does not have any sputum out Patient very short of breath. Oxygen drops in to the low 80's with minimal activity. Patient recovers slowly with pursed lipped breathing. Heart rate remains high. Patient given AM Cardizem with no change. MD notified. [address] Patient in bed resting quietly heart rate in the low 100's -110's at rest. Breathing less labored. Message sent to pharmacy for Cardizem gtt. Heart rate still slightly above 100. Gtt increased to 5mL/hr. Sputum cx sent Cardizem gtt stopped TRANSFER - OUT REPORT: 
 
Verbal report given to [facility] RN(name) on [facility]  being transferred to [facility](unit) for routine progression of care Report consisted of patients Situation, Background, Assessment and  
Recommendations(SBAR). Information from the following report(s) SBAR, Kardex, [facility], Recent Results and Cardiac Rhythm   was reviewed with the receiving nurse. Lines:  
Peripheral IV 02/15/19 Left Antecubital (Active) Site Assessment Clean, dry, & intact 2/16/2019  7:22 AM  
Phlebitis Assessment 0 2/16/2019  7:22 AM  
Infiltration Assessment 0 2/16/2019  7:22 AM  
Dressing Status Clean, dry, & intact 2/16/2019  7:22 AM  
Dressing Type Transparent 2/16/2019  7:22 AM  
Hub Color/Line Status Pink;Flushed 2/16/2019  7:22 AM  
Action Taken Open ports on tubing capped 2/16/2019  7:22 AM  
Alcohol Cap Used Yes 2/16/2019  7:22 AM  
   
Peripheral IV 02/15/19 Right Antecubital (Active) Site Assessment Clean, dry, & intact 2/16/2019  7:22 AM  
Phlebitis Assessment 0 2/16/2019  7:22 AM  
Infiltration Assessment 0 2/16/2019  7:22 AM  
 Dressing Status Clean, dry, & intact 2/16/2019  7:22 AM  
Dressing Type Transparent 2/16/2019  7:22 AM  
Hub Color/Line Status Pink 2/16/2019  7:22 AM  
Action Taken Open ports on tubing capped 2/16/2019  7:22 AM  
Alcohol Cap Used Yes 2/16/2019  7:22 AM  
  
 
Opportunity for questions and clarification was provided. Patient transported with: 
 Monitor O2 @   liters Registered Nurse

## 2019-02-16 NOTE — ED NOTES
Verbal shift change report given to [facility] (oncoming nurse) by Timothy Peacock (offgoing nurse). Report included the following information SBAR, Kardex, ED Summary and MAR.

## 2019-02-17 PROBLEM — R79.89 ELEVATED LACTIC ACID LEVEL: Status: RESOLVED | Noted: 2019-02-15 | Resolved: 2019-02-17

## 2019-02-17 LAB
ANION GAP SERPL CALC-SCNC: 11 MMOL/L (ref 5–15)
ATRIAL RATE: 104 BPM
BUN SERPL-MCNC: 15 MG/DL (ref 6–20)
BUN/CREAT SERPL: 17 (ref 12–20)
CALCIUM SERPL-MCNC: 9.7 MG/DL (ref 8.5–10.1)
CALCULATED R AXIS, ECG10: 73 DEGREES
CALCULATED T AXIS, ECG11: 12 DEGREES
CHLORIDE SERPL-SCNC: 97 MMOL/L (ref 97–108)
CO2 SERPL-SCNC: 28 MMOL/L (ref 21–32)
CREAT SERPL-MCNC: 0.89 MG/DL (ref 0.7–1.3)
DIAGNOSIS, 93000: NORMAL
ERYTHROCYTE [DISTWIDTH] IN BLOOD BY AUTOMATED COUNT: 14.1 % (ref 11.5–14.5)
GLUCOSE SERPL-MCNC: 133 MG/DL (ref 65–100)
HCT VFR BLD AUTO: 36.8 % (ref 36.6–50.3)
HGB BLD-MCNC: 12.4 G/DL (ref 12.1–17)
MCH RBC QN AUTO: 32.2 PG (ref 26–34)
MCHC RBC AUTO-ENTMCNC: 33.7 G/DL (ref 30–36.5)
MCV RBC AUTO: 95.6 FL (ref 80–99)
NRBC # BLD: 0 K/UL (ref 0–0.01)
NRBC BLD-RTO: 0 PER 100 WBC
PLATELET # BLD AUTO: 245 K/UL (ref 150–400)
PMV BLD AUTO: 9.6 FL (ref 8.9–12.9)
POTASSIUM SERPL-SCNC: 3.5 MMOL/L (ref 3.5–5.1)
Q-T INTERVAL, ECG07: 300 MS
QRS DURATION, ECG06: 86 MS
QTC CALCULATION (BEZET), ECG08: 413 MS
RBC # BLD AUTO: 3.85 M/UL (ref 4.1–5.7)
SODIUM SERPL-SCNC: 136 MMOL/L (ref 136–145)
VENTRICULAR RATE, ECG03: 114 BPM
WBC # BLD AUTO: 19.7 K/UL (ref 4.1–11.1)

## 2019-02-17 PROCEDURE — 74011250636 HC RX REV CODE- 250/636: Performed by: PHYSICIAN ASSISTANT

## 2019-02-17 PROCEDURE — 77010033678 HC OXYGEN DAILY

## 2019-02-17 PROCEDURE — 80048 BASIC METABOLIC PNL TOTAL CA: CPT

## 2019-02-17 PROCEDURE — 65660000000 HC RM CCU STEPDOWN

## 2019-02-17 PROCEDURE — 74011250636 HC RX REV CODE- 250/636: Performed by: INTERNAL MEDICINE

## 2019-02-17 PROCEDURE — 74011250637 HC RX REV CODE- 250/637: Performed by: INTERNAL MEDICINE

## 2019-02-17 PROCEDURE — 85027 COMPLETE CBC AUTOMATED: CPT

## 2019-02-17 PROCEDURE — 94640 AIRWAY INHALATION TREATMENT: CPT

## 2019-02-17 PROCEDURE — 74011000250 HC RX REV CODE- 250: Performed by: INTERNAL MEDICINE

## 2019-02-17 PROCEDURE — 36415 COLL VENOUS BLD VENIPUNCTURE: CPT

## 2019-02-17 PROCEDURE — 74011000258 HC RX REV CODE- 258: Performed by: PHYSICIAN ASSISTANT

## 2019-02-17 RX ORDER — POTASSIUM CHLORIDE 7.45 MG/ML
10 INJECTION INTRAVENOUS
Status: COMPLETED | OUTPATIENT
Start: 2019-02-17 | End: 2019-02-17

## 2019-02-17 RX ORDER — DILTIAZEM HYDROCHLORIDE 300 MG/1
300 CAPSULE, COATED, EXTENDED RELEASE ORAL DAILY
Status: DISCONTINUED | OUTPATIENT
Start: 2019-02-17 | End: 2019-02-21 | Stop reason: HOSPADM

## 2019-02-17 RX ADMIN — IPRATROPIUM BROMIDE AND ALBUTEROL SULFATE 3 ML: .5; 3 SOLUTION RESPIRATORY (INHALATION) at 19:48

## 2019-02-17 RX ADMIN — Medication 10 ML: at 17:13

## 2019-02-17 RX ADMIN — CEFTRIAXONE SODIUM 2 G: 2 INJECTION, POWDER, FOR SOLUTION INTRAMUSCULAR; INTRAVENOUS at 20:28

## 2019-02-17 RX ADMIN — VITAMIN D, TAB 1000IU (100/BT) 2000 UNITS: 25 TAB at 09:04

## 2019-02-17 RX ADMIN — Medication 10 ML: at 22:51

## 2019-02-17 RX ADMIN — MONTELUKAST 10 MG: 10 TABLET, FILM COATED ORAL at 22:50

## 2019-02-17 RX ADMIN — METHYLPREDNISOLONE SODIUM SUCCINATE 60 MG: 40 INJECTION, POWDER, FOR SOLUTION INTRAMUSCULAR; INTRAVENOUS at 17:17

## 2019-02-17 RX ADMIN — ATORVASTATIN CALCIUM 40 MG: 20 TABLET, FILM COATED ORAL at 22:50

## 2019-02-17 RX ADMIN — ALBUTEROL SULFATE 2.5 MG: 2.5 SOLUTION RESPIRATORY (INHALATION) at 14:00

## 2019-02-17 RX ADMIN — ASPIRIN 81 MG 81 MG: 81 TABLET ORAL at 22:50

## 2019-02-17 RX ADMIN — IPRATROPIUM BROMIDE AND ALBUTEROL SULFATE 3 ML: .5; 3 SOLUTION RESPIRATORY (INHALATION) at 11:19

## 2019-02-17 RX ADMIN — OMEGA-3 FATTY ACIDS CAP 1000 MG 2 CAPSULE: 1000 CAP at 09:03

## 2019-02-17 RX ADMIN — POTASSIUM CHLORIDE 10 MEQ: 10 INJECTION, SOLUTION INTRAVENOUS at 09:05

## 2019-02-17 RX ADMIN — METHYLPREDNISOLONE SODIUM SUCCINATE 60 MG: 40 INJECTION, POWDER, FOR SOLUTION INTRAMUSCULAR; INTRAVENOUS at 04:01

## 2019-02-17 RX ADMIN — APIXABAN 5 MG: 5 TABLET, FILM COATED ORAL at 09:04

## 2019-02-17 RX ADMIN — METHYLPREDNISOLONE SODIUM SUCCINATE 60 MG: 40 INJECTION, POWDER, FOR SOLUTION INTRAMUSCULAR; INTRAVENOUS at 22:51

## 2019-02-17 RX ADMIN — POTASSIUM CHLORIDE 10 MEQ: 10 INJECTION, SOLUTION INTRAVENOUS at 10:05

## 2019-02-17 RX ADMIN — BUDESONIDE 500 MCG: 0.5 INHALANT RESPIRATORY (INHALATION) at 07:15

## 2019-02-17 RX ADMIN — METHYLPREDNISOLONE SODIUM SUCCINATE 60 MG: 40 INJECTION, POWDER, FOR SOLUTION INTRAMUSCULAR; INTRAVENOUS at 10:05

## 2019-02-17 RX ADMIN — GUAIFENESIN 1200 MG: 600 TABLET, EXTENDED RELEASE ORAL at 09:03

## 2019-02-17 RX ADMIN — THERA TABS 1 TABLET: TAB at 09:04

## 2019-02-17 RX ADMIN — POTASSIUM CHLORIDE 10 MEQ: 10 INJECTION, SOLUTION INTRAVENOUS at 13:25

## 2019-02-17 RX ADMIN — DILTIAZEM HYDROCHLORIDE 300 MG: 300 CAPSULE, COATED, EXTENDED RELEASE ORAL at 09:04

## 2019-02-17 RX ADMIN — VITAMIN D, TAB 1000IU (100/BT) 1000 UNITS: 25 TAB at 22:54

## 2019-02-17 RX ADMIN — BUDESONIDE 500 MCG: 0.5 INHALANT RESPIRATORY (INHALATION) at 19:48

## 2019-02-17 RX ADMIN — APIXABAN 5 MG: 5 TABLET, FILM COATED ORAL at 17:18

## 2019-02-17 RX ADMIN — OMEGA-3 FATTY ACIDS CAP 1000 MG 1 CAPSULE: 1000 CAP at 22:50

## 2019-02-17 RX ADMIN — AZITHROMYCIN MONOHYDRATE 500 MG: 500 INJECTION, POWDER, LYOPHILIZED, FOR SOLUTION INTRAVENOUS at 20:30

## 2019-02-17 RX ADMIN — IPRATROPIUM BROMIDE AND ALBUTEROL SULFATE 3 ML: .5; 3 SOLUTION RESPIRATORY (INHALATION) at 07:15

## 2019-02-17 RX ADMIN — POTASSIUM CHLORIDE 10 MEQ: 10 INJECTION, SOLUTION INTRAVENOUS at 11:30

## 2019-02-17 RX ADMIN — GUAIFENESIN 1200 MG: 600 TABLET, EXTENDED RELEASE ORAL at 22:50

## 2019-02-17 NOTE — PROGRESS NOTES
[facility] DAILY PROGRESS NOTE Name: Evelio Alvarenga : 1950 MRN: [id number] Date: 2019 10:59 AM  
I have reviewed the flowsheet and previous days notes. The patient feels a little worse than when I saw him yesterday. Feels more congested. Vital Signs:   
Visit Vitals /75 (BP 1 Location: Right arm, BP Patient Position: At rest) Pulse 85 Temp 97.6 °F (36.4 °C) Resp 14 SpO2 97% O2 Device: Nasal cannula O2 Flow Rate (L/min): 4 l/min Temp (24hrs), Av.7 °F (36.5 °C), Min:97 °F (36.1 °C), Max:98 °F (36.7 °C) Intake/Output:  
Last shift:      No intake/output data recorded. Last 3 shifts: 02/15 1901 -  0700 In: 2040 [P.O.:490; I.V.:1550] Out: 975 [Urine:975] Intake/Output Summary (Last 24 hours) at 2019 1059 Last data filed at 2019 8465 Gross per 24 hour Intake 800 ml Output 475 ml Net 325 ml Physical Exam: 
General:  Alert, cooperative, resting and in no apparent distress Head:  Normocephalic, atraumatic. Eyes:  EOMs intact. Nose: Nares normal.  
Nasal O2 Lungs:   Scattered wheezes, diminished throughout Chest wall:  No tenderness or deformity. Heart:  Regular rate and rhythm Abdomen:   Soft, non-tender. Extremities: No edema. Skin: Dry Neurologic: Grossly nonfocal  
 
 
DATA:  
Current Facility-Administered Medications Medication Dose Route Frequency  dilTIAZem CD (CARDIZEM CD) capsule 300 mg  300 mg Oral DAILY  potassium chloride 10 mEq in 100 ml IVPB  10 mEq IntraVENous Q1H  
 dilTIAZem (CARDIZEM) 125 mg in dextrose 5% 125 mL infusion  0-15 mg/hr IntraVENous TITRATE  arformoterol (BROVANA) neb solution 15 mcg  15 mcg Nebulization BID RT  
 azithromycin (ZITHROMAX) 500 mg in 0.9% sodium chloride (MBP/ADV) 250 mL  500 mg IntraVENous Q24H  cefTRIAXone (ROCEPHIN) 2 g in 0.9% sodium chloride (MBP/ADV) 50 mL  2 g IntraVENous Q24H  sodium chloride (NS) flush 5-40 mL  5-40 mL IntraVENous Q8H  
 sodium chloride (NS) flush 5-40 mL  5-40 mL IntraVENous PRN  
 acetaminophen (TYLENOL) tablet 650 mg  650 mg Oral Q6H PRN  
 naloxone (NARCAN) injection 0.4 mg  0.4 mg IntraVENous PRN  
 albuterol-ipratropium (DUO-NEB) 2.5 MG-0.5 MG/3 ML  3 mL Nebulization Q4H RT  
 albuterol (PROVENTIL VENTOLIN) nebulizer solution 2.5 mg  2.5 mg Nebulization Q2H PRN  
 methylPREDNISolone (PF) (SOLU-MEDROL) injection 60 mg  60 mg IntraVENous Q6H  
 aspirin chewable tablet 81 mg  81 mg Oral QHS  atorvastatin (LIPITOR) tablet 40 mg  40 mg Oral QHS  budesonide (PULMICORT) 500 mcg/2 ml nebulizer suspension  500 mcg Nebulization BID RT  
 cholecalciferol (VITAMIN D3) tablet 1,000 Units  1,000 Units Oral QHS  cholecalciferol (VITAMIN D3) tablet 2,000 Units  2,000 Units Oral DAILY  apixaban (ELIQUIS) tablet 5 mg  5 mg Oral BID  
 guaiFENesin ER (MUCINEX) tablet 1,200 mg  1,200 mg Oral BID  montelukast (SINGULAIR) tablet 10 mg  10 mg Oral QHS  therapeutic multivitamin (THERAGRAN) tablet 1 Tab  1 Tab Oral DAILY  omega 3-DHA-EPA-fish oil 1,000 mg (120 mg-180 mg) capsule 1 Cap  1 Cap Oral QHS  omega 3-DHA-EPA-fish oil 1,000 mg (120 mg-180 mg) capsule 2 Cap  2 Cap Oral DAILY Labs: 
Recent Labs  
  02/17/19 
0101 02/16/19 
0445 02/15/19 
1902 WBC 19.7* 14.7* 15.8* HGB 12.4 12.8 13.6 HCT 36.8 37.9 41.0  
 229 236 Recent Labs  
  02/17/19 
0101 02/16/19 
0445 02/15/19 
1902  140 139  
K 3.5 4.1 3.7 CL 97 101 98 CO2 28 30 31 * 121* 98 BUN 15 14 13 CREA 0.89 0.66* 0.82 CA 9.7 9.6 10.0 MG  --  1.9  --   
PHOS  --  3.4  --   
ALB  --  2.7* 3.3* SGOT  --  13* 15 ALT  --  16 20 Recent Labs  
  02/15/19 
1925 PH 7.41  
PCO2 53* PO2 90 HCO3 33* IMPRESSION:  
· Pneumonia: RLL 
· COPD with exacerbation · Acute on chronic resp failure · Chronic hypercapnea: compensated on ABG PLAN:  
· Continue current treatment for community acquired pneumonia · Continue current steroid dose · Frequent nebs · Follow up microbiologic labs (viral panel negative) · He was set up with home non-invasive ventilation yesterday. He is currently  compensated so does not require acute NIV now. Discussed option of using NIV here and he prefers to wait. Will plan to resume home NIV upon discharge for long term management of his chronic resp failure · Asif Bates MD

## 2019-02-17 NOTE — PROGRESS NOTES
Tyrone Lucas [facility] [place] [address] 
[address] 
[phone] Medical Progress Note NAME: Khoa Car :  1950 MRM:  [id number] Date/Time: 2019 Subjective: Chief Complaint:  F/u sob No acute events. Sob continues along with sore throat. No chest pain. Not ready to go home Objective:  
 
 
Vitals:  
 
 
  
Last 24hrs VS reviewed since prior progress note. Most recent are: 
 
Visit Vitals /75 (BP 1 Location: Right arm, BP Patient Position: At rest) Pulse 85 Temp 97.6 °F (36.4 °C) Resp 14 SpO2 97% SpO2 Readings from Last 6 Encounters:  
19 97% 18 93% 18 95% 17 98% 17 95% 17 98% O2 Flow Rate (L/min): 4 l/min Intake/Output Summary (Last 24 hours) at 2019 1338 Last data filed at 2019 1130 Gross per 24 hour Intake 1495 ml Output 475 ml Net 1020 ml Exam:  
 
Physical Exam: 
 
Gen:  Well-developed, well-nourished, in no acute distress HEENT:  Pink conjunctivae, PERRL, hearing intact to voice, moist mucous membranes Neck:  Supple, without masses, thyroid non-tender Resp:  No accessory muscle use, decreased air movement. No wheezing Card:  No murmurs, normal S1, S2 without thrills, bruits or peripheral edema Abd:  Soft, non-tender, non-distended, normoactive bowel sounds are present Musc:  No cyanosis or clubbing Skin:  No rashes or ulcers, skin turgor is good Neuro:  No focal deficits, follows commands appropriately Psych:  Good insight, oriented to person, place and time, alert Telemetry reviewed:   AFIB Medications Reviewed: (see below) Lab Data Reviewed: (see below) 
 
______________________________________________________________________ Medications:  
 
Current Facility-Administered Medications Medication Dose Route Frequency  dilTIAZem CD (CARDIZEM CD) capsule 300 mg  300 mg Oral DAILY  potassium chloride 10 mEq in 100 ml IVPB  10 mEq IntraVENous Q1H  
 dilTIAZem (CARDIZEM) 125 mg in dextrose 5% 125 mL infusion  0-15 mg/hr IntraVENous TITRATE  arformoterol (BROVANA) neb solution 15 mcg  15 mcg Nebulization BID RT  
 azithromycin (ZITHROMAX) 500 mg in 0.9% sodium chloride (MBP/ADV) 250 mL  500 mg IntraVENous Q24H  cefTRIAXone (ROCEPHIN) 2 g in 0.9% sodium chloride (MBP/ADV) 50 mL  2 g IntraVENous Q24H  
 sodium chloride (NS) flush 5-40 mL  5-40 mL IntraVENous Q8H  
 sodium chloride (NS) flush 5-40 mL  5-40 mL IntraVENous PRN  
 acetaminophen (TYLENOL) tablet 650 mg  650 mg Oral Q6H PRN  
 naloxone (NARCAN) injection 0.4 mg  0.4 mg IntraVENous PRN  
 albuterol-ipratropium (DUO-NEB) 2.5 MG-0.5 MG/3 ML  3 mL Nebulization Q4H RT  
 albuterol (PROVENTIL VENTOLIN) nebulizer solution 2.5 mg  2.5 mg Nebulization Q2H PRN  
 methylPREDNISolone (PF) (SOLU-MEDROL) injection 60 mg  60 mg IntraVENous Q6H  
 aspirin chewable tablet 81 mg  81 mg Oral QHS  atorvastatin (LIPITOR) tablet 40 mg  40 mg Oral QHS  budesonide (PULMICORT) 500 mcg/2 ml nebulizer suspension  500 mcg Nebulization BID RT  
 cholecalciferol (VITAMIN D3) tablet 1,000 Units  1,000 Units Oral QHS  cholecalciferol (VITAMIN D3) tablet 2,000 Units  2,000 Units Oral DAILY  apixaban (ELIQUIS) tablet 5 mg  5 mg Oral BID  
 guaiFENesin ER (MUCINEX) tablet 1,200 mg  1,200 mg Oral BID  montelukast (SINGULAIR) tablet 10 mg  10 mg Oral QHS  therapeutic multivitamin (THERAGRAN) tablet 1 Tab  1 Tab Oral DAILY  omega 3-DHA-EPA-fish oil 1,000 mg (120 mg-180 mg) capsule 1 Cap  1 Cap Oral QHS  omega 3-DHA-EPA-fish oil 1,000 mg (120 mg-180 mg) capsule 2 Cap  2 Cap Oral DAILY Lab Review:  
 
Recent Labs  
  02/17/19 
0101 02/16/19 
0445 02/15/19 
1902 WBC 19.7* 14.7* 15.8* HGB 12.4 12.8 13.6 HCT 36.8 37.9 41.0  
 229 236 Recent Labs  
  02/17/19 
0101 02/16/19 
0445 02/15/19 
1902  140 139  
K 3.5 4.1 3.7 CL 97 101 98 CO2 28 30 31 * 121* 98 BUN 15 14 13 CREA 0.89 0.66* 0.82 CA 9.7 9.6 10.0 MG  --  1.9  --   
PHOS  --  3.4  --   
ALB  --  2.7* 3.3* SGOT  --  13* 15 ALT  --  16 20 No components found for: [place] Assessment / Plan:  
 
 Acute on chronic respiratory failure with hypoxemia: on 2.5 L home O2 chronically. Supplemental O2 to maintain SpO2 >90%. Treat COPD and PNA as below. Pulm consult 
  
 COPD exacerbation: likely precipitated by PNA. Continue duo-nebs, IV steroids. Continue home Singulair, LABA/ICS. Abx for PNA as below 
  
 Pneumonia: continue CTX/azithromycin for CAP. Send sputum culture, PNA workup (Legionella and Strep Pneumo urine ag, Mycoplasma IgM) pending. RVP negative 
  
  Atrial fibrillation: rate is improved on increased dose po dilt. Continue diltiazem, Eliquis.   
  
  CAD (coronary artery disease), native coronary artery: no CP. On ASA, but not on BB likely due to COPD. Continue statin 
  
  Elevated lactic acid level: NOT from sepsis. Resolved  
  
  SIRS (systemic inflammatory response syndrome) ([address]): No sepsis, but due to infectious process. wb  Rising due to steroids Total time spent with patient: 30 Minutes Care Plan discussed with: Patient and Nursing Staff Discussed:  Care Plan Prophylaxis:  Lovenox Disposition:  Home w/Family 
        
___________________________________________________ Attending Physician: Juancarlos Stewart MD

## 2019-02-18 LAB
ANION GAP SERPL CALC-SCNC: 8 MMOL/L (ref 5–15)
BACTERIA SPEC CULT: NORMAL
BUN SERPL-MCNC: 12 MG/DL (ref 6–20)
BUN/CREAT SERPL: 17 (ref 12–20)
CALCIUM SERPL-MCNC: 10 MG/DL (ref 8.5–10.1)
CHLORIDE SERPL-SCNC: 94 MMOL/L (ref 97–108)
CO2 SERPL-SCNC: 30 MMOL/L (ref 21–32)
CREAT SERPL-MCNC: 0.7 MG/DL (ref 0.7–1.3)
FLUID CULTURE, SPNG2: NORMAL
GLUCOSE SERPL-MCNC: 124 MG/DL (ref 65–100)
GRAM STN SPEC: NORMAL
L PNEUMO1 AG UR QL IA: NEGATIVE
MAGNESIUM SERPL-MCNC: 1.9 MG/DL (ref 1.6–2.4)
ORGANISM ID, SPNG3: NORMAL
PLEASE NOTE, SPNG4: NORMAL
POTASSIUM SERPL-SCNC: 4.1 MMOL/L (ref 3.5–5.1)
S PNEUM AG SPEC QL LA: NEGATIVE
SERVICE CMNT-IMP: NORMAL
SODIUM SERPL-SCNC: 132 MMOL/L (ref 136–145)
SPECIMEN SOURCE: NORMAL
SPECIMEN SOURCE: NORMAL
SPECIMEN, SPNG1: NORMAL

## 2019-02-18 PROCEDURE — 65660000000 HC RM CCU STEPDOWN

## 2019-02-18 PROCEDURE — 97165 OT EVAL LOW COMPLEX 30 MIN: CPT | Performed by: OCCUPATIONAL THERAPIST

## 2019-02-18 PROCEDURE — 94640 AIRWAY INHALATION TREATMENT: CPT

## 2019-02-18 PROCEDURE — 80048 BASIC METABOLIC PNL TOTAL CA: CPT

## 2019-02-18 PROCEDURE — 74011250636 HC RX REV CODE- 250/636: Performed by: PHYSICIAN ASSISTANT

## 2019-02-18 PROCEDURE — 77010033678 HC OXYGEN DAILY

## 2019-02-18 PROCEDURE — 74011250636 HC RX REV CODE- 250/636: Performed by: INTERNAL MEDICINE

## 2019-02-18 PROCEDURE — 83735 ASSAY OF MAGNESIUM: CPT

## 2019-02-18 PROCEDURE — 74011250636 HC RX REV CODE- 250/636: Performed by: NURSE PRACTITIONER

## 2019-02-18 PROCEDURE — 74011000250 HC RX REV CODE- 250: Performed by: INTERNAL MEDICINE

## 2019-02-18 PROCEDURE — 74011250637 HC RX REV CODE- 250/637: Performed by: INTERNAL MEDICINE

## 2019-02-18 PROCEDURE — 74011000258 HC RX REV CODE- 258: Performed by: PHYSICIAN ASSISTANT

## 2019-02-18 PROCEDURE — 97535 SELF CARE MNGMENT TRAINING: CPT | Performed by: OCCUPATIONAL THERAPIST

## 2019-02-18 PROCEDURE — 36415 COLL VENOUS BLD VENIPUNCTURE: CPT

## 2019-02-18 PROCEDURE — 97161 PT EVAL LOW COMPLEX 20 MIN: CPT

## 2019-02-18 PROCEDURE — 74011250637 HC RX REV CODE- 250/637

## 2019-02-18 RX ORDER — MAG HYDROX/ALUMINUM HYD/SIMETH 200-200-20
30 SUSPENSION, ORAL (FINAL DOSE FORM) ORAL
Status: DISCONTINUED | OUTPATIENT
Start: 2019-02-18 | End: 2019-02-21 | Stop reason: HOSPADM

## 2019-02-18 RX ADMIN — BUDESONIDE 500 MCG: 0.5 INHALANT RESPIRATORY (INHALATION) at 20:24

## 2019-02-18 RX ADMIN — ALUMINUM HYDROXIDE AND MAGNESIUM HYDROXIDE: 200; 200 SUSPENSION ORAL at 01:29

## 2019-02-18 RX ADMIN — VITAMIN D, TAB 1000IU (100/BT) 1000 UNITS: 25 TAB at 21:45

## 2019-02-18 RX ADMIN — Medication 10 ML: at 05:16

## 2019-02-18 RX ADMIN — DILTIAZEM HYDROCHLORIDE 300 MG: 300 CAPSULE, COATED, EXTENDED RELEASE ORAL at 10:01

## 2019-02-18 RX ADMIN — IPRATROPIUM BROMIDE AND ALBUTEROL SULFATE 3 ML: .5; 3 SOLUTION RESPIRATORY (INHALATION) at 15:04

## 2019-02-18 RX ADMIN — Medication 10 ML: at 14:00

## 2019-02-18 RX ADMIN — THERA TABS 1 TABLET: TAB at 10:01

## 2019-02-18 RX ADMIN — IPRATROPIUM BROMIDE AND ALBUTEROL SULFATE 3 ML: .5; 3 SOLUTION RESPIRATORY (INHALATION) at 00:22

## 2019-02-18 RX ADMIN — OMEGA-3 FATTY ACIDS CAP 1000 MG 1 CAPSULE: 1000 CAP at 21:46

## 2019-02-18 RX ADMIN — OMEGA-3 FATTY ACIDS CAP 1000 MG 2 CAPSULE: 1000 CAP at 10:01

## 2019-02-18 RX ADMIN — APIXABAN 5 MG: 5 TABLET, FILM COATED ORAL at 10:01

## 2019-02-18 RX ADMIN — Medication 10 ML: at 21:46

## 2019-02-18 RX ADMIN — GUAIFENESIN 1200 MG: 600 TABLET, EXTENDED RELEASE ORAL at 21:44

## 2019-02-18 RX ADMIN — VITAMIN D, TAB 1000IU (100/BT) 2000 UNITS: 25 TAB at 10:01

## 2019-02-18 RX ADMIN — GUAIFENESIN 1200 MG: 600 TABLET, EXTENDED RELEASE ORAL at 10:01

## 2019-02-18 RX ADMIN — CEFTRIAXONE SODIUM 2 G: 2 INJECTION, POWDER, FOR SOLUTION INTRAMUSCULAR; INTRAVENOUS at 20:33

## 2019-02-18 RX ADMIN — METHYLPREDNISOLONE SODIUM SUCCINATE 60 MG: 125 INJECTION, POWDER, FOR SOLUTION INTRAMUSCULAR; INTRAVENOUS at 21:44

## 2019-02-18 RX ADMIN — ASPIRIN 81 MG 81 MG: 81 TABLET ORAL at 21:44

## 2019-02-18 RX ADMIN — IPRATROPIUM BROMIDE AND ALBUTEROL SULFATE 3 ML: .5; 3 SOLUTION RESPIRATORY (INHALATION) at 07:17

## 2019-02-18 RX ADMIN — BUDESONIDE 500 MCG: 0.5 INHALANT RESPIRATORY (INHALATION) at 07:17

## 2019-02-18 RX ADMIN — IPRATROPIUM BROMIDE AND ALBUTEROL SULFATE 3 ML: .5; 3 SOLUTION RESPIRATORY (INHALATION) at 20:24

## 2019-02-18 RX ADMIN — ATORVASTATIN CALCIUM 40 MG: 20 TABLET, FILM COATED ORAL at 21:44

## 2019-02-18 RX ADMIN — AZITHROMYCIN MONOHYDRATE 500 MG: 500 INJECTION, POWDER, LYOPHILIZED, FOR SOLUTION INTRAVENOUS at 20:32

## 2019-02-18 RX ADMIN — METHYLPREDNISOLONE SODIUM SUCCINATE 60 MG: 40 INJECTION, POWDER, FOR SOLUTION INTRAMUSCULAR; INTRAVENOUS at 10:01

## 2019-02-18 RX ADMIN — MONTELUKAST 10 MG: 10 TABLET, FILM COATED ORAL at 21:45

## 2019-02-18 RX ADMIN — ALUMINUM HYDROXIDE, MAGNESIUM HYDROXIDE, AND SIMETHICONE 30 ML: 200; 200; 20 SUSPENSION ORAL at 01:29

## 2019-02-18 RX ADMIN — APIXABAN 5 MG: 5 TABLET, FILM COATED ORAL at 18:33

## 2019-02-18 RX ADMIN — IPRATROPIUM BROMIDE AND ALBUTEROL SULFATE 3 ML: .5; 3 SOLUTION RESPIRATORY (INHALATION) at 11:09

## 2019-02-18 RX ADMIN — METHYLPREDNISOLONE SODIUM SUCCINATE 60 MG: 40 INJECTION, POWDER, FOR SOLUTION INTRAMUSCULAR; INTRAVENOUS at 05:15

## 2019-02-18 RX ADMIN — IPRATROPIUM BROMIDE AND ALBUTEROL SULFATE 3 ML: .5; 3 SOLUTION RESPIRATORY (INHALATION) at 04:45

## 2019-02-18 NOTE — PROGRESS NOTES
Occupational Therapy EVALUATION/discharge Patient: Louise Rojas (87 y.o. male) Date: 2/18/2019 Primary Diagnosis: Acute on chronic respiratory failure with hypoxemia ([facility] [address] [J96.21] Precautions:  Fall ASSESSMENT:  
Based on the objective data described below, the patient presents at an overall close supervision and set-up level with LE ADLs, toileting and functional mobility. He demonstrated fair safety awareness and no LOB during session. Pt on 4L O2 with sats 94% at rest and as low as 84% with ADL activity. Educated pt on energy conservation techniques to increase his safety and independence with all functional tasks. Written handout provided and pt discussed ways he already utilizes these techniques at home. Further skilled acute occupational therapy is not indicated at this time. Discharge Recommendations: Pt may benefit from [facility] home safety assessment and then OP pulmonary rehab Further Equipment Recommendations for Discharge: TBD SUBJECTIVE:  
Patient stated I am ok.  OBJECTIVE DATA SUMMARY:  
HISTORY:  
Past Medical History:  
Diagnosis Date  Arrhythmia  Asthma  COPD (chronic obstructive pulmonary disease) (HCC) severe  Hypertension  Insulin resistance 3/27/2014  Lung mass 9/2012 MAY resection, + mycobacterial orgs, neg malignancy  Melanoma ([facility] [address] 2007  Non-STEMI (non-ST elevated myocardial infarction) ([facility] [address] 2/14  On home O2 prn  Pneumonia  Snoring  Tinnitus  Tuberculosis  Vitamin D deficiency 3/27/2014 Past Surgical History:  
Procedure Laterality Date  HX OTHER SURGICAL EXC MELANOMA RIGHT CHEEK  
 HX OTHER SURGICAL NEEDLE BX LEFT LUNG  
 HX OTHER SURGICAL  9/11/2012  
 apical segmentectomy, MAY Prior Level of Function/Environment/Context: Sedentary, mod I with ADLs, uses delivery services and fast food for meals, drives Home Situation Home Environment: Private residence # Steps to Enter: 3 Rails to Enter: Yes Hand Rails : Bilateral 
One/Two Story Residence: Two story, live on 1st floor Living Alone: Yes Support Systems: Friends \ neighbors Patient Expects to be Discharged to[de-identified] Private residence Current DME Used/Available at Home: Cane, straight, Nebulizer, Oxygen, portable(2.5L O2 during day and Trilogy machine at night) Tub or Shower Type: Tub/Shower combination Hand dominance: Right EXAMINATION OF PERFORMANCE DEFICITS: 
Cognitive/Behavioral Status: 
Neurologic State: Alert; Appropriate for age Orientation Level: Oriented X4 Cognition: Appropriate for age attention/concentration; Appropriate decision making; Appropriate safety awareness; Follows commands Perception: Appears intact Perseveration: No perseveration noted Safety/Judgement: Awareness of environment; Fall prevention; Insight into deficits Hearing: Auditory Auditory Impairment: None Vision/Perceptual:   
Acuity: Within Defined Limits Corrective Lenses: Glasses Range of Motion: 
AROM: Within functional limits Strength: 
Strength: Generally decreased, functional 
  
  
  
  
 
Coordination: 
  
Fine Motor Skills-Upper: Left Impaired;Right Impaired Gross Motor Skills-Upper: Left Intact; Right Intact Tone & Sensation: 
Tone: Normal 
Sensation: Intact Balance: 
Sitting: Intact Standing: Intact; With support Functional Mobility and Transfers for ADLs:Bed Mobility: 
Supine to Sit: Supervision Transfers: 
Sit to Stand: Supervision Stand to Sit: Contact guard assistance Bed to Chair: Contact guard assistance Bathroom Mobility: Supervision/set up Toilet Transfer : Supervision; Additional time ADL Assessment: 
Feeding: Modified independent Oral Facial Hygiene/Grooming: Supervision(standing and sitting) Bathing: Stand-by assistance; Additional time(seated) Upper Body Dressing: Setup Lower Body Dressing: Stand-by assistance Toileting: Stand by assistance ADL Intervention and task modifications: 
Patient instructed and indicated understanding energy conservation techniques to increase independence and safety during all ADLs for end goal of returning back home. Provided instruction body is like a jar of marbles, marbles represent energy, at end of day need as many marbles as possible to obtain a good night sleep, REM sleep is when the body repairs itself. This ensures a full jar of marbles, full of energy when wake up to start a new day. Use energy conservation techniques during ADLs so can increase participation in life activities patient prefers, to ensure more frequent good days. If having a bad day, evaluate tasks completed day before and re-plan how to save energy to complete same tasks, for example if going grocery shopping do not complete full bathing/dressing/grooming. Visual handout provided. Patient indicated understanding by stating tasks already completing to save energy ie sitting to don all clothing. Cognitive Retraining Safety/Judgement: Awareness of environment; Fall prevention; Insight into deficits Functional Measure: 
Barthel Index: 
 
Bathin Bladder: 10 Bowels: 10 
Groomin Dressin Feeding: 10 Mobility: 0 Stairs: 0 Toilet Use: 5 Transfer (Bed to Chair and Back): 10 Total: 55 The Barthel ADL Index: Guidelines 1. The index should be used as a record of what a patient does, not as a record of what a patient could do. 2. The main aim is to establish degree of independence from any help, physical or verbal, however minor and for whatever reason. 3. The need for supervision renders the patient not independent. 4. A patient's performance should be established using the best available evidence. Asking the patient, friends/relatives and nurses are the usual sources, but direct observation and common sense are also important. However direct testing is not needed. 5. Usually the patient's performance over the preceding 24-48 hours is important, but occasionally longer periods will be relevant. 6. Middle categories imply that the patient supplies over 50 per cent of the effort. 7. Use of aids to be independent is allowed. Howard Ferrari., Barthel, D.W. (8845). Functional evaluation: the Barthel Index. [facility] (14)2. Pankaj Huang mark MIHIR Brown, Safia Burrell., Elbert Del Toro, [place], [address] (1999). Measuring the change indisability after inpatient rehabilitation; comparison of the responsiveness of the Barthel Index and Functional [place] Measure. Journal of Neurology, Neurosurgery, and Psychiatry, 66(4), 404-253. Venita Cunningham, NADAN.ANDREW, JUICE Dejesus, & Sebastian Otoole M.A. (2004.) Assessment of post-stroke quality of life in cost-effectiveness studies: The usefulness of the Barthel Index and the EuroQoL-5D. [facility], 13, 968-29 Occupational Therapy Evaluation Charge Determination History Examination Decision-Making LOW Complexity : Brief history review  MEDIUM Complexity : 3-5 performance deficits relating to physical, cognitive , or psychosocial skils that result in activity limitations and / or participation restrictions MEDIUM Complexity : Patient may present with comorbidities that affect occupational performnce. Miniml to moderate modification of tasks or assistance (eg, physical or verbal ) with assesment(s) is necessary to enable patient to complete evaluation Based on the above components, the patient evaluation is determined to be of the following complexity level: LOW Pain: 
Pain Scale 1: Numeric (0 - 10) Pain Intensity 1: 0 Activity Tolerance:  
Fair Please refer to the flowsheet for vital signs taken during this treatment. After treatment:  
[x]  Patient left in no apparent distress sitting up in chair 
[]  Patient left in no apparent distress in bed 
[x]  Call bell left within reach [x]  Nursing notified 
[]  Caregiver present 
[]  Bed alarm activated COMMUNICATION/EDUCATION:  
Communication/Collaboration: 
[x]      Home safety education was provided and the patient/caregiver indicated understanding. [x]      Patient/family have participated as able and agree with findings and recommendations. []      Patient is unable to participate in plan of care at this time. Findings and recommendations were discussed with: Registered Nurse Ana Cross OT Time Calculation: 27 mins

## 2019-02-18 NOTE — PROGRESS NOTES
[facility] DAILY PROGRESS NOTE Name: Naila Ruiz : 1950 MRN: [id number] Date: 2019 10:59 AM  
 
Subjective: 
Breathing doesn't feel much different. Nebs are helping. Coughing but not productive. Doesn't feel congested. Doesn't think he has been wheezing. Afebrile HR 100s, SPO2 93% 4LNC WBC 19.7 Na 132 Sputum cx NRF prelim RVP negative Vital Signs:   
Visit Vitals /89 (BP 1 Location: Right arm, BP Patient Position: At rest) Pulse (!) 101 Temp 98.2 °F (36.8 °C) Resp 16 Ht 5' 10\" (1.778 m) Wt 67.8 kg (149 lb 9.3 oz) SpO2 93% BMI 21.46 kg/m² O2 Device: Nasal cannula O2 Flow Rate (L/min): 4 l/min Temp (24hrs), Av.3 °F (36.8 °C), Min:97.9 °F (36.6 °C), Max:98.9 °F (37.2 °C) Intake/Output:  
Last shift:      No intake/output data recorded. Last 3 shifts:  1901 -  0700 In: 3145 [P.O.:2295; I.V.:850] Out: 2375 [Urine:2375] Intake/Output Summary (Last 24 hours) at 2019 1142 Last data filed at 2019 0430 Gross per 24 hour Intake 1650 ml Output 1900 ml Net -250 ml Physical Exam: 
General:  Alert, cooperative, resting and in no apparent distress Head:  Normocephalic, atraumatic. Eyes:  EOMs intact. Nose: Nares normal.  
Nasal O2 Lungs:   Scattered wheezes, diminished throughout Chest wall:  No tenderness or deformity. Heart:  Regular rate and rhythm Abdomen:   Soft, non-tender. Extremities: No edema. Skin: Dry Neurologic: Grossly nonfocal  
 
 
DATA:  
Current Facility-Administered Medications Medication Dose Route Frequency  alum-mag hydroxide-simeth (MYLANTA) oral suspension 30 mL  30 mL Oral Q4H PRN  
 dilTIAZem CD (CARDIZEM CD) capsule 300 mg  300 mg Oral DAILY  dilTIAZem (CARDIZEM) 125 mg in dextrose 5% 125 mL infusion  0-15 mg/hr IntraVENous TITRATE  arformoterol (BROVANA) neb solution 15 mcg  15 mcg Nebulization BID RT  
  azithromycin (ZITHROMAX) 500 mg in 0.9% sodium chloride (MBP/ADV) 250 mL  500 mg IntraVENous Q24H  cefTRIAXone (ROCEPHIN) 2 g in 0.9% sodium chloride (MBP/ADV) 50 mL  2 g IntraVENous Q24H  
 sodium chloride (NS) flush 5-40 mL  5-40 mL IntraVENous Q8H  
 sodium chloride (NS) flush 5-40 mL  5-40 mL IntraVENous PRN  
 acetaminophen (TYLENOL) tablet 650 mg  650 mg Oral Q6H PRN  
 naloxone (NARCAN) injection 0.4 mg  0.4 mg IntraVENous PRN  
 albuterol-ipratropium (DUO-NEB) 2.5 MG-0.5 MG/3 ML  3 mL Nebulization Q4H RT  
 albuterol (PROVENTIL VENTOLIN) nebulizer solution 2.5 mg  2.5 mg Nebulization Q2H PRN  
 methylPREDNISolone (PF) (SOLU-MEDROL) injection 60 mg  60 mg IntraVENous Q6H  
 aspirin chewable tablet 81 mg  81 mg Oral QHS  atorvastatin (LIPITOR) tablet 40 mg  40 mg Oral QHS  budesonide (PULMICORT) 500 mcg/2 ml nebulizer suspension  500 mcg Nebulization BID RT  
 cholecalciferol (VITAMIN D3) tablet 1,000 Units  1,000 Units Oral QHS  cholecalciferol (VITAMIN D3) tablet 2,000 Units  2,000 Units Oral DAILY  apixaban (ELIQUIS) tablet 5 mg  5 mg Oral BID  
 guaiFENesin ER (MUCINEX) tablet 1,200 mg  1,200 mg Oral BID  montelukast (SINGULAIR) tablet 10 mg  10 mg Oral QHS  therapeutic multivitamin (THERAGRAN) tablet 1 Tab  1 Tab Oral DAILY  omega 3-DHA-EPA-fish oil 1,000 mg (120 mg-180 mg) capsule 1 Cap  1 Cap Oral QHS  omega 3-DHA-EPA-fish oil 1,000 mg (120 mg-180 mg) capsule 2 Cap  2 Cap Oral DAILY Labs: 
Recent Labs  
  02/17/19 
0101 02/16/19 
0445 02/15/19 
1902 WBC 19.7* 14.7* 15.8* HGB 12.4 12.8 13.6 HCT 36.8 37.9 41.0  
 229 236 Recent Labs  
  02/18/19 
0107 02/17/19 
0101 02/16/19 
0445 02/15/19 
1902 * 136 140 139  
K 4.1 3.5 4.1 3.7 CL 94* 97 101 98 CO2 30 28 30 31 * 133* 121* 98 BUN 12 15 14 13 CREA 0.70 0.89 0.66* 0.82  
CA 10.0 9.7 9.6 10.0 MG 1.9  --  1.9  --   
PHOS  --   --  3.4  --   
 ALB  --   --  2.7* 3.3* SGOT  --   --  13* 15 ALT  --   --  16 20 Recent Labs  
  02/15/19 
1925 PH 7.41  
PCO2 53* PO2 90 HCO3 33* IMPRESSION:  
· Pneumonia: RLL 
· COPD with exacerbation · Acute on chronic resp failure · Chronic hypercapnea: compensated on ABG PLAN:  
· O2 to keep SPO2 >88% · Continue azithro/rocephin · Will  Wean IVCS some- 60Q8hr · Frequent nebs · OOB · Follow up microbiologic labs (viral panel negative) · He was set up with home non-invasive ventilation yesterday. He is currently  compensated so does not require acute NIV now. Discussed option of using NIV here and he prefers to wait. Will plan to resume home NIV upon discharge for long term management of his chronic resp failure Jenni Hawk NP

## 2019-02-18 NOTE — PROGRESS NOTES
Tyrone Lucas [facility] [address] 
[address] 
[phone] Medical Progress Note NAME: Franklin Peralta :  1950 MRM:  [id number] Date/Time: 2019  7:53 AM 
 
 
  
Subjective: Chief Complaint:  SOB: moderate, constant, worse with exertion, overall improved ROS: 
(bold if positive, if negative) SOB/PATEL Tolerating PT  Tolerating Diet Objective:  
 
 
Vitals:  
 
 
  
Last 24hrs VS reviewed since prior progress note. Most recent are: 
 
Visit Vitals /66 (BP 1 Location: Right arm, BP Patient Position: At rest;Supine; Head of bed elevated (Comment degrees)) Pulse 90 Temp 98.2 °F (36.8 °C) Resp 20 SpO2 97% SpO2 Readings from Last 6 Encounters:  
19 97% 18 93% 18 95% 17 98% 17 95% 17 98% O2 Flow Rate (L/min): 4 l/min Intake/Output Summary (Last 24 hours) at 2019 6923 Last data filed at 2019 0430 Gross per 24 hour Intake 2345 ml Output 1900 ml Net 445 ml Exam:  
 
Physical Exam: 
 
Gen:  Well-developed, well-nourished, frail, elderly, chronically ill-appearing, in mild respiratory distress HEENT:  Pink conjunctivae, PERRL, hearing intact to voice, moist mucous membranes Neck:  Supple, without masses, thyroid non-tender Resp:  No accessory muscle use, diffuse wheezing and rhonchi with prolonged expiratory phase Card:  No murmurs, normal S1, S2 without thrills, bruits or peripheral edema Abd:  Soft, non-tender, non-distended, normoactive bowel sounds are present, no palpable organomegaly and no detectable hernias Lymph:  No cervical or inguinal adenopathy Musc:  No cyanosis or clubbing Skin:  No rashes or ulcers, skin turgor is good Neuro:  Cranial nerves are grossly intact, no focal motor weakness, follows commands appropriately Psych:  Good insight, oriented to person, place and time, alert Telemetry reviewed:   AFIB Medications Reviewed: (see below) Lab Data Reviewed: (see below) 
 
______________________________________________________________________ Medications:  
 
Current Facility-Administered Medications Medication Dose Route Frequency  alum-mag hydroxide-simeth (MYLANTA) oral suspension 30 mL  30 mL Oral Q4H PRN  
 dilTIAZem CD (CARDIZEM CD) capsule 300 mg  300 mg Oral DAILY  dilTIAZem (CARDIZEM) 125 mg in dextrose 5% 125 mL infusion  0-15 mg/hr IntraVENous TITRATE  arformoterol (BROVANA) neb solution 15 mcg  15 mcg Nebulization BID RT  
 azithromycin (ZITHROMAX) 500 mg in 0.9% sodium chloride (MBP/ADV) 250 mL  500 mg IntraVENous Q24H  cefTRIAXone (ROCEPHIN) 2 g in 0.9% sodium chloride (MBP/ADV) 50 mL  2 g IntraVENous Q24H  
 sodium chloride (NS) flush 5-40 mL  5-40 mL IntraVENous Q8H  
 sodium chloride (NS) flush 5-40 mL  5-40 mL IntraVENous PRN  
 acetaminophen (TYLENOL) tablet 650 mg  650 mg Oral Q6H PRN  
 naloxone (NARCAN) injection 0.4 mg  0.4 mg IntraVENous PRN  
 albuterol-ipratropium (DUO-NEB) 2.5 MG-0.5 MG/3 ML  3 mL Nebulization Q4H RT  
 albuterol (PROVENTIL VENTOLIN) nebulizer solution 2.5 mg  2.5 mg Nebulization Q2H PRN  
 methylPREDNISolone (PF) (SOLU-MEDROL) injection 60 mg  60 mg IntraVENous Q6H  
 aspirin chewable tablet 81 mg  81 mg Oral QHS  atorvastatin (LIPITOR) tablet 40 mg  40 mg Oral QHS  budesonide (PULMICORT) 500 mcg/2 ml nebulizer suspension  500 mcg Nebulization BID RT  
 cholecalciferol (VITAMIN D3) tablet 1,000 Units  1,000 Units Oral QHS  cholecalciferol (VITAMIN D3) tablet 2,000 Units  2,000 Units Oral DAILY  apixaban (ELIQUIS) tablet 5 mg  5 mg Oral BID  
 guaiFENesin ER (MUCINEX) tablet 1,200 mg  1,200 mg Oral BID  montelukast (SINGULAIR) tablet 10 mg  10 mg Oral QHS  therapeutic multivitamin (THERAGRAN) tablet 1 Tab  1 Tab Oral DAILY  omega 3-DHA-EPA-fish oil 1,000 mg (120 mg-180 mg) capsule 1 Cap  1 Cap Oral QHS  omega 3-DHA-EPA-fish oil 1,000 mg (120 mg-180 mg) capsule 2 Cap  2 Cap Oral DAILY Lab Review:  
 
Recent Labs  
  02/17/19 
0101 02/16/19 
0445 02/15/19 
1902 WBC 19.7* 14.7* 15.8* HGB 12.4 12.8 13.6 HCT 36.8 37.9 41.0  
 229 236 Recent Labs  
  02/18/19 
0107 02/17/19 
0101 02/16/19 
0445 02/15/19 
1902 * 136 140 139  
K 4.1 3.5 4.1 3.7 CL 94* 97 101 98 CO2 30 28 30 31 * 133* 121* 98 BUN 12 15 14 13 CREA 0.70 0.89 0.66* 0.82  
CA 10.0 9.7 9.6 10.0 MG 1.9  --  1.9  --   
PHOS  --   --  3.4  --   
ALB  --   --  2.7* 3.3* TBILI  --   --  0.6 1.0  
SGOT  --   --  13* 15 ALT  --   --  16 20 No results found for: [facility] Recent Labs  
  02/15/19 
1925 PH 7.41  
PCO2 53* PO2 90 HCO3 33* No results for input(s): INR in the last 72 hours. No lab exists for component: INREXT Lab Results Component Value Date/Time Specimen Description: NARES 02/12/2014 05:30 AM  
 Specimen Description: BLOOD 02/12/2014 12:08 AM  
 
Lab Results Component Value Date/Time Culture result: HEAVY NORMAL RESPIRATORY AYO SO FAR 02/16/2019 11:00 AM  
 Culture result: NO GROWTH 3 DAYS 02/15/2019 10:45 PM  
 Culture result: MIXED SKIN AYO ISOLATED 06/04/2015 11:15 PM  
 
 
 
  
Assessment:  
 
Principal Problem: 
  Pneumonia (2/15/2019) Active Problems: COPD exacerbation ([facility] (2/27/2014) CAD (coronary artery disease), native coronary artery (3/12/2014) Atrial flutter, paroxysmal ([facility] (3/31/2016) Acute on chronic respiratory failure with hypoxemia ([facility] (2/15/2019) SIRS (systemic inflammatory response syndrome) ([facility] (2/15/2019) Plan:  
 
Principal Problem: 
  Pneumonia (2/15/2019) - continue antibiotics as above - pneumonia work up not revealing so far - Pulmonary following Active Problems: COPD exacerbation ([facility] (2/27/2014) - continue steroids and bronchodilators CAD (coronary artery disease), native coronary artery (3/12/2014) - stable, continue cardiac meds Atrial flutter, paroxysmal ([facility] (3/31/2016) 
 - in afib with rate controlled 
 - continue anticoagulation Acute on chronic respiratory failure with hypoxemia ([facility] (2/15/2019) 
 - wean oxygen to baseline SIRS (systemic inflammatory response syndrome) ([facility] (2/15/2019) 
 - due to steroids, etc 
 - follow Debilty 
 - easily winded just sitting up on side of bed to urinate 
 - start PT/OT Total time spent in patient care: 25 minutes Care Plan discussed with: Patient, Care Manager and Nursing Staff Discussed:  Code Status, Care Plan and D/C Planning Prophylaxis:  Eliquis Disposition:   PT, OT, RN 
        
___________________________________________________ Attending Physician: Adrianne Hua MD

## 2019-02-18 NOTE — PROGRESS NOTES
Bedside and Verbal shift change report given to Shailesh Rashid RN (oncoming nurse) by Mandeep Louis RN (offgoing nurse). Report included the following information SBAR, Kardex, ED Summary, Intake/Output, Recent Results, Med Rec Status and Cardiac Rhythm A-Fib.  
 
0115: Pt alert, oriented, denies pain. Pt reported heartburn after coughing. Notified Dr. Adiel Worley of pt status. Dr. Salas Height Mylanta 30 ml every 4 hours prn. 
 
0230: Pt resting quietly, denies heartburn. Bedside and Verbal shift change report given to Adele Osullivan RN (oncoming nurse) by Shailesh Rashid RN (offgoing nurse). Report included the following information SBAR, Kardex, ED Summary, Intake/Output, Recent Results, Med Rec Status and Cardiac Rhythm A-Fib.

## 2019-02-18 NOTE — PROGRESS NOTES
Problem: Mobility Impaired (Adult and Pediatric) Goal: *Acute Goals and Plan of Care (Insert Text) Physical Therapy Goals Initiated 2/18/2019 1. Patient will move from supine to sit and sit to supine  in bed with independence within 7 day(s). 2.  Patient will transfer from bed to chair and chair to bed with independence using the least restrictive device within 7 day(s). 3.  Patient will perform sit to stand with independence within 7 day(s). 4.  Patient will ambulate with modified independence for 50 feet with the least restrictive device within 7 day(s). 5.  Patient will ascend/descend 3 stairs with 1 handrail(s) with supervision/set-up within 7 day(s). physical Therapy EVALUATION Patient: Jenny Carbajal (63 y.o. male) Date: 2/18/2019 Primary Diagnosis: Acute on chronic respiratory failure with hypoxemia ([facility] [J96.21] Precautions:   Fall ASSESSMENT : 
Based on the objective data described below, the patient presents with generalized weakness, decreased O2 sats and increased WOB with activity, and limited functional ambulation following admission for acute on chronic respiratory failure with hypoxemia. Patient came to ER on 2/15/19 after 3 days of SOB. Chest xray revealed RLL pneumonia. Today he was received in bed with O2 on 4 liters and slightly labored. He was able to sit on edge of bed and ambulate 3 feet to recliner with CGA. O2 on at 4 liters at all times. At rest, patient maintained 90% or better and  -106. With activity he desats to 84% and HR increased to 117. Encouraged pursed lip breathing and it takes time to recover. He was left comfortably sitting in recliner, , 90% and 133/89. Chair alarm in place. At baseline, patient lives in his own home and uses 2.5 liters of O2 PRN. He was independent with ambulation. He may benefit from OP pulmonary rehab. PT will continue to see patient to increase strength and activity as well as monitor O2 sats. Gobbler Dials Patient will benefit from skilled intervention to address the above impairments. Patients rehabilitation potential is considered to be Fair Factors which may influence rehabilitation potential include:  
[]         None noted 
[]         Mental ability/status [x]         Medical condition 
[x]         Home/family situation and support systems 
[]         Safety awareness 
[]         Pain tolerance/management 
[]         Other: PLAN : 
Recommendations and Planned Interventions: 
[x]           Bed Mobility Training             []    Neuromuscular Re-Education 
[x]           Transfer Training                   []    Orthotic/Prosthetic Training 
[x]           Gait Training                         []    Modalities [x]           Therapeutic Exercises           []    Edema Management/Control 
[]           Therapeutic Activities            []    Patient and Family Training/Education 
[]           Other (comment): Frequency/Duration: Patient will be followed by physical therapy  5 times a week to address goals. Discharge Recommendations: Outpatient Pulmonary Rehab Further Equipment Recommendations for Discharge: none at this time; he may require increased O2 usage at home SUBJECTIVE:  
Patient stated I don't know how much I will be able to do.  OBJECTIVE DATA SUMMARY:  
HISTORY:   
Past Medical History:  
Diagnosis Date  Arrhythmia  Asthma  COPD (chronic obstructive pulmonary disease) (HCC) severe  Hypertension  Insulin resistance 3/27/2014  Lung mass 9/2012 MAY resection, + mycobacterial orgs, neg malignancy  Melanoma ([facility] 2007  Non-STEMI (non-ST elevated myocardial infarction) ([facility] 2/14  On home O2 prn  Pneumonia  Snoring  Tinnitus  Tuberculosis  Vitamin D deficiency 3/27/2014 Past Surgical History:  
Procedure Laterality Date  HX OTHER SURGICAL EXC MELANOMA RIGHT CHEEK  
 HX OTHER SURGICAL  NEEDLE BX LEFT LUNG  
  HX OTHER SURGICAL  2012  
 apical segmentectomy, MAY Prior Level of Function/Home Situation: independent Personal factors and/or comorbidities impacting plan of care: medical history and condition Home Situation Home Environment: Private residence # Steps to Enter: 3 Rails to Enter: Yes One/Two Story Residence: Two story, live on 1st floor Living Alone: Yes Support Systems: Friends \ neighbors Patient Expects to be Discharged to[de-identified] Private residence Current DME Used/Available at Home: Nebulizer, Oxygen, portable, Other (comment)(oxygen concentrator) EXAMINATION/PRESENTATION/DECISION MAKING: Critical Behavior: 
Neurologic State: Alert Orientation Level: Appropriate for age, Oriented X4 Cognition: Appropriate decision making, Appropriate for age attention/concentration, Appropriate safety awareness, Follows commands Safety/Judgement: Insight into deficits Hearing: Auditory Auditory Impairment: NoneSkin:  Not fully observed Range Of Motion: 
AROM: Within functional limits Strength:   
Strength: Generally decreased, functional 
  
  
  
  
  
  
Tone & Sensation:  
Tone: Normal 
  
  
  
  
Sensation: Intact Functional Mobility: 
Bed Mobility: 
  
Supine to Sit: Supervision Transfers: 
Sit to Stand: Contact guard assistance Stand to Sit: Contact guard assistance Bed to Chair: Contact guard assistance Balance:  
Sitting: Intact Standing: Intact; With supportAmbulation/Gait Training:Distance (ft): 3 Feet (ft) Assistive Device: Gait belt Ambulation - Level of Assistance: Contact guard assistance Therapeutic Exercises:  
Pursed lip breathing Functional Measure: 
Barthel Index: 
 
Bathin Bladder: 10 Bowels: 10 
Groomin Dressin Feeding: 10 Mobility: 0 Stairs: 0 Toilet Use: 5 Transfer (Bed to Chair and Back): 10 Total: 50 
 
 
 
 The Barthel ADL Index: Guidelines 1. The index should be used as a record of what a patient does, not as a record of what a patient could do. 2. The main aim is to establish degree of independence from any help, physical or verbal, however minor and for whatever reason. 3. The need for supervision renders the patient not independent. 4. A patient's performance should be established using the best available evidence. Asking the patient, friends/relatives and nurses are the usual sources, but direct observation and common sense are also important. However direct testing is not needed. 5. Usually the patient's performance over the preceding 24-48 hours is important, but occasionally longer periods will be relevant. 6. Middle categories imply that the patient supplies over 50 per cent of the effort. 7. Use of aids to be independent is allowed. Jenae Mendoza., Barthel, DBriannaW. (8687). Functional evaluation: the Barthel Index. [facility] (14)2. JULIETTE MarsF, Lennie Gonzales., Joana Osler., [place], [address] [place] [address] (1999). Measuring the change indisability after inpatient rehabilitation; comparison of the responsiveness of the Barthel Index and Functional [place] Measure. Journal of Neurology, Neurosurgery, and Psychiatry, 66(4), 598-862. Diana Harper, N.J.A, JUICE Dejesus, & Jana Dela Cruz M.A. (2004.) Assessment of post-stroke quality of life in cost-effectiveness studies: The usefulness of the Barthel Index and the EuroQoL-5D. [facility], 13, 036-61 Physical Therapy Evaluation Charge Determination History Examination Presentation Decision-Making MEDIUM  Complexity : 1-2 comorbidities / personal factors will impact the outcome/ POC  LOW Complexity : 1-2 Standardized tests and measures addressing body structure, function, activity limitation and / or participation in recreation  MEDIUM Complexity : Evolving with changing characteristics  Other outcome measures Barthel  MEDIUM Based on the above components, the patient evaluation is determined to be of the following complexity level: LOW Pain: 
Pain Scale 1: Numeric (0 - 10) Pain Intensity 1: 0 Activity Tolerance:  
Limited due to SOB and low O2 sats Please refer to the flowsheet for vital signs taken during this treatment. After treatment:  
[x]         Patient left in no apparent distress sitting up in chair 
[]         Patient left in no apparent distress in bed 
[x]         Call bell left within reach [x]         Nursing notified 
[]         Caregiver present [x]         Chair alarm activated COMMUNICATION/EDUCATION:  
The patients plan of care was discussed with: Registered Nurse and Certified Nursing Assistant/Patient Care Technician. [x]         Fall prevention education was provided and the patient/caregiver indicated understanding. []         Patient/family have participated as able in goal setting and plan of care. [x]         Patient/family agree to work toward stated goals and plan of care. []         Patient understands intent and goals of therapy, but is neutral about his/her participation. []         Patient is unable to participate in goal setting and plan of care. Thank you for this referral. 
Aditi Copeland, PT Time Calculation: 20 mins

## 2019-02-18 NOTE — PROGRESS NOTES
2- Reason for Admission:  Dyspnes RRAT Score:  11 Plan for utilizing home health:  TBD Likelihood of Readmission:  Low Transition of Care Plan:  TBD I met with the pt to determine potential discharge needs. The pt lives alone in a 2-story house but his bedroom and bath are on the first floor. He claims he is independent with his ADL's, uses no assistive devices to mobilize and drives. He has stationary and portable oxygen concentrators he purchased on his own from [employer]. He has no family close by but does have friends available to assist if needed. He said he has been to out-pt pulmonary rehab at [facility] in the past. [facility] [place]. He has prescription drug coverage and gets his medications from [facility] on [address] [place] [address]. CM will continue to follow and assist with discharge planning as needed. Care Management Interventions PCP Verified by CM: Yes([facility]-no nurse navigator) Discharge Durable Medical Equipment: No(Has stationary and portable oxygen concentrators he purchased on his own thru [employer]) Physical Therapy Consult: Yes Occupational Therapy Consult: Yes Current Support Network: Lives Alone, Own Home Confirm Follow Up Transport: Friends Plan discussed with Pt/Family/Caregiver: Yes Discharge Location Discharge Placement: (Wants to return home) AARON Abbasi, CM

## 2019-02-18 NOTE — PROGRESS NOTES
Nutrition Assessment: 
 
RECOMMENDATIONS/INTERVENTION(S):  
Continue cardiac diet Monitor PO intakes, weight- please weigh pt Added Ensure Enlive (chocolate) for added kcal/protein while pt has low intakes. ASSESSMENT:  
2/18: 79 yr old male admitted for COPD exacerbation PMhx: COPD, CAD, PNA, DLD. MST received for weight loss. Pt states he lost a significant mount of weight in 2012 when COPD started. Pt went from 170 lbs to 140 lbs. Since then pt states he's gradually lost weight but over last year or so has been stable. Weight hx indicates pt usually between 155-165 over last 2 yrs. Pt states he ate fruit cup and some yogurt today and appetite has been low. Added Ensure enlive for ease of kcal intakes while appetite is poor and increased energy needs with COPD. Pt with difficulty talking 2/2 coughing/catching breath. Please weight pt on standing scale. No n/v, no c/d (last BM 2/15), no chew/swallow difficulties. Denies food allergies. Na 132, , 133, 121 mg/dL. SUBJECTIVE/OBJECTIVE:  
Diet Order: Cardiac 
% Eaten:   
Patient Vitals for the past 168 hrs: 
 % Diet Eaten 02/17/19 1130 100 % 02/17/19 0905 250 % 02/16/19 0938 25 % Pertinent Medications: [x] Reviewed Labs reviewed:  [x] Anthropometrics: Height: 5' 10\" (177.8 cm) Weight: 74.8 kg (165 lb)(pt stated- please weigh on bedscale/standing scale) IBW (%IBW):   ( ) UBW (%UBW):   (  %) BMI: Body mass index is 23.68 kg/m². This BMI is indicative of: 
 
 [] Underweight    [x] Normal    [] Overweight    []  Obesity    []  Extreme Obesity (BMI>40) Estimated Nutrition Needs (Based on): 1997 Kcals/day(BMR(1536x1.3)) , 75 g(-90g/day(1.0-1.2g/kg)) Protein Carbohydrate: At Least 130 g/day  Fluids: 2000 mL/day (1mL/kg rounded to 50 mL) Last BM: 2/15   [x]Active     []Hyperactive  []Hypoactive       [] Absent   BS Skin:    [x] Intact   [] Incision  [] Breakdown   [] DTI   [] Tears/Excoriation/Abrasion  []Edema [] Other: Wt Readings from Last 30 Encounters:  
02/18/19 74.8 kg (165 lb) 12/04/18 72.1 kg (159 lb) 06/01/18 70.5 kg (155 lb 6.4 oz) 10/12/17 74.8 kg (165 lb)  
09/26/17 74.8 kg (165 lb) 04/21/17 73.4 kg (161 lb 12.8 oz)  
03/21/17 71.7 kg (158 lb)  
09/13/16 71.2 kg (157 lb)  
03/30/16 66.5 kg (146 lb 9.6 oz)  
07/20/15 67.9 kg (149 lb 9.6 oz) 06/04/15 74.8 kg (165 lb)  
01/28/15 72.1 kg (159 lb) 12/18/14 72.1 kg (159 lb) 10/29/14 66.4 kg (146 lb 4.8 oz) 06/12/14 66 kg (145 lb 6.4 oz) 03/27/14 66.7 kg (147 lb)  
03/12/14 68.5 kg (151 lb)  
02/26/14 69.4 kg (153 lb) 02/16/14 63.9 kg (140 lb 12.8 oz) 10/08/12 77.1 kg (170 lb)  
09/25/12 77.1 kg (170 lb)  
09/11/12 77.1 kg (170 lb) 08/21/12 78.3 kg (172 lb 9.6 oz) 05/07/12 74.8 kg (165 lb) NUTRITION DIAGNOSES:  
Problem:  Predicted suboptimal energy intake Etiology: related to decreased ability to consume sufficient energy Signs/Symptoms: as evidenced by low PO intakes, low appetite, difficulty breathing/eating same time NUTRITION INTERVENTIONS: 
Meals/Snacks: General/healthful diet   Supplements: Commercial supplement GOAL:  
pt will consume >50% of meals and ONS within 3-5 days Cultural, Anabaptist, or Ethnic Dietary Needs: None LEARNING NEEDS (Diet, Food/Nutrient-Drug Interaction):  
 [x] None Identified 
 [] Identified and Education Provided/Documented 
 [] Identified and Pt declined/was not appropriate [x] Interdisciplinary Care Plan Reviewed/Documented  
 [x] Discharge Needs: high kcal meals, low Na 
 [] No Nutrition Related Discharge Needs NUTRITION RISK:  
Pt Is At Nutrition Risk  [x] No Nutrition Risk Identified  [] PT SEEN FOR:  
 []  MD Consult: []Calorie Count []Diabetic Diet Education []Diet Education []Electrolyte Management []General Nutrition Management and Supplements []Management of Tube Feeding []TPN Recommendations [x]  RN Referral:  [x]MST score >=2 
   []Enteral/Parenteral Nutrition PTA []Pregnant: Gestational DM or Multigestation  
              [] Pressure Ulcer 
   
[]  Low BMI      []  Length of Stay       [] Dysphagia Diet     [] Ventilator      [] Follow-Up Previous Recommendations: 
 [] Implemented          [] Not Implemented          [x] Not Applicable Previous Goal: 
 [] Met              [] Progressing Towards Goal              [] Not Progressing Towards Goal   [x] Not Applicable José Miguel Ivory RD Pager: [phone] Office: [phone]

## 2019-02-19 ENCOUNTER — APPOINTMENT (OUTPATIENT)
Dept: GENERAL RADIOLOGY | Age: 69
DRG: 193 | End: 2019-02-19
Payer: MEDICARE

## 2019-02-19 LAB
ANION GAP SERPL CALC-SCNC: 3 MMOL/L (ref 5–15)
BUN SERPL-MCNC: 14 MG/DL (ref 6–20)
BUN/CREAT SERPL: 22 (ref 12–20)
CALCIUM SERPL-MCNC: 9.8 MG/DL (ref 8.5–10.1)
CHLORIDE SERPL-SCNC: 99 MMOL/L (ref 97–108)
CO2 SERPL-SCNC: 34 MMOL/L (ref 21–32)
CREAT SERPL-MCNC: 0.64 MG/DL (ref 0.7–1.3)
ERYTHROCYTE [DISTWIDTH] IN BLOOD BY AUTOMATED COUNT: 13.8 % (ref 11.5–14.5)
GLUCOSE SERPL-MCNC: 129 MG/DL (ref 65–100)
HCT VFR BLD AUTO: 36.5 % (ref 36.6–50.3)
HGB BLD-MCNC: 12.4 G/DL (ref 12.1–17)
M PNEUMO IGG SER IA-ACNC: <100 U/ML (ref 0–99)
M PNEUMO IGM SER IA-ACNC: <770 U/ML (ref 0–769)
MCH RBC QN AUTO: 31.9 PG (ref 26–34)
MCHC RBC AUTO-ENTMCNC: 34 G/DL (ref 30–36.5)
MCV RBC AUTO: 93.8 FL (ref 80–99)
NRBC # BLD: 0 K/UL (ref 0–0.01)
NRBC BLD-RTO: 0 PER 100 WBC
PLATELET # BLD AUTO: 283 K/UL (ref 150–400)
PMV BLD AUTO: 9.3 FL (ref 8.9–12.9)
POTASSIUM SERPL-SCNC: 4.6 MMOL/L (ref 3.5–5.1)
RBC # BLD AUTO: 3.89 M/UL (ref 4.1–5.7)
SODIUM SERPL-SCNC: 136 MMOL/L (ref 136–145)
WBC # BLD AUTO: 14.9 K/UL (ref 4.1–11.1)

## 2019-02-19 PROCEDURE — 74011250637 HC RX REV CODE- 250/637: Performed by: INTERNAL MEDICINE

## 2019-02-19 PROCEDURE — 97116 GAIT TRAINING THERAPY: CPT

## 2019-02-19 PROCEDURE — 74011250636 HC RX REV CODE- 250/636: Performed by: PHYSICIAN ASSISTANT

## 2019-02-19 PROCEDURE — 71045 X-RAY EXAM CHEST 1 VIEW: CPT

## 2019-02-19 PROCEDURE — 85027 COMPLETE CBC AUTOMATED: CPT

## 2019-02-19 PROCEDURE — 74011000258 HC RX REV CODE- 258: Performed by: PHYSICIAN ASSISTANT

## 2019-02-19 PROCEDURE — 74011250636 HC RX REV CODE- 250/636: Performed by: NURSE PRACTITIONER

## 2019-02-19 PROCEDURE — 36415 COLL VENOUS BLD VENIPUNCTURE: CPT

## 2019-02-19 PROCEDURE — 74011000250 HC RX REV CODE- 250: Performed by: INTERNAL MEDICINE

## 2019-02-19 PROCEDURE — 94640 AIRWAY INHALATION TREATMENT: CPT

## 2019-02-19 PROCEDURE — 97530 THERAPEUTIC ACTIVITIES: CPT

## 2019-02-19 PROCEDURE — 80048 BASIC METABOLIC PNL TOTAL CA: CPT

## 2019-02-19 PROCEDURE — 65660000000 HC RM CCU STEPDOWN

## 2019-02-19 PROCEDURE — 77010033678 HC OXYGEN DAILY

## 2019-02-19 RX ADMIN — Medication 10 ML: at 21:15

## 2019-02-19 RX ADMIN — METHYLPREDNISOLONE SODIUM SUCCINATE 60 MG: 125 INJECTION, POWDER, FOR SOLUTION INTRAMUSCULAR; INTRAVENOUS at 06:36

## 2019-02-19 RX ADMIN — METHYLPREDNISOLONE SODIUM SUCCINATE 40 MG: 125 INJECTION, POWDER, FOR SOLUTION INTRAMUSCULAR; INTRAVENOUS at 21:14

## 2019-02-19 RX ADMIN — IPRATROPIUM BROMIDE AND ALBUTEROL SULFATE 3 ML: .5; 3 SOLUTION RESPIRATORY (INHALATION) at 16:16

## 2019-02-19 RX ADMIN — APIXABAN 5 MG: 5 TABLET, FILM COATED ORAL at 09:43

## 2019-02-19 RX ADMIN — DILTIAZEM HYDROCHLORIDE 300 MG: 300 CAPSULE, COATED, EXTENDED RELEASE ORAL at 09:42

## 2019-02-19 RX ADMIN — GUAIFENESIN 1200 MG: 600 TABLET, EXTENDED RELEASE ORAL at 09:42

## 2019-02-19 RX ADMIN — ASPIRIN 81 MG 81 MG: 81 TABLET ORAL at 21:14

## 2019-02-19 RX ADMIN — ATORVASTATIN CALCIUM 40 MG: 20 TABLET, FILM COATED ORAL at 21:14

## 2019-02-19 RX ADMIN — IPRATROPIUM BROMIDE AND ALBUTEROL SULFATE 3 ML: .5; 3 SOLUTION RESPIRATORY (INHALATION) at 00:07

## 2019-02-19 RX ADMIN — BUDESONIDE 500 MCG: 0.5 INHALANT RESPIRATORY (INHALATION) at 20:52

## 2019-02-19 RX ADMIN — IPRATROPIUM BROMIDE AND ALBUTEROL SULFATE 3 ML: .5; 3 SOLUTION RESPIRATORY (INHALATION) at 04:17

## 2019-02-19 RX ADMIN — CEFTRIAXONE SODIUM 2 G: 2 INJECTION, POWDER, FOR SOLUTION INTRAMUSCULAR; INTRAVENOUS at 20:42

## 2019-02-19 RX ADMIN — Medication 10 ML: at 15:17

## 2019-02-19 RX ADMIN — VITAMIN D, TAB 1000IU (100/BT) 1000 UNITS: 25 TAB at 21:14

## 2019-02-19 RX ADMIN — IPRATROPIUM BROMIDE AND ALBUTEROL SULFATE 3 ML: .5; 3 SOLUTION RESPIRATORY (INHALATION) at 07:13

## 2019-02-19 RX ADMIN — IPRATROPIUM BROMIDE AND ALBUTEROL SULFATE 3 ML: .5; 3 SOLUTION RESPIRATORY (INHALATION) at 11:07

## 2019-02-19 RX ADMIN — OMEGA-3 FATTY ACIDS CAP 1000 MG 2 CAPSULE: 1000 CAP at 09:42

## 2019-02-19 RX ADMIN — METHYLPREDNISOLONE SODIUM SUCCINATE 40 MG: 125 INJECTION, POWDER, FOR SOLUTION INTRAMUSCULAR; INTRAVENOUS at 15:15

## 2019-02-19 RX ADMIN — VITAMIN D, TAB 1000IU (100/BT) 2000 UNITS: 25 TAB at 09:42

## 2019-02-19 RX ADMIN — APIXABAN 5 MG: 5 TABLET, FILM COATED ORAL at 17:10

## 2019-02-19 RX ADMIN — Medication 10 ML: at 06:36

## 2019-02-19 RX ADMIN — OMEGA-3 FATTY ACIDS CAP 1000 MG 1 CAPSULE: 1000 CAP at 21:14

## 2019-02-19 RX ADMIN — THERA TABS 1 TABLET: TAB at 09:42

## 2019-02-19 RX ADMIN — IPRATROPIUM BROMIDE AND ALBUTEROL SULFATE 3 ML: .5; 3 SOLUTION RESPIRATORY (INHALATION) at 20:52

## 2019-02-19 RX ADMIN — MONTELUKAST 10 MG: 10 TABLET, FILM COATED ORAL at 21:14

## 2019-02-19 RX ADMIN — BUDESONIDE 500 MCG: 0.5 INHALANT RESPIRATORY (INHALATION) at 07:13

## 2019-02-19 RX ADMIN — AZITHROMYCIN MONOHYDRATE 500 MG: 500 INJECTION, POWDER, LYOPHILIZED, FOR SOLUTION INTRAVENOUS at 19:20

## 2019-02-19 RX ADMIN — GUAIFENESIN 1200 MG: 600 TABLET, EXTENDED RELEASE ORAL at 21:14

## 2019-02-19 NOTE — PROGRESS NOTES
Palliative Medicine Code Status: Full Code Advance Care Planning: 
Advance Care Planning 2/19/2019 Patient's Healthcare Decision Maker is: Named in scanned ACP document:  Sister Perico Arteaga Confirm Advance Directive Yes, on file Patient / Family Encounter Documentation Participants (names): Pt, Palliative Medicine (Dr. Marcellus Ta, [address]) Narrative: Pt was awake in bed, wearing jeans and shoes along with his hospital gown. Pt had O2 in place via NC, conversation was limited due to sob. Pt lives alone, sister Herlinda Jones (only sibling) lives in [place], [place]. Pt worked for [employer] for 30+ yrs, stated he took early snf at age 60 because his medical condition no longer allowed him to keep up with the fast pace of his job. Pt continues to drive but shared that he is having increased difficulty even going out for a bite to eat, is no longer able to engage in activities he once enjoyed. Pt verbalized understanding of the progressive nature of his disease, inquired whether lung transplant would be an option, asked about success rates. Pt has AMD in place which appoints sister Herlinda Jones as his sole Medical POA. Pt indicated he has not given much consideration to code status, stated he supposes he would prefer to allow natural death (DNR), though will give more thought to the matter. Psychosocial Issues Identified/ Resilience Factors: Coping with advanced illness, has limited support though stated he could call on friends/family if needed. Goals of Care / Plan:  Pt stated he does not feel ready to leave the hospital yet, indicated some anxiety re: going home. Pt has been to Pulmonary Rehab in the past but reports he \"scared\" the staff when coughing up mucous. Concern has been expressed re: pt's ability to get to and from outpt pulmonary rehab, PT today states inpt pulmonary rehab might be a better option than outpt.   Palliative team will follow for support and ongoing goals of care conversations. Discussed with Dr. Josefina Orta, RN, and with [facility]. Thank you for including Palliative Medicine in the care of Mr. Lenny Gallegos. Jose Urena LCSW, [facility]- 
[phone])

## 2019-02-19 NOTE — PROGRESS NOTES
Spiritual Care Assessment/Progress Note [address] 
 
 
NAME: Zoraida Turner      MRN: [id number] AGE: 79 y.o. SEX: male Anabaptist Affiliation: Jainism  
Language: [place] 2/19/2019     Total Time (in minutes): 10 Spiritual Assessment begun in SF 3 PRO CARE TELE 2 through conversation with: 
  
    [x]Patient        [] Family    [] Friend(s) Reason for Consult: Palliative Care, Initial/Spiritual Assessment Spiritual beliefs: (Please include comment if needed) [x] Identifies with a ivory tradition: Adventism    
   [] Supported by a ivory community:        
   [] Claims no spiritual orientation:       
   [] Seeking spiritual identity:            
   [] Adheres to an individual form of spirituality:       
   [] Not able to assess:                   
 
    
Identified resources for coping:  
   [] Prayer                           
   [] Music                  [] Guided Imagery [x] Family/friends                 [] Pet visits [] Devotional reading                         [] Unknown 
   [] Other:                                        
 
 
Interventions offered during this visit: (See comments for more details) Patient Interventions: Affirmation of emotions/emotional suffering, Coping skills reviewed/reinforced, Iconic (affirming the presence of God/Higher Power) Plan of Care: 
 
 [] Support spiritual and/or cultural needs  
 [] Support AMD and/or advance care planning process    
 [] Support grieving process 
 [] Coordinate Rites and/or Rituals  
 [] Coordination with community clergy [] No spiritual needs identified at this time 
 [] Detailed Plan of Care below (See Comments)  [] Make referral to Music Therapy 
[] Make referral to Pet Therapy    
[] Make referral to Addiction services 
[] Make referral to [facility] 
[] Make referral to Spiritual Care Partner 
[] No future visits requested       
[x] Follow up visits as needed Comments:  visit for initial spiritual assessment, palliative care consultation. Patient sitting up in bed, resting and watching television. Good eye contact, smiling, friendly. Says he is feeling better. Provided spiritual presence and listening as he spoke of his current thoughts, feelings, and concerns. Also spoke of the events leading to this hopsitalisation. Lives alone. Close friends provide support and comfort. Appeared encouraged as a result of this visit and expressed gratitude leroy this visit. Visited by Rev. Pedro Haji, [facility]  paging service: [phone])

## 2019-02-19 NOTE — PROGRESS NOTES
Received a phone call from [place] with [address] [place] [address] has a Trilogy through them, She will need a call at discharge [phone].  STEPHANIE Luo

## 2019-02-19 NOTE — PROGRESS NOTES
Transitional Care Team: Initial G Note Date of Assessment: 02/19/19 Time of Assessment:  11:15 AM 
Hospital Nurse Navigator: Raúl Beltran RN Tracey Sauceda is a 79 y.o. male inpatient at [facility] with Acute on chronic respiratory failure with hypoxemia ([facility] [address] [J96.21] on  2/15/2019. This Nurse Navigator accessed chart to offer support and placement in the [address] [place] [address] Team bridges the gaps in care and education surrounding discharge from the acute care facility. The objective is to empower the patient and family in taking a proactive role in the task of preventing readmission within the first thirty days after discharge from the acute care setting. The team is also involved in the efforts to reduce readmission to the acute care setting after stabilization and discharge from the acute care environment either to the skilled nursing facilities or community. Primary Diagnosis Pneumonia COPD PLAN: 
A written individual care plan including education materials, a calendar individualized with their follow up appointments with primary care providers, specialist, and telephone numbers of healthcare personal that are available for them to ask questions during the recovery phase. Request palliative consult. The following opportunities were noted following assessment: 1. Patient's PCP is  at [facility]. He has not seen him in about 1 year. He follows up with Dr. Ese Ferrari and Dr. Precious Otero but not frequently. Patient has used [facility] in the past and is agreeable to having them come to his home. We discussed HH or H2H, he has not had Home health in the past but is open to having if needed. 2. Patient lives alone and has friends and neighbors who assist him, but no family that live locally. His surrogate health care agent is sister, Misa Bradford who lives in [facility]. Patient has oxygen 2.5L continuous at home and has a new trilogy machine that was delivered the day before admission. 3. Patient manages his own medications at home and denies any issues affording medications. EDUCATION / INTERVENTIONS OFFERED:   
 
1. Medication reconciliation was not performed. 2. Patient / Family was instructed on specific signs/symptoms to look for with regards to worsening of their medical conditions. Learning was assessed using teach back. 3. Patient / Family were given all educational material from the [facility] with services identified for complete wrap around services during their 30 day recovery phase. The patient and I discussed wrap around services including nurse navigation, care coordination, home health, transitional care appointments with their primary care provider and specialist team and the role of [facility]. Patient education focused on readmission zones as described as: The Red Zone: High risk for readmission, days 1-21 The Yellow Zone: Moderate risk for readmission, days 22-29 The Green Zone: Lower risk for readmission, days 30 and after 4. Advance Care Planning:  reviewed and current BARRIERS IDENTIFIED: 
 
Valdemar Vasquez expressed the following barriers to his discharge: PCP relationship, support system, transportation, depression Pt will be discharging to [facility]. The TC Team will follow patient from a distance while inpatient as well as be available for further transition disposition as needed. The [facility] TEAM will continue to offer support during the 30- 90 day discharge from acute care setting. Will notified Ambulatory [facility] Nurse Navigator when discharge plan in place. Past Medical History:  
Diagnosis Date  Arrhythmia  Asthma  COPD (chronic obstructive pulmonary disease) (HCC) severe  Hypertension  Insulin resistance 3/27/2014  Lung mass 9/2012 MAY resection, + mycobacterial orgs, neg malignancy  Melanoma ([facility] [address] 2007  Non-STEMI (non-ST elevated [facility] infarction) [facility] 2/14  On home O2 prn  Pneumonia  Snoring  Tinnitus  Tuberculosis  Vitamin D deficiency 3/27/2014

## 2019-02-19 NOTE — PROGRESS NOTES
Problem: Mobility Impaired (Adult and Pediatric) Goal: *Acute Goals and Plan of Care (Insert Text) Physical Therapy Goals Initiated 2/18/2019 1. Patient will move from supine to sit and sit to supine  in bed with independence within 7 day(s). 2.  Patient will transfer from bed to chair and chair to bed with independence using the least restrictive device within 7 day(s). 3.  Patient will perform sit to stand with independence within 7 day(s). 4.  Patient will ambulate with modified independence for 50 feet with the least restrictive device within 7 day(s). 5.  Patient will ascend/descend 3 stairs with 1 handrail(s) with supervision/set-up within 7 day(s). physical Therapy TREATMENT Patient: Khoa Car (89 y.o. male) Date: 2/19/2019 Diagnosis: Acute on chronic respiratory failure with hypoxemia (HCC) [J96.21] Pneumonia Precautions: Fall Chart, physical therapy assessment, plan of care and goals were reviewed. ASSESSMENT: 
Patient cleared for PT. Received in bed on 2.5 liters. Patient stood and ambulated in room about 10 feet with CGA on 2.5 liters but desats and required increased O2 to 3.5 liters to recover. At rest, pre-activity on 2.5 liters: 92%,  Seated on edge of bed on 2.5 liters: 88%,  After ambulation in room on 2.5 liters, seated in recliner: 82%,  Recovery seated in recliner on 3.5 liters: 91%,  Patient practicing pursed lip breathing but with slow recovery and increased WOB noted. Notified nursing of patients progression. He refused chair alarm and nursing aware. He agrees to call for assistance when getting up. Patient discouraged at his slow progress. Recommend pulmonary rehab; inpatient may be more appropriate than OP. Progression toward goals: 
[]    Improving appropriately and progressing toward goals [x]    Improving slowly and progressing toward goals 
[]    Not making progress toward goals and plan of care will be adjusted PLAN: 
Patient continues to benefit from skilled intervention to address the above impairments. Continue treatment per established plan of care. Discharge Recommendations:  Rehab Further Equipment Recommendations for Discharge:  none SUBJECTIVE:  
Patient stated This is the worse I have ever been.  OBJECTIVE DATA SUMMARY:  
Critical Behavior: 
Neurologic State: Alert, Appropriate for age, Eyes open spontaneously Orientation Level: Appropriate for age, Oriented X4 Cognition: Appropriate decision making, Appropriate for age attention/concentration, Appropriate safety awareness, Follows commands Safety/Judgement: Awareness of environment, Fall prevention, Insight into deficits Functional Mobility Training: 
Bed Mobility: 
  
Supine to Sit: Supervision Transfers: 
Sit to Stand: Supervision Stand to Sit: Contact guard assistance Bed to Chair: Contact guard assistance Balance: 
Sitting: Intact Standing: Intact; With supportAmbulation/Gait Training: 
Distance (ft): 10 Feet (ft) Assistive Device: Gait belt Ambulation - Level of Assistance: Contact guard assistance Gait Abnormalities: Path deviations Therapeutic Exercises:  
Pursed lip breathing Pain: 
Pain Scale 1: Numeric (0 - 10) Pain Intensity 1: 0 Activity Tolerance:  
Gets SOB easily with decreased O2 sats. Please refer to the flowsheet for vital signs taken during this treatment. After treatment:  
[x]    Patient left in no apparent distress sitting up in chair 
[]    Patient left in no apparent distress in bed 
[x]    Call bell left within reach [x]    Nursing notified 
[]    Caregiver present 
[]    Bed alarm activated COMMUNICATION/COLLABORATION:  
The patients plan of care was discussed with: Registered Nurse America Ramirez, PT Time Calculation: 23 mins

## 2019-02-19 NOTE — PROGRESS NOTES
Bedside and Verbal shift change report given to Elliott Tim RN (oncoming nurse) by Kristin Toure RN (offgoing nurse). Report included the following information SBAR, Kardex, ED Summary, Intake/Output, Recent Results, Med Rec Status and Cardiac Rhythm A-Fib. 
 
19:30: Pt sitting in chair; denies pain. Pt reports intermittent SOB while resting; pt reports frustration and concern that his current respiratory treatments are not adequately effective. 2027: Assisted pt to bed; pt PATEL, Spo2 decreased to 86-89 for 30 sec before returning to 94-95 on 4 L O2 per NC humidified. Bedside and Verbal shift change report given to Kristin Toure RN (oncoming nurse) by Elliott Tim RN (offgoing nurse). Report included the following information SBAR, Kardex, ED Summary, Intake/Output, Recent Results, Med Rec Status and Cardiac Rhythm A-Fib/A-Flutter.

## 2019-02-19 NOTE — PROGRESS NOTES
[facility] PROGRESS NOTEPulmonary, Critical Care, and Sleep Medicine Name: Ladi Rodriguez MRN: [id number] : 1950 Hospital: [facility] Date: 2019  Admission Date: 2/15/2019 Chart and notes reviewed. Data reviewed. I review the patient's current medications in the medical record at each encounter. I have evaluated and examined the patient. Overnight events reviewed: 
Afebrile Sats 88% on 4L WBC 14.9 - better Resp cx no growth Blood cx no growth x 4 days Pna work up negative ROS: Feeling a little better this morning. Still with significant PATEL, worse that baseline. Denies fever or chills. Reports nonproductive cough. Denies CP. Denies abd pain or LE pain/swelling. 12 point ROS reviewed and negative except as noted above. Past Medical History:  
Diagnosis Date  Arrhythmia  Asthma  COPD (chronic obstructive pulmonary disease) (HCC) severe  Hypertension  Insulin resistance 3/27/2014  Lung mass 2012 MAY resection, + mycobacterial orgs, neg malignancy  Melanoma ([facility] [address]   Non-STEMI (non-ST elevated myocardial infarction) ([facility] [address] [facility]   On home O2 prn  Pneumonia  Snoring  Tinnitus  Tuberculosis  Vitamin D deficiency 3/27/2014 Past Surgical History:  
Procedure Laterality Date  HX OTHER SURGICAL EXC MELANOMA RIGHT CHEEK  
 HX OTHER SURGICAL NEEDLE BX LEFT LUNG  
 HX OTHER SURGICAL  2012  
 apical segmentectomy, MAY Family History Problem Relation Age of Onset  Glaucoma Mother  Dementia Mother  Heart Attack Father  Heart Disease Father Social History Tobacco Use  Smoking status: Former Smoker Packs/day: 0.25 Years: 40.00 Pack years: 10.00 Types: Cigarettes Last attempt to quit:  Years since quittin.1  Smokeless tobacco: Never Used Substance Use Topics  Alcohol use:  Yes  
 Alcohol/week: 15.6 oz Types: 21 Cans of beer, 5 Shots of liquor per week No Known Allergies Current Facility-Administered Medications Medication Dose Route Frequency  alum-mag hydroxide-simeth (MYLANTA) oral suspension 30 mL  30 mL Oral Q4H PRN  
 methylPREDNISolone (PF) (SOLU-MEDROL) injection 60 mg  60 mg IntraVENous Q8H  
 dilTIAZem CD (CARDIZEM CD) capsule 300 mg  300 mg Oral DAILY  dilTIAZem (CARDIZEM) 125 mg in dextrose 5% 125 mL infusion  0-15 mg/hr IntraVENous TITRATE  arformoterol (BROVANA) neb solution 15 mcg  15 mcg Nebulization BID RT  
 azithromycin (ZITHROMAX) 500 mg in 0.9% sodium chloride (MBP/ADV) 250 mL  500 mg IntraVENous Q24H  cefTRIAXone (ROCEPHIN) 2 g in 0.9% sodium chloride (MBP/ADV) 50 mL  2 g IntraVENous Q24H  
 sodium chloride (NS) flush 5-40 mL  5-40 mL IntraVENous Q8H  
 sodium chloride (NS) flush 5-40 mL  5-40 mL IntraVENous PRN  
 acetaminophen (TYLENOL) tablet 650 mg  650 mg Oral Q6H PRN  
 naloxone (NARCAN) injection 0.4 mg  0.4 mg IntraVENous PRN  
 albuterol-ipratropium (DUO-NEB) 2.5 MG-0.5 MG/3 ML  3 mL Nebulization Q4H RT  
 albuterol (PROVENTIL VENTOLIN) nebulizer solution 2.5 mg  2.5 mg Nebulization Q2H PRN  
 aspirin chewable tablet 81 mg  81 mg Oral QHS  atorvastatin (LIPITOR) tablet 40 mg  40 mg Oral QHS  budesonide (PULMICORT) 500 mcg/2 ml nebulizer suspension  500 mcg Nebulization BID RT  
 cholecalciferol (VITAMIN D3) tablet 1,000 Units  1,000 Units Oral QHS  cholecalciferol (VITAMIN D3) tablet 2,000 Units  2,000 Units Oral DAILY  apixaban (ELIQUIS) tablet 5 mg  5 mg Oral BID  
 guaiFENesin ER (MUCINEX) tablet 1,200 mg  1,200 mg Oral BID  montelukast (SINGULAIR) tablet 10 mg  10 mg Oral QHS  therapeutic multivitamin (THERAGRAN) tablet 1 Tab  1 Tab Oral DAILY  omega 3-DHA-EPA-fish oil 1,000 mg (120 mg-180 mg) capsule 1 Cap  1 Cap Oral QHS  omega 3-DHA-EPA-fish oil 1,000 mg (120 mg-180 mg) capsule 2 Cap  2 Cap Oral DAILY Vital Signs: 
Visit Vitals /72 (BP 1 Location: Right arm) Comment: Simultaneous filing. User may not have seen previous data. Pulse 99 Comment: Pt finished breatrhing teatment. Temp 98.3 °F (36.8 °C) Resp 21 Comment: Simultaneous filing. User may not have seen previous data. Ht 5' 10\" (1.778 m) Wt 67.8 kg (149 lb 9.3 oz) SpO2 (!) 88% BMI 21.46 kg/m² O2 Device: Nasal cannula O2 Flow Rate (L/min): 4 l/min(titrated to 2.5L) Temp (24hrs), Av.1 °F (36.7 °C), Min:97.6 °F (36.4 °C), Max:98.3 °F (36.8 °C) Intake/Output:  
Last shift:      No intake/output data recorded. Last 3 shifts:  1901 -  0700 In: 3600.5 [P.O.:2990; I.V.:610.5] Out: 5094 [[id number]] Intake/Output Summary (Last 24 hours) at 2019 1488 Last data filed at 2019 0600 Gross per 24 hour Intake 1940 ml Output 2175 ml Net -235 ml Physical Exam:  
General:  Alert, cooperative, no distress, appears stated age. Head:  Normocephalic, without obvious abnormality, atraumatic. Eyes:  Conjunctivae/corneas clear. Nose: Nares normal. Septum midline. Mucosa normal.  
Throat: Lips, mucosa, and tongue normal.   
Neck: Supple, symmetrical, trachea midline, no adenopathy. Lungs:   Diminished, no wheeze appreciated this morning. Chest wall:  No tenderness or deformity. Heart:  Regular rate and rhythm, S1, S2 normal, no murmur, click, rub or gallop. Abdomen:   Soft, non-tender. Bowel sounds normal. No masses,  No organomegaly. Extremities: Extremities normal, atraumatic, no cyanosis or edema. Pulses: 2+ and symmetric all extremities. Skin: Skin color, texture, turgor normal. No rashes or lesions Lymph nodes: Cervical, supraclavicular nodes normal.  
Neurologic: Grossly nonfocal  
 
DATA: 
MAR reviewed and pertinent medications noted or modified as needed Labs: 
Recent Labs  
  19 
0430 19 
0101 WBC 14.9* 19.7* HGB 12.4 12.4 HCT 36.5* 36.8  245 Recent Labs  
  02/19/19 
0430 02/18/19 
0107 02/17/19 
0101  132* 136  
K 4.6 4.1 3.5 CL 99 94* 97  
CO2 34* 30 28 * 124* 133* BUN 14 12 15 CREA 0.64* 0.70 0.89 CA 9.8 10.0 9.7 MG  --  1.9  --   
 
 
Imaging: No new images this morning. IMPRESSION:  
· Acute/chronic hypoxic hypercapnic respiratory failure · COPD exacerbation · RLL pneumonia · PAF PLAN:  
· Continue O2 and wean as able to keep sats 88-94% · Continue scheduled Duonebs and Brovana/Pulmicort nebs · Continue Zithromax/CTX through tomorrow · Wean Solumedrol to 40 mg q 8hr · Continue Singulair and Mucinex · Repeat CXR 
· OOB/PT/OT 
· Outpatient pulmonary rehab · He was set up with home non-invasive ventilation on 2/15. [facility] is currently  compensated so does not require acute NIV now.  Discussed option of using NIV here and he prefers to wait. Will plan to resume home NIV upon discharge for long term management of his chronic resp failure · DVT prophylaxis: Lovely Rodriguez

## 2019-02-19 NOTE — PROGRESS NOTES
Tyrone Abdullahi [place] 81 
[facility], [address] [place] [address] 
[phone] Medical Progress Note NAME: Jaguar Calderón :  1950 MRM:  [id number] Date/Time: 2019  8:47 AM  
 
  
Subjective: Chief Complaint:  SOB: moderate, constant, worse with exertion, overall improved ROS: 
(bold if positive, if negative) SOB/PATEL Tolerating PT  Tolerating Diet Objective:  
 
 
Vitals:  
 
 
  
Last 24hrs VS reviewed since prior progress note. Most recent are: 
 
Visit Vitals /72 (BP 1 Location: Right arm) Pulse 99 Temp 98.3 °F (36.8 °C) Resp 21 Ht 5' 10\" (1.778 m) Wt 67.8 kg (149 lb 9.3 oz) SpO2 (!) 88% BMI 21.46 kg/m² SpO2 Readings from Last 6 Encounters:  
19 (!) 88% 18 93% 18 95% 17 98% 17 95% 17 98% O2 Flow Rate (L/min): 4 l/min(titrated to 2.5L) Intake/Output Summary (Last 24 hours) at 2019 3387 Last data filed at 2019 0600 Gross per 24 hour Intake 1940 ml Output 2175 ml Net -235 ml Exam:  
 
Physical Exam: 
 
Gen:  Well-developed, well-nourished, frail, elderly, chronically ill-appearing, in mild respiratory distress HEENT:  Pink conjunctivae, PERRL, hearing intact to voice, moist mucous membranes Neck:  Supple, without masses, thyroid non-tender Resp:  No accessory muscle use, diffuse wheezing and rhonchi with prolonged expiratory phase Card:  No murmurs, normal S1, S2 without thrills, bruits or peripheral edema Abd:  Soft, non-tender, non-distended, normoactive bowel sounds are present, no palpable organomegaly and no detectable hernias Lymph:  No cervical or inguinal adenopathy Musc:  No cyanosis or clubbing Skin:  No rashes or ulcers, skin turgor is good Neuro:  Cranial nerves are grossly intact, no focal motor weakness, follows commands appropriately Psych:  Good insight, oriented to person, place and time, alert Telemetry reviewed:   AFIB Medications Reviewed: (see below) Lab Data Reviewed: (see below) 
 
______________________________________________________________________ Medications:  
 
Current Facility-Administered Medications Medication Dose Route Frequency  methylPREDNISolone (PF) (Solu-MEDROL) injection 40 mg  40 mg IntraVENous Q8H  
 alum-mag hydroxide-simeth (MYLANTA) oral suspension 30 mL  30 mL Oral Q4H PRN  
 dilTIAZem CD (CARDIZEM CD) capsule 300 mg  300 mg Oral DAILY  dilTIAZem (CARDIZEM) 125 mg in dextrose 5% 125 mL infusion  0-15 mg/hr IntraVENous TITRATE  arformoterol (BROVANA) neb solution 15 mcg  15 mcg Nebulization BID RT  
 azithromycin (ZITHROMAX) 500 mg in 0.9% sodium chloride (MBP/ADV) 250 mL  500 mg IntraVENous Q24H  cefTRIAXone (ROCEPHIN) 2 g in 0.9% sodium chloride (MBP/ADV) 50 mL  2 g IntraVENous Q24H  
 sodium chloride (NS) flush 5-40 mL  5-40 mL IntraVENous Q8H  
 sodium chloride (NS) flush 5-40 mL  5-40 mL IntraVENous PRN  
 acetaminophen (TYLENOL) tablet 650 mg  650 mg Oral Q6H PRN  
 naloxone (NARCAN) injection 0.4 mg  0.4 mg IntraVENous PRN  
 albuterol-ipratropium (DUO-NEB) 2.5 MG-0.5 MG/3 ML  3 mL Nebulization Q4H RT  
 albuterol (PROVENTIL VENTOLIN) nebulizer solution 2.5 mg  2.5 mg Nebulization Q2H PRN  
 aspirin chewable tablet 81 mg  81 mg Oral QHS  atorvastatin (LIPITOR) tablet 40 mg  40 mg Oral QHS  budesonide (PULMICORT) 500 mcg/2 ml nebulizer suspension  500 mcg Nebulization BID RT  
 cholecalciferol (VITAMIN D3) tablet 1,000 Units  1,000 Units Oral QHS  cholecalciferol (VITAMIN D3) tablet 2,000 Units  2,000 Units Oral DAILY  apixaban (ELIQUIS) tablet 5 mg  5 mg Oral BID  
 guaiFENesin ER (MUCINEX) tablet 1,200 mg  1,200 mg Oral BID  montelukast (SINGULAIR) tablet 10 mg  10 mg Oral QHS  therapeutic multivitamin (THERAGRAN) tablet 1 Tab  1 Tab Oral DAILY  omega 3-DHA-EPA-fish oil 1,000 mg (120 mg-180 mg) capsule 1 Cap  1 Cap Oral QHS  omega 3-DHA-EPA-fish oil 1,000 mg (120 mg-180 mg) capsule 2 Cap  2 Cap Oral DAILY Lab Review:  
 
Recent Labs  
  02/19/19 
0430 02/17/19 
0101 WBC 14.9* 19.7* HGB 12.4 12.4 HCT 36.5* 36.8  245 Recent Labs  
  02/19/19 
0430 02/18/19 
0107 02/17/19 
0101  132* 136  
K 4.6 4.1 3.5 CL 99 94* 97  
CO2 34* 30 28 * 124* 133* BUN 14 12 15 CREA 0.64* 0.70 0.89 CA 9.8 10.0 9.7 MG  --  1.9  -- No results found for: Verónica Russo No results for input(s): PH, PCO2, PO2, HCO3, FIO2 in the last 72 hours. No results for input(s): INR in the last 72 hours. No lab exists for component: INREXT, INREXT Lab Results Component Value Date/Time Specimen Description: NARES 02/12/2014 05:30 AM  
 Specimen Description: BLOOD 02/12/2014 12:08 AM  
 
Lab Results Component Value Date/Time Culture result: HEAVY NORMAL RESPIRATORY AYO 02/16/2019 11:00 AM  
 Culture result: NO GROWTH 4 DAYS 02/15/2019 10:45 PM  
 Culture result: MIXED SKIN AYO ISOLATED 06/04/2015 11:15 PM  
 
 
 
  
Assessment:  
 
Principal Problem: 
  Pneumonia (2/15/2019) Active Problems: [facility] exacerb[facility] (2/27/2014) CAD (coronary artery disease), native coronary artery (3/12/2014) Atrial flutter, paroxysmal ([facility] (3/31/2016) Acute on chronic respiratory failure with hypoxemia ([facility] (2/15/2019) SIRS (systemic inflammatory response syndrome) ([facility] (2/15/2019) Plan:  
 
Principal Problem: 
  Pneumonia (2/15/2019) - continue antibiotics as above - pneumonia work up negative for source - Pulmonary following Active Problems: COPD exacerbation ([facility] (2/27/2014) - continue steroids and bronchodilators CAD (coronary artery disease), native coronary artery (3/12/2014) - stable, continue cardiac meds Atrial flutter, paroxysmal ([facility] (3/31/2016) 
 - in afib with rate controlled 
 - continue anticoagulation Acute on chronic respiratory failure with hypoxemia [facility] (2/15/2019) 
 - wean oxygen to baseline SIRS (systemic inflammatory response syndrome) ([facility] (2/15/2019) 
 - due to steroids, etc 
 - follow Debilty 
 - slowly improving 
 - continue PT/OT, arrange outpatient rehab Total time spent in patient care: 25 minutes Care Plan discussed with: Patient, Care Manager, Nursing Staff and Lovely Laureano Discussed:  Code Status, Care Plan and D/C Planning Prophylaxis:  Eliquis Disposition:   PT, OT, RN 
        
___________________________________________________ Attending Physician: Dasha Yoder MD

## 2019-02-19 NOTE — PROGRESS NOTES
707 Pt's relative [facility] ) was updated with pt's move from rm # [phone] to rm # [id number].  She wants to be called when pt is leaving the hospital.

## 2019-02-19 NOTE — ACP (ADVANCE CARE PLANNING)
Primary Decision Maker [facility] Agent):   Primary Decision Maker: [place] [facility] - [phone]  [x] Named in a scanned document   [] Legal Next of Kin  [] Guardian    ACP documents you current have include:  [x] Advance Directive or Living Will  [] Durable Do Not Resuscitate  [] Physician Orders for Scope of Treatment (POST)  [x] Medical Power of   [] Other

## 2019-02-20 ENCOUNTER — APPOINTMENT (OUTPATIENT)
Dept: GENERAL RADIOLOGY | Age: 69
DRG: 193 | End: 2019-02-20
Attending: INTERNAL MEDICINE
Payer: MEDICARE

## 2019-02-20 PROCEDURE — 74011000250 HC RX REV CODE- 250: Performed by: INTERNAL MEDICINE

## 2019-02-20 PROCEDURE — 74011250636 HC RX REV CODE- 250/636: Performed by: PHYSICIAN ASSISTANT

## 2019-02-20 PROCEDURE — 94640 AIRWAY INHALATION TREATMENT: CPT

## 2019-02-20 PROCEDURE — 71045 X-RAY EXAM CHEST 1 VIEW: CPT

## 2019-02-20 PROCEDURE — 74011250637 HC RX REV CODE- 250/637: Performed by: INTERNAL MEDICINE

## 2019-02-20 PROCEDURE — 94760 N-INVAS EAR/PLS OXIMETRY 1: CPT

## 2019-02-20 PROCEDURE — 97116 GAIT TRAINING THERAPY: CPT

## 2019-02-20 PROCEDURE — 76450000000

## 2019-02-20 PROCEDURE — 77010033678 HC OXYGEN DAILY

## 2019-02-20 PROCEDURE — 97530 THERAPEUTIC ACTIVITIES: CPT

## 2019-02-20 PROCEDURE — 65660000000 HC RM CCU STEPDOWN

## 2019-02-20 RX ADMIN — GUAIFENESIN 1200 MG: 600 TABLET, EXTENDED RELEASE ORAL at 21:21

## 2019-02-20 RX ADMIN — IPRATROPIUM BROMIDE AND ALBUTEROL SULFATE 3 ML: .5; 3 SOLUTION RESPIRATORY (INHALATION) at 23:15

## 2019-02-20 RX ADMIN — Medication 10 ML: at 13:31

## 2019-02-20 RX ADMIN — VITAMIN D, TAB 1000IU (100/BT) 1000 UNITS: 25 TAB at 21:21

## 2019-02-20 RX ADMIN — ATORVASTATIN CALCIUM 40 MG: 20 TABLET, FILM COATED ORAL at 21:21

## 2019-02-20 RX ADMIN — GUAIFENESIN 1200 MG: 600 TABLET, EXTENDED RELEASE ORAL at 08:13

## 2019-02-20 RX ADMIN — BUDESONIDE 500 MCG: 0.5 INHALANT RESPIRATORY (INHALATION) at 19:26

## 2019-02-20 RX ADMIN — Medication 10 ML: at 06:25

## 2019-02-20 RX ADMIN — OMEGA-3 FATTY ACIDS CAP 1000 MG 2 CAPSULE: 1000 CAP at 08:13

## 2019-02-20 RX ADMIN — APIXABAN 5 MG: 5 TABLET, FILM COATED ORAL at 08:13

## 2019-02-20 RX ADMIN — IPRATROPIUM BROMIDE AND ALBUTEROL SULFATE 3 ML: .5; 3 SOLUTION RESPIRATORY (INHALATION) at 03:18

## 2019-02-20 RX ADMIN — IPRATROPIUM BROMIDE AND ALBUTEROL SULFATE 3 ML: .5; 3 SOLUTION RESPIRATORY (INHALATION) at 07:58

## 2019-02-20 RX ADMIN — APIXABAN 5 MG: 5 TABLET, FILM COATED ORAL at 18:06

## 2019-02-20 RX ADMIN — IPRATROPIUM BROMIDE AND ALBUTEROL SULFATE 3 ML: .5; 3 SOLUTION RESPIRATORY (INHALATION) at 19:26

## 2019-02-20 RX ADMIN — IPRATROPIUM BROMIDE AND ALBUTEROL SULFATE 3 ML: .5; 3 SOLUTION RESPIRATORY (INHALATION) at 00:06

## 2019-02-20 RX ADMIN — METHYLPREDNISOLONE SODIUM SUCCINATE 40 MG: 125 INJECTION, POWDER, FOR SOLUTION INTRAMUSCULAR; INTRAVENOUS at 21:22

## 2019-02-20 RX ADMIN — MONTELUKAST 10 MG: 10 TABLET, FILM COATED ORAL at 21:21

## 2019-02-20 RX ADMIN — Medication 10 ML: at 21:25

## 2019-02-20 RX ADMIN — OMEGA-3 FATTY ACIDS CAP 1000 MG 1 CAPSULE: 1000 CAP at 21:41

## 2019-02-20 RX ADMIN — ARFORMOTEROL TARTRATE 15 MCG: 15 SOLUTION RESPIRATORY (INHALATION) at 07:58

## 2019-02-20 RX ADMIN — DILTIAZEM HYDROCHLORIDE 300 MG: 300 CAPSULE, COATED, EXTENDED RELEASE ORAL at 08:13

## 2019-02-20 RX ADMIN — METHYLPREDNISOLONE SODIUM SUCCINATE 40 MG: 125 INJECTION, POWDER, FOR SOLUTION INTRAMUSCULAR; INTRAVENOUS at 06:24

## 2019-02-20 RX ADMIN — VITAMIN D, TAB 1000IU (100/BT) 2000 UNITS: 25 TAB at 08:13

## 2019-02-20 RX ADMIN — IPRATROPIUM BROMIDE AND ALBUTEROL SULFATE 3 ML: .5; 3 SOLUTION RESPIRATORY (INHALATION) at 16:27

## 2019-02-20 RX ADMIN — METHYLPREDNISOLONE SODIUM SUCCINATE 40 MG: 125 INJECTION, POWDER, FOR SOLUTION INTRAMUSCULAR; INTRAVENOUS at 13:31

## 2019-02-20 RX ADMIN — THERA TABS 1 TABLET: TAB at 08:13

## 2019-02-20 RX ADMIN — ASPIRIN 81 MG 81 MG: 81 TABLET ORAL at 21:21

## 2019-02-20 RX ADMIN — IPRATROPIUM BROMIDE AND ALBUTEROL SULFATE 3 ML: .5; 3 SOLUTION RESPIRATORY (INHALATION) at 11:40

## 2019-02-20 RX ADMIN — BUDESONIDE 500 MCG: 0.5 INHALANT RESPIRATORY (INHALATION) at 07:58

## 2019-02-20 NOTE — PROGRESS NOTES
Palliative Medicine Consult Patient Name: Margarette Ramirez YOB: 1950 Date of Initial Consult: 2/19/19 Reason for Consult: care decisions Requesting Provider: Dr. Tara Smith Primary Care Physician: Alesia Romero MD 
  
 SUMMARY:  
Margarette Ramirez is a 79 y.o. with a past history of advanced COPD, chronic respiratory failure, CAD, aflutter, who was admitted on 2/15/2019 from home with a diagnosis of a/c respiratory failure. Current medical issues leading to Palliative Medicine involvement include: care decisions. Chart reviewed/HPI-patient admitted to the hospital with a/c respiratory failure/pneumonia/copd exacerbation. Patient has had progressive SOB for several weeks and struggling even doing basic ADLs. NIV(trilogy) ventilation has been arranged for home but delivered the day before admission. SH-lives alone. Sister lives in [facility]. Retired from [employer] at the age of 60 secondary to his COPD PALLIATIVE DIAGNOSES:  
1. GOC discussion 2. DNR discussion 3. Debility 4. SOB 5. ACP review 6. Advanced COPD 7. Chronic respiratory failure PLAN:  
1. Met with patient. Dr. Fatoumata Delgado did talk with him earlier about potential transplant and he felt like all of his questions answered. Now agreeable to consider pulmonary rehab.  
2. GOC-continue all current tx. Hopeful to have some recovery to his prior baseline so that he can at least be able to do ADLs, etc and hoping pulmonary rehab may help. 3. AMD-in the chart with his sister Paulette Gil as MPOA 4. Code status-we again reviewed resuscitation and what that would entail. He continues to want to think about this and he understands right now, he is a full code. 5. Symptom management-inpatient team managing 6. Psychosocial-seems to have limited community support. Sister does not live local. Our team will continue to follow and see how he is responding to tx 7. Discussed with Dr. Fatoumata Delgado, bedside nurse 8. Initial consult note routed to primary continuity provider 9. Communicated plan of care with: Palliative IDT 
 
 
 GOALS OF CARE / TREATMENT PREFERENCES:  
[====Goals of Care====] GOALS OF CARE: 
Patient/Health Care Proxy Stated Goals: Prolong life TREATMENT PREFERENCES:  
Code Status: Full Code Advance Care Planning: 
Advance Care Planning 2/19/2019 Patient's Healthcare Decision Maker is: Named in scanned ACP document Confirm Advance Directive Yes, on file Medical Interventions: Full interventions Other Instructions: The palliative care team has discussed with patient / health care proxy about goals of care / treatment preferences for patient. 
[====Goals of Care====] HISTORY:  
 
History obtained from: chart, patient CHIEF COMPLAINT: sob HPI/SUBJECTIVE: The patient is:  
[x] Verbal and participatory [] Non-participatory due to:  
Patient is awake and alert. Denies any pain but is SOB even with talking 2/20-feeling a little better. Breathing slightly improved. Clinical Pain Assessment (nonverbal scale for severity on nonverbal patients):  
Clinical Pain Assessment Severity: 0 FUNCTIONAL ASSESSMENT:  
 
Palliative Performance Scale (PPS): PPS: 50 PSYCHOSOCIAL/SPIRITUAL SCREENING:  
 
Advance Care Planning: 
Advance Care Planning 2/19/2019 Patient's Healthcare Decision Maker is: Named in scanned ACP document Confirm Advance Directive Yes, on file Any spiritual / [employer] concerns: 
[] Yes /  [x] No 
 
Caregiver Burnout: 
[] Yes /  [] No /  [x] No Caregiver Present Anticipatory grief assessment:  
[x] Normal  / [] Maladaptive ESAS Anxiety: Anxiety: 0 
 
ESAS Depression: Depression: 0 REVIEW OF SYSTEMS:  
 
Positive and pertinent negative findings in ROS are noted above in HPI. The following systems were [x] reviewed / [] unable to be reviewed as noted in HPI Other findings are noted below. Systems: constitutional, ears/nose/mouth/throat, respiratory, gastrointestinal, genitourinary, musculoskeletal, integumentary, neurologic, psychiatric, endocrine. Positive findings noted below. Modified ESAS Completed by: provider Fatigue: 3 Drowsiness: 0 Depression: 0 Pain: 0 Anxiety: 0 Nausea: 0 Anorexia: 0 Dyspnea: 6 Constipation: No  
     
 
 
 PHYSICAL EXAM:  
 
From RN flowsheet: 
Wt Readings from Last 3 Encounters:  
02/18/19 149 lb 9.3 oz (67.8 kg) 12/04/18 159 lb (72.1 kg) 06/01/18 155 lb 6.4 oz (70.5 kg) Blood pressure 134/76, pulse 99, temperature 98.2 °F (36.8 °C), resp. rate 20, height 5' 10\" (1.778 m), weight 149 lb 9.3 oz (67.8 kg), SpO2 91 %. Pain Scale 1: Numeric (0 - 10) Pain Intensity 1: 0 Last bowel movement, if known:  
 
Constitutional: thin, SOB does seems improved and able to talk with us much better without significant SOB Eyes: pupils equal, anicteric ENMT: no nasal discharge, moist mucous membranes Cardiovascular: regular rhythm, distal pulses intact Respiratory: breathing slightly labored, symmetric Gastrointestinal: soft non-tender, +bowel sounds Musculoskeletal: no deformity, no tenderness to palpation Skin: warm, dry Neurologic: following commands, moving all extremities Psychiatric: full affect, no hallucinations Other: 
 
 
 HISTORY:  
 
Principal Problem: 
  Pneumonia (2/15/2019) Active Problems: COPD exacerbation ([address] (2/27/2014) CAD (coronary artery disease), native coronary artery (3/12/2014) Atrial flutter, paroxysmal ([address] (3/31/2016) Acute on chronic respiratory failure with hypoxemia ([address] (2/15/2019) SIRS (systemic inflammatory response syndrome) ([address] (2/15/2019) Past Medical History:  
Diagnosis Date  Arrhythmia  Asthma  COPD (chronic obstructive pulmonary disease) (HCC) severe  Hypertension  Insulin resistance 3/27/2014  Lung mass 9/2012 MAY resection, + mycobacterial orgs, neg malignancy  Melanoma ([facility]   Non-STEMI (non-ST elevated myocardial infarction) ([facility]   On home O2 prn  Pneumonia  Snoring  Tinnitus  Tuberculosis  Vitamin D deficiency 3/27/2014 Past Surgical History:  
Procedure Laterality Date  HX OTHER SURGICAL EXC MELANOMA RIGHT CHEEK  
 HX OTHER SURGICAL NEEDLE BX LEFT LUNG  
 HX OTHER SURGICAL  2012  
 apical segmentectomy, MAY Family History Problem Relation Age of Onset  Glaucoma Mother  Dementia Mother  Heart Attack Father  Heart Disease Father History reviewed, no pertinent family history. Social History Tobacco Use  Smoking status: Former Smoker Packs/day: 0.25 Years: 40.00 Pack years: 10.00 Types: Cigarettes Last attempt to quit:  Years since quittin.1  Smokeless tobacco: Never Used Substance Use Topics  Alcohol use: Yes Alcohol/week: 15.6 oz Types: 21 Cans of beer, 5 Shots of liquor per week No Known Allergies Current Facility-Administered Medications Medication Dose Route Frequency  methylPREDNISolone (PF) (Solu-MEDROL) injection 40 mg  40 mg IntraVENous Q8H  
 alum-mag hydroxide-simeth (MYLANTA) oral suspension 30 mL  30 mL Oral Q4H PRN  
 dilTIAZem CD (CARDIZEM CD) capsule 300 mg  300 mg Oral DAILY  arformoterol (BROVANA) neb solution 15 mcg  15 mcg Nebulization BID RT  
 sodium chloride (NS) flush 5-40 mL  5-40 mL IntraVENous Q8H  
 sodium chloride (NS) flush 5-40 mL  5-40 mL IntraVENous PRN  
 acetaminophen (TYLENOL) tablet 650 mg  650 mg Oral Q6H PRN  
 naloxone (NARCAN) injection 0.4 mg  0.4 mg IntraVENous PRN  
 albuterol-ipratropium (DUO-NEB) 2.5 MG-0.5 MG/3 ML  3 mL Nebulization Q4H RT  
 albuterol (PROVENTIL VENTOLIN) nebulizer solution 2.5 mg  2.5 mg Nebulization Q2H PRN  
 aspirin chewable tablet 81 mg  81 mg Oral QHS  atorvastatin (LIPITOR) tablet 40 mg  40 mg Oral QHS  budesonide (PULMICORT) 500 mcg/2 ml nebulizer suspension  500 mcg Nebulization BID RT  
 cholecalciferol (VITAMIN D3) tablet 1,000 Units  1,000 Units Oral QHS  cholecalciferol (VITAMIN D3) tablet 2,000 Units  2,000 Units Oral DAILY  apixaban (ELIQUIS) tablet 5 mg  5 mg Oral BID  
 guaiFENesin ER (MUCINEX) tablet 1,200 mg  1,200 mg Oral BID  montelukast (SINGULAIR) tablet 10 mg  10 mg Oral QHS  therapeutic multivitamin (THERAGRAN) tablet 1 Tab  1 Tab Oral DAILY  omega 3-DHA-EPA-fish oil 1,000 mg (120 mg-180 mg) capsule 1 Cap  1 Cap Oral QHS  omega 3-DHA-EPA-fish oil 1,000 mg (120 mg-180 mg) capsule 2 Cap  2 Cap Oral DAILY  
 
 
 
 LAB AND IMAGING FINDINGS:  
 
Lab Results Component Value Date/Time WBC 14.9 (H) 02/19/2019 04:30 AM  
 HGB 12.4 02/19/2019 04:30 AM  
 PLATELET 820 63/45/4754 04:30 AM  
 
Lab Results Component Value Date/Time Sodium 136 02/19/2019 04:30 AM  
 Potassium 4.6 02/19/2019 04:30 AM  
 Chloride 99 02/19/2019 04:30 AM  
 CO2 34 (H) 02/19/2019 04:30 AM  
 BUN 14 02/19/2019 04:30 AM  
 Creatinine 0.64 (L) 02/19/2019 04:30 AM  
 Calcium 9.8 02/19/2019 04:30 AM  
 Magnesium 1.9 02/18/2019 01:07 AM  
 Phosphorus 3.4 02/16/2019 04:45 AM  
  
Lab Results Component Value Date/Time AST (SGOT) 13 (L) 02/16/2019 04:45 AM  
 Alk. phosphatase 93 02/16/2019 04:45 AM  
 Protein, total 6.8 02/16/2019 04:45 AM  
 Albumin 2.7 (L) 02/16/2019 04:45 AM  
 Globulin 4.1 (H) 02/16/2019 04:45 AM  
 
Lab Results Component Value Date/Time INR 1.0 02/13/2014 03:00 AM  
 Prothrombin time 10.7 02/13/2014 03:00 AM  
 aPTT 27.3 02/13/2014 03:00 AM  
  
No results found for: IRON, FE, TIBC, IBCT, PSAT, FERR Lab Results Component Value Date/Time pH 7.41 02/15/2019 07:25 PM  
 PCO2 53 (H) 02/15/2019 07:25 PM  
 PO2 90 02/15/2019 07:25 PM  
 
No components found for: [place] Lab Results Component Value Date/Time  10/28/2014 09:25 AM  
 CK - MB 2.7 10/28/2014 09:25 AM  
  
 
 
   
 
Total time: 25 
Counseling / coordination time, spent as noted above: 20 
> 50% counseling / coordination?: yes Prolonged service was provided for  []30 min   []75 min in face to face time in the presence of the patient, spent as noted above. Time Start:  
Time End:  
Note: this can only be billed with 23453 (initial) or 09344 (follow up). If multiple start / stop times, list each separately.

## 2019-02-20 NOTE — PROGRESS NOTES
1900 
Bedside and Verbal shift change report given to [facility]  (oncoming nurse) by Emma Min RN (offgoing nurse). Report included the following information SBAR, Kardex, Procedure Summary, Intake/Output, MAR, Accordion, Recent Results and Cardiac Rhythm Afib. Patient resting on bed. Initial assessment done. Still short of breath. Sats at 89% on 2.5L. Adjusted the O2 to 3L to keep sats above 90. Visit Vitals /73 Pulse 95 Temp 97.4 °F (36.3 °C) Resp 18 Ht 5' 10\" (1.778 m) Wt 67.8 kg (149 lb 9.3 oz) SpO2 95% BMI 21.46 kg/m²  
 
0700 Bedside and Verbal shift change report given to [facility] (oncoming nurse) by Justin Velez RN (offgoing nurse). Report included the following information SBAR, Kardex, Procedure Summary, Intake/Output, MAR, Accordion and Recent Results.

## 2019-02-20 NOTE — PROGRESS NOTES
Problem: Patient Education: Go to Patient Education Activity Goal: Patient/Family Education 
physical Therapy TREATMENT Patient: Sidra Saha (51 y.o. male) Date: 2/20/2019 Diagnosis: Acute on chronic respiratory failure with hypoxemia (HCC) [J96.21] Pneumonia Precautions: Fall Chart, physical therapy assessment, plan of care and goals were reviewed. ASSESSMENT: 
Pt sitting up in bed on arrival of PT. Pt SPO2 94% resting comfortably in bed on 3.5L of O2. Pt instructed to move slowly coming to sit independently. Pt became fatigued quickly with SPO2 remaining at 94% on 3.5 L. After resting 40 seconds. Pt stood with CGA and ambulated at slow pace CGA to window in room. Pt dropped to 86% on 3.5L after only 15ft Pt performed pursed lip breathing standing at window with SPO2 slowly increasing 89%. Pts SPO2 dropped again ambulating back to bed to 85%. Pt returned to 92% on 3.5L after sitting 2 minutes. Pt fatigues very quickly with mobility. Progress slow. Progression toward goals: 
[]    Improving appropriately and progressing toward goals [x]    Improving slowly and progressing toward goals 
[]    Not making progress toward goals and plan of care will be adjusted PLAN: 
Patient continues to benefit from skilled intervention to address the above impairments. Continue treatment per established plan of care. Discharge Recommendations:   Pulmonary Rehab Further Equipment Recommendations for Discharge: SUBJECTIVE:  
 
 
OBJECTIVE DATA SUMMARY:  
Critical Behavior: 
Neurologic State: Alert Orientation Level: Oriented X4 Cognition: Appropriate decision making, Appropriate for age attention/concentration, Appropriate safety awareness, Follows commands Safety/Judgement: Awareness of environment, Fall prevention, Insight into deficits Functional Mobility Training: 
Bed Mobility: 
  
Supine to Sit: Independent Transfers: 
Sit to Stand: Contact guard assistance Stand to Sit: Contact guard assistance Balance: 
Sitting: Intact Standing: IntactAmbulation/Gait Training: 
Distance (ft): 30 Feet (ft) Assistive Device: Gait belt Ambulation - Level of Assistance: Contact guard assistance Gait Abnormalities: Decreased step clearance Pain: 
Pain Scale 1: Numeric (0 - 10) Pain Intensity 1: 0 Activity Tolerance:  
Pt tolerated treatment fair to poor. Please refer to the flowsheet for vital signs taken during this treatment. After treatment:  
[]    Patient left in no apparent distress sitting up in chair 
[x]    Patient left in no apparent distress in bed 
[]    Call bell left within reach 
[]    Nursing notified 
[]    Caregiver present 
[]    Bed alarm activated COMMUNICATION/COLLABORATION:  
The patients plan of care was discussed with: Physical Therapist 
 
Shawn Corbett PTA Time Calculation: 23 mins

## 2019-02-20 NOTE — PROGRESS NOTES
Bedside and Verbal shift change report given to Vazquez John (oncoming nurse) by Jaquan Huang (offgoing nurse). Report included the following information SBAR, Kardex, Intake/Output, Accordion and Recent Results. Bedside and Verbal shift change report given to eagle (oncoming nurse) by Glory Brizuela (offgoing nurse). Report included the following information SBAR and Kardex.

## 2019-02-20 NOTE — PROGRESS NOTES
[facility] PROGRESS NOTEPulmonary, Critical Care, and Sleep Medicine Name: Franklin Peralta MRN: [id number] : 1950 Hospital: [address] [place] [address] Date: 2019  Admission Date: 2/15/2019 Chart and notes reviewed. Data reviewed. I review the patient's current medications in the medical record at each encounter. I have evaluated and examined the patient. Overnight events reviewed: 
Afebrile Sats 90% on 3L WBC 14.9 - better Resp cx no growth Blood cx no growth x 5 days Pna work up negative ROS: Feeling marginally better. Still with PATEL, worse that baseline. Denies fever or chills. Reports nonproductive cough. Denies CP. Denies abd pain or LE pain/swelling. 12 point ROS reviewed and negative except as noted above. Past Medical History:  
Diagnosis Date  Arrhythmia  Asthma  COPD (chronic obstructive pulmonary disease) (HCC) severe  Hypertension  Insulin resistance 3/27/2014  Lung mass 2012 MAY resection, + mycobacterial orgs, neg malignancy  Melanoma ([facility]   Non-STEMI (non-ST elevated myocardial infarction) ([facility]   On home O2 prn  Pneumonia  Snoring  Tinnitus  Tuberculosis  Vitamin D deficiency 3/27/2014 Past Surgical History:  
Procedure Laterality Date  HX OTHER SURGICAL EXC MELANOMA RIGHT CHEEK  
 HX OTHER SURGICAL NEEDLE BX LEFT LUNG  
 HX OTHER SURGICAL  2012  
 apical segmentectomy, MAY Family History Problem Relation Age of Onset  Glaucoma Mother  Dementia Mother  Heart Attack Father  Heart Disease Father Social History Tobacco Use  Smoking status: Former Smoker Packs/day: 0.25 Years: 40.00 Pack years: 10.00 Types: Cigarettes Last attempt to quit:  Years since quittin.1  Smokeless tobacco: Never Used Substance Use Topics  Alcohol use: Yes   Alcohol/week: 15.6 oz  
 Types: 21 Cans of beer, 5 Shots of liquor per week No Known Allergies Current Facility-Administered Medications Medication Dose Route Frequency  methylPREDNISolone (PF) (Solu-MEDROL) injection 40 mg  40 mg IntraVENous Q8H  
 alum-mag hydroxide-simeth (MYLANTA) oral suspension 30 mL  30 mL Oral Q4H PRN  
 dilTIAZem CD (CARDIZEM CD) capsule 300 mg  300 mg Oral DAILY  dilTIAZem (CARDIZEM) 125 mg in dextrose 5% 125 mL infusion  0-15 mg/hr IntraVENous TITRATE  arformoterol (BROVANA) neb solution 15 mcg  15 mcg Nebulization BID RT  
 sodium chloride (NS) flush 5-40 mL  5-40 mL IntraVENous Q8H  
 sodium chloride (NS) flush 5-40 mL  5-40 mL IntraVENous PRN  
 acetaminophen (TYLENOL) tablet 650 mg  650 mg Oral Q6H PRN  
 naloxone (NARCAN) injection 0.4 mg  0.4 mg IntraVENous PRN  
 albuterol-ipratropium (DUO-NEB) 2.5 MG-0.5 MG/3 ML  3 mL Nebulization Q4H RT  
 albuterol (PROVENTIL VENTOLIN) nebulizer solution 2.5 mg  2.5 mg Nebulization Q2H PRN  
 aspirin chewable tablet 81 mg  81 mg Oral QHS  atorvastatin (LIPITOR) tablet 40 mg  40 mg Oral QHS  budesonide (PULMICORT) 500 mcg/2 ml nebulizer suspension  500 mcg Nebulization BID RT  
 cholecalciferol (VITAMIN D3) tablet 1,000 Units  1,000 Units Oral QHS  cholecalciferol (VITAMIN D3) tablet 2,000 Units  2,000 Units Oral DAILY  apixaban (ELIQUIS) tablet 5 mg  5 mg Oral BID  
 guaiFENesin ER (MUCINEX) tablet 1,200 mg  1,200 mg Oral BID  montelukast (SINGULAIR) tablet 10 mg  10 mg Oral QHS  therapeutic multivitamin (THERAGRAN) tablet 1 Tab  1 Tab Oral DAILY  omega 3-DHA-EPA-fish oil 1,000 mg (120 mg-180 mg) capsule 1 Cap  1 Cap Oral QHS  omega 3-DHA-EPA-fish oil 1,000 mg (120 mg-180 mg) capsule 2 Cap  2 Cap Oral DAILY Vital Signs: 
Visit Vitals BP (!) 136/96 (BP 1 Location: Left arm, BP Patient Position: At rest) Pulse 95 Temp 97.8 °F (36.6 °C) Resp 20 Ht 5' 10\" (1.778 m) Wt 67.8 kg (149 lb 9.3 oz) SpO2 90% Comment: Simultaneous filing. User may not have seen previous data. BMI 21.46 kg/m² O2 Device: Nasal cannula O2 Flow Rate (L/min): 3 l/min Temp (24hrs), Av.9 °F (36.6 °C), Min:97.4 °F (36.3 °C), Max:98.2 °F (36.8 °C) Intake/Output:  
Last shift:      701 - 1900 In: -  
Out: 400 [Urine:400] Last 3 shifts: 1901 - 700 In: 2140 [P.O.:1830; I.V.:310] Out:  [CKNEP:9781] Intake/Output Summary (Last 24 hours) at 2019 6716 Last data filed at 2019 4512 Gross per 24 hour Intake 940 ml Output 2350 ml Net -1410 ml Physical Exam:  
General:  Alert, cooperative, no acute distress, appears stated age. Head:  Normocephalic, without obvious abnormality, atraumatic. Eyes:  Conjunctivae/corneas clear. Nose: Nares normal. Septum midline. Mucosa normal.  
Throat: Lips, mucosa, and tongue normal.   
Neck: Supple, symmetrical, trachea midline, no adenopathy. Lungs:   Very diminished, no wheeze or rales appreciated. Chest wall:  No tenderness or deformity. Heart:  Regular rate and rhythm, S1, S2 normal, no murmur, click, rub or gallop. Abdomen:   Soft, non-tender. Bowel sounds normal. No masses,  No organomegaly. Extremities: Extremities normal, atraumatic, no cyanosis or edema. Pulses: 2+ and symmetric all extremities. Skin: Skin color, texture, turgor normal. No rashes or lesions Lymph nodes: Cervical, supraclavicular nodes normal.  
Neurologic: Grossly nonfocal  
 
DATA: 
MAR reviewed and pertinent medications noted or modified as needed Labs: 
Recent Labs  
  19 
0430 WBC 14.9* HGB 12.4 HCT 36.5*  
 Recent Labs  
  19 
0430 19 
0107  132* K 4.6 4.1 CL 99 94* CO2 34* 30  
* 124* BUN 14 12 CREA 0.64* 0.70 CA 9.8 10.0 MG  --  1.9 Imaging: Report reviewed CXR (19): not able visualized images in PACS. RLL opacity again noted.  
 
 
IMPRESSION:  
 · Acute/chronic hypoxic hypercapnic respiratory failure · COPD exacerbation · RLL pneumonia · PAF PLAN:  
· Continue O2 and wean as able to keep sats 88-94% · Continue scheduled Duonebs and Brovana/Pulmicort nebs · Completed course of Zithromax/CTX · Weaning Solumedrol - currently at 40 mg q 8hr · Continue Singulair and Mucinex · Repeat chest imaging in 6 weeks · OOB/PT/OT 
· CM consult for referral to inpatient pulm rehab · He was set up with home non-invasive ventilation on 2/15. Cherelle Guzmán is currently  compensated so does not require acute NIV now.  Discussed option of using NIV here and he prefers to wait. Will plan to resume home NIV upon discharge for long term management of his chronic resp failure · DVT prophylaxis: Lovely Sequeira

## 2019-02-20 NOTE — PROGRESS NOTES
Problem: Falls - Risk of 
Goal: *Absence of Falls Document [facility] [place] Fall Risk and appropriate interventions in the flowsheet. Outcome: Progressing Towards Goal 
Fall Risk Interventions: 
Mobility Interventions: Assess mobility with egress test, Bed/chair exit alarm, Patient to call before getting OOB, PT Consult for mobility concerns Medication Interventions: Assess postural VS orthostatic hypotension, Bed/chair exit alarm Elimination Interventions: Bed/chair exit alarm, Call light in reach, Patient to call for help with toileting needs, Toileting schedule/hourly rounds

## 2019-02-20 NOTE — PROGRESS NOTES
Tyrone Lucas [address] 
[address] 
[phone] Medical Progress Note NAME: Luis Grijalva :  1950 MRM:  [id number] Date/Time: 2019  10:14 AM  
 
  
Subjective: Chief Complaint:  SOB: moderate, constant, worse with exertion, overall improved ROS: 
(bold if positive, if negative) SOB/PATEL Tolerating PT  Tolerating Diet Objective:  
 
 
Vitals:  
 
 
  
Last 24hrs VS reviewed since prior progress note. Most recent are: 
 
Visit Vitals BP (!) 136/96 (BP 1 Location: Left arm, BP Patient Position: At rest) Pulse 95 Temp 97.8 °F (36.6 °C) Resp 20 Ht 5' 10\" (1.778 m) Wt 67.8 kg (149 lb 9.3 oz) SpO2 90% BMI 21.46 kg/m² SpO2 Readings from Last 6 Encounters:  
19 90% 18 93% 18 95% 17 98% 17 95% 17 98% O2 Flow Rate (L/min): 3 l/min Intake/Output Summary (Last 24 hours) at 2019 1014 Last data filed at 2019 6369 Gross per 24 hour Intake 940 ml Output 2350 ml Net -1410 ml Exam:  
 
Physical Exam: 
 
Gen:  Well-developed, well-nourished, frail, elderly, chronically ill-appearing, in mild respiratory distress HEENT:  Pink conjunctivae, PERRL, hearing intact to voice, moist mucous membranes Neck:  Supple, without masses, thyroid non-tender Resp:  No accessory muscle use, clear bilaterally Card:  No murmurs, normal S1, S2 without thrills, bruits or peripheral edema Abd:  Soft, non-tender, non-distended, normoactive bowel sounds are present, no palpable organomegaly and no detectable hernias Lymph:  No cervical or inguinal adenopathy Musc:  No cyanosis or clubbing Skin:  No rashes or ulcers, skin turgor is good Neuro:  Cranial nerves are grossly intact, no focal motor weakness, follows commands appropriately Psych:  Good insight, oriented to person, place and time, alert Telemetry reviewed:   AFIB Medications Reviewed: (see below) Lab Data Reviewed: (see below) 
 
______________________________________________________________________ Medications:  
 
Current Facility-Administered Medications Medication Dose Route Frequency  methylPREDNISolone (PF) (Solu-MEDROL) injection 40 mg  40 mg IntraVENous Q8H  
 alum-mag hydroxide-simeth (MYLANTA) oral suspension 30 mL  30 mL Oral Q4H PRN  
 dilTIAZem CD (CARDIZEM CD) capsule 300 mg  300 mg Oral DAILY  dilTIAZem (CARDIZEM) 125 mg in dextrose 5% 125 mL infusion  0-15 mg/hr IntraVENous TITRATE  arformoterol (BROVANA) neb solution 15 mcg  15 mcg Nebulization BID RT  
 sodium chloride (NS) flush 5-40 mL  5-40 mL IntraVENous Q8H  
 sodium chloride (NS) flush 5-40 mL  5-40 mL IntraVENous PRN  
 acetaminophen (TYLENOL) tablet 650 mg  650 mg Oral Q6H PRN  
 naloxone (NARCAN) injection 0.4 mg  0.4 mg IntraVENous PRN  
 albuterol-ipratropium (DUO-NEB) 2.5 MG-0.5 MG/3 ML  3 mL Nebulization Q4H RT  
 albuterol (PROVENTIL VENTOLIN) nebulizer solution 2.5 mg  2.5 mg Nebulization Q2H PRN  
 aspirin chewable tablet 81 mg  81 mg Oral QHS  atorvastatin (LIPITOR) tablet 40 mg  40 mg Oral QHS  budesonide (PULMICORT) 500 mcg/2 ml nebulizer suspension  500 mcg Nebulization BID RT  
 cholecalciferol (VITAMIN D3) tablet 1,000 Units  1,000 Units Oral QHS  cholecalciferol (VITAMIN D3) tablet 2,000 Units  2,000 Units Oral DAILY  apixaban (ELIQUIS) tablet 5 mg  5 mg Oral BID  
 guaiFENesin ER (MUCINEX) tablet 1,200 mg  1,200 mg Oral BID  montelukast (SINGULAIR) tablet 10 mg  10 mg Oral QHS  therapeutic multivitamin (THERAGRAN) tablet 1 Tab  1 Tab Oral DAILY  omega 3-DHA-EPA-fish oil 1,000 mg (120 mg-180 mg) capsule 1 Cap  1 Cap Oral QHS  omega 3-DHA-EPA-fish oil 1,000 mg (120 mg-180 mg) capsule 2 Cap  2 Cap Oral DAILY Lab Review:  
 
Recent Labs  
  02/19/19 
0430 WBC 14.9* HGB 12.4 HCT 36.5*  
 Recent Labs 02/19/19 
0430 02/18/19 
0107  132* K 4.6 4.1 CL 99 94* CO2 34* 30  
* 124* BUN 14 12 CREA 0.64* 0.70 CA 9.8 10.0 MG  --  1.9 No results found for: [facility] No results for input(s): PH, PCO2, PO2, HCO3, FIO2 in the last 72 hours. No results for input(s): INR in the last 72 hours. No lab exists for component: INREXT, INREXT Lab Results Component Value Date/Time Specimen Description: NARES 02/12/2014 05:30 AM  
 Specimen Description: BLOOD 02/12/2014 12:08 AM  
 
Lab Results Component Value Date/Time Culture result: HEAVY NORMAL RESPIRATORY AYO 02/16/2019 11:00 AM  
 Culture result: NO GROWTH 5 DAYS 02/15/2019 10:45 PM  
 Culture result: MIXED SKIN AYO ISOLATED 06/04/2015 11:15 PM  
 
 
 
  
Assessment:  
 
Principal Problem: 
  Pneumonia (2/15/2019) Active Problems: COPD exacerbation ([address] (2/27/2014) CAD (coronary artery disease), native coronary artery (3/12/2014) Atrial flutter, paroxysmal ([address] (3/31/2016) Acute on chronic respiratory failure with hypoxemia ([address] (2/15/2019) SIRS (systemic inflammatory response syndrome) ([address] (2/15/2019) Plan:  
 
Principal Problem: 
  Pneumonia (2/15/2019) - continue antibiotics as above - pneumonia work up negative for source - Pulmonary following Active Problems: COPD exacerbation ([address] (2/27/2014) - continue steroids and bronchodilators per Pulmonary CAD (coronary artery disease), native coronary artery (3/12/2014) - stable, continue cardiac meds Atrial flutter, paroxysmal ([address] (3/31/2016) 
 - in afib with rate controlled 
 - continue anticoagulation Acute on chronic respiratory failure with hypoxemia ([address] (2/15/2019) - patient now requesting inpatient pulmonary rehab as he does not feel strong enough to go home -  to evaluate SIRS (systemic inflammatory response syndrome) ([address] (2/15/2019) 
 - due to steroids, etc 
 - follow Ania Truong - slowly improving 
 - continue PT/OT, see above Total time spent in patient care: 25 minutes Care Plan discussed with: Patient, Care Manager, Nursing Staff and Lovely Arzola Discussed:  Code Status, Care Plan and D/C Planning Prophylaxis:  Eliquis Disposition:   PT, OT, RN 
        
___________________________________________________ Attending Physician: Antonio Pichardo MD

## 2019-02-20 NOTE — PROGRESS NOTES
Consult noted for pulmonary rehab. I have sent a referral in [facility] to [facility]. I will continue to follow.  STEPHANIE Rivas

## 2019-02-21 VITALS
HEART RATE: 101 BPM | HEIGHT: 70 IN | BODY MASS INDEX: 21.73 KG/M2 | DIASTOLIC BLOOD PRESSURE: 74 MMHG | OXYGEN SATURATION: 94 % | TEMPERATURE: 97.8 F | WEIGHT: 151.8 LBS | RESPIRATION RATE: 20 BRPM | SYSTOLIC BLOOD PRESSURE: 112 MMHG

## 2019-02-21 PROBLEM — R65.10 SIRS (SYSTEMIC INFLAMMATORY RESPONSE SYNDROME) (HCC): Status: RESOLVED | Noted: 2019-02-15 | Resolved: 2019-02-21

## 2019-02-21 LAB
BACTERIA SPEC CULT: NORMAL
SERVICE CMNT-IMP: NORMAL

## 2019-02-21 PROCEDURE — 74011250636 HC RX REV CODE- 250/636: Performed by: PHYSICIAN ASSISTANT

## 2019-02-21 PROCEDURE — 94760 N-INVAS EAR/PLS OXIMETRY 1: CPT

## 2019-02-21 PROCEDURE — 74011250637 HC RX REV CODE- 250/637: Performed by: INTERNAL MEDICINE

## 2019-02-21 PROCEDURE — 74011000250 HC RX REV CODE- 250: Performed by: INTERNAL MEDICINE

## 2019-02-21 PROCEDURE — 77010033678 HC OXYGEN DAILY

## 2019-02-21 PROCEDURE — 94640 AIRWAY INHALATION TREATMENT: CPT

## 2019-02-21 RX ORDER — PREDNISONE 20 MG/1
TABLET ORAL
Qty: 11 TAB | Refills: 0 | Status: SHIPPED | OUTPATIENT
Start: 2019-02-21 | End: 2019-08-15 | Stop reason: ALTCHOICE

## 2019-02-21 RX ADMIN — METHYLPREDNISOLONE SODIUM SUCCINATE 40 MG: 125 INJECTION, POWDER, FOR SOLUTION INTRAMUSCULAR; INTRAVENOUS at 07:42

## 2019-02-21 RX ADMIN — OMEGA-3 FATTY ACIDS CAP 1000 MG 2 CAPSULE: 1000 CAP at 09:06

## 2019-02-21 RX ADMIN — IPRATROPIUM BROMIDE AND ALBUTEROL SULFATE 3 ML: .5; 3 SOLUTION RESPIRATORY (INHALATION) at 11:27

## 2019-02-21 RX ADMIN — VITAMIN D, TAB 1000IU (100/BT) 2000 UNITS: 25 TAB at 09:06

## 2019-02-21 RX ADMIN — APIXABAN 5 MG: 5 TABLET, FILM COATED ORAL at 09:06

## 2019-02-21 RX ADMIN — DILTIAZEM HYDROCHLORIDE 300 MG: 300 CAPSULE, COATED, EXTENDED RELEASE ORAL at 09:06

## 2019-02-21 RX ADMIN — Medication 10 ML: at 07:44

## 2019-02-21 RX ADMIN — IPRATROPIUM BROMIDE AND ALBUTEROL SULFATE 3 ML: .5; 3 SOLUTION RESPIRATORY (INHALATION) at 03:49

## 2019-02-21 RX ADMIN — IPRATROPIUM BROMIDE AND ALBUTEROL SULFATE 3 ML: .5; 3 SOLUTION RESPIRATORY (INHALATION) at 15:19

## 2019-02-21 RX ADMIN — THERA TABS 1 TABLET: TAB at 09:06

## 2019-02-21 RX ADMIN — BUDESONIDE 500 MCG: 0.5 INHALANT RESPIRATORY (INHALATION) at 08:00

## 2019-02-21 RX ADMIN — GUAIFENESIN 1200 MG: 600 TABLET, EXTENDED RELEASE ORAL at 09:06

## 2019-02-21 RX ADMIN — IPRATROPIUM BROMIDE AND ALBUTEROL SULFATE 3 ML: .5; 3 SOLUTION RESPIRATORY (INHALATION) at 08:00

## 2019-02-21 NOTE — ROUTINE PROCESS
Bedside and Verbal shift change report given to Acacia Hawkins RN (oncoming nurse) by Corky Pond RN (offgoing nurse). Report included the following information SBAR, Kardex, Intake/Output, MAR, Accordion and Recent Results. 2215- Pt states that he is having dyspnea at rest and is pursed lip breathing, SPO2 78% @ 4L/NC, notified RT. Placed pt on 6L/NC 
 
2215- RT recommends having midflow as pt is no at 6L/NC, SpO2 @ 91Placed nonrebreath at bedside. 200- Notified MD Virgil Culver, regarding pt episode of dyspnea. Will continue to monitor pt for potential of mucous plug. Bedside and Verbal shift change report given to Corky Pond RN (oncoming nurse) by Acacia Hawkins RN (offgoing nurse). Report included the following information SBAR, Kardex, Intake/Output, MAR, Accordion and Recent Results.

## 2019-02-21 NOTE — DISCHARGE SUMMARY
Physician Discharge Summary     Patient ID:  Louise Rojas  [id number]  66 y.o.  1950    Admit date: 2/15/2019    Discharge date: 2/21/2019    Admission Diagnoses: Acute on chronic respiratory failure with hypoxemia ([facility] [J96.21]    Discharge Diagnoses:  Principal Diagnosis Pneumonia                                            Principal Problem:    Pneumonia (2/15/2019)    Active Problems:    COPD exacerbation ([facility] (2/27/2014)      CAD (coronary artery disease), native coronary artery (3/12/2014)      Atrial flutter, paroxysmal (HCC) (3/31/2016)      Acute on chronic respiratory failure with hypoxemia ([facility] (2/15/2019)      SIRS (systemic inflammatory response syndrome) ([facility] (2/15/2019)         Resolved Problems:  Problem List as of 2/21/2019 Date Reviewed: 2/15/2019          Codes Class Noted - Resolved    * (Principal) Pneumonia ICD-10-CM: J18.9  ICD-9-CM: 486  2/15/2019 - Present        Acute on chronic respiratory failure with hypoxemia ([facility] ICD-10-CM: J96.21  ICD-9-CM: 518.84  2/15/2019 - Present        SIRS (systemic inflammatory response syndrome) ([facility] ICD-10-CM: R65.10  ICD-9-CM: 995.90  2/15/2019 - Present        Atrial flutter, paroxysmal (HCC) (Chronic) ICD-10-CM: L55.62  ICD-9-CM: 427.32  3/31/2016 - Present        SVT (supraventricular tachycardia) ([facility] ICD-10-CM: I47.1  ICD-9-CM: 427.89  6/24/2015 - Present        Vitamin D deficiency ICD-10-CM: E55.9  ICD-9-CM: 268.9  3/27/2014 - Present        Dyslipidemia ICD-10-CM: E78.5  ICD-9-CM: 272.4  3/27/2014 - Present        CAD (coronary artery disease), native coronary artery (Chronic) ICD-10-CM: I25.10  ICD-9-CM: 414.01  3/12/2014 - Present        COPD exacerbation ([facility] ICD-10-CM: J44.1  ICD-9-CM: 491.21  2/27/2014 - Present        Atrial fibrillation ([facility] ICD-10-CM: I48.91  ICD-9-CM: 427.31  2/16/2014 - Present        RESOLVED: Elevated lactic acid level ICD-10-CM: R79.89  ICD-9-CM: 276.2  2/15/2019 - 2/17/2019        RESOLVED: Acute and chronic respiratory failure (acute-on-chronic) ([facility] ICD-10-CM: J96.20  ICD-9-CM: 518.84  10/31/2014 - 12/23/2014        RESOLVED: COPD with exacerbation ([facility] ICD-10-CM: J44.1  ICD-9-CM: 491.21  10/28/2014 - 12/18/2014        RESOLVED: Insulin resistance ICD-10-CM: E88.81  ICD-9-CM: 277.7  3/27/2014 - 6/24/2015        RESOLVED: Acute respiratory failure (HCC) ICD-10-CM: J96.00  ICD-9-CM: 518.81  2/12/2014 - 2/27/2014        RESOLVED: NSTEMI (non-ST elevated myocardial infarction) ([facility] ICD-10-CM: I21.4  ICD-9-CM: 410.70  2/12/2014 - 2/27/2014        RESOLVED: Chronic airway obstruction, not elsewhere classified (Chronic) ICD-10-CM: J44.9  ICD-9-CM: 496  2/12/2014 - 3/12/2014                Hospital Course:   Mr. Stanley Plummer was admitted to the Hospitalist Service on the 3rd floor for treatment of pneumonia. He completed a course of IV ceftriaxone and azithromycin. Pneumonia work up was not revealing for etiology of infection. Pulmonary was consulted and he was treated for acute COPD with steroids and bronchodilators. He was mobilized with PT/OT but remained very dyspneic with limited exercise tolerance and was recommended for inpatient pulmonary rehab. He was discharged [facility] on 2/21/2019 in improved condition. PCP: Daron Hilton MD    Consults: Pulmonary/Intensive care and Palliative Care    Discharge Exam:  See my Progress Note from today. Disposition: rehab    Patient Instructions:   Current Discharge Medication List      START taking these medications    Details   predniSONE (DELTASONE) 20 mg tablet 2 tabs daily for 3 days, then 1 tab daily for 3 days, then 1/2 tab daily for 4 days  Qty: 11 Tab, Refills: 0         CONTINUE these medications which have NOT CHANGED    Details   aspirin 81 mg chewable tablet Take 81 mg by mouth nightly. !! cholecalciferol (VITAMIN D3) 1,000 unit tablet Take 1,000 Units by mouth nightly.       !! omega 3-DHA-EPA-fish oil 1,000 mg (120 mg-180 mg) capsule Take 1 Cap by mouth nightly. !! omega 3-DHA-EPA-fish oil 1,000 mg (120 mg-180 mg) capsule Take 2 Caps by mouth daily. !! cholecalciferol (VITAMIN D3) 1,000 unit tablet Take 2,000 Units by mouth daily. atorvastatin (LIPITOR) 40 mg tablet TAKE 1 TABLET NIGHTLY  Qty: 90 Tab, Refills: 1      dilTIAZem CD (CARDIZEM CD) 240 mg ER capsule TAKE 1 CAPSULE BY MOUTH DAILY  Qty: 90 Cap, Refills: 1      glycopyrrolate-formoterol (BEVESPI AEROSPHERE) 9-4.8 mcg HFAA Take 2 Puffs by inhalation two (2) times a day. ELIQUIS 5 mg tablet TAKE 1 TAB BY MOUTH TWO TIMES A DAY. INDICATIONS: PULMONARY THROMBOEMBOLISM PREVENTION  Qty: 180 Tab, Refills: 0    Associated Diagnoses: Atrial flutter, paroxysmal (HCC)      nitroglycerin (NITROSTAT) 0.4 mg SL tablet 1 Tab by SubLINGual route every five (5) minutes as needed for Chest Pain. Up to 3 doses. Qty: 1 Bottle, Refills: 6      budesonide (PULMICORT) 0.5 mg/2 mL nbsp 500 mcg by Nebulization route two (2) times a day. montelukast (SINGULAIR) 10 mg tablet Take 10 mg by mouth nightly. multivitamin (ONE A DAY) tablet Take 1 tablet by mouth daily. albuterol (PROVENTIL VENTOLIN) 2.5 mg /3 mL (0.083 %) nebulizer solution 2.5 mg by Nebulization route every four (4) hours as needed for Wheezing or Shortness of Breath.      guaiFENesin (MUCINEX) 1,200 mg TM12 ER tablet Take 1,200 mg by mouth two (2) times a day. !! - Potential duplicate medications found. Please discuss with provider. Activity: Activity as tolerated  Diet: Cardiac Diet  Wound Care: None needed    Follow-up Information     Follow up With Specialties Details Why Contact Info    Sara Pringle MD General acute hospital In 2 weeks  [address] [address]  [phone]            35 minutes were spent on this discharge.     Signed:  Phoenix Chavira MD  2/21/2019  1:09 PM

## 2019-02-21 NOTE — PROGRESS NOTES
Problem: Falls - Risk of 
Goal: *Absence of Falls Document Dorcas Yu Fall Risk and appropriate interventions in the flowsheet. Outcome: Progressing Towards Goal 
Fall Risk Interventions: 
Mobility Interventions: Assess mobility with egress test, Bed/chair exit alarm, Mechanical lift, OT consult for ADLs, PT Consult for mobility concerns Medication Interventions: Assess postural VS orthostatic hypotension, Bed/chair exit alarm, Patient to call before getting OOB, Teach patient to arise slowly Elimination Interventions: Bed/chair exit alarm, Elevated toilet seat, Call light in reach, Toilet paper/wipes in reach

## 2019-02-21 NOTE — PROGRESS NOTES
Tyrone Lucas emerson Halliday 81 
[address] [facility], [id number] [place] [address] 
[phone] Medical Progress Note NAME: Priscilla Ibrahim :  1950 MRM:  [id number] Date/Time: 2019  9:03 AM   
 
  
Subjective: Chief Complaint:  SOB: moderate, constant, worse with exertion, overall improved ROS: 
(bold if positive, if negative) SOB/PATEL Tolerating PT  Tolerating Diet Objective:  
 
 
Vitals:  
 
 
  
Last 24hrs VS reviewed since prior progress note. Most recent are: 
 
Visit Vitals /80 (BP 1 Location: Left arm) Pulse 93 Temp 97.8 °F (36.6 °C) Resp 20 Ht 5' 10\" (1.778 m) Wt 68.9 kg (151 lb 12.8 oz) SpO2 93% BMI 21.78 kg/m² SpO2 Readings from Last 6 Encounters:  
19 93% 18 93% 18 95% 17 98% 17 95% 17 98% O2 Flow Rate (L/min): 3 l/min Intake/Output Summary (Last 24 hours) at 2019 2547 Last data filed at 2019 8828 Gross per 24 hour Intake  Output 1700 ml Net -1700 ml Exam:  
 
Physical Exam: 
 
Gen:  Well-developed, well-nourished, frail, elderly, chronically ill-appearing, in mild respiratory distress HEENT:  Pink conjunctivae, PERRL, hearing intact to voice, moist mucous membranes Neck:  Supple, without masses, thyroid non-tender Resp:  No accessory muscle use, clear bilaterally Card:  No murmurs, normal S1, S2 without thrills, bruits or peripheral edema Abd:  Soft, non-tender, non-distended, normoactive bowel sounds are present, no palpable organomegaly and no detectable hernias Lymph:  No cervical or inguinal adenopathy Musc:  No cyanosis or clubbing Skin:  No rashes or ulcers, skin turgor is good Neuro:  Cranial nerves are grossly intact, no focal motor weakness, follows commands appropriately Psych:  Good insight, oriented to person, place and time, alert Telemetry reviewed:   AFIB Medications Reviewed: (see below) Lab Data Reviewed: (see below) 
 
______________________________________________________________________ Medications:  
 
Current Facility-Administered Medications Medication Dose Route Frequency  methylPREDNISolone (PF) (Solu-MEDROL) injection 40 mg  40 mg IntraVENous Q8H  
 alum-mag hydroxide-simeth (MYLANTA) oral suspension 30 mL  30 mL Oral Q4H PRN  
 dilTIAZem CD (CARDIZEM CD) capsule 300 mg  300 mg Oral DAILY  arformoterol (BROVANA) neb solution 15 mcg  15 mcg Nebulization BID RT  
 sodium chloride (NS) flush 5-40 mL  5-40 mL IntraVENous Q8H  
 sodium chloride (NS) flush 5-40 mL  5-40 mL IntraVENous PRN  
 acetaminophen (TYLENOL) tablet 650 mg  650 mg Oral Q6H PRN  
 naloxone (NARCAN) injection 0.4 mg  0.4 mg IntraVENous PRN  
 albuterol-ipratropium (DUO-NEB) 2.5 MG-0.5 MG/3 ML  3 mL Nebulization Q4H RT  
 albuterol (PROVENTIL VENTOLIN) nebulizer solution 2.5 mg  2.5 mg Nebulization Q2H PRN  
 aspirin chewable tablet 81 mg  81 mg Oral QHS  atorvastatin (LIPITOR) tablet 40 mg  40 mg Oral QHS  budesonide (PULMICORT) 500 mcg/2 ml nebulizer suspension  500 mcg Nebulization BID RT  
 cholecalciferol (VITAMIN D3) tablet 1,000 Units  1,000 Units Oral QHS  cholecalciferol (VITAMIN D3) tablet 2,000 Units  2,000 Units Oral DAILY  apixaban (ELIQUIS) tablet 5 mg  5 mg Oral BID  
 guaiFENesin ER (MUCINEX) tablet 1,200 mg  1,200 mg Oral BID  montelukast (SINGULAIR) tablet 10 mg  10 mg Oral QHS  therapeutic multivitamin (THERAGRAN) tablet 1 Tab  1 Tab Oral DAILY  omega 3-DHA-EPA-fish oil 1,000 mg (120 mg-180 mg) capsule 1 Cap  1 Cap Oral QHS  omega 3-DHA-EPA-fish oil 1,000 mg (120 mg-180 mg) capsule 2 Cap  2 Cap Oral DAILY Lab Review:  
 
Recent Labs  
  02/19/19 
0430 WBC 14.9* HGB 12.4 HCT 36.5*  
 Recent Labs  
  02/19/19 0430   
K 4.6 CL 99  
CO2 34* * BUN 14  
CREA 0.64* CA 9.8 No results found for: Carmen Kinney No results for input(s): PH, PCO2, PO2, HCO3, FIO2 in the last 72 hours. No results for input(s): INR in the last 72 hours. No lab exists for component: INREXT, INREXT Lab Results Component Value Date/Time Specimen Description: NARES 02/12/2014 05:30 AM  
 Specimen Description: BLOOD 02/12/2014 12:08 AM  
 
Lab Results Component Value Date/Time Culture result: HEAVY NORMAL RESPIRATORY AYO 02/16/2019 11:00 AM  
 Culture result: NO GROWTH 6 DAYS 02/15/2019 10:45 PM  
 Culture result: MIXED SKIN AYO ISOLATED 06/04/2015 11:15 PM  
 
 
 
  
Assessment:  
 
Principal Problem: 
  Pneumonia (2/15/2019) Active Problems: COPD exacerbation ([address] (2/27/2014) CAD (coronary artery disease), native coronary artery (3/12/2014) Atrial flutter, paroxysmal ([address] (3/31/2016) Acute on chronic respiratory failure with hypoxemia ([address] (2/15/2019) SIRS (systemic inflammatory response syndrome) ([address] (2/15/2019) Plan:  
 
Principal Problem: 
  Pneumonia (2/15/2019) - continue antibiotics as above - pneumonia work up negative for source - Pulmonary following - to pulmonary rehab when insurance approved Active Problems: COPD exacerbation ([address] (2/27/2014) - continue steroids and bronchodilators per Pulmonary CAD (coronary artery disease), native coronary artery (3/12/2014) - stable, continue cardiac meds Atrial flutter, paroxysmal ([address] (3/31/2016) 
 - in afib with rate controlled 
 - continue anticoagulation Acute on chronic respiratory failure with hypoxemia ([address] (2/15/2019) - patient now requesting inpatient pulmonary rehab as he does not feel strong enough to go home - CM arranging SIRS (systemic inflammatory response syndrome) ([address] (2/15/2019) 
 - due to steroids, etc 
 - follow [facility] 
 - slowly improving 
 - continue PT/OT, see above Total time spent in patient care: 25 minutes Care Plan discussed with: Patient, Care Manager, Nursing Staff and Lovely Alejo Discussed:  Code Status, Care Plan and D/C Planning Prophylaxis:  Eliquis Disposition:  HH PT, OT, RN 
        
___________________________________________________ Attending Physician: Carmelo Allan MD

## 2019-02-21 NOTE — PROGRESS NOTES
[facility] PROGRESS NOTEPulmonary, Critical Care, and Sleep Medicine Name: Khoa Car MRN: [id number] : 1950 Hospital: [address] [place] [address] Date: 2019  Admission Date: 2/15/2019 Chart and notes reviewed. Data reviewed. I review the patient's current medications in the medical record at each encounter. I have evaluated and examined the patient. Overnight events reviewed: 
Afebrile Sats 93% on 3L this morning Had episode of short dyspnea and worsening hypoxia overnight requiring additional O2 WBC 14.9 - better Resp cx no growth Blood cx no growth x 6 days Pna work up negative ROS: Feeling marginally better. Still with PATEL, worse that baseline. Was quite fatigued working with therapy yesterday. Denies fever or chills. Reports nonproductive cough. Denies CP. Denies abd pain or LE pain/swelling. 12 point ROS reviewed and negative except as noted above. Past Medical History:  
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  
Procedure Laterality Date  HX OTHER SURGICAL EXC MELANOMA RIGHT CHEEK  
 HX OTHER SURGICAL NEEDLE BX LEFT LUNG  
 HX OTHER SURGICAL  2012  
 apical segmentectomy, MAY Family History Problem Relation Age of Onset  Glaucoma Mother  Dementia Mother  Heart Attack Father  Heart Disease Father Social History Tobacco Use  Smoking status: Former Smoker Packs/day: 0.25 Years: 40.00 Pack years: 10.00 Types: Cigarettes Last attempt to quit: 2014 Years since quittin.1  Smokeless tobacco: Never Used Substance Use Topics  Alcohol use: Yes Alcohol/week: 15.6 oz Types: 21 Cans of beer, 5 Shots of liquor per week No Known Allergies Current Facility-Administered Medications Medication Dose Route Frequency  methylPREDNISolone (PF) (Solu-MEDROL) injection 40 mg  40 mg IntraVENous Q8H  
 alum-mag hydroxide-simeth (MYLANTA) oral suspension 30 mL  30 mL Oral Q4H PRN  
 dilTIAZem CD (CARDIZEM CD) capsule 300 mg  300 mg Oral DAILY  arformoterol (BROVANA) neb solution 15 mcg  15 mcg Nebulization BID RT  
 sodium chloride (NS) flush 5-40 mL  5-40 mL IntraVENous Q8H  
 sodium chloride (NS) flush 5-40 mL  5-40 mL IntraVENous PRN  
 acetaminophen (TYLENOL) tablet 650 mg  650 mg Oral Q6H PRN  
 naloxone (NARCAN) injection 0.4 mg  0.4 mg IntraVENous PRN  
 albuterol-ipratropium (DUO-NEB) 2.5 MG-0.5 MG/3 ML  3 mL Nebulization Q4H RT  
 albuterol (PROVENTIL VENTOLIN) nebulizer solution 2.5 mg  2.5 mg Nebulization Q2H PRN  
 aspirin chewable tablet 81 mg  81 mg Oral QHS  atorvastatin (LIPITOR) tablet 40 mg  40 mg Oral QHS  budesonide (PULMICORT) 500 mcg/2 ml nebulizer suspension  500 mcg Nebulization BID RT  
 cholecalciferol (VITAMIN D3) tablet 1,000 Units  1,000 Units Oral QHS  cholecalciferol (VITAMIN D3) tablet 2,000 Units  2,000 Units Oral DAILY  apixaban (ELIQUIS) tablet 5 mg  5 mg Oral BID  
 guaiFENesin ER (MUCINEX) tablet 1,200 mg  1,200 mg Oral BID  montelukast (SINGULAIR) tablet 10 mg  10 mg Oral QHS  therapeutic multivitamin (THERAGRAN) tablet 1 Tab  1 Tab Oral DAILY  omega 3-DHA-EPA-fish oil 1,000 mg (120 mg-180 mg) capsule 1 Cap  1 Cap Oral QHS  omega 3-DHA-EPA-fish oil 1,000 mg (120 mg-180 mg) capsule 2 Cap  2 Cap Oral DAILY Vital Signs: 
Visit Vitals /80 (BP 1 Location: Left arm) Pulse 93 Temp 97.8 °F (36.6 °C) Resp 20 Ht 5' 10\" (1.778 m) Wt 68.9 kg (151 lb 12.8 oz) SpO2 93% BMI 21.78 kg/m² O2 Device: Nasal cannula O2 Flow Rate (L/min): 3 l/min Temp (24hrs), Av.3 °F (36.8 °C), Min:97.8 °F (36.6 °C), Max:98.4 °F (36.9 °C) Intake/Output:  
Last shift:      No intake/output data recorded. Last 3 shifts:  1901 -  0700 In: 250 [P.O.:240; I.V.:10] Out: 2500 [Urine:2500] Intake/Output Summary (Last 24 hours) at 2019 9664 Last data filed at 2019 4649 Gross per 24 hour Intake  Output 1700 ml Net -1700 ml Physical Exam:  
General:  Alert, cooperative, no acute distress, appears stated age. Head:  Normocephalic, without obvious abnormality, atraumatic. Eyes:  Conjunctivae/corneas clear. Nose: Nares normal. Septum midline. Mucosa normal.  
Throat: Lips, mucosa, and tongue normal.   
Neck: Supple, symmetrical, trachea midline, no adenopathy. Lungs:   Very diminished, no wheeze or rales appreciated. Chest wall:  No tenderness or deformity. Heart:  Regular rate and rhythm, S1, S2 normal, no murmur, click, rub or gallop. Abdomen:   Soft, non-tender. Bowel sounds normal. No masses,  No organomegaly. Extremities: Extremities normal, atraumatic, no cyanosis or edema. Pulses: 2+ and symmetric all extremities. Skin: Skin color, texture, turgor normal. No rashes or lesions Lymph nodes: Cervical, supraclavicular nodes normal.  
Neurologic: Grossly nonfocal  
 
DATA: 
MAR reviewed and pertinent medications noted or modified as needed Labs: 
Recent Labs  
  19 
0430 WBC 14.9* HGB 12.4 HCT 36.5*  
 Recent Labs  
  19 
0430   
K 4.6 CL 99  
CO2 34* * BUN 14  
CREA 0.64* CA 9.8 Imaging: Report reviewed CXR (19): Right basilar airspace disease is similar to the prior study. Lungs are hyperexpanded. IMPRESSION:  
· Acute/chronic hypoxic hypercapnic respiratory failure · COPD exacerbation · RLL pneumonia · PAF PLAN:  
· Continue O2 and wean as able to keep sats 88-94% · Continue scheduled Duonebs and Brovana/Pulmicort nebs · Completed course of Zithromax/CTX · Weaned Solumedrol to 40 mg q 12hr · Continue Singulair and Mucinex · Repeat chest imaging in 6 weeks · OOB/PT/OT 
· CM assisting with referral to inpatient pulm rehab · He was set up with home non-invasive ventilation on 2/15. Abbi Rojas is currently  compensated so does not require acute NIV now.  Discussed option of using NIV here and he prefers to wait. Will plan to resume home NIV upon discharge for long term management of his chronic resp failure · DVT prophylaxis: Yusuf Knight [facility], [address]

## 2019-02-21 NOTE — PROGRESS NOTES
Bedside and Verbal shift change report given to Marysol Connell (oncoming nurse) by Tiffany Pathak (offgoing nurse). Report included the following information SBAR, Kardex, Intake/Output, Accordion and Recent Results.

## 2019-02-21 NOTE — PROGRESS NOTES
2:24 PM 
Pt is being discharged today to [facility]. He will be transported by [facility] at 4:30. Reynaldo Kemp RN to call report to [phone]. Pt informed of above. No other issues or concerns at this time.  STEPHANIE Javed

## 2019-02-21 NOTE — PROGRESS NOTES
Discharge instructions, including information on new medications, were reviewed with patient. All questions were answered. Patient received a copy of the discharge papers and is going to be discharged to [facility] and will be transported by [facility] this afternoon. Primary nurse Toy Ledesma will call report to receiving nurse. IV and heart monitor will be removed prior to discharging. Case Management has completed all necessary documents and placed in envelope for ambulance service. I called [facility] pharmacy and informed them that patient will be going to inpatient pulmonary rehab and will not need this prescription at this time.

## 2019-02-21 NOTE — PROGRESS NOTES
Transitional Care Team: CATALINO Note Plan is for patient to discharge today to inpatient rehab. Discussed plan with patient he voices no concerns referencing discharge today. He asks questions reference what to expect from inpatient pulmonary rehab. He is encouraged to follow up with his PCP within a week of discharging from rehab. Chart reviewed including discharge medications and labs.

## 2019-02-21 NOTE — DISCHARGE INSTRUCTIONS
HOSPITALIST DISCHARGE INSTRUCTIONS  NAME: Naila Ruiz   :  1950   MRN:  [id number]     Date/Time:  2019 1:08 PM    ADMIT DATE: 2/15/2019     DISCHARGE DATE: 2019     DISCHARGE DIAGNOSIS:  Pneumonia    MEDICATIONS:  · It is important that you take the medication exactly as they are prescribed. · Keep your medication in the bottles provided by the pharmacist and keep a list of the medication names, dosages, and times to be taken in your wallet. · Do not take other medications without consulting your doctor. Pain Management: per above medications    What to do at Home    Recommended diet:  Cardiac Diet    Recommended activity: Activity as tolerated    If you experience any of the following symptoms then please call your primary care physician or return to the emergency room if you cannot get hold of your doctor:  Fever, chills, nausea, vomiting, diarrhea, change in mentation, falling, bleeding, shortness of breath    Follow Up: Follow-up Information     Follow up With Specialties Details Why Contact Info    Jose Neff MD [facility] In 2 weeks  [address] [place] [address]  [phone]              Information obtained by :  I understand that if any problems occur once I am at home I am to contact my physician. I understand and acknowledge receipt of the instructions indicated above.                                                                                                                                            Physician's or R.N.'s Signature                                                                  Date/Time                                                                                                                                              Patient or Representative Signature                                                          Date/Time

## 2019-02-21 NOTE — PALLIATIVE CARE DISCHARGE
Goals of Care/Treatment Preferences The Palliative Medicine team was consulted as part of your/your loved one's care in the hospital. Our team is a supportive service; we strive to relieve suffering and improve quality of life. You met with Dr. Merlinda Pica and  Gabino Shepard. We reviewed advance care planning information, which includes the following: 
Patient's Healthcare Decision Maker is[de-identified] Named in scanned ACP document Confirm Advance Directive: Yes, on file Patient/Health Care Proxy Stated Goals: Prolong life We reviewed / discussed your code status as: Full Code Full Code means perform CPR in the event of cardiac arrest. 
    DNR means do NOT perform CPR in the event of cardiac arrest. 
    Partial Code means you have specific preferences, please discuss with your healthcare team. 
    Feliz Mcgrath means this issue was not addressed / resolved during your stay Medical Interventions: Full interventions Because of the importance of this information, we are providing you with a printed copy to share with other healthcare providers after this hospitalization is complete.

## 2019-02-21 NOTE — PROGRESS NOTES
Patient met with Aggie Kim from [facility]. Updates given to Aggie Kim. No other issues or concerns at this time.  STEPHANIE Padilla

## 2019-02-25 ENCOUNTER — PATIENT OUTREACH (OUTPATIENT)
Dept: FAMILY MEDICINE CLINIC | Age: 69
End: 2019-02-25

## 2019-02-25 NOTE — PROGRESS NOTES
Voicemail left for Thomas Lisa CM at [facility] to return call to NN for FERNANDO.

## 2019-02-28 ENCOUNTER — PATIENT OUTREACH (OUTPATIENT)
Dept: FAMILY MEDICINE CLINIC | Age: 69
End: 2019-02-28

## 2019-03-01 DIAGNOSIS — I48.92 ATRIAL FLUTTER, PAROXYSMAL (HCC): ICD-10-CM

## 2019-03-01 RX ORDER — APIXABAN 5 MG/1
TABLET, FILM COATED ORAL
Qty: 180 TAB | Refills: 0 | Status: SHIPPED | OUTPATIENT
Start: 2019-03-01 | End: 2019-03-30 | Stop reason: SDUPTHER

## 2019-03-06 ENCOUNTER — PATIENT OUTREACH (OUTPATIENT)
Dept: FAMILY MEDICINE CLINIC | Age: 69
End: 2019-03-06

## 2019-03-06 NOTE — PROGRESS NOTES
Hospital Discharge Follow-Up      Date/Time:  3/6/2019 12:52 PM    Patient was admitted to [place] [address] on 2/15/19 and discharged on 19 to [facility] until 3/1/19 for pneumonia. The physician discharge summary was available at the time of outreach. Patient was contacted within 2 business days of discharge. Top Challenges reviewed with the provider   · Pulmonology f/u today         Method of communication with provider :none    Inpatient RRAT score: 15  Was this a readmission? no   Patient stated reason for the readmission: n/a    Nurse Navigator (NN) contacted the patient by telephone to perform post hospital discharge assessment. Verified name and  with patient as identifiers. Provided introduction to self, and explanation of the Nurse Navigator role. Reviewed discharge instructions and red flags with patient who verbalized understanding. Patient given an opportunity to ask questions and does not have any further questions or concerns at this time. The patient agrees to contact the PCP office for questions related to their healthcare. NN provided contact information for future reference. Disease Specific:   N/A    Summary of patient's top problems:  1. Pneumonia -   He completed a course of IV ceftriaxone and azithromycin. Pneumonia work up was not revealing for etiology of infection. Pulmonary was consulted and he was treated for acute COPD with steroids and bronchodilators. He was mobilized with PT/OT but remained very dyspneic with limited exercise tolerance and was recommended for inpatient pulmonary rehab.         Home Health orders at discharge: inpatient rehab  [address]: n/a  Date of initial visit: [facility] ordered/company: n/a  Durable Medical Equipment received: n/a    Barriers to care? none identified    Advance Care Planning:   Does patient have an Advance Directive:  reviewed and current     Medication(s):   New Medications at Discharge: prednisone  Changed Medications at Discharge: none  Discontinued Medications at Discharge: none    Medication reconciliation was performed with patient, who verbalizes understanding of administration of home medications. There were no barriers to obtaining medications identified at this time. Referral to Pharm D needed: no     Current Outpatient Medications   Medication Sig    ELIQUIS 5 mg tablet TAKE 1 TABLET BY MOUTH TWICE A DAY    aspirin 81 mg chewable tablet Take 81 mg by mouth nightly.  cholecalciferol (VITAMIN D3) 1,000 unit tablet Take 1,000 Units by mouth nightly.  omega 3-DHA-EPA-fish oil 1,000 mg (120 mg-180 mg) capsule Take 1 Cap by mouth nightly.  omega 3-DHA-EPA-fish oil 1,000 mg (120 mg-180 mg) capsule Take 2 Caps by mouth daily.  cholecalciferol (VITAMIN D3) 1,000 unit tablet Take 2,000 Units by mouth daily.  atorvastatin (LIPITOR) 40 mg tablet TAKE 1 TABLET NIGHTLY    dilTIAZem CD (CARDIZEM CD) 240 mg ER capsule TAKE 1 CAPSULE BY MOUTH DAILY    nitroglycerin (NITROSTAT) 0.4 mg SL tablet 1 Tab by SubLINGual route every five (5) minutes as needed for Chest Pain. Up to 3 doses.  budesonide (PULMICORT) 0.5 mg/2 mL nbsp 500 mcg by Nebulization route two (2) times a day.  montelukast (SINGULAIR) 10 mg tablet Take 10 mg by mouth nightly.  multivitamin (ONE A DAY) tablet Take 1 tablet by mouth daily.  albuterol (PROVENTIL VENTOLIN) 2.5 mg /3 mL (0.083 %) nebulizer solution 2.5 mg by Nebulization route every four (4) hours as needed for Wheezing or Shortness of Breath.  guaiFENesin (MUCINEX) 1,200 mg TM12 ER tablet Take 1,200 mg by mouth two (2) times a day.  predniSONE (DELTASONE) 20 mg tablet 2 tabs daily for 3 days, then 1 tab daily for 3 days, then 1/2 tab daily for 4 days    glycopyrrolate-formoterol (BEVESPI AEROSPHERE) 9-4.8 mcg HFAA Take 2 Puffs by inhalation two (2) times a day. No current facility-administered medications for this visit. There are no discontinued medications. [facility] follow up appointment(s):   Future Appointments   Date Time Provider Vickie Buckleyi   6/4/2019  3:20 PM Doe De Leon MD [facility]      Non-[facility] follow up appointment(s): Pulmonology 3/6/19  [facility]:  n/a       Goals      Attends follow-up appointments as directed. 3/6/19:  Patient discharged from [facility] rehab 3/1/19. Pulmonology appt today. Declines to make PCP appt. States \"He's not really my primary doctor and there's no need for me to\". NN will f/u again in one week.  Understands red flags post discharge. 3/6/19:  Red flags reviewed with patient who verbalized understanding. Red flags/sepsis: fever or below normal temperature (97f), chills, nausea, vomiting, diarrhea, chest pain, shortness of breath, unable to take medications, unusual sensations, and BFAST. Abx completed while in rehab. Denies any fever or chills.

## 2019-03-08 ENCOUNTER — HOSPITAL ENCOUNTER (OUTPATIENT)
Dept: GENERAL RADIOLOGY | Age: 69
Discharge: HOME | End: 2019-03-08
Payer: MEDICARE

## 2019-03-08 DIAGNOSIS — J44.9 COPD, SEVERE (HCC): ICD-10-CM

## 2019-03-08 PROCEDURE — 71046 X-RAY EXAM CHEST 2 VIEWS: CPT

## 2019-03-13 ENCOUNTER — PATIENT OUTREACH (OUTPATIENT)
Dept: FAMILY MEDICINE CLINIC | Age: 69
End: 2019-03-13

## 2019-03-21 ENCOUNTER — PATIENT OUTREACH (OUTPATIENT)
Dept: FAMILY MEDICINE CLINIC | Age: 69
End: 2019-03-21

## 2019-03-21 NOTE — PROGRESS NOTES
Goals      Attends follow-up appointments as directed. 3/21/19:  NN has been unable to reach patient for follow x 2 weeks. No response to voicemails left. NN contacted [facility] [address] and confirmed that patient did attend his hospital follow up there on 3/6/19 and is scheduled to follow up again in July. 3/6/19:  Patient discharged from [facility] rehab 3/1/19. Pulmonology appt today. Declines to make PCP appt. States \"He's not really my primary doctor and there's no need for me to\". NN will f/u again in one week.  Understands red flags post discharge. 3/6/19:  Red flags reviewed with patient who verbalized understanding. Red flags/sepsis: fever or below normal temperature (97f), chills, nausea, vomiting, diarrhea, chest pain, shortness of breath, unable to take medications, unusual sensations, and BFAST. Abx completed while in rehab. Denies any fever or chills.

## 2019-03-26 ENCOUNTER — PATIENT OUTREACH (OUTPATIENT)
Dept: FAMILY MEDICINE CLINIC | Age: 69
End: 2019-03-26

## 2019-03-26 NOTE — PROGRESS NOTES
This episode closed. Goals      Attends follow-up appointments as directed. 3/26/19:  NN continued to have unsuccessful attempts at contacting patient. 3/21/19:  NN has been unable to reach patient for follow x 2 weeks. No response to voicemails left. NN contacted [address] and confirmed that patient did attend his hospital follow up there on 3/6/19 and is scheduled to follow up again in July. 3/6/19:  Patient discharged from [facility] rehab 3/1/19. Pulmonology appt today. Declines to make PCP appt. States \"He's not really my primary doctor and there's no need for me to\". NN will f/u again in one week.  Understands red flags post discharge. 3/6/19:  Red flags reviewed with patient who verbalized understanding. Red flags/sepsis: fever or below normal temperature (97f), chills, nausea, vomiting, diarrhea, chest pain, shortness of breath, unable to take medications, unusual sensations, and BFAST. Abx completed while in rehab. Denies any fever or chills.

## 2019-05-22 LAB
% ALBUMIN, 58A: 66 % (ref 54–71)
ALB/GLOBRATIO, 58C: 1.91 (ref 1.15–2.5)
ALBUMIN SERPL-MCNC: 4.5 G/DL (ref 3.7–5.1)
ALP SERPL-CCNC: 98 U/L (ref 35–117)
ALT SERPL-CCNC: 21 U/L
ANION GAP SERPL CALC-SCNC: 10 MMOL/L (ref 6–18)
APOLIPOPROTEIN A-1, 6: 173 MG/DL
APOLIPOPROTEIN B , 48: 58 MG/DL
AST SERPL W P-5'-P-CCNC: 22 U/L (ref 5–40)
BILIRUB SERPL-MCNC: 0.7 MG/DL
BUN SERPL-MCNC: 7 MG/DL (ref 6–20)
BUN/CREATININE RATIO,BUCR: 11 (ref 10–27)
CALCIUM SERPL-MCNC: 10.1 MG/DL (ref 8.8–10.5)
CHLORIDE SERPL-SCNC: 100 MMOL/L (ref 98–110)
CO2 SERPL-SCNC: 30 MMOL/L (ref 19–31)
CREAT SERPL-MCNC: 0.7 MG/DL (ref 0.7–1.2)
EGFRAACREAT: 114 ML/MIN/1.73M^2
EGFRNACREAT: 98 ML/MIN/1.73M^2
FFA FREE FATTY ACIDS, 64: 0.84 MMOL/L
GLOBCALC, 58B: 2.4 G/DL (ref 1.9–3.5)
GLUCOSE SERPL-MCNC: 96 MG/DL (ref 70–99)
HOMA-IR, HDL2100: 1
HOMOCYSTEINE,PLASMA,HOMCY: 6 UMOL/L
INSULIN,INS: 4 UU/ML (ref 3–9)
LP-PLA2 ACTIVITY: 103 (NMOL/MIN/ML) (ref 196–225)
MPOAU: 263 PMOL/L
POTASSIUM SERPL-SCNC: 4.3 MMOL/L (ref 3.5–5.3)
PRO BNP NT,BNPNT: 406 PG/ML
PROT SERPL-MCNC: 6.9 G/DL (ref 6.1–8)
SODIUM SERPL-SCNC: 141 MMOL/L (ref 133–145)

## 2019-05-23 LAB
25(OH)D3 SERPL-MCNC: 69 NG/ML (ref 30–100)
CAMPESTEROL, HDL1302: 4.33 UG/ML (ref 2.11–4.43)
CHOLEST SERPL-MCNC: 156 MG/DL
CHOLESTANOL, HDL1304: 3.04 UG/ML (ref 2.02–3.47)
DESMOSTEROL, HDL1306: 0.54 UG/ML
HDL HDL-P, HDL5001: 40.3 UMOL/L
HDL LDL-P, HDL5000: 614 NMOL/L
HDL SLDL-P, HDL5002: < 200 NMOL/L
HDL2-DIAZ: 27 MG/DL
HDLC SERPL-MCNC: 80 MG/DL
HDLC SERPL-MCNC: 80 MG/DL
HSCRP-TX: 4.7 MG/L
LDL-CT: 63 MG/DL
LP(A)-P, HDL4000: < 50 NMOL/L
N-HDL-C: 76 MG/DL
SDLDL-DIAZ: 16 MG/DL
SITOSTEROL, HDL1300: 2.4 UG/ML (ref 1.43–3.17)
TRIGL SERPL-MCNC: 61 MG/DL (ref ?–150)

## 2019-05-24 LAB
AA2: 14.36 % (ref 10.5–23.3)
ALPHA LINOLEIC ACID N3, HDL1202: 0.11 % (ref 0.1–0.4)
CIS-MONO-UNSATURATED FATTY ACID TOTAL, HDL1205: 16.9 (ref 11.5–20.5)
DOCOSAHEXAENOIC ACID N3, HDL1208: 6.86 % (ref 0.1–8.4)
DOCOSAPENTAENOIC ACID N3, HDL1206: 2.91 % (ref 0.6–4.1)
DOCOSAPENTAENOIC ACID N6, HDL1207: 0.62 % (ref 0.1–1.3)
EPA2: 1.27 % (ref 0.1–2.5)
LINOLEIC ACID C6, HDL1216: 10.31 % (ref 4.6–21.3)
OMEGA-3 FATTY ACID TOTAL, HDL1220: 11.1 (ref 0.1–14.1)
OMEGA-3 INDEX, HDL1219: 8.1 (ref 0.1–10.4)
OMEGA-6 FATTY ACID TOTAL, HDL1222: 29.9 (ref 28.6–44.5)
SATURATED FATTY ACID TOTAL, HDL1226: 41.3 (ref 36.6–42)
TRANS FATTY ACID TOTAL, HDL1232: 0.8 (ref 0.1–1.8)
TRANSLINOLEIC ACID, HDL1229: <0.1 % (ref 0.1–0.5)
TRANSOLEIC ACID, HDL1230: 0.51 % (ref 0.1–1.3)

## 2019-06-03 ENCOUNTER — DOCUMENTATION ONLY (OUTPATIENT)
Dept: CARDIOLOGY CLINIC | Age: 69
End: 2019-06-03

## 2019-06-03 DIAGNOSIS — I48.91 ATRIAL FIBRILLATION, UNSPECIFIED TYPE (HCC): ICD-10-CM

## 2019-06-03 DIAGNOSIS — E78.5 DYSLIPIDEMIA: Primary | ICD-10-CM

## 2019-06-03 DIAGNOSIS — I25.10 CORONARY ARTERY DISEASE INVOLVING NATIVE CORONARY ARTERY OF NATIVE HEART WITHOUT ANGINA PECTORIS: ICD-10-CM

## 2019-06-03 NOTE — PROGRESS NOTES
Labs drawn 5/21/19       High Risk Intermediate Risk Optimal   Total Cholesterol   156   LDL   63   HDL   80   TG   61   Non-HDL   76   Apo B   58   LDL-P   614   Small LDL-P   <200   sdLDL-C   16   Apo A-I   173   HDL-P   40.3   HDL2-C   27   Apo B: Apo A-I ratio      Lp(a)-P   <50   Hs-CRP 4.7     Lp-PLA2   103   Myeloperoxidase  263    NT-proBNP  406    Apolipoprotein E      [id number]*1*8*      [id number]*17*      Factor V Leiden      Prothrombin Mutation      MTHFR      25-hydroxy-Vitamin D   69   Homocysteine   6   Creatinine, serum      Campesterol   4.33   Sitosterol   2.40   Cholestanol   3.04   Desmosterol   0.54   Glucose   96   Free Fatty Acid 0.84     ATIYA-IR   10   Insulin   4   Omega 3   8.1

## 2019-06-19 RX ORDER — DILTIAZEM HYDROCHLORIDE 240 MG/1
CAPSULE, COATED, EXTENDED RELEASE ORAL
Qty: 90 CAP | Refills: 1 | Status: SHIPPED | OUTPATIENT
Start: 2019-06-19 | End: 2020-01-27

## 2019-07-26 RX ORDER — ATORVASTATIN CALCIUM 40 MG/1
TABLET, FILM COATED ORAL
Qty: 90 TAB | Refills: 1 | Status: SHIPPED | OUTPATIENT
Start: 2019-07-26 | End: 2019-09-16 | Stop reason: SDUPTHER

## 2019-08-15 ENCOUNTER — OFFICE VISIT (OUTPATIENT)
Dept: CARDIOLOGY CLINIC | Age: 69
End: 2019-08-15

## 2019-08-15 VITALS
HEIGHT: 70 IN | SYSTOLIC BLOOD PRESSURE: 100 MMHG | BODY MASS INDEX: 20.79 KG/M2 | OXYGEN SATURATION: 97 % | WEIGHT: 145.2 LBS | DIASTOLIC BLOOD PRESSURE: 68 MMHG | HEART RATE: 86 BPM

## 2019-08-15 DIAGNOSIS — R06.09 DOE (DYSPNEA ON EXERTION): ICD-10-CM

## 2019-08-15 DIAGNOSIS — I48.20 CHRONIC ATRIAL FIBRILLATION (HCC): ICD-10-CM

## 2019-08-15 DIAGNOSIS — I25.10 CORONARY ARTERY DISEASE INVOLVING NATIVE CORONARY ARTERY OF NATIVE HEART WITHOUT ANGINA PECTORIS: Primary | ICD-10-CM

## 2019-08-15 DIAGNOSIS — J44.9 CHRONIC OBSTRUCTIVE PULMONARY DISEASE, UNSPECIFIED COPD TYPE (HCC): ICD-10-CM

## 2019-08-15 DIAGNOSIS — E78.5 DYSLIPIDEMIA: ICD-10-CM

## 2019-08-15 NOTE — PROGRESS NOTES
Patient says he is shortness of breath always    Visit Vitals  /68 (BP 1 Location: Right arm, BP Patient Position: Sitting)   Pulse 86   Ht 5' 10\" (1.778 m)   Wt 145 lb 3.2 oz (65.9 kg)   SpO2 97%   BMI 20.83 kg/m²

## 2019-08-15 NOTE — PROGRESS NOTES
Yaz Shearer MD [facility]  [address]  [place], [address] [facility]    Office [phone]  Fax [phone]  Cell [phone]      Daniel Goff is a 81 y.o. male. Last seen 8 months ago. DIAGNOSES  Encounter Diagnoses     ICD-10-CM ICD-9-CM   1. Coronary artery disease involving native coronary artery of native heart without angina pectoris I25.10 414.01   2. Chronic atrial fibrillation (HCC) I48.2 427.31   3. Chronic obstructive pulmonary disease, unspecified COPD type ([facility] J44.9 496   4. Dyslipidemia E78.5 272.4   5. PATEL (dyspnea on exertion) R06.09 786.09       ASSESSMENT/PLAN    CAD with MI 2014 treated with RCA stenting. Dobutamine Cardiolite 10/2017 showed normal perfusion. He has no sxs of angina, but his functional capacity is limited by severe COPD. Continue ASA and statin therapy. Chronic AF, rate controlled and completely asx. Rhythm control unlikely due to advancing COPD. Continue Diltiazem plus Eliquis. Severe COPD managed by Dr. Gabbie Louise. Hospitalized 6 months ago with PNA. Dyslipidemia. Optimized lipids and lipoproteins. Sterol and insulin marker remain normal. Continue Atorva 40 mg/d. Repeat numbers with [facility] (advanced lipid testing) in 6 months. Follow-up and Dispositions    · Return in about 6 months (around 2/15/2020). HPI    Hospitalized in February with exacerbation of COPD with evidence of pneumonia. He went to rehab after. Daniel Goff reports he is fair now. He wears continuous O2. His activity is limited. Patient denies any exertional chest pain, palpitations, syncope, orthopnea, edema or paroxysmal nocturnal dyspnea. Cardiac testing  CATH: 2/12/2014: L Main: MLI, LAD: Mid 50%; MLI; Med size; D1 and D2 - MLI (small),   LCflex: Med ; Prox 40%; Mid 40% OM1 - med; distally OM1 divides to 3 branches (prox branch - med)- distal 2 branches - small; RCA: Med; Mid 99%;  Distally 40% PDA and PLB - small to med, LVEDP: 11. LVEF: 45%; Ant HK, Inf HK, no signif grad across AV   --- MARCO (Resolute 3 x 22) -> RCA   ECHO: 2/12/14: EF 50%, mild HK basal-mid anteroseptal and basal-mid inferior wall(s). mild MR/TR     Health fair screening 3/13/15, carotid duplex showed mild stenosis, aortic duplex normal, ZENON normal, EKG normal   Echo 6/24/15 - EF 55%, normal wall motion   Holter 7/23/15 - SR , rare episodes of AFL/AF  CT Heart Scan - Calcium score 2041, incidental pulmonary findings (see full report). Echo 4/3/17 - EF 55%. No WMA. Grade 1 diastolic dysfunction. Dobutamine cardiolite 10/12/17 - normal perfusion, EF 61%, but new AF noted at rest, persistent with stress  EKG 12/4/18 - AF 70s      Current Outpatient Medications   Medication Sig    atorvastatin (LIPITOR) 40 mg tablet TAKE 1 TABLET BY MOUTH EVERY DAY AT NIGHT    dilTIAZem CD (CARDIZEM CD) 240 mg ER capsule TAKE 1 CAPSULE BY MOUTH EVERY DAY    ELIQUIS 5 mg tablet TAKE 1 TABLET BY MOUTH TWICE A DAY    aspirin 81 mg chewable tablet Take 81 mg by mouth nightly.  cholecalciferol (VITAMIN D3) 1,000 unit tablet Take 1,000 Units by mouth nightly.  omega 3-DHA-EPA-fish oil 1,000 mg (120 mg-180 mg) capsule Take 2 Caps by mouth daily.  cholecalciferol (VITAMIN D3) 1,000 unit tablet Take 2,000 Units by mouth daily.  glycopyrrolate-formoterol (BEVESPI AEROSPHERE) 9-4.8 mcg HFAA Take 2 Puffs by inhalation two (2) times a day.  nitroglycerin (NITROSTAT) 0.4 mg SL tablet 1 Tab by SubLINGual route every five (5) minutes as needed for Chest Pain. Up to 3 doses.  budesonide (PULMICORT) 0.5 mg/2 mL nbsp 500 mcg by Nebulization route two (2) times a day.  montelukast (SINGULAIR) 10 mg tablet Take 10 mg by mouth nightly.  multivitamin (ONE A DAY) tablet Take 1 tablet by mouth daily.     albuterol (PROVENTIL VENTOLIN) 2.5 mg /3 mL (0.083 %) nebulizer solution 2.5 mg by Nebulization route every four (4) hours as needed for Wheezing or Shortness of Breath.  guaiFENesin (MUCINEX) 1,200 mg TM12 ER tablet Take 1,200 mg by mouth two (2) times a day. No current facility-administered medications for this visit. No Known Allergies     Review of Systems  Constitutional: Negative for weight loss, malaise/fatigue and diaphoresis. Respiratory: +COPD, chronic PATEL. Negative for cough, hemoptysis, sputum production, and wheezing. Cardiovascular: Negative for palpitations, orthopnea, claudication, leg swelling and PND. Gastrointestinal: Negative for heartburn, nausea, vomiting, blood in stool and melena. Genitourinary: Negative for dysuria, hematuria and flank pain. Musculoskeletal: Negative for back pain and joint pain. Neurological: Negative for dizziness, focal weakness, seizures, loss of consciousness, weakness and headaches. Endo/Heme/Allergies: Does not bruise/bleed easily. Psychiatric/Behavioral: Negative for mood disorders. Visit Vitals  /68 (BP 1 Location: Right arm, BP Patient Position: Sitting)   Pulse 86   Ht 5' 10\" (1.778 m)   Wt 145 lb 3.2 oz (65.9 kg)   SpO2 97%   BMI 20.83 kg/m²      Wt Readings from Last 3 Encounters:   08/15/19 145 lb 3.2 oz (65.9 kg)   02/21/19 151 lb 12.8 oz (68.9 kg)   12/04/18 159 lb (72.1 kg)     Physical Exam  Vitals reviewed. Constitutional: He is oriented to person, place, and time. He appears well-developed. HENT:    Head: Normocephalic. Neck: Neck supple. No JVD present. No tracheal deviation present. Heart: Irregular S1, S2. No murmurs, rubs, clicks or gallops. Pulses:Carotid pulses are 2+ on the right side, and 2+ on the left side. Pulmonary/Chest: Effort normal and breath sounds normal. He has no wheezes, rhonchi or rales. Abdominal: Soft. Bowel sounds are normal. No tenderness. He has no rebound. Musculoskeletal: He exhibits no edema. Neurological: He is alert and oriented to person, place, and time. Skin: Skin is warm and dry.    Psychiatric: He has a normal mood and affect. Cardiographics  EKG 6/12/14 - sinus with PAC's  EKG 12/18/14 - SR, isolated PVC, otherwise normal   EKG 9/13/16 - SR 68  EKG 9/26/17- SR 63  EKG 12/4/18 - AF 70s            Written by Susan Kwan, as dictated by Emily Crisostomo M.D.      Emily Crisostomo MD

## 2019-08-15 NOTE — LETTER
8/15/19 Patient: Adolph Bell YOB: 1950 Date of Visit: 8/15/2019 Kathryn Nogueira MD 
[facility] [address] [address] VIA Facsimile: [phone] Dear Kathryn Nogueira MD, Thank you for referring Mr. Mya Fay to [address] for evaluation. My notes for this consultation are attached. If you have questions, please do not hesitate to call me. I look forward to following your patient along with you. Sincerely, Hugo Pozo MD

## 2019-09-16 RX ORDER — ATORVASTATIN CALCIUM 40 MG/1
TABLET, FILM COATED ORAL
Qty: 90 TAB | Refills: 1 | Status: SHIPPED | OUTPATIENT
Start: 2019-09-16 | End: 2020-09-04

## 2019-10-31 ENCOUNTER — HOSPITAL ENCOUNTER (OUTPATIENT)
Dept: CT IMAGING | Age: 69
Discharge: HOME | End: 2019-10-31
Attending: NURSE PRACTITIONER
Payer: MEDICARE

## 2019-10-31 DIAGNOSIS — Z87.891 HISTORY OF TOBACCO ABUSE: ICD-10-CM

## 2019-10-31 PROCEDURE — G0297 LDCT FOR LUNG CA SCREEN: HCPCS

## 2019-11-25 ENCOUNTER — TELEPHONE (OUTPATIENT)
Dept: CARDIOLOGY CLINIC | Age: 69
End: 2019-11-25

## 2019-11-25 NOTE — TELEPHONE ENCOUNTER
Mariposa Abdul stated CT showed \"embolism\" and Constanza Hernandez was to follow up with him. Constanza Hernandez has anyone called you about this patient?

## 2019-11-25 NOTE — TELEPHONE ENCOUNTER
Patient would like to speak with someone regarding a CT scan that he had done at [facility].      Phone: [phone]

## 2019-11-25 NOTE — TELEPHONE ENCOUNTER
Reviewed recent CT lung - has moderately dilated ascending aorta measuring 4.6 cm, unchanged by radiology report. Nothing to worry about at this juncture. Will get echo in one year to reassess aorta.

## 2019-11-25 NOTE — TELEPHONE ENCOUNTER
[id number] notified per 's note. Patient insists that embolism was seen.   Called  office and left message to call back with more information

## 2019-12-03 DIAGNOSIS — E78.5 DYSLIPIDEMIA: ICD-10-CM

## 2019-12-03 DIAGNOSIS — I25.10 CORONARY ARTERY DISEASE INVOLVING NATIVE CORONARY ARTERY OF NATIVE HEART WITHOUT ANGINA PECTORIS: ICD-10-CM

## 2019-12-03 DIAGNOSIS — I48.91 ATRIAL FIBRILLATION, UNSPECIFIED TYPE (HCC): ICD-10-CM

## 2020-01-27 RX ORDER — DILTIAZEM HYDROCHLORIDE 240 MG/1
CAPSULE, COATED, EXTENDED RELEASE ORAL
Qty: 90 CAP | Refills: 1 | Status: SHIPPED | OUTPATIENT
Start: 2020-01-27 | End: 2020-07-21

## 2020-02-13 NOTE — PROGRESS NOTES
Yaz Holm MD [facility]  [address]  [place], [address] [facility]    Office [phone]  Fax [phone]  Cell [phone]      Noah Salas is a 81 y.o. male. Last seen by me 6 months ago. DIAGNOSES  Encounter Diagnoses     ICD-10-CM ICD-9-CM   1. Chronic atrial fibrillation I48.20 427.31   2. Coronary artery disease involving native coronary artery of native heart without angina pectoris I25.10 414.01   3. Chronic obstructive pulmonary disease, unspecified COPD type ([facility] J44.9 496   4. Dyslipidemia E78.5 272.4   5. Ascending aorta dilation (HCC) I77.810 447.71       ASSESSMENT/PLAN    CAD with MI 2014 treated with RCA stenting. Dobutamine Cardiolite 10/2017 showed normal perfusion. He has no sxs of angina, but his functional capacity is limited by severe COPD.   - Continue ASA 81mg/d     Chronic AF, rate controlled and completely asx. Rhythm control unlikely due to advancing COPD.    - Continue Diltiazem plus Eliquis. Severe COPD managed by Dr. Jagjit Estrada. He has class 3 sxs, but stable. Hospitalized 1 year ago with PNA. Dilated Ascending Aorta measuring 4.6 cm by chest CT October 2019. Reasses with echo in 6 months. Dyslipidemia. Updated labs with [facility] (advanced lipid testing) demonstrate optimized lipids and lipoproteins. FBS remains normal.  - Continue Atorva 40 mg/d. - Repeat numbers with [facility] (advanced lipid testing) in 6 months. F/u in 6 months  Follow-up and Dispositions    · Return in about 6 months (around 8/14/2020). HPI    Noah Salas reports he is feeling well. He reports stable pattern of PATEL. Patient denies any exertional chest pain, palpitations, syncope, orthopnea, edema or paroxysmal nocturnal dyspnea. CT Lung scan in October showing enlarged aorta 4.6 cm. He will obtain lung scans once a year, next one in October. No bleeding issues while on Eliquis.     Cardiac testing  CATH: 2/12/2014: L Main: MLI, LAD: Mid 50%; MLI; Med size; D1 and D2 - MLI (small),   LCflex: Med ; Prox 40%; Mid 40% OM1 - med; distally OM1 divides to 3 branches (prox branch - med)- distal 2 branches - small; RCA: Med; Mid 99%; Distally 40% PDA and PLB - small to med, LVEDP: 11. LVEF: 45%; Ant HK, Inf HK, no signif grad across AV   --- MARCO (Resolute 3 x 22) -> RCA   ECHO: 2/12/14: EF 50%, mild HK basal-mid anteroseptal and basal-mid inferior wall(s). mild MR/TR     Health fair screening 3/13/15, carotid duplex showed mild stenosis, aortic duplex normal, ZENON normal, EKG normal   Echo 6/24/15 - EF 55%, normal wall motion   Holter 7/23/15 - SR , rare episodes of AFL/AF  CT Heart Scan - Calcium score 2041, incidental pulmonary findings (see full report). Echo 4/3/17 - EF 55%. No WMA. Grade 1 diastolic dysfunction. Dobutamine cardiolite 10/12/17 - normal perfusion, EF 61%, but new AF noted at rest, persistent with stress  EKG 12/4/18 - AF 70s      Current Outpatient Medications   Medication Sig    dilTIAZem CD (CARDIZEM CD) 240 mg ER capsule TAKE 1 CAPSULE BY MOUTH EVERY DAY    atorvastatin (LIPITOR) 40 mg tablet TAKE 1 TABLET BY MOUTH EVERY DAY AT NIGHT    ELIQUIS 5 mg tablet TAKE 1 TABLET BY MOUTH TWICE A DAY    aspirin 81 mg chewable tablet Take 81 mg by mouth nightly.  cholecalciferol (VITAMIN D3) 1,000 unit tablet Take 1,000 Units by mouth nightly.  omega 3-DHA-EPA-fish oil 1,000 mg (120 mg-180 mg) capsule Take 2 Caps by mouth daily.  cholecalciferol (VITAMIN D3) 1,000 unit tablet Take 2,000 Units by mouth daily.  glycopyrrolate-formoterol (BEVESPI AEROSPHERE) 9-4.8 mcg HFAA Take 2 Puffs by inhalation two (2) times a day.  nitroglycerin (NITROSTAT) 0.4 mg SL tablet 1 Tab by SubLINGual route every five (5) minutes as needed for Chest Pain. Up to 3 doses.  budesonide (PULMICORT) 0.5 mg/2 mL nbsp 500 mcg by Nebulization route two (2) times a day.     montelukast (SINGULAIR) 10 mg tablet Take 10 mg by mouth nightly.  multivitamin (ONE A DAY) tablet Take 1 tablet by mouth daily.  albuterol (PROVENTIL VENTOLIN) 2.5 mg /3 mL (0.083 %) nebulizer solution 2.5 mg by Nebulization route every four (4) hours as needed for Wheezing or Shortness of Breath.  guaiFENesin (MUCINEX) 1,200 mg TM12 ER tablet Take 1,200 mg by mouth two (2) times a day. No current facility-administered medications for this visit. No Known Allergies     Review of Systems  Constitutional: Negative for weight loss, malaise/fatigue and diaphoresis. Respiratory: +COPD, chronic PATEL. Negative for cough, hemoptysis, sputum production, and wheezing. Cardiovascular: Negative for palpitations, orthopnea, claudication, leg swelling and PND. Gastrointestinal: Negative for heartburn, nausea, vomiting, blood in stool and melena. Genitourinary: Negative for dysuria, hematuria and flank pain. Musculoskeletal: Negative for back pain and joint pain. Neurological: Negative for dizziness, focal weakness, seizures, loss of consciousness, weakness and headaches. Endo/Heme/Allergies: Does not bruise/bleed easily. Psychiatric/Behavioral: Negative for mood disorders. Visit Vitals  /76 (BP 1 Location: Left arm, BP Patient Position: Sitting)   Pulse (!) 102   Resp 15   Ht 5' 10\" (1.778 m)   Wt 144 lb (65.3 kg)   SpO2 91%   BMI 20.66 kg/m²      Wt Readings from Last 3 Encounters:   02/14/20 144 lb (65.3 kg)   08/15/19 145 lb 3.2 oz (65.9 kg)   02/21/19 151 lb 12.8 oz (68.9 kg)     Physical Exam  Vitals reviewed. Constitutional: He is oriented to person, place, and time. He appears well-developed. HENT:    Head: Normocephalic. Neck: Neck supple. No JVD present. No tracheal deviation present. Heart: Irregular S1, S2. No murmurs, rubs, clicks or gallops. Pulses:Carotid pulses are 2+ on the right side, and 2+ on the left side. Pulmonary/Chest: Effort normal and breath sounds normal. He has no wheezes, rhonchi or rales.    Abdominal: Soft. Bowel sounds are normal. No tenderness. He has no rebound. Musculoskeletal: He exhibits no edema. Neurological: He is alert and oriented to person, place, and time. Skin: Skin is warm and dry. Psychiatric: He has a normal mood and affect. Cardiographics  EKG 6/12/14 - sinus with PAC's  EKG 12/18/14 - SR, isolated PVC, otherwise normal   EKG 9/13/16 - SR 68  EKG 9/26/17- SR 63  EKG 12/4/18 - AF 70s  EKG 2/14/20 - AF 90s      Written by Ino Andersen. [facility], as dictated by Victorina Hill M.D.      Victorina Hill MD

## 2020-02-14 ENCOUNTER — OFFICE VISIT (OUTPATIENT)
Dept: CARDIOLOGY CLINIC | Age: 70
End: 2020-02-14

## 2020-02-14 VITALS
HEIGHT: 70 IN | BODY MASS INDEX: 20.62 KG/M2 | OXYGEN SATURATION: 91 % | SYSTOLIC BLOOD PRESSURE: 110 MMHG | HEART RATE: 102 BPM | WEIGHT: 144 LBS | DIASTOLIC BLOOD PRESSURE: 76 MMHG | RESPIRATION RATE: 15 BRPM

## 2020-02-14 DIAGNOSIS — E78.5 DYSLIPIDEMIA: ICD-10-CM

## 2020-02-14 DIAGNOSIS — J44.9 CHRONIC OBSTRUCTIVE PULMONARY DISEASE, UNSPECIFIED COPD TYPE (HCC): ICD-10-CM

## 2020-02-14 DIAGNOSIS — I77.810 ASCENDING AORTA DILATION (HCC): ICD-10-CM

## 2020-02-14 DIAGNOSIS — I25.10 CORONARY ARTERY DISEASE INVOLVING NATIVE CORONARY ARTERY OF NATIVE HEART WITHOUT ANGINA PECTORIS: ICD-10-CM

## 2020-02-14 DIAGNOSIS — I48.20 CHRONIC ATRIAL FIBRILLATION (HCC): Primary | ICD-10-CM

## 2020-02-14 NOTE — PROGRESS NOTES
Chief Complaint   Patient presents with    Coronary Artery Disease    Irregular Heart Beat    Cholesterol Problem    Follow-up     \  Visit Vitals  /76 (BP 1 Location: Left arm, BP Patient Position: Sitting)   Pulse (!) 102   Resp 15   Ht 5' 10\" (1.778 m)   Wt 144 lb (65.3 kg)   SpO2 91%   BMI 20.66 kg/m²     Patient presents to office with no complaints of pain, swelling,  dizziness, or chest pain. SOB due to COPD. No refills needed. No recent hospitalizations reported.    Caryle Province, LPN

## 2020-02-14 NOTE — LETTER
2/15/20 Patient: Na Olson YOB: 1950 Date of Visit: 2/14/2020 Jose David Saldivar MD 
[facility] [address] VIA Facsimile: [phone] Dear Jose David Saldivar MD, Thank you for referring Mr. Nahomi Weiss to [address] for evaluation. My notes for this consultation are attached. If you have questions, please do not hesitate to call me. I look forward to following your patient along with you. Sincerely, Aminata Anderson MD

## 2020-02-15 PROBLEM — J96.21 ACUTE ON CHRONIC RESPIRATORY FAILURE WITH HYPOXEMIA (HCC): Status: RESOLVED | Noted: 2019-02-15 | Resolved: 2020-02-15

## 2020-02-15 PROBLEM — I77.810 ASCENDING AORTA DILATION (HCC): Status: ACTIVE | Noted: 2020-02-15

## 2020-03-09 DIAGNOSIS — I48.92 ATRIAL FLUTTER, PAROXYSMAL (HCC): ICD-10-CM

## 2020-03-09 RX ORDER — APIXABAN 5 MG/1
TABLET, FILM COATED ORAL
Qty: 60 TAB | Refills: 5 | Status: SHIPPED | OUTPATIENT
Start: 2020-03-09 | End: 2020-08-24

## 2020-08-22 DIAGNOSIS — I48.92 ATRIAL FLUTTER, PAROXYSMAL (HCC): ICD-10-CM

## 2020-08-24 RX ORDER — APIXABAN 5 MG/1
TABLET, FILM COATED ORAL
Qty: 60 TAB | Refills: 5 | Status: SHIPPED | OUTPATIENT
Start: 2020-08-24 | End: 2020-09-23 | Stop reason: SDUPTHER

## 2020-09-10 NOTE — PROGRESS NOTES
VIRTUAL VISIT DOCUMENTATION     Pursuant to the emergency declaration under the [facility] waiver authority and the [employer] and [employer] Act, this Virtual  Visit was conducted, with patient's consent, to reduce the patient's risk of exposure to COVID-19 and provide continuity of care for an established patient. Services were provided through a video synchronous discussion virtually to substitute for in-person clinic visit. CHIEF COMPLAINT      Elvia Barber is a 78 y.o. male who was seen by synchronous (real-time) audio-video technology on 9/11/2020. Last seen by me 7 months ago in clinic. ASSESSMENT        ICD-10-CM ICD-9-CM    1. Coronary artery disease involving native coronary artery of native heart without angina pectoris  I25.10 414.01    2. Atrial flutter, paroxysmal (HCC)  I48.92 427.32    3. SVT (supraventricular tachycardia) ([facility])  I47.1 427.89    4. Ascending aorta dilation ([facility])  I77.810 447.71    5. Chronic obstructive pulmonary disease, unspecified COPD type ([facility]  J44.9 496    6. Dyslipidemia  E78.5 272.4         PLAN     CAD with MI 2014 treated with RCA stenting. Dobutamine Cardiolite 10/2017 showed normal perfusion. He has atypical pattern of random chest pain, nitrate responsive but no exertional angina. His functional capacity is limited by severe COPD.  - Continue ASA 81mg/d      Chronic AF, rate controlled and completely asx. Rhythm control unlikely due to advanced COPD.    - Continue Diltiazem plus Eliquis.      Severe COPD managed by Dr. Amy Rosenbaum. He has class 3 sxs, but stable w/ chronic oxygen. Hospitalized greater than 1 year ago with PNA.      Dilated Ascending Aorta measuring 4.6 cm by chest CT October 2019.   - Reassess with scheduled CT this Fall (Pulmonary).      Dyslipidemia. Updated labs with [facility] (advanced lipid testing) demonstrate optimized lipids and lipoproteins. Interval increase in FBS from 92 to 115.   - Continue Atorva 40 mg/d. - Repeat numbers with [facility] (advanced lipid testing) in 6 months including A1c     Mild increase in serum calcium (10.6). Previous numbers have been in the 9.8-10.2 range. - Recheck in 6 months     Follow-up and Dispositions    · Return in about 6 months (around 3/11/2021). We discussed the expected course, resolution and complications of the diagnosis(es) in detail. Medication risks, benefits, costs, interactions, and alternatives were discussed as indicated. I advised him to contact the office if his condition worsens, changes or fails to improve as anticipated. He expressed understanding with the diagnosis(es) and plan    HISTORY OF PRESENTING ILLNESS      Alyce Benavides is a 78 y.o. male reports worsened pattern of PATEL which he attributes to deconditioning d/t being restricted inside during the pandemic. He notes rare, random episodes of chest tightness for which he takes nitroglycerin. Patient denies any palpitations, syncope, orthopnea, edema or paroxysmal nocturnal dyspnea. He reports a mostly sedentary lifestyle around the house. Followed by Dr. Rosa Ceballos for regular chest CTs every year.      No bleeding issues while on Eliquis.        ACTIVE PROBLEM LIST     Patient Active Problem List    Diagnosis Date Noted    Ascending aorta dilation ([address] 02/15/2020    Pneumonia 02/15/2019    Atrial flutter, paroxysmal ([address] 03/31/2016    SVT (supraventricular tachycardia) ([address] 06/24/2015    Vitamin D deficiency 03/27/2014    Dyslipidemia 03/27/2014    CAD (coronary artery disease), native coronary artery 03/12/2014    Chronic obstructive pulmonary disease ([address] 02/27/2014    Atrial fibrillation ([address] 02/16/2014           PAST MEDICAL HISTORY     Past Medical History:   Diagnosis Date    Acute on chronic respiratory failure with hypoxemia ([address] 2/15/2019    Arrhythmia     Asthma     COPD (chronic obstructive pulmonary disease) (HCC)     severe    Hypertension     Insulin resistance 3/27/2014    Lung mass 2012    MAY resection, + mycobacterial orgs, neg malignancy    Melanoma ([facility]     Non-STEMI (non-ST elevated myocardial infarction) ([facility]     On home O2 prn    Pneumonia     Snoring     Tinnitus     Tuberculosis     Vitamin D deficiency 3/27/2014           PAST SURGICAL HISTORY     Past Surgical History:   Procedure Laterality Date    HX OTHER SURGICAL      EXC MELANOMA RIGHT CHEEK    HX OTHER SURGICAL      NEEDLE BX LEFT LUNG    HX OTHER SURGICAL  2012    apical segmentectomy, MAY          ALLERGIES     No Known Allergies       FAMILY HISTORY     Family History   Problem Relation Age of Onset    Glaucoma Mother     Dementia Mother     Heart Attack Father     Heart Disease Father     negative for cardiac disease       SOCIAL HISTORY     Social History     Socioeconomic History    Marital status: SINGLE     Spouse name: Not on file    Number of children: Not on file    Years of education: Not on file    Highest education level: Not on file   Tobacco Use    Smoking status: Former Smoker     Packs/day: 0.25     Years: 40.00     Pack years: 10.00     Types: Cigarettes     Last attempt to quit:      Years since quittin.7    Smokeless tobacco: Never Used   Substance and Sexual Activity    Alcohol use: Yes     Alcohol/week: 26.0 standard drinks     Types: 21 Cans of beer, 5 Shots of liquor per week    Drug use: No    Sexual activity: Never         MEDICATIONS     Current Outpatient Medications   Medication Sig    atorvastatin (LIPITOR) 40 mg tablet TAKE 1 TABLET BY MOUTH EVERY DAY EVERY NIGHT    Eliquis 5 mg tablet TAKE 1 TABLET BY MOUTH TWICE A DAY    dilTIAZem ER (CARDIZEM CD) 240 mg capsule TAKE 1 CAPSULE BY MOUTH EVERY DAY    aspirin 81 mg chewable tablet Take 81 mg by mouth nightly.     cholecalciferol (VITAMIN D3) 1,000 unit tablet Take 1,000 Units by mouth nightly.  omega 3-DHA-EPA-fish oil 1,000 mg (120 mg-180 mg) capsule Take 2 Caps by mouth daily.  cholecalciferol (VITAMIN D3) 1,000 unit tablet Take 2,000 Units by mouth daily.  glycopyrrolate-formoterol (BEVESPI AEROSPHERE) 9-4.8 mcg HFAA Take 2 Puffs by inhalation two (2) times a day.  nitroglycerin (NITROSTAT) 0.4 mg SL tablet 1 Tab by SubLINGual route every five (5) minutes as needed for Chest Pain. Up to 3 doses.  budesonide (PULMICORT) 0.5 mg/2 mL nbsp 500 mcg by Nebulization route two (2) times a day.  montelukast (SINGULAIR) 10 mg tablet Take 10 mg by mouth nightly.  multivitamin (ONE A DAY) tablet Take 1 tablet by mouth daily.  albuterol (PROVENTIL VENTOLIN) 2.5 mg /3 mL (0.083 %) nebulizer solution 2.5 mg by Nebulization route every four (4) hours as needed for Wheezing or Shortness of Breath.  guaiFENesin (MUCINEX) 1,200 mg TM12 ER tablet Take 1,200 mg by mouth two (2) times a day. No current facility-administered medications for this visit. I have reviewed the nurses notes, vitals, problem list, allergy list, medical history, family, social history and medications. REVIEW OF SYMPTOMS      General: Pt denies excessive weight gain or loss. Pt is able to conduct ADL's  HEENT: Denies blurred vision, headaches, hearing loss, epistaxis and difficulty swallowing. Respiratory: Denies cough, congestion, shortness of breath, wheezing or stridor. +PATEL  Cardiovascular: Denies palpitations, edema or PND +chest tightness  Gastrointestinal: Denies poor appetite, indigestion, abdominal pain or blood in stool  Genitourinary: Denies hematuria, dysuria, increased urinary frequency  Musculoskeletal: Denies joint pain or swelling from muscles or joints  Neurologic: Denies tremor, paresthesias, headache, or sensory motor disturbance  Psychiatric: Denies confusion, insomnia, depression  Integumentray: Denies rash, itching or ulcers.   Hematologic: Denies easy bruising, bleeding       PHYSICAL EXAMINATION      Due to this being a TeleHealth evaluation, many elements of the physical examination are unable to be assessed. General: Well developed, in no acute distress, cooperative and alert  HEENT: Pupils equal/round. No marked JVD visible on video. Respiratory: No audible wheezing, no signs of respiratory distress, lips non cyanotic  Extremities:  No edema  Neuro: A&Ox3, speech clear, no facial droop, answering questions appropriately  Skin: Skin color is normal. No rashes or lesions. Non diaphoretic on visible skin during exam       DIAGNOSTIC DATA      CATH: 2/12/2014: L Main: MLI, LAD: Mid 50%; MLI; Med size; D1 and D2 - MLI (small),   LCflex: Med ; Prox 40%; Mid 40% OM1 - med; distally OM1 divides to 3 branches (prox branch - med)- distal 2 branches - small; RCA: Med; Mid 99%; Distally 40% PDA and PLB - small to med, LVEDP: 11. LVEF: 45%; Ant HK, Inf HK, no signif grad across AV   --- MARCO (Resolute 3 x 22) -> RCA   ECHO: 2/12/14: EF 50%, mild HK basal-mid anteroseptal and basal-mid inferior wall(s). mild MR/TR     Health fair screening 3/13/15, carotid duplex showed mild stenosis, aortic duplex normal, ZENON normal, EKG normal   Echo 6/24/15 - EF 55%, normal wall motion   Holter 7/23/15 - SR , rare episodes of AFL/AF  CT Heart Scan - Calcium score 2041, incidental pulmonary findings (see full report). Echo 4/3/17 - EF 55%. No WMA. Grade 1 diastolic dysfunction.     Dobutamine cardiolite 10/12/17 - normal perfusion, EF 61%, but new AF noted at rest, persistent with stress  EKG 12/4/18 - AF 70s       LABORATORY DATA      Lab Results   Component Value Date/Time    WBC 14.9 (H) 02/19/2019 04:30 AM    HGB 12.4 02/19/2019 04:30 AM    HCT 36.5 (L) 02/19/2019 04:30 AM    PLATELET 745 78/01/5692 04:30 AM    MCV 93.8 02/19/2019 04:30 AM      Lab Results   Component Value Date/Time    Sodium 141 05/21/2019 02:26 PM    Potassium 4.3 05/21/2019 02:26 PM    Chloride 100 05/21/2019 02:26 PM    CO2 30 05/21/2019 02:26 PM    Anion gap 10 05/21/2019 02:26 PM    Glucose 96 05/21/2019 02:26 PM    BUN 7 05/21/2019 02:26 PM    Creatinine 0.7 05/21/2019 02:26 PM    BUN/Creatinine ratio 11 05/21/2019 02:26 PM    GFR est AA >60 02/19/2019 04:30 AM    GFR est non-AA >60 02/19/2019 04:30 AM    Calcium 10.1 05/21/2019 02:26 PM    Bilirubin, total 0.7 05/21/2019 02:26 PM    Alk. phosphatase 98 05/21/2019 02:26 PM    Protein, total 6.9 05/21/2019 02:26 PM    Albumin 4.5 05/21/2019 02:26 PM    Globulin 4.1 (H) 02/16/2019 04:45 AM    A-G Ratio 0.7 (L) 02/16/2019 04:45 AM    ALT (SGPT) 21 05/21/2019 02:26 PM             FOLLOW-UP     Follow-up and Dispositions    · Return in about 6 months (around 3/11/2021). Patient was made aware and verbalized understanding that an appointment will be scheduled for them for a virtual visit and/or office visit within the above time frame. Patient understanding his/her responsibility to call and change time/date if he/she so chooses. Thank you, Katherine Davis MD for allowing me to participate in the care of Tammy Payan. Please do not hesitate to contact me for further questions/concerns. Written by Kacy Dumont, as dictated by Mounika Pang M.D. Mounika Pang MD    [facility].  [address] [place] [address], [address] [place] [address], [address], [address] [place] [address]  [phone] / [phone] Fax  [phone] / [phone] Fax        Greater than 20 minutes was spent in direct video patient care, planning and chart review. This visit was conducted using [employer] telemedicine services.

## 2020-09-11 ENCOUNTER — VIRTUAL VISIT (OUTPATIENT)
Dept: CARDIOLOGY CLINIC | Age: 70
End: 2020-09-11
Payer: MEDICARE

## 2020-09-11 DIAGNOSIS — I47.1 SVT (SUPRAVENTRICULAR TACHYCARDIA) (HCC): ICD-10-CM

## 2020-09-11 DIAGNOSIS — I25.10 CORONARY ARTERY DISEASE INVOLVING NATIVE CORONARY ARTERY OF NATIVE HEART WITHOUT ANGINA PECTORIS: Primary | Chronic | ICD-10-CM

## 2020-09-11 DIAGNOSIS — I48.92 ATRIAL FLUTTER, PAROXYSMAL (HCC): Chronic | ICD-10-CM

## 2020-09-11 DIAGNOSIS — I77.810 ASCENDING AORTA DILATION (HCC): ICD-10-CM

## 2020-09-11 DIAGNOSIS — E78.5 DYSLIPIDEMIA: ICD-10-CM

## 2020-09-11 DIAGNOSIS — J44.9 CHRONIC OBSTRUCTIVE PULMONARY DISEASE, UNSPECIFIED COPD TYPE (HCC): ICD-10-CM

## 2020-09-11 PROCEDURE — 1101F PT FALLS ASSESS-DOCD LE1/YR: CPT | Performed by: SPECIALIST

## 2020-09-11 PROCEDURE — G8427 DOCREV CUR MEDS BY ELIG CLIN: HCPCS | Performed by: SPECIALIST

## 2020-09-11 PROCEDURE — G8432 DEP SCR NOT DOC, RNG: HCPCS | Performed by: SPECIALIST

## 2020-09-11 PROCEDURE — 3017F COLORECTAL CA SCREEN DOC REV: CPT | Performed by: SPECIALIST

## 2020-09-11 PROCEDURE — 99213 OFFICE O/P EST LOW 20 MIN: CPT | Performed by: SPECIALIST

## 2020-09-14 ENCOUNTER — TELEPHONE (OUTPATIENT)
Dept: CARDIOLOGY CLINIC | Age: 70
End: 2020-09-14

## 2020-09-14 NOTE — TELEPHONE ENCOUNTER
----- Message from Selwyn Andrade MD sent at 9/11/2020 11:43 AM EDT -----  Regarding: followup and labs  [address] with me in 6 months    Celeste    Send CH[facility] followup to include A1c, NTproBNP    thanks

## 2020-09-23 DIAGNOSIS — I48.92 ATRIAL FLUTTER, PAROXYSMAL (HCC): ICD-10-CM

## 2020-09-23 NOTE — TELEPHONE ENCOUNTER
Requested Prescriptions     Signed Prescriptions Disp Refills    apixaban (Eliquis) 5 mg tablet 180 Tab 1     Sig: Take 1 Tab by mouth two (2) times a day.      Authorizing Provider: Luis Antonio Bull     Ordering User: RAGHAV LARSON

## 2020-09-28 RX ORDER — NITROGLYCERIN 0.4 MG/1
0.4 TABLET SUBLINGUAL
Qty: 1 BOTTLE | Refills: 6 | Status: SHIPPED | OUTPATIENT
Start: 2020-09-28

## 2020-11-03 RX ORDER — DILTIAZEM HYDROCHLORIDE 240 MG/1
CAPSULE, COATED, EXTENDED RELEASE ORAL
Qty: 90 CAP | Refills: 3 | Status: SHIPPED | OUTPATIENT
Start: 2020-11-03 | End: 2022-03-12

## 2021-02-22 ENCOUNTER — APPOINTMENT (OUTPATIENT)
Dept: CT IMAGING | Age: 71
End: 2021-02-22
Attending: STUDENT IN AN ORGANIZED HEALTH CARE EDUCATION/TRAINING PROGRAM
Payer: MEDICARE

## 2021-02-22 ENCOUNTER — HOSPITAL ENCOUNTER (EMERGENCY)
Age: 71
Discharge: HOME | End: 2021-02-22
Attending: EMERGENCY MEDICINE
Payer: MEDICARE

## 2021-02-22 ENCOUNTER — APPOINTMENT (OUTPATIENT)
Dept: GENERAL RADIOLOGY | Age: 71
End: 2021-02-22
Attending: STUDENT IN AN ORGANIZED HEALTH CARE EDUCATION/TRAINING PROGRAM
Payer: MEDICARE

## 2021-02-22 VITALS
HEART RATE: 109 BPM | SYSTOLIC BLOOD PRESSURE: 121 MMHG | HEIGHT: 70 IN | BODY MASS INDEX: 23.62 KG/M2 | WEIGHT: 165 LBS | DIASTOLIC BLOOD PRESSURE: 73 MMHG | OXYGEN SATURATION: 100 % | RESPIRATION RATE: 28 BRPM | TEMPERATURE: 98.2 F

## 2021-02-22 DIAGNOSIS — S32.302A CLOSED FRACTURE OF LEFT ILIAC CREST, INITIAL ENCOUNTER (HCC): ICD-10-CM

## 2021-02-22 DIAGNOSIS — D64.9 ANEMIA, UNSPECIFIED TYPE: ICD-10-CM

## 2021-02-22 DIAGNOSIS — M54.50 ACUTE LEFT-SIDED LOW BACK PAIN WITHOUT SCIATICA: ICD-10-CM

## 2021-02-22 DIAGNOSIS — R06.02 SOB (SHORTNESS OF BREATH): Primary | ICD-10-CM

## 2021-02-22 DIAGNOSIS — S30.0XXA TRAUMATIC HEMATOMA OF BUTTOCK, INITIAL ENCOUNTER: ICD-10-CM

## 2021-02-22 LAB
ALBUMIN SERPL-MCNC: 3.4 G/DL (ref 3.5–5)
ALBUMIN/GLOB SERPL: 1.1 {RATIO} (ref 1.1–2.2)
ALP SERPL-CCNC: 70 U/L (ref 45–117)
ALT SERPL-CCNC: 28 U/L (ref 12–78)
ANION GAP SERPL CALC-SCNC: 3 MMOL/L (ref 5–15)
AST SERPL-CCNC: 26 U/L (ref 15–37)
ATRIAL RATE: 86 BPM
BASOPHILS # BLD: 0 K/UL (ref 0–0.1)
BASOPHILS NFR BLD: 0 % (ref 0–1)
BILIRUB SERPL-MCNC: 1.4 MG/DL (ref 0.2–1)
BUN SERPL-MCNC: 15 MG/DL (ref 6–20)
BUN/CREAT SERPL: 20 (ref 12–20)
CALCIUM SERPL-MCNC: 9.3 MG/DL (ref 8.5–10.1)
CALCULATED R AXIS, ECG10: 68 DEGREES
CALCULATED T AXIS, ECG11: 47 DEGREES
CHLORIDE SERPL-SCNC: 95 MMOL/L (ref 97–108)
CO2 SERPL-SCNC: 36 MMOL/L (ref 21–32)
COMMENT, HOLDF: NORMAL
CREAT SERPL-MCNC: 0.76 MG/DL (ref 0.7–1.3)
D DIMER PPP FEU-MCNC: 0.53 MG/L FEU (ref 0–0.65)
DIAGNOSIS, 93000: NORMAL
DIFFERENTIAL METHOD BLD: ABNORMAL
EOSINOPHIL # BLD: 0.1 K/UL (ref 0–0.4)
EOSINOPHIL NFR BLD: 1 % (ref 0–7)
ERYTHROCYTE [DISTWIDTH] IN BLOOD BY AUTOMATED COUNT: 14.8 % (ref 11.5–14.5)
GLOBULIN SER CALC-MCNC: 3.1 G/DL (ref 2–4)
GLUCOSE SERPL-MCNC: 100 MG/DL (ref 65–100)
HCT VFR BLD AUTO: 25.2 % (ref 36.6–50.3)
HGB BLD-MCNC: 8.3 G/DL (ref 12.1–17)
IMM GRANULOCYTES # BLD AUTO: 0.1 K/UL (ref 0–0.04)
IMM GRANULOCYTES NFR BLD AUTO: 1 % (ref 0–0.5)
LYMPHOCYTES # BLD: 0.9 K/UL (ref 0.8–3.5)
LYMPHOCYTES NFR BLD: 7 % (ref 12–49)
MCH RBC QN AUTO: 32.5 PG (ref 26–34)
MCHC RBC AUTO-ENTMCNC: 32.9 G/DL (ref 30–36.5)
MCV RBC AUTO: 98.8 FL (ref 80–99)
MONOCYTES # BLD: 1.7 K/UL (ref 0–1)
MONOCYTES NFR BLD: 14 % (ref 5–13)
NEUTS SEG # BLD: 9.7 K/UL (ref 1.8–8)
NEUTS SEG NFR BLD: 77 % (ref 32–75)
NRBC # BLD: 0 K/UL (ref 0–0.01)
NRBC BLD-RTO: 0 PER 100 WBC
PLATELET # BLD AUTO: 193 K/UL (ref 150–400)
PMV BLD AUTO: 9.5 FL (ref 8.9–12.9)
POTASSIUM SERPL-SCNC: 3.7 MMOL/L (ref 3.5–5.1)
PROT SERPL-MCNC: 6.5 G/DL (ref 6.4–8.2)
Q-T INTERVAL, ECG07: 336 MS
QRS DURATION, ECG06: 88 MS
QTC CALCULATION (BEZET), ECG08: 397 MS
RBC # BLD AUTO: 2.55 M/UL (ref 4.1–5.7)
SAMPLES BEING HELD,HOLD: NORMAL
SODIUM SERPL-SCNC: 134 MMOL/L (ref 136–145)
TROPONIN I SERPL-MCNC: <0.05 NG/ML
VENTRICULAR RATE, ECG03: 84 BPM
WBC # BLD AUTO: 12.4 K/UL (ref 4.1–11.1)

## 2021-02-22 PROCEDURE — 86901 BLOOD TYPING SEROLOGIC RH(D): CPT

## 2021-02-22 PROCEDURE — 80053 COMPREHEN METABOLIC PANEL: CPT

## 2021-02-22 PROCEDURE — 74011000636 HC RX REV CODE- 636: Performed by: RADIOLOGY

## 2021-02-22 PROCEDURE — 85025 COMPLETE CBC W/AUTO DIFF WBC: CPT

## 2021-02-22 PROCEDURE — 93005 ELECTROCARDIOGRAM TRACING: CPT

## 2021-02-22 PROCEDURE — 74177 CT ABD & PELVIS W/CONTRAST: CPT

## 2021-02-22 PROCEDURE — 36415 COLL VENOUS BLD VENIPUNCTURE: CPT

## 2021-02-22 PROCEDURE — 85379 FIBRIN DEGRADATION QUANT: CPT

## 2021-02-22 PROCEDURE — 99285 EMERGENCY DEPT VISIT HI MDM: CPT

## 2021-02-22 PROCEDURE — 71046 X-RAY EXAM CHEST 2 VIEWS: CPT

## 2021-02-22 PROCEDURE — 84484 ASSAY OF TROPONIN QUANT: CPT

## 2021-02-22 PROCEDURE — 70450 CT HEAD/BRAIN W/O DYE: CPT

## 2021-02-22 RX ADMIN — IOPAMIDOL 100 ML: 755 INJECTION, SOLUTION INTRAVENOUS at 16:25

## 2021-02-22 NOTE — ED NOTES
Discharge instructions reviewed by provider and signed by patient and RN. Verbalizes understanding of instructions and follow up care. Discharged ambulatory in no acute distress.

## 2021-02-22 NOTE — ED NOTES
Bedside and Verbal shift change report given to Anshu Camacho RN (oncoming nurse) by Katharine Kong RN (offgoing nurse). Report included the following information SBAR and ED Summary.

## 2021-02-22 NOTE — ED TRIAGE NOTES
Pt reports SOB and back pain x3 days after falling on ice. Large bruise noted to back. Pt wears 2-2.5 L O2 via NC at baseline, hypoxic on arrival around 86-88%. O2 raised to 5L and sats improved to 96%.

## 2021-02-22 NOTE — ED PROVIDER NOTES
67yo male with PMH Afib, COPD, and h/o MI presents to the ED by EMS for shortness of breath and back pain after a fall 3 days ago. Patient woke up at 3am to get something out of his car, slipped on a sheet of ice, and fell onto his back. States that he experienced immediate SOB and pain, however SOB had worsened upon waking up in the morning. States that his back pain is worst at the left lower part of back. Pain is fine at rest, and excruciating with movement. He has been on 2-2.5 L oxygen at home for the last 4-5 years and takes eliquis for persistent Afib. He missed ~2 doses because he forgot to take them due to pain. O2 saturation was 86% upon arrival to ED on home O2, and improved to 95% on 5L. He lives alone and denies sick contacts. Patient denies history of PE, personal/family history of pnuemothorax, or family history of clotting disorders.             Past Medical History:   Diagnosis Date    Acute on chronic respiratory failure with hypoxemia ([address] 2/15/2019    Arrhythmia     Asthma     COPD (chronic obstructive pulmonary disease) (HCC)     severe    Hypertension     Insulin resistance 3/27/2014    Lung mass 9/2012    MAY resection, + mycobacterial orgs, neg malignancy    Melanoma ([address] 2007    Non-STEMI (non-ST elevated myocardial infarction) ([facility] 2/14    On home O2 prn    Pneumonia     Snoring     Tinnitus     Tuberculosis     Vitamin D deficiency 3/27/2014       Past Surgical History:   Procedure Laterality Date    HX OTHER SURGICAL      EXC MELANOMA RIGHT CHEEK    HX OTHER SURGICAL      NEEDLE BX LEFT LUNG    HX OTHER SURGICAL  9/11/2012    apical segmentectomy, MAY         Family History:   Problem Relation Age of Onset    Glaucoma Mother     Dementia Mother     Heart Attack Father     Heart Disease Father        Social History     Socioeconomic History    Marital status: SINGLE     Spouse name: Not on file    Number of children: Not on file    Years of education: Not on file    Highest education level: Not on file   Occupational History    Not on file   Social Needs    Financial resource strain: Not on file    Food insecurity     Worry: Not on file     Inability: Not on file    Transportation needs     Medical: Not on file     Non-medical: Not on file   Tobacco Use    Smoking status: Former Smoker     Packs/day: 0.25     Years: 40.00     Pack years: 10.00     Types: Cigarettes     Quit date:      Years since quittin.1    Smokeless tobacco: Never Used   Substance and Sexual Activity    Alcohol use: Yes     Alcohol/week: 26.0 standard drinks     Types: 21 Cans of beer, 5 Shots of liquor per week    Drug use: No    Sexual activity: Never   Lifestyle    Physical activity     Days per week: Not on file     Minutes per session: Not on file    Stress: Not on file   Relationships    Social connections     Talks on phone: Not on file     Gets together: Not on file     Attends [employer] service: Not on file     Active member of club or organization: Not on file     Attends meetings of clubs or organizations: Not on file     Relationship status: Not on file    Intimate partner violence     Fear of current or ex partner: Not on file     Emotionally abused: Not on file     Physically abused: Not on file     Forced sexual activity: Not on file   Other Topics Concern    Not on file   Social History Narrative    Not on file         ALLERGIES: Patient has no known allergies. Review of Systems   Constitutional: Negative for fever. HENT: Negative for sore throat. Eyes: Negative for visual disturbance. Respiratory: Negative for cough and shortness of breath. Cardiovascular: Negative for chest pain and leg swelling. Gastrointestinal: Negative for abdominal pain. Musculoskeletal: Positive for back pain. Skin:        Bruises to the mid/lower back and right forearm. Neurological: Negative for speech difficulty and headaches.    Hematological: Does not bruise/bleed easily. Psychiatric/Behavioral: Negative for confusion. Vitals:    02/22/21 1615 02/22/21 1645 02/22/21 1700 02/22/21 1715   BP: 109/70      Pulse:       Resp:       Temp:       SpO2:  100% 100% 100%   Weight:       Height:                Physical Exam  Constitutional:       Appearance: He is normal weight. HENT:      Head: Normocephalic and atraumatic. Eyes:      Extraocular Movements: Extraocular movements intact. Neck:      Musculoskeletal: Normal range of motion and neck supple. Cardiovascular:      Rate and Rhythm: Tachycardia present. Rhythm irregular. Comments: Bounding abdominal aorta pulse in epigastric region. Pulmonary:      Effort: Tachypnea and accessory muscle usage present. No respiratory distress. Breath sounds: Examination of the right-lower field reveals wheezing. Examination of the left-lower field reveals decreased breath sounds. Decreased breath sounds and wheezing present. No rhonchi or rales. Chest:      Chest wall: No deformity, tenderness or crepitus. Abdominal:      Palpations: Abdomen is soft. There is no mass. Tenderness: There is no abdominal tenderness. Musculoskeletal:      Right lower leg: He exhibits no tenderness. No edema. Left lower leg: He exhibits no tenderness. No edema. Skin:     General: Skin is warm and dry. Comments: Bruising over right side of lower back and right forearm. Large bruise over left buttock. Neurological:      General: No focal deficit present. Mental Status: He is alert. MDM  Number of Diagnoses or Management Options  Acute left-sided low back pain without sciatica  Anemia, unspecified type  SOB (shortness of breath)  Diagnosis management comments: Patient with SOB s/p fall:  Hgb 8.3 (BL 12.5), trop and d-dimer wnl. EKG: atrial fibrillation HR 84, no ST changes. Consistent with previous EKG.  CXR with impressive emphysema, however negative for pnuemothorax or thoracic spine fracture. CT abdomen pelvis: large 201mm left buttock hematoma, left small incomplete fracture of the iliac crest. CT head without bleed. SOB is likely secondary to anemia caused by the significant buttock hematoma. Will recommend patient take OTC iron supplements until hematoma resolves and patient is feeling better. Recommend he use a can to encourage healing of incomplete fracture. Strict return precautions given.           ED Course as of Feb 22 1721   Mon Feb 22, 2021   1529 XR CHEST PA AND LATERAL [AF]   1542 D-dimer: 0.53 [AF]      ED Course User Index  [AF] Michael Ibrahim MD       Procedures

## 2021-02-23 LAB
ABO + RH BLD: NORMAL
BLOOD GROUP ANTIBODIES SERPL: NORMAL
SPECIMEN EXP DATE BLD: NORMAL

## 2021-06-04 ENCOUNTER — OFFICE VISIT (OUTPATIENT)
Dept: CARDIOLOGY CLINIC | Age: 71
End: 2021-06-04
Payer: MEDICARE

## 2021-06-04 VITALS
DIASTOLIC BLOOD PRESSURE: 78 MMHG | HEIGHT: 70 IN | SYSTOLIC BLOOD PRESSURE: 122 MMHG | HEART RATE: 102 BPM | OXYGEN SATURATION: 94 % | BODY MASS INDEX: 20.76 KG/M2 | WEIGHT: 145 LBS

## 2021-06-04 DIAGNOSIS — I25.10 CORONARY ARTERY DISEASE INVOLVING NATIVE CORONARY ARTERY OF NATIVE HEART WITHOUT ANGINA PECTORIS: ICD-10-CM

## 2021-06-04 DIAGNOSIS — I77.810 ASCENDING AORTA DILATION (HCC): ICD-10-CM

## 2021-06-04 DIAGNOSIS — I48.20 CHRONIC ATRIAL FIBRILLATION (HCC): Primary | ICD-10-CM

## 2021-06-04 DIAGNOSIS — I48.92 ATRIAL FLUTTER, PAROXYSMAL (HCC): ICD-10-CM

## 2021-06-04 DIAGNOSIS — E78.5 DYSLIPIDEMIA: ICD-10-CM

## 2021-06-04 PROCEDURE — 93010 ELECTROCARDIOGRAM REPORT: CPT | Performed by: INTERNAL MEDICINE

## 2021-06-04 PROCEDURE — G8427 DOCREV CUR MEDS BY ELIG CLIN: HCPCS | Performed by: INTERNAL MEDICINE

## 2021-06-04 PROCEDURE — 3017F COLORECTAL CA SCREEN DOC REV: CPT | Performed by: INTERNAL MEDICINE

## 2021-06-04 PROCEDURE — G8536 NO DOC ELDER MAL SCRN: HCPCS | Performed by: INTERNAL MEDICINE

## 2021-06-04 PROCEDURE — 93005 ELECTROCARDIOGRAM TRACING: CPT | Performed by: INTERNAL MEDICINE

## 2021-06-04 PROCEDURE — G8420 CALC BMI NORM PARAMETERS: HCPCS | Performed by: INTERNAL MEDICINE

## 2021-06-04 PROCEDURE — 1101F PT FALLS ASSESS-DOCD LE1/YR: CPT | Performed by: INTERNAL MEDICINE

## 2021-06-04 PROCEDURE — G8432 DEP SCR NOT DOC, RNG: HCPCS | Performed by: INTERNAL MEDICINE

## 2021-06-04 PROCEDURE — G0463 HOSPITAL OUTPT CLINIC VISIT: HCPCS | Performed by: INTERNAL MEDICINE

## 2021-06-04 PROCEDURE — 99214 OFFICE O/P EST MOD 30 MIN: CPT | Performed by: INTERNAL MEDICINE

## 2021-06-04 NOTE — PROGRESS NOTES
Office Follow-up    NAME: Lynnette Shrestha   :  1950  MRM:  [id number]    Date:  2021            Assessment:     Problem List  Date Reviewed: 2020        Codes Class Noted    Ascending aorta dilation ([facility] ICD-10-CM: I77.810  ICD-9-CM: 447.71  2/15/2020        Pneumonia ICD-10-CM: J18.9  ICD-9-CM: 344  2/15/2019        Atrial flutter, paroxysmal (HCC) (Chronic) ICD-10-CM: I48.92  ICD-9-CM: 427.32  3/31/2016        SVT (supraventricular tachycardia) ([facility] ICD-10-CM: I47.1  ICD-9-CM: 427.89  2015        Vitamin D deficiency ICD-10-CM: E55.9  ICD-9-CM: 268.9  3/27/2014        Dyslipidemia ICD-10-CM: E78.5  ICD-9-CM: 272.4  3/27/2014        CAD (coronary artery disease), native coronary artery (Chronic) ICD-10-CM: I25.10  ICD-9-CM: 414.01  3/12/2014        Chronic obstructive pulmonary disease ([facility] ICD-10-CM: J44.9  ICD-9-CM: 452  2014        Atrial fibrillation ([facility] ICD-10-CM: I48.91  ICD-9-CM: 427.31  2014                 Plan:     CAD with MI  treated with RCA stenting. Dobutamine Cardiolite 10/2017 showed normal perfusion. He has atypical pattern of random chest pain, nitrate responsive but no exertional angina. His functional capacity is limited by severe COPD. - Continue ASA 81mg/d, and statins     Chronic AF, rate controlled and completely asx. Rhythm control unlikely due to advanced COPD.    - Continue Diltiazem plus Eliquis     Severe COPD managed by Dr. Harper Needs has class 3 sxs, but stable w/ chronic oxygen. Hospitalized greater than 1 year ago with PNA.      Dilated Ascending Aorta measuring 4.6 cm by chest CT 2020.  - Reassess with scheduled CT (Pulmonary)     Dyslipidemia. Updated labs with [facility] (advanced lipid testing) demonstrate optimized lipids and lipoproteins. Interval increase in FBS from 92 to 115.    - Continue Atorva 40 mg/d.      See Dr. Luiz Phelan in 6 months.        ATTENTION:   This medical record was transcribed using an electronic medical records/speech recognition system. Although proofread, it may and can contain electronic, spelling and other errors. Corrections may be executed at a later time. Please feel free to contact us for any clarifications as needed. Subjective:     Jonny Lopez, a 73y.o. year-old who presents for followup. He has known history of CAD status post PCI to RCA, COPD, atrial fibrillation, hypertension, dyslipidemia and ascending aortic aneurysm. Is returning today for follow-up. He used to see Dr. Ingrid Sanchez in the past.  Currently has no symptoms of palpitations or lightheadedness. He occasionally will get chest pain which improved with nitroglycerin. He uses oxygen 2 and half liters per minute on a continuous basis. EKG in our office today demonstrate atrial fibrillation with nonspecific ST changes with heart rate of 99 bpm    Exam:     Physical Exam:  Visit Vitals  /78 (BP 1 Location: Left upper arm, BP Patient Position: Sitting)   Pulse (!) 102   Ht 5' 10\" (1.778 m)   Wt 145 lb (65.8 kg)   SpO2 94%   BMI 20.81 kg/m²     General appearance - alert, well appearing, and in no distress  Mental status - affect appropriate to mood  Eyes - sclera anicteric, moist mucous membranes  Neck - supple, no significant adenopathy  Chest - clear to auscultation, no wheezes, rales or rhonchi  Heart - normal rate, regular rhythm, normal S1, S2, no murmurs, rubs, clicks or gallops  Abdomen - soft, nontender, nondistended, no masses or organomegaly  Extremities - peripheral pulses normal, no pedal edema  Skin - normal coloration  no rashes    Medications:     Current Outpatient Medications   Medication Sig    atorvastatin (LIPITOR) 40 mg tablet Take 1 Tab by mouth daily.  dilTIAZem ER (CARDIZEM CD) 240 mg capsule TAKE 1 CAPSULE BY MOUTH EVERY DAY    nitroglycerin (NITROSTAT) 0.4 mg SL tablet 1 Tab by SubLINGual route every five (5) minutes as needed for Chest Pain. Up to 3 doses.     apixaban (Eliquis) 5 mg tablet Take 1 Tab by mouth two (2) times a day.  aspirin 81 mg chewable tablet Take 81 mg by mouth nightly.  cholecalciferol (VITAMIN D3) 1,000 unit tablet Take 1,000 Units by mouth nightly.  omega 3-DHA-EPA-fish oil 1,000 mg (120 mg-180 mg) capsule Take 2 Caps by mouth daily.  cholecalciferol (VITAMIN D3) 1,000 unit tablet Take 2,000 Units by mouth daily.  glycopyrrolate-formoterol (BEVESPI AEROSPHERE) 9-4.8 mcg HFAA Take 2 Puffs by inhalation two (2) times a day.  budesonide (PULMICORT) 0.5 mg/2 mL nbsp 500 mcg by Nebulization route two (2) times a day.  montelukast (SINGULAIR) 10 mg tablet Take 10 mg by mouth nightly.  multivitamin (ONE A DAY) tablet Take 1 tablet by mouth daily.  albuterol (PROVENTIL VENTOLIN) 2.5 mg /3 mL (0.083 %) nebulizer solution 2.5 mg by Nebulization route every four (4) hours as needed for Wheezing or Shortness of Breath.  guaiFENesin (MUCINEX) 1,200 mg TM12 ER tablet Take 1,200 mg by mouth two (2) times a day. No current facility-administered medications for this visit. Diagnostic Data Review:       CATH: 2/12/2014: L Main: MLI, LAD: Mid 50%; MLI; Med size; D1 and D2 - MLI (small),   LCflex: Med ; Prox 40%; Mid 40% OM1 - med; distally OM1 divides to 3 branches (prox branch - med)- distal 2 branches - small; RCA: Med; Mid 99%; Distally 40% PDA and PLB - small to med, LVEDP: 11. LVEF: 45%; Ant HK, Inf HK, no signif grad across AV   --- MARCO (Resolute 3 x 22) -> RCA   ECHO: 2/12/14: EF 50%, mild HK basal-mid anteroseptal and basal-mid inferior wall(s). mild MR/TR     Health fair screening 3/13/15, carotid duplex showed mild stenosis, aortic duplex normal, ZENON normal, EKG normal   Echo 6/24/15 - EF 55%, normal wall motion   Holter 7/23/15 - SR , rare episodes of AFL/AF  CT Heart Scan - Calcium score 2041, incidental pulmonary findings (see full report). Echo 4/3/17 - EF 55%. No WMA. Grade 1 diastolic dysfunction.     Dobutamine cardiolite 10/12/17 - normal perfusion, EF 61%, but new AF noted at rest, persistent with stress  EKG 12/4/18 - AF 70s      Lab Review:     Lab Results   Component Value Date/Time    Cholesterol, total 156 05/21/2019 02:26 PM    HDL Cholesterol 80 05/21/2019 02:26 PM    HDL Cholesterol 80 05/21/2019 02:26 PM    LDL, calculated 56 04/09/2018 12:18 PM    Triglyceride 61 05/21/2019 02:26 PM    CHOL/HDL Ratio 3.0 02/13/2014 03:00 AM     Lab Results   Component Value Date/Time    Creatinine 0.76 02/22/2021 03:05 PM     Lab Results   Component Value Date/Time    BUN 15 02/22/2021 03:05 PM     Lab Results   Component Value Date/Time    Potassium 3.7 02/22/2021 03:05 PM     No results found for: HBA1C, PDL8IQDD  Lab Results   Component Value Date/Time    HGB 8.3 (L) 02/22/2021 03:05 PM     Lab Results   Component Value Date/Time    PLATELET 498 05/31/3669 03:05 PM     No results for input(s): CPK, CKMB, TROIQ in the last 72 hours. No lab exists for component: CKQMB, CPKMB             ___________________________________________________    Aleena Obregon.  Aixa Martinez MD, [facility]

## 2021-06-04 NOTE — PROGRESS NOTES
Pedro Flynn is a 73 y.o. male    Chief Complaint   Patient presents with    Follow-up     AF,SVT    Shortness of Breath    Coronary Artery Disease       Chest pain No    SOB due to COPD     Dizziness No    Swelling No    Refills No    Visit Vitals  /78 (BP 1 Location: Left upper arm, BP Patient Position: Sitting)   Pulse (!) 102   Ht 5' 10\" (1.778 m)   Wt 145 lb (65.8 kg)   SpO2 94%   BMI 20.81 kg/m²       1. Have you been to the ER, urgent care clinic since your last visit? Hospitalized since your last visit? No     2. Have you seen or consulted any other health care providers outside of the [address] [place] [address] since your last visit? Include any pap smears or colon screening.   No

## 2021-06-04 NOTE — PROGRESS NOTES
Per Dr. Rita Gonzalez, \"Refill Eliquis 90 day supply with 4 refills. See Dr. Rita Gonzalez in 1 year. \"

## 2021-07-01 ENCOUNTER — TRANSCRIBE ORDER (OUTPATIENT)
Dept: SCHEDULING | Age: 71
End: 2021-07-01

## 2021-07-01 DIAGNOSIS — R63.4 WEIGHT LOSS, UNINTENTIONAL: Primary | ICD-10-CM

## 2021-07-06 ENCOUNTER — HOSPITAL ENCOUNTER (OUTPATIENT)
Dept: CT IMAGING | Age: 71
Discharge: HOME | End: 2021-07-06
Attending: INTERNAL MEDICINE
Payer: MEDICARE

## 2021-07-06 ENCOUNTER — TRANSCRIBE ORDER (OUTPATIENT)
Dept: SCHEDULING | Age: 71
End: 2021-07-06

## 2021-07-06 DIAGNOSIS — R63.4 WEIGHT LOSS, UNINTENTIONAL: ICD-10-CM

## 2021-07-06 DIAGNOSIS — J43.9 EMPHYSEMA LUNG (HCC): Primary | ICD-10-CM

## 2021-07-06 DIAGNOSIS — R63.4 UNINTENTIONAL WEIGHT LOSS: Primary | ICD-10-CM

## 2021-07-06 DIAGNOSIS — J43.9 EMPHYSEMA LUNG (HCC): ICD-10-CM

## 2021-07-06 PROCEDURE — 74011000636 HC RX REV CODE- 636

## 2021-07-06 PROCEDURE — 71260 CT THORAX DX C+: CPT

## 2021-07-06 PROCEDURE — 74177 CT ABD & PELVIS W/CONTRAST: CPT

## 2021-07-06 RX ADMIN — IOPAMIDOL 100 ML: 755 INJECTION, SOLUTION INTRAVENOUS at 15:49

## 2021-07-19 RX ORDER — ATORVASTATIN CALCIUM 40 MG/1
TABLET, FILM COATED ORAL
Qty: 90 TABLET | Refills: 0 | Status: SHIPPED | OUTPATIENT
Start: 2021-07-19 | End: 2021-10-07 | Stop reason: SDUPTHER

## 2021-10-07 RX ORDER — ATORVASTATIN CALCIUM 40 MG/1
40 TABLET, FILM COATED ORAL DAILY
Qty: 90 TABLET | Refills: 3 | Status: SHIPPED | OUTPATIENT
Start: 2021-10-07 | End: 2022-10-11

## 2021-10-07 NOTE — TELEPHONE ENCOUNTER
Refill per VO of Dr. Renay Fishman  Last appt: 6/4/2021  Future Appointments   Date Time Provider Vickie Her   12/10/2021  2:40 PM Eden Vance MD [facility]       Requested Prescriptions     Pending Prescriptions Disp Refills    atorvastatin (LIPITOR) 40 mg tablet 90 Tablet 0     Sig: Take 1 Tablet by mouth daily.

## 2022-02-02 ENCOUNTER — OFFICE VISIT (OUTPATIENT)
Dept: CARDIOLOGY CLINIC | Age: 72
End: 2022-02-02
Payer: MEDICARE

## 2022-02-02 VITALS
DIASTOLIC BLOOD PRESSURE: 76 MMHG | OXYGEN SATURATION: 96 % | BODY MASS INDEX: 17.9 KG/M2 | HEIGHT: 70 IN | SYSTOLIC BLOOD PRESSURE: 116 MMHG | HEART RATE: 96 BPM | WEIGHT: 125 LBS

## 2022-02-02 DIAGNOSIS — I48.92 ATRIAL FLUTTER, PAROXYSMAL (HCC): Primary | ICD-10-CM

## 2022-02-02 PROCEDURE — 3017F COLORECTAL CA SCREEN DOC REV: CPT | Performed by: INTERNAL MEDICINE

## 2022-02-02 PROCEDURE — G8419 CALC BMI OUT NRM PARAM NOF/U: HCPCS | Performed by: INTERNAL MEDICINE

## 2022-02-02 PROCEDURE — G0463 HOSPITAL OUTPT CLINIC VISIT: HCPCS | Performed by: INTERNAL MEDICINE

## 2022-02-02 PROCEDURE — G8510 SCR DEP NEG, NO PLAN REQD: HCPCS | Performed by: INTERNAL MEDICINE

## 2022-02-02 PROCEDURE — 93010 ELECTROCARDIOGRAM REPORT: CPT | Performed by: INTERNAL MEDICINE

## 2022-02-02 PROCEDURE — 99214 OFFICE O/P EST MOD 30 MIN: CPT | Performed by: INTERNAL MEDICINE

## 2022-02-02 PROCEDURE — 93005 ELECTROCARDIOGRAM TRACING: CPT | Performed by: INTERNAL MEDICINE

## 2022-02-02 PROCEDURE — G8428 CUR MEDS NOT DOCUMENT: HCPCS | Performed by: INTERNAL MEDICINE

## 2022-02-02 PROCEDURE — 1101F PT FALLS ASSESS-DOCD LE1/YR: CPT | Performed by: INTERNAL MEDICINE

## 2022-02-02 PROCEDURE — G8536 NO DOC ELDER MAL SCRN: HCPCS | Performed by: INTERNAL MEDICINE

## 2022-02-02 NOTE — PROGRESS NOTES
Room 5    Visit Vitals  /76 (BP 1 Location: Left arm, BP Patient Position: Sitting, BP Cuff Size: Adult)   Pulse 96   Ht 5' 10\" (1.778 m)   Wt 125 lb (56.7 kg)   SpO2 96%   BMI 17.94 kg/m²         Chest pain: no  Shortness of breath: no  Edema: no  Palpitations: no  Dizziness: no    New diagnosis/Surgeries: no    ER/Hospitalizations: no    Refills: no

## 2022-02-02 NOTE — PROGRESS NOTES
Office Follow-up    NAME: Vik Montalvo   :  1950  MRM:  [id number]    Date:  2022            Assessment:     Problem List  Date Reviewed: 2020          Codes Class Noted    Ascending aorta dilation ([facility] ICD-10-CM: I77.810  ICD-9-CM: 447.71  2/15/2020        Pneumonia ICD-10-CM: J18.9  ICD-9-CM: 520  2/15/2019        Atrial flutter, paroxysmal (HCC) (Chronic) ICD-10-CM: I48.92  ICD-9-CM: 427.32  3/31/2016        SVT (supraventricular tachycardia) ([facility] ICD-10-CM: I47.1  ICD-9-CM: 427.89  2015        Vitamin D deficiency ICD-10-CM: E55.9  ICD-9-CM: 268.9  3/27/2014        Dyslipidemia ICD-10-CM: E78.5  ICD-9-CM: 272.4  3/27/2014        CAD (coronary artery disease), native coronary artery (Chronic) ICD-10-CM: I25.10  ICD-9-CM: 414.01  3/12/2014        Chronic obstructive pulmonary disease ([facility] ICD-10-CM: J44.9  ICD-9-CM: 597  2014        Atrial fibrillation ([facility] ICD-10-CM: I48.91  ICD-9-CM: 427.31  2014                 Plan:     CAD with MI  treated with RCA stenting. Dobutamine Cardiolite 10/2017 showed normal perfusion. He has atypical pattern of random chest pain, nitrate responsive but no exertional angina. His functional capacity is limited by severe COPD. - Continue ASA 81mg/d, and statins     Chronic AF, rate controlled and completely asx. Rhythm control unlikely due to advanced COPD.    - Continue Diltiazem plus Eliquis     Severe COPD managed by Dr. Mary Goetz has class 3 sxs, but stable w/ chronic oxygen 2.5L. Hospitalized greater than 1 year ago with PNA.      Dilated Ascending Aorta measuring 4.4 cm by chest CT 2021. Stable     Dyslipidemia. Updated labs with [facility] (advanced lipid testing) demonstrate optimized lipids and lipoproteins. Interval increase in FBS from 92 to 115.    - Continue Atorva 40 mg/d.s      See Dr. Maryana Álvarez in 6 months.        ATTENTION:   This medical record was transcribed using an electronic medical records/speech recognition system. Although proofread, it may and can contain electronic, spelling and other errors. Corrections may be executed at a later time. Please feel free to contact us for any clarifications as needed. Subjective:     Sina Kussmaul, a 73y.o. year-old who presents for followup. He has known history of CAD status post PCI to RCA, COPD, atrial fibrillation, hypertension, dyslipidemia and ascending aortic aneurysm. Is returning today for follow-up. He used to see Dr. Reed Velez in the past.  Currently has no symptoms of palpitations or lightheadedness. He occasionally will get chest pain which improved with nitroglycerin. He uses oxygen 2 and half liters per minute on a continuous basis. EKG in our office today demonstrate atrial fibrillation with nonspecific ST changes with heart rate of 99 bpm    Exam:     Physical Exam:  Visit Vitals  /76 (BP 1 Location: Left arm, BP Patient Position: Sitting, BP Cuff Size: Adult)   Pulse 96   Ht 5' 10\" (1.778 m)   Wt 125 lb (56.7 kg)   SpO2 96%   BMI 17.94 kg/m²     General appearance - alert, well appearing, and in no distress  Mental status - affect appropriate to mood  Eyes - sclera anicteric, moist mucous membranes  Neck - supple, no significant adenopathy  Chest - clear to auscultation, no wheezes, rales or rhonchi  Heart - normal rate, regular rhythm, normal S1, S2, no murmurs, rubs, clicks or gallops  Abdomen - soft, nontender, nondistended, no masses or organomegaly  Extremities - peripheral pulses normal, no pedal edema  Skin - normal coloration  no rashes    Medications:     Current Outpatient Medications   Medication Sig    atorvastatin (LIPITOR) 40 mg tablet Take 1 Tablet by mouth daily.  apixaban (Eliquis) 5 mg tablet Take 1 Tablet by mouth two (2) times a day.     dilTIAZem ER (CARDIZEM CD) 240 mg capsule TAKE 1 CAPSULE BY MOUTH EVERY DAY    nitroglycerin (NITROSTAT) 0.4 mg SL tablet 1 Tab by SubLINGual route every five (5) minutes as needed for Chest Pain. Up to 3 doses.  aspirin 81 mg chewable tablet Take 81 mg by mouth nightly.  omega 3-DHA-EPA-fish oil 1,000 mg (120 mg-180 mg) capsule Take 2 Caps by mouth daily.  glycopyrrolate-formoterol (BEVESPI AEROSPHERE) 9-4.8 mcg HFAA Take 2 Puffs by inhalation two (2) times a day.  budesonide (PULMICORT) 0.5 mg/2 mL nbsp 500 mcg by Nebulization route two (2) times a day.  montelukast (SINGULAIR) 10 mg tablet Take 10 mg by mouth nightly.  multivitamin (ONE A DAY) tablet Take 1 tablet by mouth daily.  albuterol (PROVENTIL VENTOLIN) 2.5 mg /3 mL (0.083 %) nebulizer solution 2.5 mg by Nebulization route every four (4) hours as needed for Wheezing or Shortness of Breath.  guaiFENesin (MUCINEX) 1,200 mg TM12 ER tablet Take 1,200 mg by mouth two (2) times a day.  cholecalciferol (VITAMIN D3) 1,000 unit tablet Take 1,000 Units by mouth nightly.  cholecalciferol (VITAMIN D3) 1,000 unit tablet Take 2,000 Units by mouth daily. No current facility-administered medications for this visit. Diagnostic Data Review:       CATH: 2/12/2014: L Main: MLI, LAD: Mid 50%; MLI; Med size; D1 and D2 - MLI (small),   LCflex: Med ; Prox 40%; Mid 40% OM1 - med; distally OM1 divides to 3 branches (prox branch - med)- distal 2 branches - small; RCA: Med; Mid 99%; Distally 40% PDA and PLB - small to med, LVEDP: 11. LVEF: 45%; Ant HK, Inf HK, no signif grad across AV   --- MARCO (Resolute 3 x 22) -> RCA   ECHO: 2/12/14: EF 50%, mild HK basal-mid anteroseptal and basal-mid inferior wall(s). mild MR/TR     Health fair screening 3/13/15, carotid duplex showed mild stenosis, aortic duplex normal, ZENON normal, EKG normal   Echo 6/24/15 - EF 55%, normal wall motion   Holter 7/23/15 - SR , rare episodes of AFL/AF  CT Heart Scan - Calcium score 2041, incidental pulmonary findings (see full report). Echo 4/3/17 - EF 55%. No WMA. Grade 1 diastolic dysfunction.     Dobutamine cardiolite 10/12/17 - normal perfusion, EF 61%, but new AF noted at rest, persistent with stress  EKG 12/4/18 - AF 70s      Lab Review:     Lab Results   Component Value Date/Time    Cholesterol, total 156 05/21/2019 02:26 PM    HDL Cholesterol 80 05/21/2019 02:26 PM    HDL Cholesterol 80 05/21/2019 02:26 PM    LDL, calculated 56 04/09/2018 12:18 PM    Triglyceride 61 05/21/2019 02:26 PM    CHOL/HDL Ratio 3.0 02/13/2014 03:00 AM     Lab Results   Component Value Date/Time    Creatinine 0.76 02/22/2021 03:05 PM     Lab Results   Component Value Date/Time    BUN 15 02/22/2021 03:05 PM     Lab Results   Component Value Date/Time    Potassium 3.7 02/22/2021 03:05 PM     No results found for: HBA1C, EZW2GDWY, XDT0QSRJ  Lab Results   Component Value Date/Time    HGB 8.3 (L) 02/22/2021 03:05 PM     Lab Results   Component Value Date/Time    PLATELET 290 47/40/2590 03:05 PM     No results for input(s): CPK, CKMB, TROIQ in the last 72 hours. No lab exists for component: CKQMB, CPKMB             ___________________________________________________    Daily Elias.  Gopi Pathak MD, [facility] [place]

## 2022-02-28 ENCOUNTER — DOCUMENTATION ONLY (OUTPATIENT)
Dept: CARDIOLOGY CLINIC | Age: 72
End: 2022-02-28

## 2022-02-28 ENCOUNTER — HOSPITAL ENCOUNTER (OUTPATIENT)
Dept: LAB | Age: 72
Discharge: HOME | End: 2022-02-28
Payer: MEDICARE

## 2022-02-28 ENCOUNTER — TRANSCRIBE ORDER (OUTPATIENT)
Dept: REGISTRATION | Age: 72
End: 2022-02-28

## 2022-02-28 DIAGNOSIS — Z01.812 PRE-PROCEDURAL LABORATORY EXAMINATIONS: Primary | ICD-10-CM

## 2022-02-28 DIAGNOSIS — Z01.812 PRE-PROCEDURAL LABORATORY EXAMINATIONS: ICD-10-CM

## 2022-02-28 PROCEDURE — U0005 INFEC AGEN DETEC AMPLI PROBE: HCPCS

## 2022-03-01 LAB
SARS-COV-2, XPLCVT: NOT DETECTED
SOURCE, COVRS: NORMAL

## 2022-03-02 NOTE — PROGRESS NOTES
[address]  Endoscopy Preprocedure Instructions      1. On the day of your surgery, please report to registration located on the 2nd floor of the  [facility]. yes    2. You must have a responsible adult to drive you to the hospital, stay at the hospital during your procedure and drive you home. If they leave your procedure will not be started (no exceptions). yes    3. Do not have anything to eat or drink (including water, gum, mints, coffee, and juice) after midnight. This does not apply to the medications you were instructed to take by your physician. yes  If you are currently taking Plavix, Coumadin, Aspirin, or other blood-thinning agents, contact your physician for special instructions. yes    4. If you are having a procedure that requires bowel prep: We recommend that you have only clear liquids the day before your procedure and begin your bowel prep by 5:00 pm.  You may continue to drink clear liquids until midnight. If for any reason you are not able to complete your prep please notify your physician immediately. yes    5. Have a list of all current medications, including vitamins, herbal supplements and any other over the counter medications. yes    6. If you wear glasses, contacts, dentures and/or hearing aids, they may be removed prior to procedure, please bring a case to store them in. yes    7. You should understand that if you do not follow these instructions your procedure may be cancelled. If your physical condition changes (I.e. fever, cold or flu) please contact your doctor as soon as possible. 8. It is important that you be on time. If for any reason you are unable to keep your appointment please call [phone] the day of or your physicians office prior to your scheduled procedure    9.  Have you received your COVID Vaccine? yes If no, you will need to receive a COVID test/swab here at [address] [place] [address] the [facility] [place] Monday - Friday 8a - 11am. There are no Saturday or Sunday swabbing at any [facility]. (patient verbalizes understanding) yes

## 2022-03-03 ENCOUNTER — ANESTHESIA (OUTPATIENT)
Dept: ENDOSCOPY | Age: 72
End: 2022-03-03
Payer: MEDICARE

## 2022-03-03 ENCOUNTER — ANESTHESIA EVENT (OUTPATIENT)
Dept: ENDOSCOPY | Age: 72
End: 2022-03-03
Payer: MEDICARE

## 2022-03-03 ENCOUNTER — HOSPITAL ENCOUNTER (OUTPATIENT)
Age: 72
Setting detail: OUTPATIENT SURGERY
Discharge: HOME | End: 2022-03-03
Attending: INTERNAL MEDICINE | Admitting: INTERNAL MEDICINE
Payer: MEDICARE

## 2022-03-03 ENCOUNTER — APPOINTMENT (OUTPATIENT)
Dept: CT IMAGING | Age: 72
End: 2022-03-03
Attending: INTERNAL MEDICINE
Payer: MEDICARE

## 2022-03-03 VITALS
DIASTOLIC BLOOD PRESSURE: 71 MMHG | SYSTOLIC BLOOD PRESSURE: 116 MMHG | BODY MASS INDEX: 17.11 KG/M2 | HEART RATE: 94 BPM | HEIGHT: 70 IN | OXYGEN SATURATION: 95 % | RESPIRATION RATE: 20 BRPM | WEIGHT: 119.49 LBS | TEMPERATURE: 98.2 F

## 2022-03-03 LAB
ALBUMIN SERPL-MCNC: 3.2 G/DL (ref 3.5–5)
ALBUMIN/GLOB SERPL: 1 {RATIO} (ref 1.1–2.2)
ALP SERPL-CCNC: 89 U/L (ref 45–117)
ALT SERPL-CCNC: 28 U/L (ref 12–78)
ANION GAP SERPL CALC-SCNC: 2 MMOL/L (ref 5–15)
AST SERPL-CCNC: 23 U/L (ref 15–37)
BASOPHILS # BLD: 0 K/UL (ref 0–0.1)
BASOPHILS NFR BLD: 0 % (ref 0–1)
BILIRUB SERPL-MCNC: 0.6 MG/DL (ref 0.2–1)
BUN SERPL-MCNC: 12 MG/DL (ref 6–20)
BUN/CREAT SERPL: 19 (ref 12–20)
CALCIUM SERPL-MCNC: 9.6 MG/DL (ref 8.5–10.1)
CEA SERPL-MCNC: 6.6 NG/ML
CHLORIDE SERPL-SCNC: 105 MMOL/L (ref 97–108)
CO2 SERPL-SCNC: 37 MMOL/L (ref 21–32)
CREAT BLD-MCNC: 0.7 MG/DL (ref 0.6–1.3)
CREAT SERPL-MCNC: 0.62 MG/DL (ref 0.7–1.3)
DIFFERENTIAL METHOD BLD: ABNORMAL
EOSINOPHIL # BLD: 0.1 K/UL (ref 0–0.4)
EOSINOPHIL NFR BLD: 1 % (ref 0–7)
ERYTHROCYTE [DISTWIDTH] IN BLOOD BY AUTOMATED COUNT: 14.7 % (ref 11.5–14.5)
GLOBULIN SER CALC-MCNC: 3.3 G/DL (ref 2–4)
GLUCOSE SERPL-MCNC: 110 MG/DL (ref 65–100)
HCT VFR BLD AUTO: 42.6 % (ref 36.6–50.3)
HGB BLD-MCNC: 13.8 G/DL (ref 12.1–17)
IMM GRANULOCYTES # BLD AUTO: 0 K/UL (ref 0–0.04)
IMM GRANULOCYTES NFR BLD AUTO: 0 % (ref 0–0.5)
LYMPHOCYTES # BLD: 0.9 K/UL (ref 0.8–3.5)
LYMPHOCYTES NFR BLD: 9 % (ref 12–49)
MCH RBC QN AUTO: 32.5 PG (ref 26–34)
MCHC RBC AUTO-ENTMCNC: 32.4 G/DL (ref 30–36.5)
MCV RBC AUTO: 100.5 FL (ref 80–99)
MONOCYTES # BLD: 0.8 K/UL (ref 0–1)
MONOCYTES NFR BLD: 9 % (ref 5–13)
NEUTS SEG # BLD: 7.9 K/UL (ref 1.8–8)
NEUTS SEG NFR BLD: 81 % (ref 32–75)
NRBC # BLD: 0 K/UL (ref 0–0.01)
NRBC BLD-RTO: 0 PER 100 WBC
PLATELET # BLD AUTO: 254 K/UL (ref 150–400)
PMV BLD AUTO: 9.5 FL (ref 8.9–12.9)
POTASSIUM SERPL-SCNC: 3.4 MMOL/L (ref 3.5–5.1)
PROT SERPL-MCNC: 6.5 G/DL (ref 6.4–8.2)
RBC # BLD AUTO: 4.24 M/UL (ref 4.1–5.7)
SODIUM SERPL-SCNC: 144 MMOL/L (ref 136–145)
WBC # BLD AUTO: 9.7 K/UL (ref 4.1–11.1)

## 2022-03-03 PROCEDURE — 82565 ASSAY OF CREATININE: CPT

## 2022-03-03 PROCEDURE — 36415 COLL VENOUS BLD VENIPUNCTURE: CPT

## 2022-03-03 PROCEDURE — 77030021593 HC FCPS BIOP ENDOSC BSC -A: Performed by: INTERNAL MEDICINE

## 2022-03-03 PROCEDURE — 74011000258 HC RX REV CODE- 258: Performed by: NURSE ANESTHETIST, CERTIFIED REGISTERED

## 2022-03-03 PROCEDURE — 2709999900 HC NON-CHARGEABLE SUPPLY: Performed by: INTERNAL MEDICINE

## 2022-03-03 PROCEDURE — 76060000032 HC ANESTHESIA 0.5 TO 1 HR: Performed by: INTERNAL MEDICINE

## 2022-03-03 PROCEDURE — 88305 TISSUE EXAM BY PATHOLOGIST: CPT

## 2022-03-03 PROCEDURE — 82378 CARCINOEMBRYONIC ANTIGEN: CPT

## 2022-03-03 PROCEDURE — 80053 COMPREHEN METABOLIC PANEL: CPT

## 2022-03-03 PROCEDURE — 74011000250 HC RX REV CODE- 250: Performed by: NURSE ANESTHETIST, CERTIFIED REGISTERED

## 2022-03-03 PROCEDURE — 71260 CT THORAX DX C+: CPT

## 2022-03-03 PROCEDURE — 74011000636 HC RX REV CODE- 636: Performed by: INTERNAL MEDICINE

## 2022-03-03 PROCEDURE — 76040000007: Performed by: INTERNAL MEDICINE

## 2022-03-03 PROCEDURE — 85025 COMPLETE CBC W/AUTO DIFF WBC: CPT

## 2022-03-03 PROCEDURE — 88360 TUMOR IMMUNOHISTOCHEM/MANUAL: CPT

## 2022-03-03 PROCEDURE — 77030003657 HC NDL SCLER BSC -B: Performed by: INTERNAL MEDICINE

## 2022-03-03 PROCEDURE — 74011250636 HC RX REV CODE- 250/636: Performed by: NURSE ANESTHETIST, CERTIFIED REGISTERED

## 2022-03-03 RX ORDER — FLUMAZENIL 0.1 MG/ML
0.2 INJECTION INTRAVENOUS
Status: DISCONTINUED | OUTPATIENT
Start: 2022-03-03 | End: 2022-03-03 | Stop reason: HOSPADM

## 2022-03-03 RX ORDER — PROPOFOL 10 MG/ML
INJECTION, EMULSION INTRAVENOUS AS NEEDED
Status: DISCONTINUED | OUTPATIENT
Start: 2022-03-03 | End: 2022-03-03 | Stop reason: HOSPADM

## 2022-03-03 RX ORDER — PROPOFOL 10 MG/ML
INJECTION, EMULSION INTRAVENOUS
Status: DISCONTINUED | OUTPATIENT
Start: 2022-03-03 | End: 2022-03-03 | Stop reason: HOSPADM

## 2022-03-03 RX ORDER — LIDOCAINE HYDROCHLORIDE 20 MG/ML
INJECTION, SOLUTION EPIDURAL; INFILTRATION; INTRACAUDAL; PERINEURAL AS NEEDED
Status: DISCONTINUED | OUTPATIENT
Start: 2022-03-03 | End: 2022-03-03 | Stop reason: HOSPADM

## 2022-03-03 RX ORDER — SODIUM CHLORIDE 9 MG/ML
50 INJECTION, SOLUTION INTRAVENOUS CONTINUOUS
Status: DISCONTINUED | OUTPATIENT
Start: 2022-03-03 | End: 2022-03-03 | Stop reason: HOSPADM

## 2022-03-03 RX ORDER — NALOXONE HYDROCHLORIDE 0.4 MG/ML
0.4 INJECTION, SOLUTION INTRAMUSCULAR; INTRAVENOUS; SUBCUTANEOUS
Status: DISCONTINUED | OUTPATIENT
Start: 2022-03-03 | End: 2022-03-03 | Stop reason: HOSPADM

## 2022-03-03 RX ORDER — PHENYLEPHRINE HCL IN 0.9% NACL 0.4MG/10ML
SYRINGE (ML) INTRAVENOUS AS NEEDED
Status: DISCONTINUED | OUTPATIENT
Start: 2022-03-03 | End: 2022-03-03 | Stop reason: HOSPADM

## 2022-03-03 RX ORDER — MIDAZOLAM HYDROCHLORIDE 1 MG/ML
.25-5 INJECTION, SOLUTION INTRAMUSCULAR; INTRAVENOUS
Status: DISCONTINUED | OUTPATIENT
Start: 2022-03-03 | End: 2022-03-03 | Stop reason: HOSPADM

## 2022-03-03 RX ORDER — DEXTROMETHORPHAN/PSEUDOEPHED 2.5-7.5/.8
1.2 DROPS ORAL
Status: DISCONTINUED | OUTPATIENT
Start: 2022-03-03 | End: 2022-03-03 | Stop reason: HOSPADM

## 2022-03-03 RX ORDER — EPINEPHRINE 0.1 MG/ML
1 INJECTION INTRACARDIAC; INTRAVENOUS
Status: DISCONTINUED | OUTPATIENT
Start: 2022-03-03 | End: 2022-03-03 | Stop reason: HOSPADM

## 2022-03-03 RX ORDER — SODIUM CHLORIDE 9 MG/ML
INJECTION, SOLUTION INTRAVENOUS
Status: DISCONTINUED | OUTPATIENT
Start: 2022-03-03 | End: 2022-03-03 | Stop reason: HOSPADM

## 2022-03-03 RX ORDER — ATROPINE SULFATE 0.1 MG/ML
0.4 INJECTION INTRAVENOUS
Status: DISCONTINUED | OUTPATIENT
Start: 2022-03-03 | End: 2022-03-03 | Stop reason: HOSPADM

## 2022-03-03 RX ADMIN — PROPOFOL INJECTABLE EMULSION 50 MG: 10 INJECTION, EMULSION INTRAVENOUS at 14:26

## 2022-03-03 RX ADMIN — LIDOCAINE HYDROCHLORIDE 50 MG: 20 INJECTION, SOLUTION INTRAVENOUS at 14:26

## 2022-03-03 RX ADMIN — PROPOFOL 75 MCG/KG/MIN: 10 INJECTION, EMULSION INTRAVENOUS at 14:40

## 2022-03-03 RX ADMIN — Medication 80 MCG: at 14:40

## 2022-03-03 RX ADMIN — PROPOFOL INJECTABLE EMULSION 30 MG: 10 INJECTION, EMULSION INTRAVENOUS at 14:33

## 2022-03-03 RX ADMIN — SODIUM CHLORIDE: 9 INJECTION, SOLUTION INTRAVENOUS at 14:24

## 2022-03-03 RX ADMIN — IOPAMIDOL 100 ML: 755 INJECTION, SOLUTION INTRAVENOUS at 16:26

## 2022-03-03 RX ADMIN — Medication 80 MCG: at 14:54

## 2022-03-03 RX ADMIN — Medication 80 MCG: at 15:00

## 2022-03-03 NOTE — PROCEDURES
Dima Foreman M.D.  [phone]           3/3/2022                EGD Operative Report  Melissa Shukla  :  1950  [facility] Medical Record Number:  [id number]      Indication:  Abnormal weight loss     : Lenora Farrell MD    Referring Provider:  Lilia Garner MD      Anesthesia/Sedation:  MAC anesthesia    Airway assessment: No airway problems anticipated    Pre-Procedural Exam:      Airway: clear, no airway problems anticipated  Heart: RRR, without gallops or rubs  Lungs: clear bilaterally without wheezes, crackles, or rhonchi  Abdomen: soft, nontender, nondistended, bowel sounds present  Mental Status: awake, alert and oriented to person, place and time       Procedure Details     After infomed consent was obtained for the procedure, with all risks and benefits of procedure explained the patient was taken to the endoscopy suite and placed in the left lateral decubitus position. Following sequential administration of sedation as per above, the endoscope was inserted into the mouth and advanced under direct vision to second portion of the duodenum. A careful inspection was made as the gastroscope was withdrawn, including a retroflexed view of the proximal stomach; findings and interventions are described below. Findings:   Esophagus:Erythema noted at the Z-line with one salmon colored islet noted about 1 cm in size just above the GE-junction  Stomach: Small size hiatal hernia, otherwise mucosa within normal  Duodenum/jejunum: Mild scattered erythema seen as nodular, otherwise mucosa within normal.    Therapies:  none    Specimens: Stomach, duodenum, and GE-juntion           Complications:   None; patient tolerated the procedure well. EBL:  None.            Impression:    Possible short segment Bills's esophagus                           Small hiatal hernia                           Mild duodenitis    Recommendations:    -Await pathology.  -Continue with anti-reflux measures    Molina Lehman MD

## 2022-03-03 NOTE — ANESTHESIA POSTPROCEDURE EVALUATION
Procedure(s):  COLONOSCOPY  ESOPHAGOGASTRODUODENOSCOPY (EGD)  ESOPHAGOGASTRODUODENAL (EGD) BIOPSY  COLON BIOPSY  INJECTION. MAC    Anesthesia Post Evaluation      Multimodal analgesia: multimodal analgesia not used between 6 hours prior to anesthesia start to PACU discharge  Patient location during evaluation: PACU  Patient participation: complete - patient participated  Level of consciousness: awake  Pain management: adequate  Airway patency: patent  Anesthetic complications: no  Cardiovascular status: acceptable, blood pressure returned to baseline and hemodynamically stable  Respiratory status: acceptable  Hydration status: acceptable  Post anesthesia nausea and vomiting:  controlled      INITIAL Post-op Vital signs:   Vitals Value Taken Time   /77 03/03/22 1528   Temp 36.8 °C (98.2 °F) 03/03/22 1510   Pulse 94 03/03/22 1530   Resp 26 03/03/22 1530   SpO2 97 % 03/03/22 1530   Vitals shown include unvalidated device data.

## 2022-03-03 NOTE — DISCHARGE INSTRUCTIONS
[address] [facility]. Jennifer Scott M.D.  [phone]                 COLON and EGD DISCHARGE INSTRUCTIONS    3/3/2022    Lexi Gandara  :  1950  [place] Medical Record Number:  [id number]      DISCOMFORT:  Sore throat- throat lozenges or warm salt water gargle  Redness at IV site- apply warm compress to area; if redness or soreness persist- contact your physician  There may be a slight amount of blood passed from the rectum  Gaseous discomfort- walking, belching will help relieve any discomfort  You may not operate a vehicle for 12 hours  You may not engage in an occupation involving machinery or appliances for rest of today  You may not drink alcoholic beverages for at least 12 hours  Avoid making any critical decisions for at least 24 hour  DIET:   Regular diet. - however -  remember your colon is empty and a heavy meal will produce gas. Avoid these foods:  vegetables, fried / greasy foods, carbonated drinks for today     ACTIVITY:  You may  resume your normal daily activities it is recommended that you spend the remainder of the day resting -  avoid any strenuous activity and driving. CALL M.D. ANY SIGN OF:   Increasing pain, nausea, vomiting  Abdominal distension (swelling)  New increased bleeding (oral or rectal)  Fever (chills)  Pain in chest area  Bloody discharge from nose or mouth  Shortness of breath      Follow-up Instructions:   Call Dr. Lenora Tom if any questions at [phone]. Results of procedure / biopsy in 7 to 10 days, we will call you with these results. Your endoscopy showed a small size hiatal hernia, mild irritation at the bottom of the esophagus and in the duodenum. Biopsies were obtained. Your colonoscopy showed diverticulosis and a mass in the rectosigmoid area. Biopsies were obtained.  Will get CT scans and labs and will need referral to see a surgeon and oncologist.

## 2022-03-03 NOTE — ANESTHESIA PREPROCEDURE EVALUATION
Relevant Problems   RESPIRATORY SYSTEM   (+) Chronic obstructive pulmonary disease (HCC)   (+) Pneumonia      CARDIOVASCULAR   (+) Atrial fibrillation (HCC)   (+) Atrial flutter, paroxysmal (HCC)   (+) CAD (coronary artery disease), native coronary artery   (+) SVT (supraventricular tachycardia) (HCC)       Anesthetic History   No history of anesthetic complications            Review of Systems / Medical History  Patient summary reviewed, nursing notes reviewed and pertinent labs reviewed    Pulmonary    COPD: severe        Asthma        Neuro/Psych   Within defined limits           Cardiovascular    Hypertension          Past MI and CAD    Exercise tolerance: >4 METS     GI/Hepatic/Renal  Within defined limits              Endo/Other  Within defined limits           Other Findings   Comments: Hx of TB, pneumonia, chronic respiratory failure on home O2.            Physical Exam    Airway  Mallampati: II    Neck ROM: normal range of motion   Mouth opening: Normal     Cardiovascular  Regular rate and rhythm,  S1 and S2 normal,  no murmur, click, rub, or gallop  Rhythm: regular  Rate: normal         Dental  No notable dental hx       Pulmonary  Breath sounds clear to auscultation               Abdominal  GI exam deferred       Other Findings            Anesthetic Plan    ASA: 3  Anesthesia type: MAC          Induction: Intravenous  Anesthetic plan and risks discussed with: Patient

## 2022-03-03 NOTE — PROCEDURES
Gracie Booker M.D.  [phone]            3/3/2022          Colonoscopy Operative Report  Pinky Polk  :  1950  [place] Medical Record Number:  [id number]      Indications:    Diarrhea     :  Eleuterio Jim MD    Referring Provider: Bruce Pillai MD    Sedation:  MAC anesthesia    Pre-Procedural Exam:      Airway: clear,  No airway problems anticipated  Heart: RRR, without gallops or rubs  Lungs: clear bilaterally without wheezes, crackles, or rhonchi  Abdomen: soft, nontender, nondistended, bowel sounds present  Mental Status: awake, alert and oriented to person, place and time     Procedure Details:  After informed consent was obtained with all risks and benefits of procedure explained and preoperative exam completed, the patient was taken to the endoscopy suite and placed in the left lateral decubitus position. Upon sequential sedation as per above, a digital rectal exam was performed. The Olympus videocolonoscope  was inserted in the rectum and carefully advanced to the cecum, which was identified by the ileocecal valve and appendiceal orifice. The quality of preparation was good. The colonoscope was slowly withdrawn with careful inspection and evaluation between folds. Retroflexion in the rectum was performed. Findings:   Terminal Ileum: not intubated  Cecum: normal  Ascending Colon: normal  Transverse Colon: normal  Descending Colon: mild diverticulosis; Sigmoid: Severe diverticulosis with tortuous lumen. Evidence of an ulcerated fungating mass at the rectosigmoid junction about 10 cm in size, taking two thirds of the cirumference and near obstructing the lumen. Seen at about 10 cm from the dentate line. Rectum: Normal mucosa, small internal hemorrhoids    Interventions:  Hungary ink tattoo injected in the distal sigmoid colon proximal to the mass    Specimen Removed:  Rectosigmoid mass    Complications: None. EBL:  None.     Impression: Rectosigmoid mass as described above                         SIgmoid diverticulosis    Recommendations:  - Will get CT scan of chest, abdomen and pelvis  - Will get labs  - Will need referral to colo-rectal surgery and oncology    Discharge Disposition:  Home in the company of a  when able to ambulate.     Ivan Jensen MD  3/3/2022  3:09 PM

## 2022-03-03 NOTE — PROGRESS NOTES
Miko Lakhani  1950  [id number]    Situation:  Verbal report received from: Apolinar Archuleta RN   Procedure: Procedure(s):  COLONOSCOPY  ESOPHAGOGASTRODUODENOSCOPY (EGD)  ESOPHAGOGASTRODUODENAL (EGD) BIOPSY  COLON BIOPSY  INJECTION    Background:    Preoperative diagnosis: BLOOD IN STOOL, UNINTENTIONAL WEIGHT LOSS, ABDOMINAL PAIN  Postoperative diagnosis: Mild Duodentitis   Possible Bills's esophagus  Hiatial hernia  Sigmoid mass  Diverticulosis    :  Dr. Manohar Dai  Assistant(s): Endoscopy Technician-1: Matthias Mercado  Endoscopy RN-1: Freida Guidry RN    Specimens:   ID Type Source Tests Collected by Time Destination   1 : gastric bx Preservative Gastric  Angela Jones MD 3/3/2022 1431 Pathology   2 : Duodenum bx Preservative Duodenum  Angela Jones MD 3/3/2022 1431 Pathology   3 : GE junction Preservative GE Rogerio Cohn MD 3/3/2022 1433 Pathology   4 : sigmoid mass Preservative Sigmoid  Angela Jones MD 3/3/2022 1453 Pathology     H. Pylori  no    Assessment:    Anesthesia gave intra-procedure sedation and medications, see anesthesia flow sheet no    Intravenous fluids: NS@ KVO     Vital signs stable     Abdominal assessment: round and soft     Recommendation:  Discharge patient per MD order.   Return to floor  Family or Friend   Permission to share finding with family or friend yes

## 2022-03-03 NOTE — H&P
The patient is a 73year old male who presents with a complaint of Blood in stool. This is a telehealth appointment. [facility]. Hx of COPD on 2.5L oxygen, s/p thoracotomy in 2012, CAD, MI s/p stent, Afib. Recent chest CT showed moderate to severe emphysema, extensive three vessel coronary calcifications. Large amount of stool noted in colon. Was seen by our office in 12/2020 for bleeding and was recommended to have EGD/colonoscopy but were never done. Today, he states he was concerned previously about his COPD/respiratory failure in 2020 - thats why he never did procedures. He has had ongoing issues with loose stools (this is a change for him) and blood in stool. He states he is having a lot of bright red bleeding. He is havign 6+ BMs a day. No nocturnal BMs. Dr. Raymond Triana is cardiologist.    Review of our records shows he has lost over 30 pounds since last visit in 2020. Problem List/Past Medical ([facility]; 2/25/2022 3:40 PM)  Nilay Messier red stool (R19.5)   Started with dark tarry stools about a month ago and have been intermittent in nature. Denies heartburn or reflux. Denies history of hemorrhoids. Prior EGD/Colonoscopy none. Denies family history of colon cancer. Anticoagulant long-term use (Z79.01)   He takes Eliquis and Diltiazem. History of MI 2014 and A-fib. Followed by Cardiologist Dr Brenda Maciel. COPD (chronic obstructive pulmonary disease) (J44.9)   He is on O2 2.5L for COPD. He takes both prednisone and budesonide. Follow[facility] by [facility]. Hypertension    COPD    Heart Disease    Problems Reconciled      Past Surgical History ([facility]; 2/25/2022 3:40 PM)  H/O heart artery stent (Z95.5)    EXCISION OF LEFT UPPER LUNG LOBE (62189)      Allergies ([facility]; 2/25/2022 3:40 PM)  No Known Drug Allergies   [12/16/2020]:  No Known Allergies   [12/16/2020]:   Allergies Reconciled      [facility])  Albuterol Sulfate  (Inhalation daily) Specific strength unknown - Active. Budesonide  (Inhalation daily) Specific strength unknown - Active. Diltiazem HCl  (240MG Capsule ER 24HR, 1 tab Oral daily) Active. predniSONE  (10MG Tablet, 1 tab Oral daily) Active. Eliquis  (5MG Tablet, 1 tab Oral BID) Active. Montelukast Sodium  (10MG Tablet, 1 tab Oral daily) Active. Atorvastatin Calcium  (40MG Tablet, 1 tab Oral daily) Active. Medications Reconciled     Social History Shelley William; 2/25/2022 3:41 PM)  Marital status   Single. Employment status   Retired. Tobacco Use   Former smoker. Alcohol Use   Occasional alcohol use. Blood Transfusion   No.    Health Maintenance History ([facility] William; 2/25/2022 3:41 PM)  COVID-19 vaccine   [2021]:  Flu Vaccine   [2021]:        Review of Systems Shelley William; 2/25/2022 3:40 PM)  General Present- Chronic Fatigue. Not Present- Poor Appetite, Weight Gain and Weight Loss. Skin Not Present- Itching, Rash and Skin Color Changes. HEENT Present- Tinnitus. Not Present- Hearing Loss and Vertigo. Respiratory Not Present- Difficulty Breathing and TB exposure. Cardiovascular Not Present- Chest Pain, Use of Antibiotics before Dental Procedures and Use of Blood Thinners. Gastrointestinal Present- See HPI. Musculoskeletal Not Present- Arthritis, Hip Replacement Surgery and Knee Replacement Surgery. Neurological Not Present- Weakness. Psychiatric Not Present- Depression. Endocrine Not Present- Diabetes and Thyroid Problems. Hematology Not Present- Anemia. Vitals Shelley William; 2/25/2022 3:42 PM)  2/25/2022 3:41 PM  Weight: 125 lb   Height: 70 in   Body Surface Area: 1.71 m²   Body Mass Index: 17.94 kg/m²                Assessment & Plan (Ariel Noriega.  Robin LINO; 2/25/2022 4:39 PM)  Blood in stool (K92.1)  Impression: I recommended that we proceed with colonoscopy for further evaluation of rectal bleeding/blood in stool to rule out colon cancer, polyps, colitis/proctitis, etc.    I am concerned for malignancy given his change in bowel habits, weight loss, and bleeding. Large amount of stool in colon and I am somewhat concerned for malignant obstruction w/ overflow diarrhea. No plans for stool studies at this time - consider if colonoscopy does not demonstrate source for loose stools. Getting labs to check for anemia/JUANA. Needs extended bowel prep. Current Plans  Colonoscopy (89046) (Discussed risks and benefits with the patient to include:; perforation, post polypectomy, or post biopsy bleeding, missed lesions, and sedation reactions.)  CBC, PLATELETS & AUT DIFF  METABOLIC PANEL, COMPREHENSIVE  IRON + TIBC (86378)  FERRITIN (36348)  Unintentional weight loss (R63.4)  Impression: Getting EGD w/ his bleeding and unintentional weight loss. Current Plans  EGD W/BIOPSY (19228)  Abdominal pain, generalized (R10.84)  Impression: Plan as above. COPD (chronic obstructive pulmonary disease) (J44.9) <RPQ921>  Story: He is on O2 2.5L for COPD. He takes both prednisone and budesonide. Followed by [facility]. Impression: Procedure will be in the hospital setting secondary to supplemental O2. Requesting pulmonary clearance from pulmonologist.  Coronary artery disease (I25.10)  Impression: Requesting cardiac clearance from cardiologist.    Recommended patient stop taking Eliquis 2 days prior to procedure. Current Plans  Due to this being a TeleHealth encounter (During AYFYQ-83 public health emergency), evaluation of the following organ systems was limited: Vitals/Constitutional/EENT/Resp/CV/GI//MS/Neuro/Skin/Heme-Lymph-Imm.  Pursuant to the emergency declaration under the [address], [address] [place] [address] waiver authority and the [employer] and [employer] Act, this Virtual Visit was conducted with patient's (and/or legal guardian's) consent, to reduce the risk of exposure to COVID-19 and provide necessary medical care. Services were provided through a video synchronous discussion virtually to substitute for in-person encounter.         Signed electronically by HOLLAND Serna (2/25/2022 4:40 PM)

## 2022-03-03 NOTE — PROGRESS NOTES
Endoscopy discharge instructions have been reviewed and given to patient. The patient verbalized understanding and acceptance of instructions. Dr. Zaida Yang discussed with patient procedure findings and next steps.

## 2022-03-08 NOTE — PROGRESS NOTES
[facility] [place] at [address], [address] [facility] [address] [facility]  W: [phone]  F: [phone] Patient ID  Name: Michele Leblanc  YOB: 1950  MRN: [id number]  Referring Provider:   Dr. Jhoana Becerra MD  Primary Care Provider:   Ruben Hanson MD       HEMATOLOGY/MEDICAL ONCOLOGY  NOTE   Date of Visit: 03/09/22  Reason for Evaluation:     Chief Complaint   Patient presents with    New Patient     Luz Maria Ching is a pleasant 73year old male who presents as a new patient for colon cancer. He denies pain       Hematology/Oncology Summary:  Please review original records for clinical decision making. This summary highlights focused aspects of patient's ongoing care and may have a recurring section in notes with either updates or remain unchanged as a longitudinal care summary. --------------------------------------------------------------------------------------------------------------------------------------------------------------------------------------------------------------------------  DIAGNOSIS:   Adenocarcinoma Rectosigmoid junction    ORIGINAL STAGING:   Unstaged. SITES OF DISEASE:  CT CHEST ABD PELV W CONT 3/3/2022 FINDINGS: CHEST W 1. Severe emphysema. Stable ascending aortic aneurysm. 2. Numerous hypoattenuating hepatic lesions appear unchanged in size and number, probable cysts. 3. Stable nonspecific 10 mm sized cortically-based lesion of inferior pole of right kidney. 4. Questioned area of mural thickening at rectosigmoid junction, possibly representing the mass in question. Sigmoid diverticulosis.    CURRENT TREATMENT:   Referral to Medical Oncology, Colorectal Surgery    PRIOR TREATMENT:   Colonoscopy 3/3/2022    GOALS OF CARE:   N/a until staging complete but goal is for curative intent.     PATHOLOGY:  Specimen #:[id number]   Collect: 3/3/2022   GE junction, biopsy:        Glandular mucosa with intestinal metaplasia and focal high-grade dysplasia   Squamous mucosa with squamous hyperplasia   4. Sigmoid mass, biopsy:        Colonic adenocarcinoma, moderately differentiated   Fragments of villous adenoma     PERTINENT CARE EVENTS   Bloody Stools in 2021   Colonoscopy in March 2022      Subjective:     History of Present Illness:     Abdulaziz Ramos is a 73 y.o. 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 This is a new problem. Has had bloody stools for about one year. Patient reports dealing with his chronic health issues. He is ambulatory. He reports that he has a scooter and a lift installed at home. He can walk out to his mailbox but after the round trip he is \"huffing and puffing. \" Patient reports adequate oral intake of food and hydration. Patient reports bloody stools. He notes constipation has improved some. . Denies any bowel or bladder problems otherwise. Patient denies any uncontrolled pain. Patient reports exertional shortness of breath.  -He is on iron since a fall he had last Winter. He states that he continues to take it.  -He thinks he had his Vitamin D level checked and was okay.     -  Past Medical History:   Diagnosis Date    Acute on chronic respiratory failure with hypoxemia ([address] 2/15/2019    Arrhythmia     Asthma     COPD (chronic obstructive pulmonary disease) (HCC)     severe    Hypertension     Insulin resistance 3/27/2014    Lung mass 9/2012    MAY resection, + mycobacterial orgs, neg malignancy    Melanoma ([address] 2007    Non-STEMI (non-ST elevated myocardial infarction) ([address] 2/14    On home O2 2.5 lpm continuously    since about 2014    Pneumonia     Snoring     Tinnitus     Tuberculosis     Vitamin D deficiency 3/27/2014      Past Surgical History:   Procedure Laterality Date    COLONOSCOPY N/A 3/3/2022    COLONOSCOPY [facility]cd[facility]iels [facility] at [facility] END[facility]COPY    HX OTHER SURGICAL      EXC MELANOMA RIGHT CHEEK    HX OTHER SURGICAL      NEEDLE BX LEFT LUNG    HX OTHER SURGICAL  2012    apical segmentectomy, MAY      Social History     Tobacco Use    Smoking status: Former Smoker     Packs/day: 0.25     Years: 40.00     Pack years: 10.00     Types: Cigarettes     Quit date:      Years since quittin.1    Smokeless tobacco: Never Used   Substance Use Topics    Alcohol use: Yes     Alcohol/week: 5.0 standard drinks     Types: 5 Cans of beer per week      Family History   Problem Relation Age of Onset    Glaucoma Mother     Dementia Mother     Cancer Mother         ? of cervical vs. endometrial    Heart Attack Father     Heart Disease Father     Colon Cancer Neg Hx     Breast Cancer Neg Hx     Ovarian Cancer Neg Hx     Prostate Cancer Neg Hx      Current Outpatient Medications   Medication Sig    atorvastatin (LIPITOR) 40 mg tablet Take 1 Tablet by mouth daily.  apixaban (Eliquis) 5 mg tablet Take 1 Tablet by mouth two (2) times a day.  dilTIAZem ER (CARDIZEM CD) 240 mg capsule TAKE 1 CAPSULE BY MOUTH EVERY DAY    nitroglycerin (NITROSTAT) 0.4 mg SL tablet 1 Tab by SubLINGual route every five (5) minutes as needed for Chest Pain. Up to 3 doses.  aspirin 81 mg chewable tablet Take 81 mg by mouth nightly.  cholecalciferol (VITAMIN D3) 1,000 unit tablet Take 1,000 Units by mouth nightly.  omega 3-DHA-EPA-fish oil 1,000 mg (120 mg-180 mg) capsule Take 2 Caps by mouth daily.  glycopyrrolate-formoterol (BEVESPI AEROSPHERE) 9-4.8 mcg HFAA Take 2 Puffs by inhalation two (2) times a day.  budesonide (PULMICORT) 0.5 mg/2 mL nbsp 500 mcg by Nebulization route two (2) times a day.  montelukast (SINGULAIR) 10 mg tablet Take 10 mg by mouth nightly.  multivitamin (ONE A DAY) tablet Take 1 tablet by mouth daily.  albuterol (PROVENTIL VENTOLIN) 2.5 mg /3 mL (0.083 %) nebulizer solution 2.5 mg by Nebulization route every four (4) hours as needed for Wheezing or Shortness of Breath.     guaiFENesin (MUCINEX) 1,200 mg TM12 ER tablet Take 1,200 mg by mouth two (2) times a day.  cholecalciferol (VITAMIN D3) 1,000 unit tablet Take 2,000 Units by mouth daily. No current facility-administered medications for this visit. No Known Allergies   -  Review of Systems provided by:patient  General: denies fever, denies chills and reports weight loss  Eyes: denies any acute vision loss and denies any eye pain  HEENT: denies trouble swallowing denies any epistaxis. Cardio: denies any chest pain and reports leg swelling  Resp: denies any hemoptysis. reports exertional shortness of breath. Abdomen: denies any abdominal pain, denies any nausea and denies any vomiting  MSK: denies any myalgias and denies any arthralgias  Skin: denies any rash and denies any itching  Lymph: denies any lymph node enlargement and denies any lymph node tenderness  Neuro: denies any headache and denies any seizure history  : Denies any dysuria. Denies any hematuria. Psych: denies depression and denies anxiety    Objective:     Visit Vitals  /81   Pulse 92   Resp 16   Ht 5' 10\" (1.778 m)   Wt 128 lb 3.2 oz (58.2 kg)   SpO2 93%   BMI 18.39 kg/m²     ECOG PS: 3- Capable of only limited selfcare; confined to bed or chair more than 50% of waking hours. Physical Exam  Constitutional: No acute distress. , Non-toxic appearance., Non-diaphoretic. and Chronic ill appearance. HENT: Normocephalic and atraumatic head. and Wearing a mask during COVID-19 precautions. Eyes: Normal Conjunctivae. Anicteric sclerae. Cardiovascular: S1,S2 auscultated. No pitting edema. No friction rub. Pulmonary: Normal Respiratory Effort. No wheezing. No rhonchi. No rales. Auscultated somewhat limited by supplemental oxygen background noise. Abdominal: Normal bowel sounds. Soft Abdomen to palpation. No abdominal tenderness. No guarding. No rebound tenderness. Skin: No jaundice. No rash. Musculoskeletal: No muscle pain on palpation. No temporal muscle wasting on inspection.   Lymph: No cervical, supraclavicular,axillary, or inguinal lymph node enlargement or tenderness. Neurological: Alert and oriented. No tremor on inspection. Normal Gait; wears oxygen. Psychiatric: mood normal. normal speech rate normal affect. Results:     I personally reviewed Epic EHR labs/results below:   Lab Results   Component Value Date/Time    WBC 9.7 03/03/2022 03:43 PM    HGB 13.8 03/03/2022 03:43 PM    HCT 42.6 03/03/2022 03:43 PM    PLATELET 797 63/25/4503 03:43 PM    .5 (H) 03/03/2022 03:43 PM    ABS. NEUTROPHILS 7.9 03/03/2022 03:43 PM     Lab Results   Component Value Date/Time    Sodium 144 03/03/2022 03:43 PM    Potassium 3.4 (L) 03/03/2022 03:43 PM    Chloride 105 03/03/2022 03:43 PM    CO2 37 (H) 03/03/2022 03:43 PM    Glucose 110 (H) 03/03/2022 03:43 PM    BUN 12 03/03/2022 03:43 PM    Creatinine 0.62 (L) 03/03/2022 03:43 PM    GFR est AA >60 03/03/2022 03:43 PM    GFR est non-AA >60 03/03/2022 03:43 PM    Calcium 9.6 03/03/2022 03:43 PM    Creatinine (POC) 0.70 03/03/2022 04:22 PM     Lab Results   Component Value Date/Time    Bilirubin, total 0.6 03/03/2022 03:43 PM    ALT (SGPT) 28 03/03/2022 03:43 PM    Alk. phosphatase 89 03/03/2022 03:43 PM    Protein, total 6.5 03/03/2022 03:43 PM    Albumin 3.2 (L) 03/03/2022 03:43 PM    Globulin 3.3 03/03/2022 03:43 PM     Lab Results   Component Value Date/Time    CEA 6.6 03/03/2022 03:43 PM       Assessment and Recommendations:     1. Rectosigmoid cancer [facility])  -Discussed with patient per NCCN guidelines the work-up for rectosigmoid cancer. We discussed that his disease needs staging. We discussed that a pelvic MRI has been ordered for him and this is needed to further determine his tumor status and any concerns for jas involvement. We discussed that per CT scans there have been no clear signs of distant metastatic disease. We discussed that I cannot exclude any occult microscopic metastasis.   He has already met with Dr. Wayne Knight in colorectal surgery clinic yesterday. Patient states that he is anticipating to undergo treatment with resection and requirement of an ostomy although is aware that he is to have a pelvic MRI. He does not know when this is scheduled yet. -Discussed with patient that we will discuss his case at our tumor board next week. Rectosigmoid Cancers can present a dilemma if they are to be treated more like colon cancers or rectal cancers and in some cases may even demonstrate more aggressive behavior (See  1. Stefany HELMS et al. Oncological outcome of carcinomas in the rectosigmoid junction compared to the upper rectum or sigmoid colon - A retrospective cohort study. Eur J Surg Oncol. 4219;64(05):1184-7302. 2. Chantel Bach, [place] L, Ubaldo RODAS, et al. A systematic review and meta-analysis comparing surgical and oncological outcomes of upper rectal, rectosigmoid and sigmoid tumours. Eur J Surg Oncol. 2021;47(9):1715-9425.)  Patient's tumor reportedly was 10cm from the dentate line on colonoscopy although was 10cm in length. Unclear if this is more of an upper rectal cancer or specifically a rectosigmoid junction cancer. Will need to pursue multidisciplinary discussion at tumor board. We discussed that due to his performance status being a Performance Status 3 ECOG that he typically would not be considered for IV chemotherapy especially in the presence of his comorbidities. Patient has reservations about taking chemotherapy as well. We discussed that we will reevaluate once we have further staging and tumor board discussion. Whether or not he could tolerate xeloda is another consideration. 2. Centrilobular emphysema ([address]  -Patient follows with pulmonology. He may need pulmonary evaluation prior to surgery. However, he recently underwent colonoscopy and upper endoscopy. I will defer this decision to his pulmonary providers.       3. Elevated CEA  -Discussed with patient that this is likely related to his rectosigmoid cancer. We discussed that he believes he was still smoking when this level was drawn. We discussed that that has some effect on the number but in general his CEA level is likely related to his cancer. 4. 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 Patient evaluated by Dr. Zoraida Garner. At the least he would likely need follow-up upper endoscopy. Will defer management to gastroenterology. We will request pathology perform microsatellite instability testing on his biopsied rectosigmoid cancer. 5. Bloody stools  -Patient still with bloody stools. He will need follow-up iron studies over time. Follow-up and Dispositions    · Return in about 2 weeks (around 3/23/2022).            Daniel Shea MD  Hematology/Medical Oncology Provider  [place] [address]  P: [phone]      Signed By:   Darcy Drake MD

## 2022-03-09 ENCOUNTER — OFFICE VISIT (OUTPATIENT)
Dept: ONCOLOGY | Age: 72
End: 2022-03-09
Payer: MEDICARE

## 2022-03-09 ENCOUNTER — TRANSCRIBE ORDER (OUTPATIENT)
Dept: SCHEDULING | Age: 72
End: 2022-03-09

## 2022-03-09 VITALS
BODY MASS INDEX: 18.35 KG/M2 | OXYGEN SATURATION: 93 % | SYSTOLIC BLOOD PRESSURE: 125 MMHG | WEIGHT: 128.2 LBS | DIASTOLIC BLOOD PRESSURE: 81 MMHG | HEART RATE: 92 BPM | RESPIRATION RATE: 16 BRPM | HEIGHT: 70 IN

## 2022-03-09 DIAGNOSIS — K92.1 BLOODY STOOLS: ICD-10-CM

## 2022-03-09 DIAGNOSIS — Q40.3 GASTRIC DYSPLASIA: ICD-10-CM

## 2022-03-09 DIAGNOSIS — R97.0 ELEVATED CEA: ICD-10-CM

## 2022-03-09 DIAGNOSIS — J43.2 CENTRILOBULAR EMPHYSEMA (HCC): ICD-10-CM

## 2022-03-09 DIAGNOSIS — C20 MALIGNANT NEOPLASM OF RECTUM (HCC): Primary | ICD-10-CM

## 2022-03-09 DIAGNOSIS — C19 RECTOSIGMOID CANCER (HCC): Primary | ICD-10-CM

## 2022-03-09 PROCEDURE — G8536 NO DOC ELDER MAL SCRN: HCPCS | Performed by: INTERNAL MEDICINE

## 2022-03-09 PROCEDURE — G8427 DOCREV CUR MEDS BY ELIG CLIN: HCPCS | Performed by: INTERNAL MEDICINE

## 2022-03-09 PROCEDURE — G8510 SCR DEP NEG, NO PLAN REQD: HCPCS | Performed by: INTERNAL MEDICINE

## 2022-03-09 PROCEDURE — G9711 PT HX TOT COL OR COLON CA: HCPCS | Performed by: INTERNAL MEDICINE

## 2022-03-09 PROCEDURE — 1101F PT FALLS ASSESS-DOCD LE1/YR: CPT | Performed by: INTERNAL MEDICINE

## 2022-03-09 PROCEDURE — G0463 HOSPITAL OUTPT CLINIC VISIT: HCPCS | Performed by: INTERNAL MEDICINE

## 2022-03-09 PROCEDURE — 99204 OFFICE O/P NEW MOD 45 MIN: CPT | Performed by: INTERNAL MEDICINE

## 2022-03-09 PROCEDURE — G8419 CALC BMI OUT NRM PARAM NOF/U: HCPCS | Performed by: INTERNAL MEDICINE

## 2022-03-09 NOTE — LETTER
3/11/2022    Patient: Lexi Gandara   YOB: 1950   Date of Visit: 3/9/2022     Robbie Puga MD  [facility]. [address]  Via Fax: [phone]    Dear Robbie Puga MD,      Thank you for referring Mr. Suzette Ray to [facility]  [email or web address] for evaluation. My notes for this consultation are attached. If you have questions, please do not hesitate to call me. I look forward to following your patient along with you.       Sincerely,    Stacie Montanez MD

## 2022-03-09 NOTE — PROGRESS NOTES
Chief Complaint   Patient presents with    New Patient     Roque Talbert is a pleasant 73year old male who presents as a new patient for colon cancer.  He denies pain

## 2022-03-10 PROBLEM — Q40.3 GASTRIC DYSPLASIA: Status: ACTIVE | Noted: 2022-03-10

## 2022-03-10 PROBLEM — R97.0 ELEVATED CEA: Status: ACTIVE | Noted: 2022-03-10

## 2022-03-10 PROBLEM — C19 RECTOSIGMOID CANCER (HCC): Status: ACTIVE | Noted: 2022-03-10

## 2022-03-10 PROBLEM — K92.1 BLOODY STOOLS: Status: ACTIVE | Noted: 2022-03-10

## 2022-03-10 PROBLEM — J43.2 CENTRILOBULAR EMPHYSEMA (HCC): Status: ACTIVE | Noted: 2022-03-10

## 2022-03-12 RX ORDER — DILTIAZEM HYDROCHLORIDE 240 MG/1
CAPSULE, COATED, EXTENDED RELEASE ORAL
Qty: 90 CAPSULE | Refills: 3 | Status: SHIPPED | OUTPATIENT
Start: 2022-03-12 | End: 2022-05-13

## 2022-03-14 ENCOUNTER — HOSPITAL ENCOUNTER (OUTPATIENT)
Dept: MRI IMAGING | Age: 72
Discharge: HOME | End: 2022-03-14
Attending: COLON & RECTAL SURGERY
Payer: MEDICARE

## 2022-03-14 VITALS — WEIGHT: 128 LBS | BODY MASS INDEX: 18.37 KG/M2

## 2022-03-14 DIAGNOSIS — C20 MALIGNANT NEOPLASM OF RECTUM (HCC): ICD-10-CM

## 2022-03-14 PROCEDURE — 72197 MRI PELVIS W/O & W/DYE: CPT

## 2022-03-14 PROCEDURE — 74011250636 HC RX REV CODE- 250/636: Performed by: RADIOLOGY

## 2022-03-14 PROCEDURE — A9575 INJ GADOTERATE MEGLUMI 0.1ML: HCPCS | Performed by: RADIOLOGY

## 2022-03-14 RX ORDER — GADOTERATE MEGLUMINE 376.9 MG/ML
11 INJECTION INTRAVENOUS
Status: COMPLETED | OUTPATIENT
Start: 2022-03-14 | End: 2022-03-14

## 2022-03-14 RX ADMIN — GADOTERATE MEGLUMINE 11 ML: 376.9 INJECTION INTRAVENOUS at 15:12

## 2022-03-16 ENCOUNTER — TELEPHONE (OUTPATIENT)
Dept: ONCOLOGY | Age: 72
End: 2022-03-16

## 2022-03-16 DIAGNOSIS — C19 RECTOSIGMOID CANCER (HCC): Primary | ICD-10-CM

## 2022-03-16 NOTE — PROGRESS NOTES
Please let patient know that we are going to discuss his case at tumor board tomorrow. Also, we will refer him to see Radiation Oncology too since that may be a consideration in his care. More information to follow-up after tomorrow's tumor board discussion.     Thanks,  Dr. Mack Schirmer

## 2022-03-16 NOTE — TELEPHONE ENCOUNTER
[address] at [place]  [phone]    03/16/22 4:15 PM - Called and spoke with the patient. Patient's ID verified x 2. Advised, per Dr. Loulou Kirk, they will be discussing his case tomorrow at tumor board. Dr. Loulou Kirk also placed a referral to radiation oncology, since that may be a consideration in his case. Advised there will be more information to follow-up on after tomorrow's tumor board discussion. Advised this nurse will call him back with the radiation oncology appointment date and time this afternoon or tomorrow. The patient voiced understanding and gratitude for the call. No further questions or concerns.

## 2022-03-18 ENCOUNTER — TELEPHONE (OUTPATIENT)
Dept: ONCOLOGY | Age: 72
End: 2022-03-18

## 2022-03-18 PROBLEM — C19 RECTOSIGMOID CANCER (HCC): Status: ACTIVE | Noted: 2022-03-10

## 2022-03-18 PROBLEM — Q40.3 GASTRIC DYSPLASIA: Status: ACTIVE | Noted: 2022-03-10

## 2022-03-18 PROBLEM — R97.0 ELEVATED CEA: Status: ACTIVE | Noted: 2022-03-10

## 2022-03-18 NOTE — TELEPHONE ENCOUNTER
[facility] at [facility]  [phone]    03/18/22 12:24 PM - Amelie Mcdaniels and spoke with Jay Lucas at Radiation Oncology. Advised we will be cancelling the referral placed for this patient. She voiced understanding. No further questions or concerns. [facility] at [facility]  [phone]    03/18/22 12:29 PM -  Called and spoke with the patient. Patient's ID verified x 2. Advised we are cancelling the referral to Radiation Oncology. Advised they discussed his case at tumor board yesterday and the surgeon decided on surgery without radiation for now. The patient voiced understanding. He inquired if there was any other news to share. Advised this nurse would put in a message to Dr. Zelda Blood and let him know the patient was asking if there are any updates. The patient stated there was no gaxiola, whenever Dr. Zelda Blood has time. The patient voiced gratitude for the call. No further questions or concerns.

## 2022-03-19 PROBLEM — J43.2 CENTRILOBULAR EMPHYSEMA (HCC): Status: ACTIVE | Noted: 2022-03-10

## 2022-03-20 PROBLEM — J18.9 PNEUMONIA: Status: ACTIVE | Noted: 2019-02-15

## 2022-03-20 PROBLEM — I77.810 ASCENDING AORTA DILATION (HCC): Status: ACTIVE | Noted: 2020-02-15

## 2022-03-20 PROBLEM — K92.1 BLOODY STOOLS: Status: ACTIVE | Noted: 2022-03-10

## 2022-03-25 ENCOUNTER — OFFICE VISIT (OUTPATIENT)
Dept: ONCOLOGY | Age: 72
End: 2022-03-25
Payer: MEDICARE

## 2022-03-25 VITALS
RESPIRATION RATE: 16 BRPM | OXYGEN SATURATION: 90 % | HEIGHT: 70 IN | HEART RATE: 100 BPM | SYSTOLIC BLOOD PRESSURE: 114 MMHG | BODY MASS INDEX: 17.92 KG/M2 | DIASTOLIC BLOOD PRESSURE: 71 MMHG | WEIGHT: 125.2 LBS

## 2022-03-25 DIAGNOSIS — Q40.3 GASTRIC DYSPLASIA: ICD-10-CM

## 2022-03-25 DIAGNOSIS — C19 RECTOSIGMOID CANCER (HCC): Primary | ICD-10-CM

## 2022-03-25 DIAGNOSIS — J43.2 CENTRILOBULAR EMPHYSEMA (HCC): ICD-10-CM

## 2022-03-25 DIAGNOSIS — R53.83 OTHER FATIGUE: ICD-10-CM

## 2022-03-25 PROCEDURE — G8510 SCR DEP NEG, NO PLAN REQD: HCPCS | Performed by: INTERNAL MEDICINE

## 2022-03-25 PROCEDURE — 1101F PT FALLS ASSESS-DOCD LE1/YR: CPT | Performed by: INTERNAL MEDICINE

## 2022-03-25 PROCEDURE — G9711 PT HX TOT COL OR COLON CA: HCPCS | Performed by: INTERNAL MEDICINE

## 2022-03-25 PROCEDURE — 99213 OFFICE O/P EST LOW 20 MIN: CPT | Performed by: INTERNAL MEDICINE

## 2022-03-25 PROCEDURE — G8419 CALC BMI OUT NRM PARAM NOF/U: HCPCS | Performed by: INTERNAL MEDICINE

## 2022-03-25 PROCEDURE — G0463 HOSPITAL OUTPT CLINIC VISIT: HCPCS | Performed by: INTERNAL MEDICINE

## 2022-03-25 PROCEDURE — G8536 NO DOC ELDER MAL SCRN: HCPCS | Performed by: INTERNAL MEDICINE

## 2022-03-25 PROCEDURE — G8427 DOCREV CUR MEDS BY ELIG CLIN: HCPCS | Performed by: INTERNAL MEDICINE

## 2022-03-25 NOTE — LETTER
3/25/2022    Patient: Anuj Shah   YOB: 1950   Date of Visit: 3/25/2022     ePrico Jane, [address] [address]  Via Fax: [phone]    Dear Perico Jane MD,      Thank you for referring Mr. Ceci Puga to [facility] for evaluation. My notes for this consultation are attached. If you have questions, please do not hesitate to call me. I look forward to following your patient along with you.       Sincerely,    Jame Valerio MD

## 2022-03-25 NOTE — PROGRESS NOTES
[facility] [place] at [address], [address] [facility]  W: [phone]  F: [phone] Patient ID  Name: Aylin Montanez  YOB: 1950  MRN: [id number]  Referring Provider:   Dr. Hannah Montez MD  Primary Care Provider:   Bipin Reis MD       HEMATOLOGY/MEDICAL ONCOLOGY  NOTE   Date of Visit: 03/25/22  Reason for Evaluation:     Chief Complaint   Patient presents with    Follow-up     Glen Dukes is a pleasant 73year old male who presents as a follow up. He denies pain       Hematology/Oncology Summary:  Please review original records for clinical decision making. This summary highlights focused aspects of patient's ongoing care and may have a recurring section in notes with either updates or remain unchanged as a longitudinal care summary. --------------------------------------------------------------------------------------------------------------------------------------------------------------------------------------------------------------------------  DIAGNOSIS:   Adenocarcinoma Rectosigmoid junction    ORIGINAL STAGING:   T3 N0 M0    SITES OF DISEASE:  CT CHEST ABD PELV W CONT 3/3/2022 FINDINGS: CHEST W 1. Severe emphysema. Stable ascending aortic aneurysm. 2. Numerous hypoattenuating hepatic lesions appear unchanged in size and number, probable cysts. 3. Stable nonspecific 10 mm sized cortically-based lesion of inferior pole of right kidney. 4. Questioned area of mural thickening at rectosigmoid junction, possibly representing the mass in question. Sigmoid diverticulosis.   MRI PELV W WO CONT  3/15/2022  FINDINGS: PRIMARY TUMOR: MORPHOLOGY, LOCATION, AND CHARACTERISTICS: Distance to the anal verge:  8.8 cm Distance to the top of sphincter complex/anorectal junction:  5.5 cm Relationship to anterior peritoneal reflection:  Superior Craniocaudal length:  5.4 cm Morphology:  C shaped partial apple core with focally of the neoplasm in the left aspect of the rectal wall. Overall volume of neoplasm measures approximately 4.8 x 4.6 x 5.4 cm. Mucinous: Borderline, approximately 50% of the tumor/tumor pool has very high T2 signal intensity compared to perirectal muscle. T category Extramural depth of invasion: Approximately 7 mm MR-T category: T3c Structures with possible invasion: (if applicable) : none Pelvic sidewall: none  Pelvic floor: none Sacrum: none  Vessels: none Nerves: none  EXTRAMURAL VENOUS INVASION (EMVI): Close approximation, no evidence of direct invasion. CIRCUMFERENTIAL RESECTION estimated VOLUME: 8.0 x 5.2 x 6.7 cm. Shortest distance of tumor to MRF: 0.2 cm. MESORECTAL LYMPH NODES (superior rectal and mesorectal only) AND TUMOR DEPOSITS: None measurable. LUIS FERNANDO node absent. EXTRA-MESORECAL LYMPH nodes: None. OTHER: No significant free fluid. Bones are within normal limits. Urinary bladder is nondistended. Prostate gland is not enlarged. 4.8 x 4.6 x 5.4 cm proximal rectal carcinoma is located 5.5 cm from the proximal anal sphincter. Transmural extension. No measurable lymphadenopathy. Imaging category Category: T   3 N  0 CRM: 0.2 cm. Sphincter involvement:  No anal or involvement.    CURRENT TREATMENT:   Referral to Medical Oncology, Colorectal Surgery    PRIOR TREATMENT:   Colonoscopy 3/3/2022    GOALS OF CARE:   Curative Intent    PATHOLOGY:  Specimen #:[id number]   Collect: 3/3/2022   GE junction, biopsy:        Glandular mucosa with intestinal metaplasia and focal high-grade   dysplasia   Squamous mucosa with squamous hyperplasia   4. Sigmoid mass, biopsy:        Colonic adenocarcinoma, moderately differentiated   Fragments of villous adenoma     PERTINENT CARE EVENTS   Bloody Stools in 2021   Colonoscopy in March 2022      Subjective:     History of Present Illness:     Gisell Magaña is a 73 y.o. M who presents for a follow-up evaluation for rectal adenocarcinoma. Patient overall reports feeling stable. . I discussed his case at our tumor board about 2 weeks ago. Bleeding:Grade 1  Diarrhea:Grade 1  Fatigue:Grade 1      -  Past Medical History:   Diagnosis Date    Acute on chronic respiratory failure with hypoxemia ([facility] 2/15/2019    Arrhythmia     Asthma     COPD (chronic obstructive pulmonary disease) (HCC)     severe    Hypertension     Insulin resistance 3/27/2014    Lung mass 2012    MAY resection, + mycobacterial orgs, neg malignancy    Melanoma ([facility] [address] [facility]     Non-STEMI (non-ST elevated myocardial infarction) ([facility] [address] [facility]     On home O2 2.5 lpm continuously    since about     Pneumonia     Snoring     Tinnitus     Tuberculosis     Vitamin D deficiency 3/27/2014      Past Surgical History:   Procedure Laterality Date    COLONOSCOPY N/A 3/3/2022    COLON[facility] END[facility]COPY    HX OTHER SURGICAL      EXC MELANOMA RIGHT CHEEK    HX OTHER SURGICAL      NEEDLE BX LEFT LUNG    HX OTHER SURGICAL  2012    apical segmentectomy, MAY      Social History     Tobacco Use    Smoking status: Former Smoker     Packs/day: 0.25     Years: 40.00     Pack years: 10.00     Types: Cigarettes     Quit date:      Years since quittin.2    Smokeless tobacco: Never Used   Substance Use Topics    Alcohol use: Yes     Alcohol/week: 5.0 standard drinks     Types: 5 Cans of beer per week      Family History   Problem Relation Age of Onset    Glaucoma Mother     Dementia Mother     Cancer Mother         ? of cervical vs. endometrial    Heart Attack Father     Heart Disease Father     Colon Cancer Neg Hx     Breast Cancer Neg Hx     Ovarian Cancer Neg Hx     Prostate Cancer Neg Hx      Current Outpatient Medications   Medication Sig    dilTIAZem ER (CARDIZEM CD) 240 mg capsule TAKE 1 CAPSULE BY MOUTH EVERY DAY    atorvastatin (LIPITOR) 40 mg tablet Take 1 Tablet by mouth daily.  apixaban (Eliquis) 5 mg tablet Take 1 Tablet by mouth two (2) times a day.     nitroglycerin (NITROSTAT) 0.4 mg SL tablet 1 Tab by SubLINGual route every five (5) minutes as needed for Chest Pain. Up to 3 doses.  aspirin 81 mg chewable tablet Take 81 mg by mouth nightly.  cholecalciferol (VITAMIN D3) 1,000 unit tablet Take 1,000 Units by mouth nightly.  omega 3-DHA-EPA-fish oil 1,000 mg (120 mg-180 mg) capsule Take 2 Caps by mouth daily.  glycopyrrolate-formoterol (BEVESPI AEROSPHERE) 9-4.8 mcg HFAA Take 2 Puffs by inhalation two (2) times a day.  budesonide (PULMICORT) 0.5 mg/2 mL nbsp 500 mcg by Nebulization route two (2) times a day.  montelukast (SINGULAIR) 10 mg tablet Take 10 mg by mouth nightly.  multivitamin (ONE A DAY) tablet Take 1 tablet by mouth daily.  albuterol (PROVENTIL VENTOLIN) 2.5 mg /3 mL (0.083 %) nebulizer solution 2.5 mg by Nebulization route every four (4) hours as needed for Wheezing or Shortness of Breath.  guaiFENesin (MUCINEX) 1,200 mg TM12 ER tablet Take 1,200 mg by mouth two (2) times a day.  cholecalciferol (VITAMIN D3) 1,000 unit tablet Take 2,000 Units by mouth daily. No current facility-administered medications for this visit. No Known Allergies   -  Review of Systems Provided by:  Patient  Review of Systems: A complete review of systems was obtained, reviewed. Pertinent findings reviewed above. Objective:     Visit Vitals  /71   Pulse 100   Resp 16   Ht 5' 10\" (1.778 m)   Wt 125 lb 3.2 oz (56.8 kg)   SpO2 90%   BMI 17.96 kg/m²     ECOG PS: 3- Capable of only limited selfcare; confined to bed or chair more than 50% of waking hours. Physical Exam  Constitutional: No acute distress. , Non-toxic appearance. and Chronic ill appearance. HENT: Normocephalic and atraumatic head. and Wearing a mask during COVID-19 precautions. Eyes: Normal Conjunctivae. Palpebral Conjunctivae without Pallor. Anicteric sclerae. Cardiovascular: S1,S2 auscultated. No pitting edema. No friction rub. No gallops auscultated.   Pulmonary: Normal Respiratory Effort. No wheezing. No rhonchi. Minor coarse breath sounds bilaterally. Abdominal: Normal bowel sounds. Soft Abdomen to palpation. No abdominal tenderness. No guarding. No rebound tenderness. Rectal Exam deferred. Skin: No jaundice. No rash. No petechiae. Musculoskeletal: No muscle pain on palpation. No temporal muscle wasting on inspection. Neurological: Alert and oriented. No tremor on inspection. Normal Gait. Psychiatric: mood normal. normal speech rate normal affect    Results:     I personally reviewed MRI Pelvis and placed in above Oncology Summary. Lab Results   Component Value Date/Time    WBC 9.7 03/03/2022 03:43 PM    HGB 13.8 03/03/2022 03:43 PM    HCT 42.6 03/03/2022 03:43 PM    PLATELET 768 12/75/3168 03:43 PM    .5 (H) 03/03/2022 03:43 PM    ABS. NEUTROPHILS 7.9 03/03/2022 03:43 PM     Lab Results   Component Value Date/Time    Sodium 144 03/03/2022 03:43 PM    Potassium 3.4 (L) 03/03/2022 03:43 PM    Chloride 105 03/03/2022 03:43 PM    CO2 37 (H) 03/03/2022 03:43 PM    Glucose 110 (H) 03/03/2022 03:43 PM    BUN 12 03/03/2022 03:43 PM    Creatinine 0.62 (L) 03/03/2022 03:43 PM    GFR est AA >60 03/03/2022 03:43 PM    GFR est non-AA >60 03/03/2022 03:43 PM    Calcium 9.6 03/03/2022 03:43 PM    Creatinine (POC) 0.70 03/03/2022 04:22 PM     Lab Results   Component Value Date/Time    Bilirubin, total 0.6 03/03/2022 03:43 PM    ALT (SGPT) 28 03/03/2022 03:43 PM    Alk. phosphatase 89 03/03/2022 03:43 PM    Protein, total 6.5 03/03/2022 03:43 PM    Albumin 3.2 (L) 03/03/2022 03:43 PM    Globulin 3.3 03/03/2022 03:43 PM     Lab Results   Component Value Date/Time    CEA 6.6 03/03/2022 03:43 PM     Assessment and Recommendations:     1. [facility]os[facility]oid cancer [facility]  Prior discussions with Dr. Silvia Ruiz; He plans to proceed forward with primary resection for patient over any neoadjuvant therapy. Dr. Silvia Ruiz to discuss with Rad Onc.  I reviewed with patient NCCN guidelines and how sometimes chemoXRT or short duration XRT is consider for patients in the preoperative setting whenever there is concern about CRM. However, with his comorbidities he is not an ideal candidate for chemotherapy (ie. EDUARDO (also with the proximity of his tumor to the rectosigmoid junction there is less clarity about the benefits of IV chemotherapy with FOLFOX since the goals of treatment are not focused on organ preservation)) and Surgery is preferring to proceed with primary resection over radiation due to patient's health. Dr. Ijeoma De Leonates to further discuss with Rad Onc. We will refer patient back to surgery.  -discussed with patient that MSI testing was requested to be sent; pathology reportedly ordered. We discussed that any abnormal results may lead to genetic testing for any Acuña Syndrome.  -discussed with patient that we will see him at 8 weeks to reevaluate his case especially if any node involvement found on resection. 2. Centrilobular emphysema ([address]  Reports that Dr. Foley Record is to continue to manage his care. He forgot his oxygen today but is without any acute shortness of breath. Denies any worsened breathing than usual.    3. Gastric dysplasia  Would recommend follow-up with Dr. Zoraida Garner for this lesion noted on recent EGD. 4. Other fatigue  Has had bleeding. Hemoglobin was normal on 3/3/22. Has had bloody stools. We discussed checking Iron studies. He is without any palpebral conjunctival pallor and he feels well without any worsening. He forgot to wear his oxygen and is ambulating and conversing well without having to stop. We will hold off on iron testing at his preference; he will continue taking oral iron. Also, he will continue taking Vitamin D. We discussed consideration of checking levels in the future. Follow-up and Dispositions    · Return in about 8 weeks (around 5/20/2022).        Daniel Shea MD  Hematology/Medical Oncology Provider  [facility]  P: [phone]      Signed By:   Kerri Salcido MD

## 2022-03-25 NOTE — PROGRESS NOTES
Chief Complaint   Patient presents with    Follow-up     Stefanieedelia Lesches is a pleasant 73year old male who presents as a follow up.  He denies pain

## 2022-04-04 ENCOUNTER — TELEPHONE (OUTPATIENT)
Dept: ONCOLOGY | Age: 72
End: 2022-04-04

## 2022-04-04 NOTE — TELEPHONE ENCOUNTER
Derrell ray [place]  [phone]    04/04/22 8:42 AM - Called and spoke with the patient regarding his recent [employer]hart message (see below). Patient's ID verified x 2. The patient reports he has been feeling weak and short of breath, on and off, over the past five days. He states the amount of blood with the stools varies but sometimes it is a considerable amount. He denies any chest pain or pain elsewhere. He states the weakness and shortness of breath comes and goes. Last night before he went to bed, it was \"pretty bad. \"  Patient denies any shortness of breath or weakness currently. He is unaware of any aggravating or alleviating factors for the shortness of breath and weakness. He wonders if he is losing too much blood and that is what is making him weak and short of breath. He reports the amount of blood in the stool has increased recently, and that is when the shortness of breath and weakness began. Advised this nurse would update Dr. Brett Damon of above and he would receive a call back with recommendations as soon as possible. The patient voiced understanding and gratitude for the call. No further questions or concerns. Patient's [employer]hart message: \"My bowel movements are mostly blood at this point. Friday night I had an episode of feeling very weak and short of breath which is something I experienced after a fall last winter and blood loss so I am wondering if I am losing too much blood at this point and need to do something about it. Please advise. Og Headings [birth year] [phone]\"      [address] at [place]  [phone]    04/04/22 9:20 AM - Called and spoke with the patient. Inquired if he had communicated above to Dr. Claudia Antoine office yet. The patient stated he had not. Advised, per Dr. Brett Damon, he recommends the patient call Dr. Claudia Antoine office and communicate what is going on.   Advised if he has any further questions or concerns, he can call our office. The patient voiced understanding and gratitude for the call. No further questions or concerns. [address] at [facility]  [phone]    04/04/22 9:27 AM - Called and spoke with the patient. Advised, per Dr. Mejia Rodriguez, since he has had significant bleeding and shortness of breath he recommends the patient have a reliable  escort him to the emergency room. Advised patient if he is having active bleeding or shortness of breath, he is to call 9-1-1 immediately. The patient stated he is not currently having any of the symptoms. Advised he still recommends the patient go, as he has had the significant bleeding and shortness of breath as well. The patient voiced understanding and gratitude for the call. No further questions or concerns.

## 2022-04-04 NOTE — TELEPHONE ENCOUNTER
Reviewed noted. Since significant bleeding with bowel movements and shortness of breath I have recommended immediate callback to have him get a reliable  to take him to closest ER. If having active bleeding or significant shortness of breath, instruct him to call 9-1-1 immediately.  Thanks, Dr. Mayelin Chavarria

## 2022-04-18 ENCOUNTER — HOSPITAL ENCOUNTER (OUTPATIENT)
Dept: GENERAL RADIOLOGY | Age: 72
Discharge: HOME | End: 2022-04-18
Attending: COLON & RECTAL SURGERY
Payer: MEDICARE

## 2022-04-18 ENCOUNTER — HOSPITAL ENCOUNTER (OUTPATIENT)
Dept: PREADMISSION TESTING | Age: 72
Discharge: HOME | End: 2022-04-18
Payer: MEDICARE

## 2022-04-18 VITALS
RESPIRATION RATE: 22 BRPM | HEART RATE: 80 BPM | WEIGHT: 129.4 LBS | DIASTOLIC BLOOD PRESSURE: 74 MMHG | HEIGHT: 70 IN | OXYGEN SATURATION: 92 % | TEMPERATURE: 96.9 F | SYSTOLIC BLOOD PRESSURE: 132 MMHG | BODY MASS INDEX: 18.52 KG/M2

## 2022-04-18 LAB
ABO + RH BLD: NORMAL
ALBUMIN SERPL-MCNC: 3.1 G/DL (ref 3.5–5)
ALBUMIN/GLOB SERPL: 0.9 {RATIO} (ref 1.1–2.2)
ALP SERPL-CCNC: 118 U/L (ref 45–117)
ALT SERPL-CCNC: 20 U/L (ref 12–78)
ANION GAP SERPL CALC-SCNC: 3 MMOL/L (ref 5–15)
AST SERPL-CCNC: 24 U/L (ref 15–37)
BILIRUB SERPL-MCNC: 0.6 MG/DL (ref 0.2–1)
BLOOD GROUP ANTIBODIES SERPL: NORMAL
BUN SERPL-MCNC: 8 MG/DL (ref 6–20)
BUN/CREAT SERPL: 13 (ref 12–20)
CALCIUM SERPL-MCNC: 9.5 MG/DL (ref 8.5–10.1)
CHLORIDE SERPL-SCNC: 96 MMOL/L (ref 97–108)
CO2 SERPL-SCNC: 38 MMOL/L (ref 21–32)
CREAT SERPL-MCNC: 0.62 MG/DL (ref 0.7–1.3)
ERYTHROCYTE [DISTWIDTH] IN BLOOD BY AUTOMATED COUNT: 14.6 % (ref 11.5–14.5)
EST. AVERAGE GLUCOSE BLD GHB EST-MCNC: 111 MG/DL
GLOBULIN SER CALC-MCNC: 3.4 G/DL (ref 2–4)
GLUCOSE SERPL-MCNC: 84 MG/DL (ref 65–100)
HBA1C MFR BLD: 5.5 % (ref 4–5.6)
HCT VFR BLD AUTO: 39.3 % (ref 36.6–50.3)
HGB BLD-MCNC: 12.9 G/DL (ref 12.1–17)
MCH RBC QN AUTO: 31.7 PG (ref 26–34)
MCHC RBC AUTO-ENTMCNC: 32.8 G/DL (ref 30–36.5)
MCV RBC AUTO: 96.6 FL (ref 80–99)
NRBC # BLD: 0 K/UL (ref 0–0.01)
NRBC BLD-RTO: 0 PER 100 WBC
PLATELET # BLD AUTO: 394 K/UL (ref 150–400)
PMV BLD AUTO: 9.1 FL (ref 8.9–12.9)
POTASSIUM SERPL-SCNC: 4.3 MMOL/L (ref 3.5–5.1)
PROT SERPL-MCNC: 6.5 G/DL (ref 6.4–8.2)
RBC # BLD AUTO: 4.07 M/UL (ref 4.1–5.7)
SODIUM SERPL-SCNC: 137 MMOL/L (ref 136–145)
SPECIMEN EXP DATE BLD: NORMAL
WBC # BLD AUTO: 11.3 K/UL (ref 4.1–11.1)

## 2022-04-18 PROCEDURE — 71046 X-RAY EXAM CHEST 2 VIEWS: CPT

## 2022-04-18 PROCEDURE — 83036 HEMOGLOBIN GLYCOSYLATED A1C: CPT

## 2022-04-18 PROCEDURE — 80053 COMPREHEN METABOLIC PANEL: CPT

## 2022-04-18 PROCEDURE — 86900 BLOOD TYPING SEROLOGIC ABO: CPT

## 2022-04-18 PROCEDURE — 36415 COLL VENOUS BLD VENIPUNCTURE: CPT

## 2022-04-18 PROCEDURE — 85027 COMPLETE CBC AUTOMATED: CPT

## 2022-04-18 RX ORDER — ALBUTEROL SULFATE 90 UG/1
AEROSOL, METERED RESPIRATORY (INHALATION)
COMMUNITY

## 2022-04-18 NOTE — PERIOP NOTES
Pre-Operative Nutrition Score        1. Has the patient had an unplanned weight loss of 10%  of body weight or more in the last 6 months? Yes = 1    2. Has the patient elizabeth eating less than 50% of their normal diet in the preceding week? Unknown = 0    Patient instructed on Non-Diabetic pre-operative nutrition plan to start 5 days prior to surgery. Patient given 10 shakes and 3 pre-surgery drinks. Opportunity given for questions and all questions answered. Pt reports weight loss of approximately 70 pounds over the last \"year or two\"      ERAS protocol and handbooks reviewed with patient. Handbooks given. Bowel prep reviewed. Supplements reviewed. All questions answered. Med plan for Eliquis and Aspirin faxed to Dr Jw Eaton. Phone call to Dr Melvin Ortega; last OVN requested.

## 2022-04-18 NOTE — PERIOP NOTES
[address] , [address] [facility]   MAIN OR                                  [phone]   MAIN PRE OP                          [phone]                                                                                AMBULATORY PRE OP          [phone]  PRE-ADMISSION TESTING    [phone]   Surgery Date:   5/3/2022        Is surgery arrival time given by surgeon? NO  If NO, [address] staff will call you between 3 and 7pm the day before your surgery with your arrival time. (If your surgery is on a Monday, we will call you the Friday before.)    Call [phone] after 7pm Monday-Friday if you did not receive this call. INSTRUCTIONS BEFORE YOUR SURGERY   When You  Arrive Arrive at the 2nd [address] [facility] on the day of your surgery  Have your insurance card, photo ID, and any copayment (if needed)   Food   and   Drink NO food or drink after midnight the night before surgery    This means NO water, gum, mints, coffee, juice, etc.  No alcohol (beer, wine, liquor) 24 hours before and after surgery   Medications to   TAKE   Morning of Surgery MEDICATIONS TO TAKE THE MORNING OF SURGERY WITH A SIP OF WATER:    Diltiazem   Atorvastatin   Pulmocort nebulizer   Bevespi inhaler   Albuterol nebulizer/ inhaler if needed    Bring rescue inhaler with you on day of surgery.     Medications  To  STOP      7 days before surgery  Non-Steroidal anti-inflammatory Drugs (NSAID's): for example, Ibuprofen (Advil, Motrin), Naproxen (Aleve)   Aspirin, if taking for pain    Herbal supplements, vitamins, and fish oil   Other:  (Pain medications not listed above, including Tylenol may be taken)   Blood  Thinners  If you take  Aspirin, Plavix, Coumadin, or any blood-thinning or anti-blood clot medicine, talk to the doctor who prescribed the medications for pre-operative instructions.    Follow Dr Balaji Riley instructions for Eliiquis and Aspirin pre- operatively. Bathing Clothing  Jewelry  Valuables      If you shower the morning of surgery, please do not apply anything to your skin (lotions, powders, deodorant, or makeup, especially mascara)   Follow Chlorhexidine [employer] body wash instructions provided to you during PAT appointment. Begin 3 days prior to surgery.  Do not shave or trim anywhere 24 hours before surgery   Wear your hair loose or down; no pony-tails, buns, or metal hair clips   Wear loose, comfortable, clean clothes   Wear glasses instead of contacts  [employer] money, valuables, and jewelry, including body piercings, at home   If you were given an [employer], bring it on day of surgery. Going Home - or Spending the Night  SAME-DAY SURGERY: You must have a responsible adult drive you home and stay with you 24 hours after surgery   ADMITS: If your doctor is keeping you in the hospital after surgery, leave personal belongings/luggage in your car until you have a hospital room number. Hospital discharge time is 12 noon  Drivers must be here before 12 noon unless you are told differently   Special Instructions Follow instructions provided for pre- surgery nutrition supplements. Bring Trilogy machine and portable Oxygen with you on day of surgery. Follow all instructions so your surgery wont be cancelled. Please, be on time. If a situation occurs and you are delayed the day of surgery, call [phone]. If your physical condition changes (like a fever, cold, flu, etc.) call your surgeon. Home medication(s) reviewed and verified via LIST   VERBAL   during PAT appointment. The patient was contacted by  IN-PERSON  The patient verbalizes understanding of all instructions and   DOES NOT   need reinforcement.

## 2022-04-19 NOTE — PERIOP NOTES
Preoperative Nutrition Screen (BANDAR)   Patient's Age: 73 y.o. Patient's BMI: Estimated body mass index is 18.57 kg/m² as calculated from the following:    Height as of 4/18/22: 5' 10\" (1.778 m). Weight as of 4/18/22: 58.7 kg (129 lb 6.4 oz). If the answer to any of the following is Yes, then recommend prescribe Oral Nutrition Supplements (ONS) for at least 7 days prior to surgery and/or order referral to dietitian for further assessment and nutrition therapy. 1. Does the patient have a documented serum albumin less than 3.0 within the last 90 days? No = 0          2. Is patient's BMI less than 18.5 (or less than 20 if age over 72)? Yes = 1         3. Has the patient had an unplanned weight loss of 10% of body weight or more in the last 6 months? Yes = 1   4. Has the patient been eating less than 50% of their normal diet in the preceding week? No = 0   BANDAR Score (number of Yes responses), 0-4 2     Plan:   No change in preoperative or postoperative nutrition plan.     Electronically signed by Wilda Harada, RN on 4/19/22 at 9:10 AM

## 2022-04-20 NOTE — PERIOP NOTES
Called Dr. Yvon Ramos office to verify receipt of ASA and Eliquis plan. Refaxed per their request with attention to [facility]. Reviewing Dr. Lacey Rainey notes, plan is to get medical clearance and pulmonary clearance from Dr. Anibal Mijares. Patient denies being instructed to see Dr. Anibal Mijares prior to surgery. Last visit 2/2/22. Lm at Dr. Arnulfo Crews office to inquire if clearance was obtained. Instructed patient I would return call to him once I clarified with Dr. Arnulfo Crews office. Notified patient that Dr. Lacey Rainey office received Pulmonary clearance from Dr. Anibal Mijares.

## 2022-04-22 ENCOUNTER — DOCUMENTATION ONLY (OUTPATIENT)
Dept: CARDIOLOGY CLINIC | Age: 72
End: 2022-04-22

## 2022-04-25 NOTE — PERIOP NOTES
Patient returned clal - notified to hold Eliquis and ASA 3 days prior to surgery per Dr. Rosy Mcdaniels instructions. Verbalized understanding.

## 2022-05-02 ENCOUNTER — ANESTHESIA EVENT (OUTPATIENT)
Dept: SURGERY | Age: 72
DRG: 330 | End: 2022-05-02
Payer: MEDICARE

## 2022-05-02 NOTE — PERIOP NOTES
Both an e-mail and an VM was left for the ostomy nurse requesting that the patient's stomal markings be done prior to the patient's OR time of 1300 tomorrow.   DOS: 5/3/2022

## 2022-05-03 ENCOUNTER — ANESTHESIA (OUTPATIENT)
Dept: SURGERY | Age: 72
DRG: 330 | End: 2022-05-03
Payer: MEDICARE

## 2022-05-03 ENCOUNTER — APPOINTMENT (OUTPATIENT)
Dept: GENERAL RADIOLOGY | Age: 72
DRG: 330 | End: 2022-05-03
Attending: COLON & RECTAL SURGERY
Payer: MEDICARE

## 2022-05-03 ENCOUNTER — HOSPITAL ENCOUNTER (INPATIENT)
Age: 72
LOS: 10 days | Discharge: HOME HEALTH | DRG: 330 | End: 2022-05-13
Attending: COLON & RECTAL SURGERY | Admitting: COLON & RECTAL SURGERY
Payer: MEDICARE

## 2022-05-03 DIAGNOSIS — I48.19 PERSISTENT ATRIAL FIBRILLATION (HCC): ICD-10-CM

## 2022-05-03 DIAGNOSIS — C20 RECTAL CANCER (HCC): Primary | ICD-10-CM

## 2022-05-03 LAB
ABO + RH BLD: NORMAL
ANION GAP SERPL CALC-SCNC: 6 MMOL/L (ref 5–15)
BASOPHILS # BLD: 0 K/UL (ref 0–0.1)
BASOPHILS NFR BLD: 0 % (ref 0–1)
BLOOD GROUP ANTIBODIES SERPL: NORMAL
BUN SERPL-MCNC: 10 MG/DL (ref 6–20)
BUN/CREAT SERPL: 13 (ref 12–20)
CALCIUM SERPL-MCNC: 8.7 MG/DL (ref 8.5–10.1)
CHLORIDE SERPL-SCNC: 97 MMOL/L (ref 97–108)
CO2 SERPL-SCNC: 38 MMOL/L (ref 21–32)
CREAT SERPL-MCNC: 0.77 MG/DL (ref 0.7–1.3)
DIFFERENTIAL METHOD BLD: ABNORMAL
EOSINOPHIL # BLD: 0 K/UL (ref 0–0.4)
EOSINOPHIL NFR BLD: 0 % (ref 0–7)
ERYTHROCYTE [DISTWIDTH] IN BLOOD BY AUTOMATED COUNT: 15 % (ref 11.5–14.5)
GLUCOSE SERPL-MCNC: 123 MG/DL (ref 65–100)
HCT VFR BLD AUTO: 35.7 % (ref 36.6–50.3)
HGB BLD-MCNC: 11.5 G/DL (ref 12.1–17)
IMM GRANULOCYTES # BLD AUTO: 0 K/UL (ref 0–0.04)
IMM GRANULOCYTES NFR BLD AUTO: 0 % (ref 0–0.5)
LYMPHOCYTES # BLD: 0.6 K/UL (ref 0.8–3.5)
LYMPHOCYTES NFR BLD: 3 % (ref 12–49)
MCH RBC QN AUTO: 31.3 PG (ref 26–34)
MCHC RBC AUTO-ENTMCNC: 32.2 G/DL (ref 30–36.5)
MCV RBC AUTO: 97.3 FL (ref 80–99)
MONOCYTES # BLD: 0.6 K/UL (ref 0–1)
MONOCYTES NFR BLD: 3 % (ref 5–13)
NEUTS SEG # BLD: 17.6 K/UL (ref 1.8–8)
NEUTS SEG NFR BLD: 94 % (ref 32–75)
NRBC # BLD: 0 K/UL (ref 0–0.01)
NRBC BLD-RTO: 0 PER 100 WBC
PLATELET # BLD AUTO: 298 K/UL (ref 150–400)
PMV BLD AUTO: 8.9 FL (ref 8.9–12.9)
POTASSIUM SERPL-SCNC: 3.8 MMOL/L (ref 3.5–5.1)
RBC # BLD AUTO: 3.67 M/UL (ref 4.1–5.7)
RBC MORPH BLD: ABNORMAL
SODIUM SERPL-SCNC: 141 MMOL/L (ref 136–145)
SPECIMEN EXP DATE BLD: NORMAL
WBC # BLD AUTO: 18.8 K/UL (ref 4.1–11.1)
WBC MORPH BLD: ABNORMAL

## 2022-05-03 PROCEDURE — 77030008602 HC TRCR ENDOSC EPATH J&J -B: Performed by: COLON & RECTAL SURGERY

## 2022-05-03 PROCEDURE — 74011250636 HC RX REV CODE- 250/636: Performed by: NURSE ANESTHETIST, CERTIFIED REGISTERED

## 2022-05-03 PROCEDURE — 86900 BLOOD TYPING SEROLOGIC ABO: CPT

## 2022-05-03 PROCEDURE — 77030009527 HC GEL PRT SYS AMR -E: Performed by: COLON & RECTAL SURGERY

## 2022-05-03 PROCEDURE — 77030013079 HC BLNKT BAIR HGGR 3M -A: Performed by: ANESTHESIOLOGY

## 2022-05-03 PROCEDURE — 77030008684 HC TU ET CUF COVD -B: Performed by: ANESTHESIOLOGY

## 2022-05-03 PROCEDURE — 88361 TUMOR IMMUNOHISTOCHEM/COMPUT: CPT

## 2022-05-03 PROCEDURE — 77030007866 HC KT SPN ANES BBMI -B

## 2022-05-03 PROCEDURE — 77030011808 HC STPLR ENDOSCOPIC J&J -D: Performed by: COLON & RECTAL SURGERY

## 2022-05-03 PROCEDURE — 77030002966 HC SUT PDS J&J -A: Performed by: COLON & RECTAL SURGERY

## 2022-05-03 PROCEDURE — P9045 ALBUMIN (HUMAN), 5%, 250 ML: HCPCS | Performed by: NURSE ANESTHETIST, CERTIFIED REGISTERED

## 2022-05-03 PROCEDURE — 65610000006 HC RM INTENSIVE CARE

## 2022-05-03 PROCEDURE — 74011000250 HC RX REV CODE- 250: Performed by: COLON & RECTAL SURGERY

## 2022-05-03 PROCEDURE — 77030010507 HC ADH SKN DERMBND J&J -B: Performed by: COLON & RECTAL SURGERY

## 2022-05-03 PROCEDURE — 77030002996 HC SUT SLK J&J -A: Performed by: COLON & RECTAL SURGERY

## 2022-05-03 PROCEDURE — 77030040922 HC BLNKT HYPOTHRM STRY -A

## 2022-05-03 PROCEDURE — 36415 COLL VENOUS BLD VENIPUNCTURE: CPT

## 2022-05-03 PROCEDURE — 85025 COMPLETE CBC W/AUTO DIFF WBC: CPT

## 2022-05-03 PROCEDURE — 77030040361 HC SLV COMPR DVT MDII -B

## 2022-05-03 PROCEDURE — 77030009968 HC RELD STPLR ENDOSC J&J -D: Performed by: COLON & RECTAL SURGERY

## 2022-05-03 PROCEDURE — 94640 AIRWAY INHALATION TREATMENT: CPT

## 2022-05-03 PROCEDURE — 0DTP4ZZ RESECTION OF RECTUM, PERCUTANEOUS ENDOSCOPIC APPROACH: ICD-10-PCS | Performed by: COLON & RECTAL SURGERY

## 2022-05-03 PROCEDURE — 74011250636 HC RX REV CODE- 250/636: Performed by: COLON & RECTAL SURGERY

## 2022-05-03 PROCEDURE — C1765 ADHESION BARRIER: HCPCS | Performed by: COLON & RECTAL SURGERY

## 2022-05-03 PROCEDURE — 77030009848 HC PASSR SUT SET COOP -C: Performed by: COLON & RECTAL SURGERY

## 2022-05-03 PROCEDURE — 77030008756 HC TU IRR SUC STRY -B: Performed by: COLON & RECTAL SURGERY

## 2022-05-03 PROCEDURE — 77030031139 HC SUT VCRL2 J&J -A: Performed by: COLON & RECTAL SURGERY

## 2022-05-03 PROCEDURE — 80048 BASIC METABOLIC PNL TOTAL CA: CPT

## 2022-05-03 PROCEDURE — 77030040504 HC DRN WND MDII -B: Performed by: COLON & RECTAL SURGERY

## 2022-05-03 PROCEDURE — 77030036732 HC RELD STPLR VASC J&J -F: Performed by: COLON & RECTAL SURGERY

## 2022-05-03 PROCEDURE — 76060000040 HC ANESTHESIA 4.5 TO 5 HR: Performed by: COLON & RECTAL SURGERY

## 2022-05-03 PROCEDURE — 77030002986 HC SUT PROL J&J -A: Performed by: COLON & RECTAL SURGERY

## 2022-05-03 PROCEDURE — 77030008462 HC STPLR SKN PROX J&J -A: Performed by: COLON & RECTAL SURGERY

## 2022-05-03 PROCEDURE — 0D1M4Z4 BYPASS DESCENDING COLON TO CUTANEOUS, PERCUTANEOUS ENDOSCOPIC APPROACH: ICD-10-PCS | Performed by: COLON & RECTAL SURGERY

## 2022-05-03 PROCEDURE — 76010000176 HC OR TIME 4.5 TO 5 HR INTENSV-TIER 1: Performed by: COLON & RECTAL SURGERY

## 2022-05-03 PROCEDURE — 74011000250 HC RX REV CODE- 250: Performed by: ANESTHESIOLOGY

## 2022-05-03 PROCEDURE — 77030034133 HC APPL ENDOSCP FLX SPRY BAXT -C: Performed by: COLON & RECTAL SURGERY

## 2022-05-03 PROCEDURE — 77030019908 HC STETH ESOPH SIMS -A: Performed by: ANESTHESIOLOGY

## 2022-05-03 PROCEDURE — 76210000016 HC OR PH I REC 1 TO 1.5 HR: Performed by: COLON & RECTAL SURGERY

## 2022-05-03 PROCEDURE — 94002 VENT MGMT INPAT INIT DAY: CPT

## 2022-05-03 PROCEDURE — 88309 TISSUE EXAM BY PATHOLOGIST: CPT

## 2022-05-03 PROCEDURE — 71045 X-RAY EXAM CHEST 1 VIEW: CPT

## 2022-05-03 PROCEDURE — 77030016151 HC PROTCTR LNS DFOG COVD -B: Performed by: COLON & RECTAL SURGERY

## 2022-05-03 PROCEDURE — 77030040506 HC DRN WND MDII -A: Performed by: COLON & RECTAL SURGERY

## 2022-05-03 PROCEDURE — 74011000250 HC RX REV CODE- 250: Performed by: NURSE ANESTHETIST, CERTIFIED REGISTERED

## 2022-05-03 PROCEDURE — 74011250636 HC RX REV CODE- 250/636: Performed by: ANESTHESIOLOGY

## 2022-05-03 PROCEDURE — 77030008603 HC TRCR ENDOSC EPATH J&J -C: Performed by: COLON & RECTAL SURGERY

## 2022-05-03 PROCEDURE — 74011250637 HC RX REV CODE- 250/637: Performed by: COLON & RECTAL SURGERY

## 2022-05-03 PROCEDURE — 77030026438 HC STYL ET INTUB CARD -A: Performed by: ANESTHESIOLOGY

## 2022-05-03 PROCEDURE — 2709999900 HC NON-CHARGEABLE SUPPLY: Performed by: COLON & RECTAL SURGERY

## 2022-05-03 PROCEDURE — 0DBN4ZZ EXCISION OF SIGMOID COLON, PERCUTANEOUS ENDOSCOPIC APPROACH: ICD-10-PCS | Performed by: COLON & RECTAL SURGERY

## 2022-05-03 PROCEDURE — 77030020061 HC IV BLD WRMR ADMIN SET 3M -B: Performed by: ANESTHESIOLOGY

## 2022-05-03 PROCEDURE — 77030018673: Performed by: COLON & RECTAL SURGERY

## 2022-05-03 RX ORDER — MIDAZOLAM HYDROCHLORIDE 1 MG/ML
INJECTION, SOLUTION INTRAMUSCULAR; INTRAVENOUS AS NEEDED
Status: DISCONTINUED | OUTPATIENT
Start: 2022-05-03 | End: 2022-05-03 | Stop reason: HOSPADM

## 2022-05-03 RX ORDER — ALBUTEROL SULFATE 0.83 MG/ML
2.5 SOLUTION RESPIRATORY (INHALATION)
Status: DISCONTINUED | OUTPATIENT
Start: 2022-05-03 | End: 2022-05-13 | Stop reason: HOSPADM

## 2022-05-03 RX ORDER — SODIUM CHLORIDE 0.9 % (FLUSH) 0.9 %
5-40 SYRINGE (ML) INJECTION AS NEEDED
Status: DISCONTINUED | OUTPATIENT
Start: 2022-05-03 | End: 2022-05-03 | Stop reason: HOSPADM

## 2022-05-03 RX ORDER — MAGNESIUM SULFATE HEPTAHYDRATE 40 MG/ML
INJECTION, SOLUTION INTRAVENOUS AS NEEDED
Status: DISCONTINUED | OUTPATIENT
Start: 2022-05-03 | End: 2022-05-03 | Stop reason: HOSPADM

## 2022-05-03 RX ORDER — IPRATROPIUM BROMIDE 0.5 MG/2.5ML
0.5 SOLUTION RESPIRATORY (INHALATION)
Status: DISCONTINUED | OUTPATIENT
Start: 2022-05-03 | End: 2022-05-05

## 2022-05-03 RX ORDER — OXYCODONE HYDROCHLORIDE 5 MG/1
5 TABLET ORAL
Status: DISCONTINUED | OUTPATIENT
Start: 2022-05-03 | End: 2022-05-13 | Stop reason: HOSPADM

## 2022-05-03 RX ORDER — LIDOCAINE HYDROCHLORIDE 10 MG/ML
0.1 INJECTION, SOLUTION EPIDURAL; INFILTRATION; INTRACAUDAL; PERINEURAL AS NEEDED
Status: DISCONTINUED | OUTPATIENT
Start: 2022-05-03 | End: 2022-05-03 | Stop reason: HOSPADM

## 2022-05-03 RX ORDER — MORPHINE SULFATE 1 MG/ML
INJECTION, SOLUTION EPIDURAL; INTRATHECAL; INTRAVENOUS
Status: SHIPPED | OUTPATIENT
Start: 2022-05-03 | End: 2022-05-03

## 2022-05-03 RX ORDER — ALBUTEROL SULFATE 0.83 MG/ML
2.5 SOLUTION RESPIRATORY (INHALATION) AS NEEDED
Status: DISCONTINUED | OUTPATIENT
Start: 2022-05-03 | End: 2022-05-03 | Stop reason: HOSPADM

## 2022-05-03 RX ORDER — PROPOFOL 10 MG/ML
INJECTION, EMULSION INTRAVENOUS AS NEEDED
Status: DISCONTINUED | OUTPATIENT
Start: 2022-05-03 | End: 2022-05-03 | Stop reason: HOSPADM

## 2022-05-03 RX ORDER — ONDANSETRON 2 MG/ML
4 INJECTION INTRAMUSCULAR; INTRAVENOUS
Status: DISCONTINUED | OUTPATIENT
Start: 2022-05-03 | End: 2022-05-13 | Stop reason: HOSPADM

## 2022-05-03 RX ORDER — NALOXONE HYDROCHLORIDE 0.4 MG/ML
0.04 INJECTION, SOLUTION INTRAMUSCULAR; INTRAVENOUS; SUBCUTANEOUS
Status: DISCONTINUED | OUTPATIENT
Start: 2022-05-03 | End: 2022-05-13 | Stop reason: HOSPADM

## 2022-05-03 RX ORDER — DEXTROSE, SODIUM CHLORIDE, AND POTASSIUM CHLORIDE 5; .45; .15 G/100ML; G/100ML; G/100ML
100 INJECTION INTRAVENOUS CONTINUOUS
Status: DISCONTINUED | OUTPATIENT
Start: 2022-05-03 | End: 2022-05-04

## 2022-05-03 RX ORDER — GUAIFENESIN 600 MG/1
1200 TABLET, EXTENDED RELEASE ORAL EVERY 12 HOURS
Status: DISCONTINUED | OUTPATIENT
Start: 2022-05-03 | End: 2022-05-13 | Stop reason: HOSPADM

## 2022-05-03 RX ORDER — SODIUM CHLORIDE 0.9 % (FLUSH) 0.9 %
5-40 SYRINGE (ML) INJECTION EVERY 8 HOURS
Status: DISCONTINUED | OUTPATIENT
Start: 2022-05-03 | End: 2022-05-03 | Stop reason: HOSPADM

## 2022-05-03 RX ORDER — SODIUM CHLORIDE 0.9 % (FLUSH) 0.9 %
5-40 SYRINGE (ML) INJECTION AS NEEDED
Status: DISCONTINUED | OUTPATIENT
Start: 2022-05-03 | End: 2022-05-13 | Stop reason: HOSPADM

## 2022-05-03 RX ORDER — SODIUM CHLORIDE, SODIUM LACTATE, POTASSIUM CHLORIDE, CALCIUM CHLORIDE 600; 310; 30; 20 MG/100ML; MG/100ML; MG/100ML; MG/100ML
125 INJECTION, SOLUTION INTRAVENOUS CONTINUOUS
Status: DISCONTINUED | OUTPATIENT
Start: 2022-05-03 | End: 2022-05-03 | Stop reason: HOSPADM

## 2022-05-03 RX ORDER — ARFORMOTEROL TARTRATE 15 UG/2ML
15 SOLUTION RESPIRATORY (INHALATION)
Status: DISCONTINUED | OUTPATIENT
Start: 2022-05-03 | End: 2022-05-05

## 2022-05-03 RX ORDER — ACETAMINOPHEN 500 MG
1000 TABLET ORAL ONCE
Status: COMPLETED | OUTPATIENT
Start: 2022-05-03 | End: 2022-05-03

## 2022-05-03 RX ORDER — SODIUM CHLORIDE 9 MG/ML
250 INJECTION, SOLUTION INTRAVENOUS AS NEEDED
Status: DISCONTINUED | OUTPATIENT
Start: 2022-05-03 | End: 2022-05-03

## 2022-05-03 RX ORDER — DILTIAZEM HYDROCHLORIDE 120 MG/1
240 CAPSULE, COATED, EXTENDED RELEASE ORAL DAILY
Status: DISCONTINUED | OUTPATIENT
Start: 2022-05-04 | End: 2022-05-04

## 2022-05-03 RX ORDER — ACETAMINOPHEN 500 MG
1000 TABLET ORAL EVERY 6 HOURS
Status: DISCONTINUED | OUTPATIENT
Start: 2022-05-03 | End: 2022-05-13 | Stop reason: HOSPADM

## 2022-05-03 RX ORDER — VECURONIUM BROMIDE FOR INJECTION 1 MG/ML
INJECTION, POWDER, LYOPHILIZED, FOR SOLUTION INTRAVENOUS AS NEEDED
Status: DISCONTINUED | OUTPATIENT
Start: 2022-05-03 | End: 2022-05-03 | Stop reason: HOSPADM

## 2022-05-03 RX ORDER — PHENYLEPHRINE HCL IN 0.9% NACL 0.4MG/10ML
SYRINGE (ML) INTRAVENOUS AS NEEDED
Status: DISCONTINUED | OUTPATIENT
Start: 2022-05-03 | End: 2022-05-03 | Stop reason: HOSPADM

## 2022-05-03 RX ORDER — BUPIVACAINE HYDROCHLORIDE 5 MG/ML
INJECTION, SOLUTION EPIDURAL; INTRACAUDAL AS NEEDED
Status: DISCONTINUED | OUTPATIENT
Start: 2022-05-03 | End: 2022-05-03 | Stop reason: HOSPADM

## 2022-05-03 RX ORDER — FENTANYL CITRATE 50 UG/ML
25 INJECTION, SOLUTION INTRAMUSCULAR; INTRAVENOUS
Status: DISCONTINUED | OUTPATIENT
Start: 2022-05-03 | End: 2022-05-03 | Stop reason: HOSPADM

## 2022-05-03 RX ORDER — EPHEDRINE SULFATE/0.9% NACL/PF 50 MG/5 ML
SYRINGE (ML) INTRAVENOUS AS NEEDED
Status: DISCONTINUED | OUTPATIENT
Start: 2022-05-03 | End: 2022-05-03 | Stop reason: HOSPADM

## 2022-05-03 RX ORDER — IPRATROPIUM BROMIDE AND ALBUTEROL SULFATE 2.5; .5 MG/3ML; MG/3ML
3 SOLUTION RESPIRATORY (INHALATION)
Status: DISCONTINUED | OUTPATIENT
Start: 2022-05-03 | End: 2022-05-03 | Stop reason: SDUPTHER

## 2022-05-03 RX ORDER — SODIUM CHLORIDE, SODIUM LACTATE, POTASSIUM CHLORIDE, CALCIUM CHLORIDE 600; 310; 30; 20 MG/100ML; MG/100ML; MG/100ML; MG/100ML
INJECTION, SOLUTION INTRAVENOUS
Status: DISCONTINUED | OUTPATIENT
Start: 2022-05-03 | End: 2022-05-03 | Stop reason: HOSPADM

## 2022-05-03 RX ORDER — DEXAMETHASONE SODIUM PHOSPHATE 4 MG/ML
INJECTION, SOLUTION INTRA-ARTICULAR; INTRALESIONAL; INTRAMUSCULAR; INTRAVENOUS; SOFT TISSUE AS NEEDED
Status: DISCONTINUED | OUTPATIENT
Start: 2022-05-03 | End: 2022-05-03 | Stop reason: HOSPADM

## 2022-05-03 RX ORDER — ROCURONIUM BROMIDE 10 MG/ML
INJECTION, SOLUTION INTRAVENOUS AS NEEDED
Status: DISCONTINUED | OUTPATIENT
Start: 2022-05-03 | End: 2022-05-03 | Stop reason: HOSPADM

## 2022-05-03 RX ORDER — SODIUM CHLORIDE 0.9 % (FLUSH) 0.9 %
5-40 SYRINGE (ML) INJECTION EVERY 8 HOURS
Status: DISCONTINUED | OUTPATIENT
Start: 2022-05-03 | End: 2022-05-13 | Stop reason: HOSPADM

## 2022-05-03 RX ORDER — HYDROMORPHONE HYDROCHLORIDE 1 MG/ML
.25-1 INJECTION, SOLUTION INTRAMUSCULAR; INTRAVENOUS; SUBCUTANEOUS
Status: DISCONTINUED | OUTPATIENT
Start: 2022-05-03 | End: 2022-05-03 | Stop reason: HOSPADM

## 2022-05-03 RX ORDER — OXYCODONE HYDROCHLORIDE 5 MG/1
10 TABLET ORAL
Status: DISCONTINUED | OUTPATIENT
Start: 2022-05-03 | End: 2022-05-13 | Stop reason: HOSPADM

## 2022-05-03 RX ORDER — ONDANSETRON 2 MG/ML
4 INJECTION INTRAMUSCULAR; INTRAVENOUS AS NEEDED
Status: DISCONTINUED | OUTPATIENT
Start: 2022-05-03 | End: 2022-05-03 | Stop reason: HOSPADM

## 2022-05-03 RX ORDER — MONTELUKAST SODIUM 10 MG/1
10 TABLET ORAL
Status: DISCONTINUED | OUTPATIENT
Start: 2022-05-03 | End: 2022-05-13 | Stop reason: HOSPADM

## 2022-05-03 RX ORDER — ALBUMIN HUMAN 50 G/1000ML
SOLUTION INTRAVENOUS AS NEEDED
Status: DISCONTINUED | OUTPATIENT
Start: 2022-05-03 | End: 2022-05-03 | Stop reason: HOSPADM

## 2022-05-03 RX ORDER — LIDOCAINE HYDROCHLORIDE ANHYDROUS AND DEXTROSE MONOHYDRATE .8; 5 G/100ML; G/100ML
INJECTION, SOLUTION INTRAVENOUS
Status: DISCONTINUED | OUTPATIENT
Start: 2022-05-03 | End: 2022-05-03 | Stop reason: HOSPADM

## 2022-05-03 RX ORDER — PROMETHAZINE HYDROCHLORIDE 25 MG/1
12.5 TABLET ORAL
Status: DISCONTINUED | OUTPATIENT
Start: 2022-05-03 | End: 2022-05-13 | Stop reason: HOSPADM

## 2022-05-03 RX ORDER — DIPHENHYDRAMINE HYDROCHLORIDE 50 MG/ML
12.5 INJECTION, SOLUTION INTRAMUSCULAR; INTRAVENOUS AS NEEDED
Status: DISCONTINUED | OUTPATIENT
Start: 2022-05-03 | End: 2022-05-03 | Stop reason: HOSPADM

## 2022-05-03 RX ORDER — LIDOCAINE HYDROCHLORIDE 20 MG/ML
INJECTION, SOLUTION EPIDURAL; INFILTRATION; INTRACAUDAL; PERINEURAL AS NEEDED
Status: DISCONTINUED | OUTPATIENT
Start: 2022-05-03 | End: 2022-05-03 | Stop reason: HOSPADM

## 2022-05-03 RX ORDER — KETAMINE HYDROCHLORIDE 10 MG/ML
INJECTION, SOLUTION INTRAMUSCULAR; INTRAVENOUS AS NEEDED
Status: DISCONTINUED | OUTPATIENT
Start: 2022-05-03 | End: 2022-05-03 | Stop reason: HOSPADM

## 2022-05-03 RX ORDER — BUDESONIDE 0.5 MG/2ML
500 INHALANT ORAL
Status: DISCONTINUED | OUTPATIENT
Start: 2022-05-03 | End: 2022-05-13 | Stop reason: HOSPADM

## 2022-05-03 RX ORDER — ONDANSETRON 2 MG/ML
INJECTION INTRAMUSCULAR; INTRAVENOUS AS NEEDED
Status: DISCONTINUED | OUTPATIENT
Start: 2022-05-03 | End: 2022-05-03 | Stop reason: HOSPADM

## 2022-05-03 RX ORDER — FLUMAZENIL 0.1 MG/ML
0.2 INJECTION INTRAVENOUS
Status: DISCONTINUED | OUTPATIENT
Start: 2022-05-03 | End: 2022-05-03 | Stop reason: HOSPADM

## 2022-05-03 RX ORDER — CELECOXIB 100 MG/1
200 CAPSULE ORAL ONCE
Status: COMPLETED | OUTPATIENT
Start: 2022-05-03 | End: 2022-05-03

## 2022-05-03 RX ORDER — ENOXAPARIN SODIUM 100 MG/ML
40 INJECTION SUBCUTANEOUS DAILY
Status: DISCONTINUED | OUTPATIENT
Start: 2022-05-04 | End: 2022-05-06

## 2022-05-03 RX ADMIN — Medication 10 ML: at 21:20

## 2022-05-03 RX ADMIN — ARFORMOTEROL TARTRATE 15 MCG: 15 SOLUTION RESPIRATORY (INHALATION) at 21:02

## 2022-05-03 RX ADMIN — SUGAMMADEX 200 MG: 100 INJECTION, SOLUTION INTRAVENOUS at 16:56

## 2022-05-03 RX ADMIN — ACETAMINOPHEN 1000 MG: 500 TABLET ORAL at 12:23

## 2022-05-03 RX ADMIN — POTASSIUM CHLORIDE, DEXTROSE MONOHYDRATE AND SODIUM CHLORIDE 100 ML/HR: 150; 5; 450 INJECTION, SOLUTION INTRAVENOUS at 20:22

## 2022-05-03 RX ADMIN — Medication 160 MCG: at 13:39

## 2022-05-03 RX ADMIN — IPRATROPIUM BROMIDE 0.5 MG: 0.5 SOLUTION RESPIRATORY (INHALATION) at 21:02

## 2022-05-03 RX ADMIN — Medication 40 MCG/MIN: at 13:52

## 2022-05-03 RX ADMIN — MONTELUKAST 10 MG: 10 TABLET, FILM COATED ORAL at 21:30

## 2022-05-03 RX ADMIN — DEXAMETHASONE SODIUM PHOSPHATE 8 MG: 4 INJECTION, SOLUTION INTRAMUSCULAR; INTRAVENOUS at 14:06

## 2022-05-03 RX ADMIN — CEFOXITIN SODIUM 2 G: 2 POWDER, FOR SOLUTION INTRAVENOUS at 13:57

## 2022-05-03 RX ADMIN — ROCURONIUM BROMIDE 20 MG: 10 INJECTION INTRAVENOUS at 13:54

## 2022-05-03 RX ADMIN — Medication 10 MG: at 17:26

## 2022-05-03 RX ADMIN — KETAMINE HYDROCHLORIDE 30 MG: 10 INJECTION INTRAMUSCULAR; INTRAVENOUS at 13:35

## 2022-05-03 RX ADMIN — NALOXEGOL OXALATE 25 MG: 12.5 TABLET, FILM COATED ORAL at 12:23

## 2022-05-03 RX ADMIN — VECURONIUM BROMIDE 2 MG: 1 INJECTION, POWDER, LYOPHILIZED, FOR SOLUTION INTRAVENOUS at 15:44

## 2022-05-03 RX ADMIN — Medication 80 MCG: at 17:35

## 2022-05-03 RX ADMIN — SODIUM CHLORIDE, POTASSIUM CHLORIDE, SODIUM LACTATE AND CALCIUM CHLORIDE 125 ML/HR: 600; 310; 30; 20 INJECTION, SOLUTION INTRAVENOUS at 13:00

## 2022-05-03 RX ADMIN — CEFOXITIN SODIUM 2 G: 2 POWDER, FOR SOLUTION INTRAVENOUS at 15:57

## 2022-05-03 RX ADMIN — MAGNESIUM SULFATE 2 G: 2 INJECTION INTRAVENOUS at 13:40

## 2022-05-03 RX ADMIN — ACETAMINOPHEN 1000 MG: 325 TABLET ORAL at 21:30

## 2022-05-03 RX ADMIN — Medication 20 MG: at 13:39

## 2022-05-03 RX ADMIN — PROPOFOL 100 MG: 10 INJECTION, EMULSION INTRAVENOUS at 13:35

## 2022-05-03 RX ADMIN — LIDOCAINE HYDROCHLORIDE ANHYDROUS AND DEXTROSE MONOHYDRATE 2 MG/KG/HR: .8; 5 INJECTION, SOLUTION INTRAVENOUS at 13:40

## 2022-05-03 RX ADMIN — ONDANSETRON HYDROCHLORIDE 4 MG: 2 SOLUTION INTRAMUSCULAR; INTRAVENOUS at 17:00

## 2022-05-03 RX ADMIN — Medication 20 MG: at 13:52

## 2022-05-03 RX ADMIN — VECURONIUM BROMIDE 3 MG: 1 INJECTION, POWDER, LYOPHILIZED, FOR SOLUTION INTRAVENOUS at 14:53

## 2022-05-03 RX ADMIN — ROCURONIUM BROMIDE 30 MG: 10 INJECTION INTRAVENOUS at 13:35

## 2022-05-03 RX ADMIN — FAMOTIDINE 20 MG: 10 INJECTION, SOLUTION INTRAVENOUS at 21:15

## 2022-05-03 RX ADMIN — MORPHINE SULFATE 0.1 MG: 1 INJECTION, SOLUTION EPIDURAL; INTRATHECAL; INTRAVENOUS at 13:11

## 2022-05-03 RX ADMIN — SODIUM CHLORIDE, POTASSIUM CHLORIDE, SODIUM LACTATE AND CALCIUM CHLORIDE: 600; 310; 30; 20 INJECTION, SOLUTION INTRAVENOUS at 13:35

## 2022-05-03 RX ADMIN — LIDOCAINE HYDROCHLORIDE 60 MG: 20 INJECTION, SOLUTION EPIDURAL; INFILTRATION; INTRACAUDAL; PERINEURAL at 13:35

## 2022-05-03 RX ADMIN — MIDAZOLAM 1 MG: 1 INJECTION, SOLUTION INTRAMUSCULAR; INTRAVENOUS at 13:03

## 2022-05-03 RX ADMIN — GUAIFENESIN 1200 MG: 600 TABLET ORAL at 21:30

## 2022-05-03 RX ADMIN — ALBUMIN (HUMAN) 12.5 G: 12.5 SOLUTION INTRAVENOUS at 13:40

## 2022-05-03 RX ADMIN — BUDESONIDE 500 MCG: 0.5 SUSPENSION RESPIRATORY (INHALATION) at 21:02

## 2022-05-03 RX ADMIN — Medication 80 MCG: at 13:35

## 2022-05-03 RX ADMIN — CELECOXIB 200 MG: 100 CAPSULE ORAL at 12:23

## 2022-05-03 RX ADMIN — MIDAZOLAM 1 MG: 1 INJECTION, SOLUTION INTRAMUSCULAR; INTRAVENOUS at 13:01

## 2022-05-03 RX ADMIN — BUPIVACAINE HYDROCHLORIDE 0.9 ML: 5 INJECTION, SOLUTION EPIDURAL; INTRACAUDAL; PERINEURAL at 13:11

## 2022-05-03 NOTE — BRIEF OP NOTE
Brief Postoperative Note    Patient: Waldemar Isabel  YOB: 1950  MRN: [id number]    Date of Procedure: 5/3/2022     Pre-Op Diagnosis: RECTAL CANCER    Post-Op Diagnosis: mid-rectal cancer     Procedure(s):  LAPAROSCOPIC LOW ANTERIOR RESECTION WITH COLOSTOMY    Surgeon(s):  Vish Chambers MD    Surgical Assistant: Surg Asst-1: Saúl Sosa    Anesthesia: General     Estimated Blood Loss (mL): less than 629     Complications: None    Specimens:   ID Type Source Tests Collected by Time Destination   1 : LOW ANTERIOR COLON Preservative Colon  Vish Chambers MD 5/3/2022 1622 Pathology        Implants: * No implants in log *    Drains:   Cleola Fleischer #1 05/03/22 Inferior Abdomen (Active)   Site Assessment Clean, dry, & intact 05/03/22 1726   Dressing Status Clean, dry, & intact 05/03/22 1726   Drainage Description Sanguinous 05/03/22 1726   Abbe Drain Airleak No 05/03/22 1726   Status Patent; Charged;Draining 05/03/22 1726   Y Connector Used No 05/03/22 1621       Findings: large mid rectal cancer abutting peritoneal reflection anteriorly    Electronically Signed by Mary Sifuentes MD on 5/3/2022 at 5:40 PM

## 2022-05-03 NOTE — ANESTHESIA PREPROCEDURE EVALUATION
Relevant Problems   RESPIRATORY SYSTEM   (+) Centrilobular emphysema (HCC)   (+) Chronic obstructive pulmonary disease (HCC)   (+) Pneumonia      CARDIOVASCULAR   (+) Atrial fibrillation (HCC)   (+) Atrial flutter, paroxysmal (HCC)   (+) CAD (coronary artery disease), native coronary artery   (+) SVT (supraventricular tachycardia) (HCC)      PERSONAL HX & FAMILY HX OF CANCER   (+) Rectosigmoid cancer (HCC)       Anesthetic History   No history of anesthetic complications            Review of Systems / Medical History  Patient summary reviewed, nursing notes reviewed and pertinent labs reviewed    Pulmonary    COPD: severe               Neuro/Psych   Within defined limits           Cardiovascular    Hypertension        Dysrhythmias : atrial fibrillation  Past MI, CAD and cardiac stents    Exercise tolerance: >4 METS     GI/Hepatic/Renal  Within defined limits              Endo/Other  Within defined limits           Other Findings   Comments: S/p lung lobe resection for TB 2012.  Now on home O2 2.5 L/min    Rectal cancer           Physical Exam    Airway  Mallampati: II    Neck ROM: normal range of motion   Mouth opening: Normal     Cardiovascular  Regular rate and rhythm,  S1 and S2 normal,  no murmur, click, rub, or gallop  Rhythm: regular  Rate: normal         Dental  No notable dental hx       Pulmonary  Breath sounds clear to auscultation               Abdominal  GI exam deferred       Other Findings            Anesthetic Plan    ASA: 4  Anesthesia type: general and spinal      Post-op pain plan if not by surgeon: intrathecal opiates    Induction: Intravenous  Anesthetic plan and risks discussed with: Patient

## 2022-05-03 NOTE — H&P
Colon and Rectal Surgery History and Physical    Subjective:      Oscar Ortiz is a 73 y.o. male who is well known to me for a history of rectal cancer at 10cm. Given comorbids, poor candidate for neoadjuvant therapy. Proceed with upfront surgery diverting colostomy.       Patient Active Problem List    Diagnosis Date Noted    Other fatigue 03/25/2022    Rectosigmoid cancer ([address] 03/10/2022    Centrilobular emphysema ([address] 03/10/2022    Elevated CEA 03/10/2022    Gastric dysplasia 03/10/2022    Bloody stools 03/10/2022    Ascending aorta dilation ([address] 02/15/2020    Pneumonia 02/15/2019    Atrial flutter, paroxysmal ([address] 03/31/2016    SVT (supraventricular tachycardia) ([address] 06/24/2015    Vitamin D deficiency 03/27/2014    Dyslipidemia 03/27/2014    CAD (coronary artery disease), native coronary artery 03/12/2014    Chronic obstructive pulmonary disease ([address] 02/27/2014    Atrial fibrillation ([address] 02/16/2014     Past Medical History:   Diagnosis Date    Acute on chronic respiratory failure with hypoxemia ([address] 2/15/2019    Arrhythmia     Asthma     Pt denies    Atrial fibrillation (HCC)     COPD (chronic obstructive pulmonary disease) (HCC)     severe    Hypertension     Insulin resistance 3/27/2014    Lung mass 9/2012    MAY resection, + mycobacterial orgs, neg malignancy    Melanoma ([address] 2007    Non-STEMI (non-ST elevated myocardial infarction) ([address] 2/14    On home O2 2.5 lpm continuously    since about 2014    Pneumonia     Snoring     Tinnitus     Tuberculosis     Vitamin D deficiency 3/27/2014      Past Surgical History:   Procedure Laterality Date    COLONOSCOPY N/A 3/3/2022    COLONOSCOPY performed by Domingo Turpin MD at [facility] HX CORONARY STENT PLACEMENT  2014    HX OTHER SURGICAL      EXC MELANOMA RIGHT CHEEK    HX OTHER SURGICAL      NEEDLE BX LEFT LUNG    HX OTHER SURGICAL  9/11/2012    apical segmentectomy, MAY      Social History     Tobacco Use    Smoking status: Light Tobacco Smoker     Packs/day: 0.25     Years: 40.00     Pack years: 10.00     Types: Cigarettes     Last attempt to quit:      Years since quittin.3    Smokeless tobacco: Never Used    Tobacco comment: Occasional cigarette   Substance Use Topics    Alcohol use: Yes     Alcohol/week: 7.0 standard drinks     Types: 7 Cans of beer per week      Family History   Problem Relation Age of Onset   Arcadio Safe Glaucoma Mother     Dementia Mother     Cancer Mother         ? of cervical vs. endometrial    Heart Attack Father     Heart Disease Father     Colon Cancer Neg Hx     Breast Cancer Neg Hx     Ovarian Cancer Neg Hx     Prostate Cancer Neg Hx       Prior to Admission medications    Medication Sig Start Date End Date Taking? Authorizing Provider   albuterol (PROVENTIL HFA, VENTOLIN HFA, PROAIR HFA) 90 mcg/actuation inhaler Take  by inhalation every four (4) hours as needed for Wheezing. Yes Provider, Historical   dilTIAZem ER (CARDIZEM CD) 240 mg capsule TAKE 1 CAPSULE BY MOUTH EVERY DAY 3/12/22  Yes Vincent Vance MD   atorvastatin (LIPITOR) 40 mg tablet Take 1 Tablet by mouth daily. 10/7/21  Yes Vincent Vance MD   budesonide (PULMICORT) 0.5 mg/2 mL nbsp 500 mcg by Nebulization route two (2) times a day. Yes Provider, Historical   montelukast (SINGULAIR) 10 mg tablet Take 10 mg by mouth nightly. Yes Provider, Historical   albuterol (PROVENTIL VENTOLIN) 2.5 mg /3 mL (0.083 %) nebulizer solution 2.5 mg by Nebulization route every four (4) hours as needed for Wheezing or Shortness of Breath. Yes Provider, Historical   guaiFENesin (MUCINEX) 1,200 mg TM12 ER tablet Take 1,200 mg by mouth two (2) times a day. Yes Provider, Historical   apixaban (Eliquis) 5 mg tablet Take 1 Tablet by mouth two (2) times a day. 21   Sonido Vance MD   nitroglycerin (NITROSTAT) 0.4 mg SL tablet 1 Tab by SubLINGual route every five (5) minutes as needed for Chest Pain. Up to 3 doses.   Patient not taking: Reported on 5/3/2022 9/28/20   Flavio Crigler, NP   aspirin 81 mg chewable tablet Take 81 mg by mouth nightly. Provider, Historical   omega 3-DHA-EPA-fish oil 1,000 mg (120 mg-180 mg) capsule Take 2 Caps by mouth daily. Provider, Historical   cholecalciferol (VITAMIN D3) 1,000 unit tablet Take 2,000 Units by mouth daily. Provider, Historical   glycopyrrolate-formoterol (BEVESPI AEROSPHERE) 9-4.8 mcg HFAA Take 2 Puffs by inhalation two (2) times a day. Provider, Historical   multivitamin (ONE A DAY) tablet Take 1 tablet by mouth daily. Provider, Historical     No Known Allergies     Review of Systems:    A comprehensive review of systems was negative except for that written in the History of Present Illness. Objective:     Visit Vitals  /62 (BP 1 Location: Left arm, BP Patient Position: At rest)   Pulse 84   Temp 97.3 °F (36.3 °C)   Resp 22   Ht 5' 10\" (1.778 m)   Wt 55.4 kg (122 lb 2.2 oz)   SpO2 98% Comment: 2.5L NC   BMI 17.52 kg/m²        Physical Exam:   Gen: A&Ox3, nad  Heent: sclera anicteric  Lungs: CTA B  CV: rrr    Imaging:  images and reports reviewed    Lab Review:  No results found for this or any previous visit (from the past 24 hour(s)). Labs and radiology: images and reports reviewed      Assessment:      Rectal cancer at 10cm    Plan:     Proceed with laparoscopic LAR, diverting colostomy. I have reviewed the risks, benefits, alternatives at great length. They understand risks and agrees to proceed. All questions answered.     Signed By: Tacho Quach MD     May 3, 2022

## 2022-05-03 NOTE — ANESTHESIA PROCEDURE NOTES
Spinal Block    Start time: 5/3/2022 1:01 PM  End time: 5/3/2022 1:11 PM  Performed by: Taylor Fay MD  Authorized by: Taylor Fay MD     Pre-procedure: Indications: at surgeon's request and primary anesthetic  Preanesthetic Checklist: patient identified, risks and benefits discussed, anesthesia consent, site marked, patient being monitored and timeout performed    Timeout Time: 13:01 EDT          Spinal Block:   Patient Position:  Seated  Prep Region:  Lumbar  Prep: DuraPrep      Location:  L3-4  Technique:  Single shot        Needle:   Needle Type:   Sotero  Needle Gauge:  25 G  Attempts:  1      Events: CSF confirmed, no blood with aspiration and no paresthesia        Assessment:  Insertion:  Uncomplicated  Patient tolerance:  Patient tolerated the procedure well with no immediate complications

## 2022-05-03 NOTE — PERIOP NOTES
TRANSFER - OUT REPORT:    Verbal report given to NALLELY Nunez(name) on Endy Epperson  being transferred to ICU(unit) for routine post - op       Report consisted of patients Situation, Background, Assessment and   Recommendations(SBAR). Information from the following report(s) SBAR was reviewed with the receiving nurse. Lines:   Peripheral IV 05/03/22 Left;Posterior Forearm (Active)   Site Assessment Clean, dry, & intact 05/03/22 1815   Phlebitis Assessment 0 05/03/22 1815   Infiltration Assessment 0 05/03/22 1815   Dressing Status Clean, dry, & intact 05/03/22 1815   Dressing Type Tape;Transparent 05/03/22 1815   Hub Color/Line Status Pink; Infusing 05/03/22 1815   Alcohol Cap Used Yes 05/03/22 1815       Peripheral IV 05/03/22 Right Antecubital (Active)   Site Assessment Clean, dry, & intact 05/03/22 1815   Phlebitis Assessment 0 05/03/22 1815   Infiltration Assessment 0 05/03/22 1815   Dressing Status Clean, dry, & intact 05/03/22 1815   Dressing Type Tape;Transparent 05/03/22 1815   Hub Color/Line Status Pink; Infusing 05/03/22 1815   Alcohol Cap Used Yes 05/03/22 1815        Opportunity for questions and clarification was provided.       Patient transported with:   Monitor  O2 @ 8 liters  Registered Nurse

## 2022-05-04 LAB
ALBUMIN SERPL-MCNC: 2.5 G/DL (ref 3.5–5)
ALBUMIN/GLOB SERPL: 0.9 {RATIO} (ref 1.1–2.2)
ALP SERPL-CCNC: 78 U/L (ref 45–117)
ALT SERPL-CCNC: 19 U/L (ref 12–78)
ANION GAP SERPL CALC-SCNC: 2 MMOL/L (ref 5–15)
AST SERPL-CCNC: 22 U/L (ref 15–37)
BASOPHILS # BLD: 0 K/UL (ref 0–0.1)
BASOPHILS NFR BLD: 0 % (ref 0–1)
BILIRUB SERPL-MCNC: 0.4 MG/DL (ref 0.2–1)
BUN SERPL-MCNC: 12 MG/DL (ref 6–20)
BUN/CREAT SERPL: 21 (ref 12–20)
CALCIUM SERPL-MCNC: 8.4 MG/DL (ref 8.5–10.1)
CHLORIDE SERPL-SCNC: 95 MMOL/L (ref 97–108)
CO2 SERPL-SCNC: 39 MMOL/L (ref 21–32)
CREAT SERPL-MCNC: 0.57 MG/DL (ref 0.7–1.3)
DIFFERENTIAL METHOD BLD: ABNORMAL
EOSINOPHIL # BLD: 0 K/UL (ref 0–0.4)
EOSINOPHIL NFR BLD: 0 % (ref 0–7)
ERYTHROCYTE [DISTWIDTH] IN BLOOD BY AUTOMATED COUNT: 15 % (ref 11.5–14.5)
GLOBULIN SER CALC-MCNC: 2.9 G/DL (ref 2–4)
GLUCOSE SERPL-MCNC: 174 MG/DL (ref 65–100)
HCT VFR BLD AUTO: 31.7 % (ref 36.6–50.3)
HGB BLD-MCNC: 10.2 G/DL (ref 12.1–17)
IMM GRANULOCYTES # BLD AUTO: 0 K/UL (ref 0–0.04)
IMM GRANULOCYTES NFR BLD AUTO: 0 % (ref 0–0.5)
LYMPHOCYTES # BLD: 0.6 K/UL (ref 0.8–3.5)
LYMPHOCYTES NFR BLD: 4 % (ref 12–49)
MAGNESIUM SERPL-MCNC: 2.4 MG/DL (ref 1.6–2.4)
MCH RBC QN AUTO: 31.4 PG (ref 26–34)
MCHC RBC AUTO-ENTMCNC: 32.2 G/DL (ref 30–36.5)
MCV RBC AUTO: 97.5 FL (ref 80–99)
MONOCYTES # BLD: 0.8 K/UL (ref 0–1)
MONOCYTES NFR BLD: 6 % (ref 5–13)
NEUTS SEG # BLD: 12.6 K/UL (ref 1.8–8)
NEUTS SEG NFR BLD: 90 % (ref 32–75)
NRBC # BLD: 0 K/UL (ref 0–0.01)
NRBC BLD-RTO: 0 PER 100 WBC
PHOSPHATE SERPL-MCNC: 3.4 MG/DL (ref 2.6–4.7)
PLATELET # BLD AUTO: 285 K/UL (ref 150–400)
PMV BLD AUTO: 9.2 FL (ref 8.9–12.9)
POTASSIUM SERPL-SCNC: 4.5 MMOL/L (ref 3.5–5.1)
PROT SERPL-MCNC: 5.4 G/DL (ref 6.4–8.2)
RBC # BLD AUTO: 3.25 M/UL (ref 4.1–5.7)
RBC MORPH BLD: ABNORMAL
SODIUM SERPL-SCNC: 136 MMOL/L (ref 136–145)
WBC # BLD AUTO: 14 K/UL (ref 4.1–11.1)

## 2022-05-04 PROCEDURE — 99223 1ST HOSP IP/OBS HIGH 75: CPT | Performed by: INTERNAL MEDICINE

## 2022-05-04 PROCEDURE — 65610000006 HC RM INTENSIVE CARE

## 2022-05-04 PROCEDURE — 74011000250 HC RX REV CODE- 250: Performed by: COLON & RECTAL SURGERY

## 2022-05-04 PROCEDURE — 84100 ASSAY OF PHOSPHORUS: CPT

## 2022-05-04 PROCEDURE — 94640 AIRWAY INHALATION TREATMENT: CPT

## 2022-05-04 PROCEDURE — 2709999900 HC NON-CHARGEABLE SUPPLY

## 2022-05-04 PROCEDURE — 36415 COLL VENOUS BLD VENIPUNCTURE: CPT

## 2022-05-04 PROCEDURE — 85025 COMPLETE CBC W/AUTO DIFF WBC: CPT

## 2022-05-04 PROCEDURE — 74011636637 HC RX REV CODE- 636/637: Performed by: NURSE PRACTITIONER

## 2022-05-04 PROCEDURE — 74011250636 HC RX REV CODE- 250/636: Performed by: COLON & RECTAL SURGERY

## 2022-05-04 PROCEDURE — 77030027138 HC INCENT SPIROMETER -A

## 2022-05-04 PROCEDURE — APPSS45 APP SPLIT SHARED TIME 31-45 MINUTES: Performed by: NURSE PRACTITIONER

## 2022-05-04 PROCEDURE — 77010033678 HC OXYGEN DAILY

## 2022-05-04 PROCEDURE — 77030040922 HC BLNKT HYPOTHRM STRY -A

## 2022-05-04 PROCEDURE — 83735 ASSAY OF MAGNESIUM: CPT

## 2022-05-04 PROCEDURE — 94762 N-INVAS EAR/PLS OXIMTRY CONT: CPT

## 2022-05-04 PROCEDURE — 80053 COMPREHEN METABOLIC PANEL: CPT

## 2022-05-04 PROCEDURE — 74011250637 HC RX REV CODE- 250/637: Performed by: COLON & RECTAL SURGERY

## 2022-05-04 PROCEDURE — 94761 N-INVAS EAR/PLS OXIMETRY MLT: CPT

## 2022-05-04 PROCEDURE — 94664 DEMO&/EVAL PT USE INHALER: CPT

## 2022-05-04 RX ORDER — PREDNISONE 10 MG/1
10 TABLET ORAL
Status: DISCONTINUED | OUTPATIENT
Start: 2022-05-04 | End: 2022-05-05

## 2022-05-04 RX ORDER — DILTIAZEM HYDROCHLORIDE 120 MG/1
240 CAPSULE, COATED, EXTENDED RELEASE ORAL DAILY
Status: DISCONTINUED | OUTPATIENT
Start: 2022-05-05 | End: 2022-05-08

## 2022-05-04 RX ORDER — SODIUM CHLORIDE, SODIUM LACTATE, POTASSIUM CHLORIDE, CALCIUM CHLORIDE 600; 310; 30; 20 MG/100ML; MG/100ML; MG/100ML; MG/100ML
50 INJECTION, SOLUTION INTRAVENOUS CONTINUOUS
Status: DISCONTINUED | OUTPATIENT
Start: 2022-05-04 | End: 2022-05-05

## 2022-05-04 RX ORDER — FAMOTIDINE 20 MG/1
20 TABLET, FILM COATED ORAL 2 TIMES DAILY
Status: DISCONTINUED | OUTPATIENT
Start: 2022-05-05 | End: 2022-05-13 | Stop reason: HOSPADM

## 2022-05-04 RX ADMIN — MONTELUKAST 10 MG: 10 TABLET, FILM COATED ORAL at 20:17

## 2022-05-04 RX ADMIN — IPRATROPIUM BROMIDE 0.5 MG: 0.5 SOLUTION RESPIRATORY (INHALATION) at 07:40

## 2022-05-04 RX ADMIN — ARFORMOTEROL TARTRATE 15 MCG: 15 SOLUTION RESPIRATORY (INHALATION) at 21:53

## 2022-05-04 RX ADMIN — BUDESONIDE 500 MCG: 0.5 SUSPENSION RESPIRATORY (INHALATION) at 07:49

## 2022-05-04 RX ADMIN — IPRATROPIUM BROMIDE 0.5 MG: 0.5 SOLUTION RESPIRATORY (INHALATION) at 15:10

## 2022-05-04 RX ADMIN — ACETAMINOPHEN 1000 MG: 325 TABLET ORAL at 17:25

## 2022-05-04 RX ADMIN — GUAIFENESIN 1200 MG: 600 TABLET ORAL at 20:16

## 2022-05-04 RX ADMIN — BUDESONIDE 500 MCG: 0.5 SUSPENSION RESPIRATORY (INHALATION) at 21:53

## 2022-05-04 RX ADMIN — GUAIFENESIN 1200 MG: 600 TABLET ORAL at 08:31

## 2022-05-04 RX ADMIN — SODIUM CHLORIDE, POTASSIUM CHLORIDE, SODIUM LACTATE AND CALCIUM CHLORIDE 100 ML/HR: 600; 310; 30; 20 INJECTION, SOLUTION INTRAVENOUS at 08:29

## 2022-05-04 RX ADMIN — Medication 10 ML: at 13:13

## 2022-05-04 RX ADMIN — FAMOTIDINE 20 MG: 10 INJECTION, SOLUTION INTRAVENOUS at 20:17

## 2022-05-04 RX ADMIN — POTASSIUM CHLORIDE, DEXTROSE MONOHYDRATE AND SODIUM CHLORIDE 100 ML/HR: 150; 5; 450 INJECTION, SOLUTION INTRAVENOUS at 06:12

## 2022-05-04 RX ADMIN — PREDNISONE 10 MG: 10 TABLET ORAL at 15:10

## 2022-05-04 RX ADMIN — FAMOTIDINE 20 MG: 10 INJECTION, SOLUTION INTRAVENOUS at 08:31

## 2022-05-04 RX ADMIN — ACETAMINOPHEN 1000 MG: 325 TABLET ORAL at 11:52

## 2022-05-04 RX ADMIN — NALOXEGOL OXALATE 25 MG: 25 TABLET, FILM COATED ORAL at 08:20

## 2022-05-04 RX ADMIN — SODIUM CHLORIDE, POTASSIUM CHLORIDE, SODIUM LACTATE AND CALCIUM CHLORIDE 100 ML/HR: 600; 310; 30; 20 INJECTION, SOLUTION INTRAVENOUS at 20:16

## 2022-05-04 RX ADMIN — ACETAMINOPHEN 1000 MG: 325 TABLET ORAL at 06:12

## 2022-05-04 RX ADMIN — ENOXAPARIN SODIUM 40 MG: 100 INJECTION SUBCUTANEOUS at 08:37

## 2022-05-04 RX ADMIN — ACETAMINOPHEN 1000 MG: 325 TABLET ORAL at 23:49

## 2022-05-04 RX ADMIN — Medication 10 ML: at 06:12

## 2022-05-04 RX ADMIN — ARFORMOTEROL TARTRATE 15 MCG: 15 SOLUTION RESPIRATORY (INHALATION) at 07:49

## 2022-05-04 RX ADMIN — IPRATROPIUM BROMIDE 0.5 MG: 0.5 SOLUTION RESPIRATORY (INHALATION) at 01:53

## 2022-05-04 RX ADMIN — IPRATROPIUM BROMIDE 0.5 MG: 0.5 SOLUTION RESPIRATORY (INHALATION) at 21:45

## 2022-05-04 RX ADMIN — Medication 10 ML: at 22:00

## 2022-05-04 NOTE — PROGRESS NOTES
Reason for Admission:   Rectal cancer,laparoscopic surgery diverting colostomy                    RUR Score:     16%             PCP: First and Last name:   Ally Mack MD     Name of Practice:    Are you a current patient: Yes/No: yes   Approximate date of last visit:    Can you participate in a virtual visit if needed: yes    Do you (patient/family) have any concerns for transition/discharge? No               Plan for utilizing home health: To be determined by discharge     Current Advanced Directive/Advance Care Plan:  Full Code      Healthcare Decision Maker:           Sister per LEE ANN Post ([phone]  I reviewed AMD scanned into pt.'s EMR. Transition of Care Plan:          I met with pt as an introductory visit. Pt lives @ the address on demographics. Pt has three entry steps into his two story home. Pt stays on the first floor. Pt has had both covid vaccines plus one booster. Pt asked when he should get his second booster shot. I referred pt to ask Dr Genevieve Bernal regarding timing of his second covid booster shot in case the surgeon has preferences. Pharmacy of choice is [facility]. Pt is independent @ home . He does not use any assistive devices to ambulate. Pt is on oxygen 2.5 liters @ home by nasal cannula. Pt states he purchased his concentrator and home oxygen out of pocket so he is not serviced by a [address] [place] [address] for home oxygen. AMD is scanned into pt.'s EMR naming his [address] [place] [address] as his primary decision-maker if pt cannot make decisions. At this time,pt does not anticipate any rehab needs or home health needs but pt may benefit from home health for colostomy education and management.     Wilfrido Paredes  [employer]  extension [phone]

## 2022-05-04 NOTE — PROGRESS NOTES
General Surgery Daily Progress Note    Patient: Lexie Sheikh MRN: [id number]  SSN: [id number]    YOB: 1950  Age: 73 y.o.   Sex: male      Admit Date: 5/3/2022    POD 1 Day Post-Op    Procedure: Procedure(s):  LAPAROSCOPIC LOW ANTERIOR RESECTION WITH COLOSTOMY    Subjective:   Pain Controlled  Tolerating clears- no N/V  No gas in ostomy as of yet  Has not mobilized since surgery- no IS in room at time of assessment    Current Facility-Administered Medications   Medication Dose Route Frequency    [START ON 5/5/2022] dilTIAZem ER (CARDIZEM CD) capsule 240 mg  240 mg Oral DAILY    lactated Ringers infusion  100 mL/hr IntraVENous CONTINUOUS    sodium chloride (NS) flush 5-40 mL  5-40 mL IntraVENous PRN    naloxone (NARCAN) injection 0.04 mg  0.04 mg IntraVENous Multiple    albuterol (PROVENTIL VENTOLIN) nebulizer solution 2.5 mg  2.5 mg Nebulization Q4H PRN    budesonide (PULMICORT) 500 mcg/2 ml nebulizer suspension  500 mcg Nebulization BID RT    montelukast (SINGULAIR) tablet 10 mg  10 mg Oral QHS    sodium chloride (NS) flush 5-40 mL  5-40 mL IntraVENous Q8H    sodium chloride (NS) flush 5-40 mL  5-40 mL IntraVENous PRN    promethazine (PHENERGAN) tablet 12.5 mg  12.5 mg Oral Q6H PRN    Or    ondansetron (ZOFRAN) injection 4 mg  4 mg IntraVENous Q6H PRN    enoxaparin (LOVENOX) injection 40 mg  40 mg SubCUTAneous DAILY    naloxegoL (MOVANTIK) tablet 25 mg  25 mg Oral ACB    famotidine (PF) (PEPCID) 20 mg in 0.9% sodium chloride 10 mL injection  20 mg IntraVENous Q12H    acetaminophen (TYLENOL) tablet 1,000 mg  1,000 mg Oral Q6H    oxyCODONE IR (ROXICODONE) tablet 5 mg  5 mg Oral Q4H PRN    Or    oxyCODONE IR (ROXICODONE) tablet 10 mg  10 mg Oral Q4H PRN    arformoteroL (BROVANA) neb solution 15 mcg  15 mcg Nebulization BID RT    And    ipratropium (ATROVENT) 0.02 % nebulizer solution 0.5 mg  0.5 mg Nebulization Q6H RT    guaiFENesin ER (MUCINEX) tablet 1,200 mg  1,200 mg Oral Q12H Objective:   05/04 0701 - 05/04 1900  In: 640 [P.O.:240; I.V.:400]  Out: 70 [Urine:70]  05/02 1901 - 05/04 0700  In: 3383.3 [P.O.:600; I.V.:2783.3]  Out: 391 [Urine:231; Drains:60]  Patient Vitals for the past 8 hrs:   BP Temp Pulse Resp SpO2   05/04/22 1000 (!) 97/58 98.4 °F (36.9 °C) 92 20 95 %   05/04/22 0900 98/62  (!) 103 15 (!) 87 %   05/04/22 0800 (!) 107/59 98.2 °F (36.8 °C) 92 15 98 %   05/04/22 0740     95 %   05/04/22 0700 (!) 90/57  87 17 96 %   05/04/22 0600 96/60  88 16 97 %   05/04/22 0500 (!) 90/56  80 20 98 %   05/04/22 0445 (!) 92/57  85 13 95 %   05/04/22 0400 (!) 84/59 97.5 °F (36.4 °C) 83 17 94 %       Physical Exam:  General: Alert, cooperative, NAD  Lungs: 4L NC mid 90s   Heart: On monitor- Afib- low 100s  Abdomen: Soft, mild tenderness as expected, non-distended. Incisions c/d/i.  ERIC drain serosang.   Ostomy with pink mildly edematous stoma as expected POD 1 and only scant serosang drain in bag-no gas  Extremities: Warm, moves all, no edema  Skin:  Warm and dry, no rash    Labs:   Recent Labs     05/04/22 0328   WBC 14.0*   HGB 10.2*   HCT 31.7*        Recent Labs     05/04/22 0328      K 4.5   CL 95*   CO2 39*   *   BUN 12   CREA 0.57*   CA 8.4*   MG 2.4   PHOS 3.4   ALB 2.5*   TBILI 0.4   ALT 19       Assessment / Plan:       Procedure: Procedure(s):  LAPAROSCOPIC LOW ANTERIOR RESECTION WITH COLOSTOMY      Cont current pain regimen- can consider adding toradol if inc pain  Bettencourt most likely out tomorrow am  Keep IVF  Cont clear liquid diet- add own supplements BID (at bedside)  Cards consult- asymptomatic afib- off home eliquis and ASA currently- on lovenox ppx dosing, cardizem  Await pulm recs- hopefully downgrade within next 24-48 hours  Cont Incentive spirometer  Must mobilize TID and into chair TID      Pt seen and discussed with Dr Grace Davis, NP

## 2022-05-04 NOTE — PROGRESS NOTES
Bedside and Verbal shift change report given to [address] [place] [address] (oncoming nurse) by Fang Moore (offgoing nurse). Report included the following information SBAR, Kardex, Intake/Output, MAR, Recent Results and Cardiac Rhythm A fib. Primary Nurse Celso Trejo and Yoli, RN performed a dual skin assessment on this patient No impairment noted  Dipak score is see flow sheet. 0800 Morning Assessment, VS being monitored, meds given, bear hugger level adjusted    Neuro: Ox4  Cardiac:  Respiratory:  GI: Bettencourt  : New colostomy  IV: 20 G R AC, 20 G L FA    1000: Patient resting quietly, NP at bedside from surgery discussing care, bear hugger turned off, patient educated on using incentitive spirometer-p/t is independent with device  1200: Reassessment, VSS, patient bathed, med given    1400: Patient talking to friend at bedside, Surgery at bedside talking to p/t too. 1530:P/t ambulated with assistance of RN to the chair in room  1600: Reassessment, VSS, patient watching TV in chair    1800: Patient turned, p/t ambulated back to bed    Bedside and Verbal shift change report given to Farzana  (oncoming nurse) by [address] [place] [address] (offgoing nurse). Report included the following information SBAR, Kardex, Intake/Output, MAR, Recent Results and Cardiac Rhythm A Fib.

## 2022-05-04 NOTE — PROGRESS NOTES
Problem: Falls - Risk of  Goal: *Absence of Falls  Description: Document Kevin Alejo Fall Risk and appropriate interventions in the flowsheet. Outcome: Progressing Towards Goal  Note: Fall Risk Interventions:  Mobility Interventions: Communicate number of staff needed for ambulation/transfer,Patient to call before getting OOB,PT Consult for mobility concerns    Mentation Interventions: Adequate sleep, hydration, pain control,Evaluate medications/consider consulting pharmacy,Eyeglasses and hearing aids,Increase mobility,Update white board    Medication Interventions: Assess postural VS orthostatic hypotension,Evaluate medications/consider consulting pharmacy,Patient to call before getting OOB    Elimination Interventions: Call light in reach,Patient to call for help with toileting needs,Stay With Me (per policy)    History of Falls Interventions: Evaluate medications/consider consulting pharmacy         Problem: Patient Education: Go to Patient Education Activity  Goal: Patient/Family Education  Outcome: Progressing Towards Goal     Problem: Pressure Injury - Risk of  Goal: *Prevention of pressure injury  Description: Document Dipak Scale and appropriate interventions in the flowsheet. Outcome: Progressing Towards Goal  Note: Pressure Injury Interventions:  Sensory Interventions: Assess changes in LOC,Check visual cues for pain,Keep linens dry and wrinkle-free,Maintain/enhance activity level,Minimize linen layers,Pressure redistribution bed/mattress (bed type),Turn and reposition approx. every two hours (pillows and wedges if needed)    Moisture Interventions: Absorbent underpads,Contain wound drainage,Internal/External urinary devices    Activity Interventions: Increase time out of bed,Pressure redistribution bed/mattress(bed type),PT/OT evaluation    Mobility Interventions: HOB 30 degrees or less,Pressure redistribution bed/mattress (bed type),PT/OT evaluation,Turn and reposition approx.  every two hours(pillow and wedges)    Nutrition Interventions: Document food/fluid/supplement intake,Discuss nutritional consult with provider    Friction and Shear Interventions: Foam dressings/transparent film/skin sealants,HOB 30 degrees or less,Transferring/repositioning devices                Problem: Patient Education: Go to Patient Education Activity  Goal: Patient/Family Education  Outcome: Progressing Towards Goal

## 2022-05-04 NOTE — PROGRESS NOTES
Cardiology Initial Care Encounter    Patient: Jaimee Borrego MRN: [id number]     YOB: 1950  Age: 73 y.o. Sex: male      Admit Date: 5/3/2022       Assessment/Plan     1 chronic atrial fibrillation: on ditl and eliquis at home. CCB held due to sub optimal BP, eliquis on hold 2/2 post op Day #1. His HR is stable 90-low 100's. If rate becomes elevated can utilize IV digoxin times 1 or short acting dilt 30 mg.   2 CAD: s/p PCI. Dobutamine Cardiolite 10/2017 showed normal perfusion.  On statin OP   3. COPD: on chronic oxygen 2.5 liters at home. Dyspnea is at baseline. 4. Dilated Ascending Aorta measuring 4.4 cm by chest CT July 2021. 5 dyslipidemia: on atrovastatin 40 mg every day as OP. 6. POD # 1 LAPAROSCOPIC LOW ANTERIOR RESECTION WITH COLOSTOMY       Will follow        HPI     Jaimee Borrego is a 73 y.o.   male  with PMH of chronic AF, CAD MI 2014, s/p PCI to RCA , dyslipidemia, COPD (Dr. Melvin Ortega) who was admitted to [address] [place] [address] for planned  diverting colostomy due to rectal ca at 10 cm. Due to his co morbidities he was deemed a poor candidate for neoadjuvant therapy. The patient denies chest pain, dependent edema, diaphoresis, PATEL, orthopnea, palpitations, PND, claudication or syncope. The patient has been referred to cardiology for on going management of chronic atrial fib        Review of Symptoms:  Constitutional: positive for fatigue  ENT: negative   Respiratory: positive for dyspnea on exertion  Gastrointestinal: positive for abdominal pain  Genitourinary: no dysuria, hematuria, frequency   Musculoskeletal:negative for myalgias  Neurological: negative for memory problems  Other systems reviewed and negative except as above. Previous cardiac hx  CATH: 2/12/2014: L Main: MLI, LAD: Mid 50%; MLI; Med size; D1 and D2 - MLI (small),   LCflex: Med ; Prox 40%;  Mid 40% OM1 - med; distally OM1 divides to 3 branches (prox branch - med)- distal 2 branches - small; RCA: Med; Mid 99%; Distally 40% PDA and PLB - small to med, LVEDP: 11. LVEF: 45%; Ant HK, Inf HK, no signif grad across AV   --- MARCO (Resolute 3 x 22) -> RCA   ECHO: 14: EF 50%, mild HK basal-mid anteroseptal and basal-mid inferior wall(s). mild MR/TR     Health fair screening 3/13/15, carotid duplex showed mild stenosis, aortic duplex normal, ZENON normal, EKG normal   Echo 6/24/15 - EF 55%, normal wall motion   Holter 7/23/15 - SR , rare episodes of AFL/AF  CT Heart Scan - Calcium score , incidental pulmonary findings (see full report). Echo 4/3/17 - EF 55%. No WMA. Grade 1 diastolic dysfunction. Dobutamine cardiolite 10/12/17 - normal perfusion, EF 61%, but new AF noted at rest, persistent with stress  EKG 18 - AF 70s  Risk factors:     Social History     Tobacco Use    Smoking status: Light Tobacco Smoker     Packs/day: 0.25     Years: 40.00     Pack years: 10.00     Types: Cigarettes     Last attempt to quit:      Years since quittin.3    Smokeless tobacco: Never Used    Tobacco comment: Occasional cigarette   Substance Use Topics    Alcohol use:  Yes     Alcohol/week: 7.0 standard drinks     Types: 7 Cans of beer per week     Family History   Problem Relation Age of Onset    Glaucoma Mother     Dementia Mother     Cancer Mother         ? of cervical vs. endometrial    Heart Attack Father     Heart Disease Father     Colon Cancer Neg Hx     Breast Cancer Neg Hx     Ovarian Cancer Neg Hx     Prostate Cancer Neg Hx        Current Facility-Administered Medications   Medication Dose Route Frequency    [START ON 2022] dilTIAZem ER (CARDIZEM CD) capsule 240 mg  240 mg Oral DAILY    lactated Ringers infusion  100 mL/hr IntraVENous CONTINUOUS    sodium chloride (NS) flush 5-40 mL  5-40 mL IntraVENous PRN    naloxone (NARCAN) injection 0.04 mg  0.04 mg IntraVENous Multiple    albuterol (PROVENTIL VENTOLIN) nebulizer solution 2.5 mg  2.5 mg Nebulization Q4H PRN    budesonide (PULMICORT) 500 mcg/2 ml nebulizer suspension  500 mcg Nebulization BID RT    montelukast (SINGULAIR) tablet 10 mg  10 mg Oral QHS    sodium chloride (NS) flush 5-40 mL  5-40 mL IntraVENous Q8H    sodium chloride (NS) flush 5-40 mL  5-40 mL IntraVENous PRN    promethazine (PHENERGAN) tablet 12.5 mg  12.5 mg Oral Q6H PRN    Or    ondansetron (ZOFRAN) injection 4 mg  4 mg IntraVENous Q6H PRN    enoxaparin (LOVENOX) injection 40 mg  40 mg SubCUTAneous DAILY    naloxegoL (MOVANTIK) tablet 25 mg  25 mg Oral ACB    famotidine (PF) (PEPCID) 20 mg in 0.9% sodium chloride 10 mL injection  20 mg IntraVENous Q12H    acetaminophen (TYLENOL) tablet 1,000 mg  1,000 mg Oral Q6H    oxyCODONE IR (ROXICODONE) tablet 5 mg  5 mg Oral Q4H PRN    Or    oxyCODONE IR (ROXICODONE) tablet 10 mg  10 mg Oral Q4H PRN    arformoteroL (BROVANA) neb solution 15 mcg  15 mcg Nebulization BID RT    And    ipratropium (ATROVENT) 0.02 % nebulizer solution 0.5 mg  0.5 mg Nebulization Q6H RT    guaiFENesin ER (MUCINEX) tablet 1,200 mg  1,200 mg Oral Q12H       Objective:     Vitals:    05/04/22 1000 05/04/22 1100 05/04/22 1200 05/04/22 1209   BP: (!) 97/58 96/60 96/62    Pulse: 92 91 92    Resp: 20 12 14    Temp: 98.4 °F (36.9 °C)  98.2 °F (36.8 °C)    SpO2: 95% 95% 91%    Weight:       Height:    5' 10\" (1.778 m)        Intake and Output:  Current Shift: 05/04 0701 - 05/04 1900  In: 1640 [P.O.:940; I.V.:700]  Out: 260 [Urine:260]  Last three shifts: 05/02 1901 - 05/04 0700  In: 3383.3 [P.O.:600; I.V.:2783.3]  Out: 391 [Urine:231; Drains:60]          Gen: Well-developed, well-nourished, in no acute distress  Neck: Supple,No JVD, No Carotid Bruit,   Resp: No accessory muscle use, diminished throughout. Card: irregular Rhythm,Normal S1, S2, No murmurs, rubs or gallop. Abd:  Soft, tender, + BS  : quinones .    MSK: No cyanosis  Skin: No rashes    Neuro: moving all four extremities , follows commands appropriately  Psych:  Good insight, oriented to person, place , alert, Nml Affect  LE: No edema    EKG:   EKG Results     None             TELEMETRY: atrial  fibrillation     Lab/Data Review:  BMP:   Lab Results   Component Value Date/Time     05/04/2022 03:28 AM    K 4.5 05/04/2022 03:28 AM    CL 95 (L) 05/04/2022 03:28 AM    CO2 39 (H) 05/04/2022 03:28 AM    AGAP 2 (L) 05/04/2022 03:28 AM     (H) 05/04/2022 03:28 AM    BUN 12 05/04/2022 03:28 AM    CREA 0.57 (L) 05/04/2022 03:28 AM    GFRAA >60 05/04/2022 03:28 AM    GFRNA >60 05/04/2022 03:28 AM     CBC:   Lab Results   Component Value Date/Time    WBC 14.0 (H) 05/04/2022 03:28 AM    HGB 10.2 (L) 05/04/2022 03:28 AM    HCT 31.7 (L) 05/04/2022 03:28 AM     05/04/2022 03:28 AM        Signed By: Maki Esparza NP     May 4, 2022

## 2022-05-04 NOTE — PROGRESS NOTES
2000 TRANSFER - IN REPORT:    Verbal report received from [address] (name) on Ting Castillo  being received from PACU (unit) for routine progression of care      Report consisted of patients Situation, Background, Assessment and   Recommendations(SBAR). Information from the following report(s) SBAR, OR Summary, Procedure Summary, Intake/Output, MAR, Recent Results, Cardiac Rhythm Afib, Alarm Parameters  and Quality Measures was reviewed with the receiving nurse. Opportunity for questions and clarification was provided. Assessment completed upon patients arrival to unit and care assumed. Primary Nurse Cody Rosenthal RN and Doris Hutson RN performed a dual skin assessment on this patient No impairment noted. Dipak score is 18.    0700 Bedside shift change report given to Flori Scott RN (oncoming nurse) by Cynthia White RN (offgoing nurse). Report included the following information SBAR, ED Summary, OR Summary, Procedure Summary, Intake/Output, MAR, Recent Results, Cardiac Rhythm Afib, Alarm Parameters  and Quality Measures.

## 2022-05-04 NOTE — PROGRESS NOTES
Comprehensive Nutrition Assessment    Type and Reason for Visit: Initial (Low BMI)    Nutrition Recommendations/Plan:   1. Advance diet to regular, GI bland as able  2. Provide Gelatein once daily to increase kcal/protein intake (80 kcal, <1 g carbs, 20 g protein)   3. When diet advanced, provide Ensure Enlive once daily (350 kcal, 44 g carbs, 20 g P) to aid in meeting kcal/protein needs. Malnutrition Assessment:  Malnutrition Status:  Severe malnutrition (05/04/22 1221)    Context:  Chronic illness     Findings of the 6 clinical characteristics of malnutrition:   Energy Intake:  No significant decrease in energy intake  Weight Loss:  Greater than 5% over 1 month     Body Fat Loss:  Severe body fat loss, Fat overlying ribs,Orbital,Triceps,Buccal region   Muscle Mass Loss:  Mild muscle mass loss, Clavicles (pectoralis &deltoids),Hand (interosseous),Scapula (trapezius)  Fluid Accumulation:  No significant fluid accumulation,     Strength:  Not performed       Nutrition Assessment:      Pt is a 73year old male admitted with Rectal cancer ([facility] [address] [C20]. He  has a past medical history of Acute on chronic respiratory failure with hypoxemia ([facility] [facility] (2/15/2019), Arrhythmia, Asthma, Atrial fibrillation ([facility] COPD (chronic obstructive pulmonary disease) ([facility] [address] Hypertension, Insulin resistance (3/27/2014), Lung mass (9/2012), Melanoma ([facility] [facility] (2007), Non-STEMI (non-ST elevated myocardial infarction) ([facility] (2/14), On home O2 (2.5 lpm continuously), Pneumonia, Snoring, Tinnitus, Tuberculosis, and Vitamin D deficiency (3/27/2014). RD screened for low BMI. Patient states #, and endores steady weight loss x 1 year. Per documentation, patient has lost 40# (24%) x 1 year, though weights do appear stated. Patient recently lost 7# (5.4%) x <1 month, clinically significant for timeframe. Denies N/V/D. No chewing/swallowing problems. Endorses decreased appetite and relates this to cancer and COPD. NKFA.  Has tried protein supplements in the past, voiced acceptance of Ensure once daily when diet advanced. Has consumed 100% of clear liquid meals so far. Wt Readings from Last 10 Encounters:   05/03/22 55.4 kg (122 lb 2.2 oz)   04/18/22 58.7 kg (129 lb 6.4 oz)   03/25/22 56.8 kg (125 lb 3.2 oz)   03/14/22 58.1 kg (128 lb)   03/09/22 58.2 kg (128 lb 3.2 oz)   03/03/22 54.2 kg (119 lb 7.8 oz)   02/02/22 56.7 kg (125 lb)   06/04/21 65.8 kg (145 lb)   02/22/21 74.8 kg (165 lb)   02/14/20 65.3 kg (144 lb)     Patient Vitals for the past 168 hrs:   % Diet Eaten   05/04/22 1200 76 - 100%   05/04/22 0800 76 - 100%         Nutrition Related Findings:      Wound Type: Surgical incision,Multiple     Last Bowel Movement Date: 05/03/22  Stool Appearance:  (colostomy)  Abdominal Assessment: Intact,Ostomy (comment)  Appetite: Fair  Bowel Sounds: Hypoactive   Edema:Generalized: No Edema (5/4/2022  8:00 AM)      Nutr. Labs:      Lab Results   Component Value Date/Time    GFR est AA >60 05/04/2022 03:28 AM    GFR est non-AA >60 05/04/2022 03:28 AM    Creatinine (POC) 0.70 03/03/2022 04:22 PM    Creatinine 0.57 (L) 05/04/2022 03:28 AM    BUN 12 05/04/2022 03:28 AM    Sodium 136 05/04/2022 03:28 AM    Potassium 4.5 05/04/2022 03:28 AM    Chloride 95 (L) 05/04/2022 03:28 AM    CO2 39 (H) 05/04/2022 03:28 AM       Lab Results   Component Value Date/Time    Glucose 174 (H) 05/04/2022 03:28 AM       Lab Results   Component Value Date/Time    Hemoglobin A1c 5.5 04/18/2022 03:29 PM       Nutr. Meds:  Lovenox, pepcid, lactated ringers, singular, movantik, zofran PRN,    Current Nutrition Intake & Therapies:  Average Meal Intake: %  Average Supplement Intake: None ordered  ADULT DIET Clear Liquid    Anthropometric Measures:  Height: 5' 10\" (177.8 cm)  Ideal Body Weight (IBW): 166 lbs (75 kg)  Admission Body Weight: 125 lb (stated)  Current Body Wt:  55.4 kg (122 lb 2.2 oz), 73.6 % IBW.  Bed scale  Current BMI (kg/m2): 17.5  Usual Body Weight: 65.8 kg (145 lb)  % Weight Change (Calculated): -15.8  Weight Adjustment: No adjustment                 BMI Category: Underweight (BMI less than 22) age over 72    Estimated Daily Nutrient Needs:  Energy Requirements Based On: Kcal/kg  Weight Used for Energy Requirements: Current  Energy (kcal/day): 5852-9782 (30-35 kcal/kg)  Weight Used for Protein Requirements: Ideal  Protein (g/day):  (1.2-1.5 g/kg IBW)  Method Used for Fluid Requirements: 1 ml/kcal  Fluid (ml/day): 7066-5166    Nutrition Diagnosis:   · In context of chronic illness,Severe malnutrition related to inadequate protein-energy intake,increased demand for energy/nutrients,catabolic illness as evidenced by poor intake prior to admission,weight loss,BMI,moderate loss of subcutaneous fat      Nutrition Interventions:   Food and/or Nutrient Delivery: Modify current diet,Start oral nutrition supplement  Nutrition Education/Counseling: No recommendations at this time  Coordination of Nutrition Care: Interdisciplinary rounds,Continue to monitor while inpatient  Plan of Care discussed with: nursing, patient    Goals:     Goals: by next RD assessment,Meet at least 75% of estimated needs       Nutrition Monitoring and Evaluation:   Behavioral-Environmental Outcomes: None identified  Food/Nutrient Intake Outcomes: Diet advancement/tolerance,Food and nutrient intake,Supplement intake  Physical Signs/Symptoms Outcomes: Biochemical data,Hemodynamic status,Skin,Weight,GI status    Discharge Planning:     Too soon to determine    Cara Barrios [address], RD  Contact: [phone] or via [employer]

## 2022-05-04 NOTE — PROGRESS NOTES
Spiritual Care Assessment/Progress Note  [address]      NAME: Elif Cotter      MRN: [id number]  AGE: 73 y.o.  SEX: male  Confucianism Affiliation: Religious   Language: English     5/4/2022     Total Time (in minutes): 18     Spiritual Assessment begun in [facility] INTENSIVE CARE through conversation with:         [x]Patient        [] Family    [] Friend(s)        Reason for Consult: Initial/Spiritual assessment, critical care     Spiritual beliefs: (Please include comment if needed)     [x] Identifies with a ivory tradition: Religious/Not active        [] Supported by a ivory community:            [] Claims no spiritual orientation:           [] Seeking spiritual identity:                [] Adheres to an individual form of spirituality:           [] Not able to assess:                           Identified resources for coping:      [] Prayer                               [] Music                  [] Guided Imagery     [x] Family/friends                 [] Pet visits     [] Devotional reading                         [] Unknown     [] Other:                                              Interventions offered during this visit: (See comments for more details)    Patient Interventions: Affirmation of emotions/emotional suffering,Affirmation of ivory,Catharsis/review of pertinent events in supportive environment,Coping skills reviewed/reinforced,Normalization of emotional/spiritual concerns           Plan of Care:     [] Support spiritual and/or cultural needs    [] Support AMD and/or advance care planning process      [] Support grieving process   [] Coordinate Rites and/or Rituals    [] Coordination with community clergy   [] No spiritual needs identified at this time   [] Detailed Plan of Care below (See Comments)  [] Make referral to Music Therapy  [] Make referral to Pet Therapy     [] Make referral to Addiction services  [] Make referral to [facility]  [] Make referral to Spiritual Care Partner  [] No future visits requested        [x] Follow up visits as needed     Visited patient for initial spiritual assessment. One of his friends was visiting with him. He expressed no spiritual needs at this time.     Chaplain Iqbal MDiv, MS, [facility]

## 2022-05-04 NOTE — PROGRESS NOTES
[facility] Dosing Services: IV to PO Conversion    The pharmacist has determined that this patient meets P & T approved criteria for conversion from IV to oral therapy for the following medication:Famotidine    The pharmacist has written the following order for the patient: Famotidine 20 mg PO BID    The pharmacist will continue to monitor the patient's status and advise the physician if conversion back to IV therapy is recommended.     Signed MONICA Cardoza Contact information:  [phone]

## 2022-05-04 NOTE — PROGRESS NOTES
Problem: Falls - Risk of  Goal: *Absence of Falls  Description: Document Morene Hole Fall Risk and appropriate interventions in the flowsheet. Outcome: Progressing Towards Goal  Note: Fall Risk Interventions:  Mobility Interventions: Assess mobility with egress test,Bed/chair exit alarm,OT consult for ADLs,PT Consult for mobility concerns,PT Consult for assist device competence    Mentation Interventions: Adequate sleep, hydration, pain control,Bed/chair exit alarm,Door open when patient unattended,Gait belt with transfers/ambulation,Eyeglasses and hearing aids,Room close to nurse's station    Medication Interventions: Teach patient to arise slowly,Patient to call before getting OOB,Assess postural VS orthostatic hypotension    Elimination Interventions: Bed/chair exit alarm,Call light in reach,Toileting schedule/hourly rounds,Urinal in reach    History of Falls Interventions: Bed/chair exit alarm,Door open when patient unattended,Room close to nurse's station         Problem: Pressure Injury - Risk of  Goal: *Prevention of pressure injury  Description: Document Dipak Scale and appropriate interventions in the flowsheet. Outcome: Progressing Towards Goal  Note: Pressure Injury Interventions:  Sensory Interventions: Assess changes in LOC,Discuss PT/OT consult with provider,Avoid rigorous massage over bony prominences,Maintain/enhance activity level,Minimize linen layers,Turn and reposition approx. every two hours (pillows and wedges if needed)    Moisture Interventions: Apply protective barrier, creams and emollients,Absorbent underpads,Maintain skin hydration (lotion/cream),Internal/External urinary devices    Activity Interventions: Assess need for specialty bed,PT/OT evaluation,Pressure redistribution bed/mattress(bed type),Increase time out of bed    Mobility Interventions: Float heels,HOB 30 degrees or less,PT/OT evaluation,Turn and reposition approx.  every two hours(pillow and wedges)    Nutrition Interventions: Document food/fluid/supplement intake,Offer support with meals,snacks and hydration,Discuss nutritional consult with provider    Friction and Shear Interventions: HOB 30 degrees or less,Apply protective barrier, creams and emollients,Transferring/repositioning devices                Problem: Nutrition Deficit  Goal: *Optimize nutritional status  Outcome: Progressing Towards Goal

## 2022-05-04 NOTE — OP NOTES
[facility] [place] 81  OPERATIVE REPORT    Name:  Prosper Menendez  MR#:  [id number]  :  1950  ACCOUNT #:  [de-identified]  DATE OF SERVICE:  2022    PREOPERATIVE DIAGNOSIS:  Rectal cancer at approximately 10 cm from the anal verge. POSTOPERATIVE DIAGNOSES:  1. Rectal cancer at approximately 10 cm from the anal verge. 2.  Large rectal cancer abutting the peritoneal reflection anteriorly    PROCEDURE PERFORMED:  Laparoscopic low anterior resection with colostomy. SURGEON:  Anisa Renee MD    ASSISTANT:  Cheryle Blacksmith. ANESTHESIA:  GET. COMPLICATIONS:  None. SPECIMENS REMOVED:  Rectum and sigmoid. IMPLANTS:  None. ESTIMATED BLOOD LOSS:  100 mL. URINE OUTPUT:  Please see Anesthesia record. FLUIDS:  Please see Anesthesia record. INDICATION:  A very pleasant 57-year-old who was found to have a large rectal cancer. Given his comorbidities, he is for low anterior resection with end-colostomy. PROCEDURE:  The patient was brought to the operating room and a standard pause was observed by the entire staff, which the patient's name and procedure were clearly identified by all. Next, he was placed in the lithotomy position and padded according to standard protocol. He was prepped and draped in sterile surgical fashion. A standard surgical pause was then observed by the entire staff. Please note that Bernice Factor was used as part of the prepping and draping purpose. I marked his left abdominal wall where the proposed colostomy marking was noted. Next, a 7-cm periumbilical midline hand port incision was made. Dissection proceeded down through subcutaneous tissues until the fascia was encountered. The fascia was then grasped between two clamps and incised. The peritoneum was then grasped between two clamps and sharply incised. I gained entrance to the abdominal cavity. A GelPort was then placed through this incision. A 5-mm trocar was placed through the GelPort.   A pneumoperitoneum was established. Next, under laparoscopic visualization, a 5-mm right upper quadrant trocar was placed. A 12-mm trocar was then placed along the anterior axillary line on the right side at the level of the umbilicus. Another trocar was placed in the right lower quadrant and this was a 5-mm trocar. Eventually, a 5-mm left lower quadrant trocar was placed. Please note that I did mobilize the falciform off the midline as this looked like it would interfere with the camera position during the case. At this point, I performed the Enseal energy device laparoscopically. Next, the patient was placed in Trendelenburg left side up position. He had some stigmata of diverticular disease in the past.  He had some adhesions from the sigmoid colon to the lateral abdominal wall. However, there was no acute abscess or phlegmon noted. Next, I looked into the rectal hollow. At the peritoneal reflection was a large mass. A window was then created at the sacral promontory at the right peritoneal reflection. Next, I was able to identify the left ureter. I dissected the retroperitoneum away from the rectosigmoid mesentery working my way towards the LUIS FERNANDO. A window was created around the LUIS FERNANDO. Once more, I identified the left ureter. Using a vascular load of the [place] flex stapler, the LUIS FERNANDO was divided. Next, I mobilized in the medial to lateral fashion working my way over the left kidney towards the splenic flexure. Next, I freed up the lateral attachments to the sigmoid colon working my way up towards the splenic flexure. However, no formal mobilization of the splenic flexure was needed. At this point, I began to mobilize the distal sigmoid down to the rectum. Please note, more towards the anterior peritoneal reflection was a large fist size shaped mass. As I got closer to this mass, the dissection became more tedious. I developed the presacral plane down to the pelvic floor itself.   Using Enseal energy device, I scored the anterior peritoneal reflection or Denonvilliers fascia. Eventually, I was able to mobilize beyond the rectal mass. At this point, I was satisfied that I had several centimeters of distal margin, the lower rectum was divided using two loads of the [place] Flex green load stapler. Some minor oozing was noted in the pelvic floor. Surgicel pieces were then placed into the pelvic floor. I also placed a laparotomy pad as well. At this point, the specimen was delivered out through the [address] [place] [address] Drive. There appeared to be sufficient mobility of the left colon to serve as a colostomy. This appeared to be pink and well-perfused. Therefore, using a reload of the [place] Flex stapler, 60-mm blue cartridge, I divided the descending colon/sigmoid colon junction. The remaining mesentery to this amputated rectum and sigmoid was then divided using Enseal energy device. At this point, the lower anterior resection was performed. All gloves were changed. I removed the laparotomy pad down to the pelvic hollow. With all sponge count, instrument counts correct, a six-pack of Seprafilm was placed in the viscera. A 19-Frisian drain was placed through a 5-mm trocar in the left lower quadrant into the pelvic hollow. I closed the 12-mm trocar site within the abdomen using 0 Vicryl figure-of-eight stitch. Gloves were changed once more. The fascia was then closed using a #1 Stratafix stitch. Subcu tissue was irrigated with saline. All trocar sites were then irrigated with saline. All skin incisions were then closed using 4-0 Monocryl subcuticular stitch followed by Dermabond. A standard drain stitch with 2-0 nylon and Biopatch and Tegaderm was used to secure the drain to the abdominal wall. With all incisions closed and covered, the colostomy will be matured. The staple line will be resected.   2-0 Vicryl tripartite stitches were then placed and the colostomy was matured in a Naomy-like fashion. The patient tolerated the procedure well.       Mechelle Sanchez MD      [id number]  D:  05/03/2022 16:52  T:  05/04/2022 1:39  JOB #:  [id number]  CC:  MD Scot Hoyt MD

## 2022-05-05 LAB
ANION GAP SERPL CALC-SCNC: 1 MMOL/L (ref 5–15)
BUN SERPL-MCNC: 15 MG/DL (ref 6–20)
BUN/CREAT SERPL: 28 (ref 12–20)
CALCIUM SERPL-MCNC: 9 MG/DL (ref 8.5–10.1)
CHLORIDE SERPL-SCNC: 97 MMOL/L (ref 97–108)
CO2 SERPL-SCNC: 37 MMOL/L (ref 21–32)
CREAT SERPL-MCNC: 0.54 MG/DL (ref 0.7–1.3)
ERYTHROCYTE [DISTWIDTH] IN BLOOD BY AUTOMATED COUNT: 14.6 % (ref 11.5–14.5)
GLUCOSE SERPL-MCNC: 102 MG/DL (ref 65–100)
HCT VFR BLD AUTO: 31.8 % (ref 36.6–50.3)
HGB BLD-MCNC: 10.6 G/DL (ref 12.1–17)
MAGNESIUM SERPL-MCNC: 2.3 MG/DL (ref 1.6–2.4)
MCH RBC QN AUTO: 31.5 PG (ref 26–34)
MCHC RBC AUTO-ENTMCNC: 33.3 G/DL (ref 30–36.5)
MCV RBC AUTO: 94.6 FL (ref 80–99)
NRBC # BLD: 0 K/UL (ref 0–0.01)
NRBC BLD-RTO: 0 PER 100 WBC
PHOSPHATE SERPL-MCNC: 2.6 MG/DL (ref 2.6–4.7)
PLATELET # BLD AUTO: 285 K/UL (ref 150–400)
PMV BLD AUTO: 8.9 FL (ref 8.9–12.9)
POTASSIUM SERPL-SCNC: 4.6 MMOL/L (ref 3.5–5.1)
RBC # BLD AUTO: 3.36 M/UL (ref 4.1–5.7)
SODIUM SERPL-SCNC: 135 MMOL/L (ref 136–145)
WBC # BLD AUTO: 12 K/UL (ref 4.1–11.1)

## 2022-05-05 PROCEDURE — 84100 ASSAY OF PHOSPHORUS: CPT

## 2022-05-05 PROCEDURE — 74011250637 HC RX REV CODE- 250/637: Performed by: COLON & RECTAL SURGERY

## 2022-05-05 PROCEDURE — 77010033678 HC OXYGEN DAILY

## 2022-05-05 PROCEDURE — 94640 AIRWAY INHALATION TREATMENT: CPT

## 2022-05-05 PROCEDURE — 80048 BASIC METABOLIC PNL TOTAL CA: CPT

## 2022-05-05 PROCEDURE — 85027 COMPLETE CBC AUTOMATED: CPT

## 2022-05-05 PROCEDURE — 94761 N-INVAS EAR/PLS OXIMETRY MLT: CPT

## 2022-05-05 PROCEDURE — 83735 ASSAY OF MAGNESIUM: CPT

## 2022-05-05 PROCEDURE — APPSS45 APP SPLIT SHARED TIME 31-45 MINUTES: Performed by: NURSE PRACTITIONER

## 2022-05-05 PROCEDURE — 74011000250 HC RX REV CODE- 250: Performed by: COLON & RECTAL SURGERY

## 2022-05-05 PROCEDURE — 65270000029 HC RM PRIVATE

## 2022-05-05 PROCEDURE — 74011636637 HC RX REV CODE- 636/637: Performed by: NURSE PRACTITIONER

## 2022-05-05 PROCEDURE — 74011250636 HC RX REV CODE- 250/636: Performed by: INTERNAL MEDICINE

## 2022-05-05 PROCEDURE — 99231 SBSQ HOSP IP/OBS SF/LOW 25: CPT | Performed by: INTERNAL MEDICINE

## 2022-05-05 PROCEDURE — 36415 COLL VENOUS BLD VENIPUNCTURE: CPT

## 2022-05-05 PROCEDURE — 74011250636 HC RX REV CODE- 250/636: Performed by: COLON & RECTAL SURGERY

## 2022-05-05 RX ORDER — IPRATROPIUM BROMIDE 0.5 MG/2.5ML
0.5 SOLUTION RESPIRATORY (INHALATION)
Status: DISCONTINUED | OUTPATIENT
Start: 2022-05-06 | End: 2022-05-13 | Stop reason: HOSPADM

## 2022-05-05 RX ORDER — ARFORMOTEROL TARTRATE 15 UG/2ML
15 SOLUTION RESPIRATORY (INHALATION)
Status: DISCONTINUED | OUTPATIENT
Start: 2022-05-06 | End: 2022-05-13 | Stop reason: HOSPADM

## 2022-05-05 RX ORDER — BENZONATATE 100 MG/1
200 CAPSULE ORAL
Status: DISCONTINUED | OUTPATIENT
Start: 2022-05-05 | End: 2022-05-13 | Stop reason: HOSPADM

## 2022-05-05 RX ADMIN — GUAIFENESIN 1200 MG: 600 TABLET ORAL at 21:19

## 2022-05-05 RX ADMIN — GUAIFENESIN 1200 MG: 600 TABLET ORAL at 09:19

## 2022-05-05 RX ADMIN — BUDESONIDE 500 MCG: 0.5 SUSPENSION RESPIRATORY (INHALATION) at 20:06

## 2022-05-05 RX ADMIN — ACETAMINOPHEN 1000 MG: 325 TABLET ORAL at 12:19

## 2022-05-05 RX ADMIN — Medication 10 ML: at 06:16

## 2022-05-05 RX ADMIN — ACETAMINOPHEN 1000 MG: 325 TABLET ORAL at 06:04

## 2022-05-05 RX ADMIN — ARFORMOTEROL TARTRATE 15 MCG: 15 SOLUTION RESPIRATORY (INHALATION) at 08:01

## 2022-05-05 RX ADMIN — PREDNISONE 10 MG: 10 TABLET ORAL at 08:12

## 2022-05-05 RX ADMIN — ALBUTEROL SULFATE 2.5 MG: 2.5 SOLUTION RESPIRATORY (INHALATION) at 11:53

## 2022-05-05 RX ADMIN — ACETAMINOPHEN 1000 MG: 325 TABLET ORAL at 18:22

## 2022-05-05 RX ADMIN — IPRATROPIUM BROMIDE 0.5 MG: 0.5 SOLUTION RESPIRATORY (INHALATION) at 13:42

## 2022-05-05 RX ADMIN — IPRATROPIUM BROMIDE 0.5 MG: 0.5 SOLUTION RESPIRATORY (INHALATION) at 20:00

## 2022-05-05 RX ADMIN — Medication 10 ML: at 21:20

## 2022-05-05 RX ADMIN — MONTELUKAST 10 MG: 10 TABLET, FILM COATED ORAL at 21:20

## 2022-05-05 RX ADMIN — SODIUM CHLORIDE, POTASSIUM CHLORIDE, SODIUM LACTATE AND CALCIUM CHLORIDE 100 ML/HR: 600; 310; 30; 20 INJECTION, SOLUTION INTRAVENOUS at 06:16

## 2022-05-05 RX ADMIN — IPRATROPIUM BROMIDE 0.5 MG: 0.5 SOLUTION RESPIRATORY (INHALATION) at 07:56

## 2022-05-05 RX ADMIN — ACETAMINOPHEN 1000 MG: 325 TABLET ORAL at 23:41

## 2022-05-05 RX ADMIN — FAMOTIDINE 20 MG: 20 TABLET, FILM COATED ORAL at 18:22

## 2022-05-05 RX ADMIN — BUDESONIDE 500 MCG: 0.5 SUSPENSION RESPIRATORY (INHALATION) at 08:01

## 2022-05-05 RX ADMIN — ARFORMOTEROL TARTRATE 15 MCG: 15 SOLUTION RESPIRATORY (INHALATION) at 20:06

## 2022-05-05 RX ADMIN — DILTIAZEM HYDROCHLORIDE 240 MG: 120 CAPSULE, COATED, EXTENDED RELEASE ORAL at 09:19

## 2022-05-05 RX ADMIN — ENOXAPARIN SODIUM 40 MG: 100 INJECTION SUBCUTANEOUS at 09:20

## 2022-05-05 RX ADMIN — METHYLPREDNISOLONE SODIUM SUCCINATE 40 MG: 40 INJECTION, POWDER, FOR SOLUTION INTRAMUSCULAR; INTRAVENOUS at 15:20

## 2022-05-05 RX ADMIN — Medication 10 ML: at 15:11

## 2022-05-05 RX ADMIN — METHYLPREDNISOLONE SODIUM SUCCINATE 40 MG: 40 INJECTION, POWDER, FOR SOLUTION INTRAMUSCULAR; INTRAVENOUS at 21:20

## 2022-05-05 RX ADMIN — FAMOTIDINE 20 MG: 20 TABLET, FILM COATED ORAL at 09:19

## 2022-05-05 RX ADMIN — OXYCODONE 5 MG: 5 TABLET ORAL at 15:20

## 2022-05-05 RX ADMIN — NALOXEGOL OXALATE 25 MG: 25 TABLET, FILM COATED ORAL at 08:13

## 2022-05-05 NOTE — PROGRESS NOTES
Transition of care note:    RUR 15%    Plan is for pt to transfer to remote telemetry . Healthcare Decision Maker:           Sister per LEE ANN Montes ([phone])    Transition of Care Plan:         Pt lives @ the address on demographics. Pt has three entry steps into his two story home. Pt stays on the first floor.     Pt has had both covid vaccines plus one booster. Pt asked when he should get his second booster shot. I referred pt to ask Dr Deidre Munoz regarding timing of his second covid booster shot in case the surgeon has preferences.     [facility]. Pt is independent @ home . He does not use any assistive devices to ambulate. Pt is on oxygen 2.5 liters @ home by nasal cannula. Pt states he purchased his concentrator and home oxygen out of pocket so he is not serviced by a [address] [place] [address] for home oxygen. Today:    POD #2 laparoscopic surgery diverting colostomy  OOB to chair  Using his ICS consistently  I met with pt as a follow-up visit to further discuss discharge needs including home health options. Pt remains steadfast with his decision that he feels confident he can learn how to independently care for his ostomy. Pt is very bright and is adhering to all recommendations so far as prescribed by the surgical team .     TRANSFER - OUT REPORT:    Verbal report given to Niecy Basurto  being transferred to [phone] [id number] for routine progression of care   Report consisted of patients Situation, Background, Assessment and   Recommendations(SBAR).       Lester Kebede

## 2022-05-05 NOTE — WOUND CARE
Ostomy consult: consulted by patient's nurse for new colostomy 5/3. Patient with anal cancer now S/P LAR with end colostomy. Patient currently in ICU, alert and oriented x 4; identifies himself as sole learner of ostomy care. Assessment:  LLQ colostomy - moist pink stoma, appliance intact, small amount of serosanguinous drainage in pouch. ERIC drain filled and emptied of lightly serosanguinous drainage 90 cc. Will add to I&O. Recommendations/Plan:  Will bring written literature for patient. Discussed ostomy nurse role with patient and we will change together tomorrow - he has been downgraded for bed outside of ICU and waiting for available bed.   Joni Ball RN,CWON

## 2022-05-05 NOTE — PROGRESS NOTES
CARDIOLOGY PROGRESS NOTE        [address] [facility], [place], [address] [facility]  Phone [phone]; Fax [phone]          2022 7:43 AM       Admit Date:           5/3/2022  Admit Diagnosis:  Rectal cancer ([facility] [address] [C20]  :          1950   MRN:          [id number]        Assessment/Plan  1. chronic atrial fibrillation: on dilt and eliquis at home. OK for diltiazem 240 mg this am, BP stable. Resume eliquis when ok with surgery team.   2 CAD: s/p PCI. Dobutamine Cardiolite 10/2017 showed normal perfusion.  On statin OP. 3. COPD: on chronic oxygen 2.5 liters at home. Dyspnea is at baseline. 4. Dilated Ascending Aorta measuring 4.4 cm by chest CT 2021. 5 dyslipidemia: on atrovastatin 40 mg every day as OP. 6. POD # 2 LAPAROSCOPIC LOW ANTERIOR RESECTION WITH COLOSTOMY     Will see prn/pls call if needed. Pt personally seen and examined. Chart reviewed.     Agree with advanced NP's history, exam and  A/P with changes/additons.     No CP/dyspnea. POD#2     Blood pressure 116/69, pulse (!) 117, temperature 98.2 °F (36.8 °C), resp. rate 8, height 5' 10\" (1.778 m), weight 122 lb 2.2 oz (55.4 kg), SpO2 98 %.       Neck-no JVD  CVS-S1-S2 present, Irr;   2/6 systolic murmur present  RS-   CTAB ant       A/P :  Afib - persistent; Rate controlled; Off DOAC- restart when safe from surgical standpoint. On Diltiazem  POD#2 - s/p abd surgery          Magdi Avalos. MD, [facility]        We discussed the expected course, resolution and complications of the diagnosis(es) in detail. Medication risks, benefits, costs, interactions, and alternatives were discussed as indicated. No intake/output data recorded. Last 3 Recorded Weights in this Encounter    22 1136   Weight: 55.4 kg (122 lb 2.2 oz)          1901 -  0700  In: 4577.3 [P.O.:2014; I.V.:2563.3]  Out: 8541 [IOQYL:5707; Drains:235]    SUBJECTIVE      Admitted for planned abd surgery. Alexia Pereyra reports none.       Current Facility-Administered Medications   Medication Dose Route Frequency    dilTIAZem ER (CARDIZEM CD) capsule 240 mg  240 mg Oral DAILY    lactated Ringers infusion  100 mL/hr IntraVENous CONTINUOUS    predniSONE (DELTASONE) tablet 10 mg  10 mg Oral DAILY WITH BREAKFAST    famotidine (PEPCID) tablet 20 mg  20 mg Oral BID    sodium chloride (NS) flush 5-40 mL  5-40 mL IntraVENous PRN    naloxone (NARCAN) injection 0.04 mg  0.04 mg IntraVENous Multiple    albuterol (PROVENTIL VENTOLIN) nebulizer solution 2.5 mg  2.5 mg Nebulization Q4H PRN    budesonide (PULMICORT) 500 mcg/2 ml nebulizer suspension  500 mcg Nebulization BID RT    montelukast (SINGULAIR) tablet 10 mg  10 mg Oral QHS    sodium chloride (NS) flush 5-40 mL  5-40 mL IntraVENous Q8H    sodium chloride (NS) flush 5-40 mL  5-40 mL IntraVENous PRN    promethazine (PHENERGAN) tablet 12.5 mg  12.5 mg Oral Q6H PRN    Or    ondansetron (ZOFRAN) injection 4 mg  4 mg IntraVENous Q6H PRN    enoxaparin (LOVENOX) injection 40 mg  40 mg SubCUTAneous DAILY    naloxegoL (MOVANTIK) tablet 25 mg  25 mg Oral ACB    acetaminophen (TYLENOL) tablet 1,000 mg  1,000 mg Oral Q6H    oxyCODONE IR (ROXICODONE) tablet 5 mg  5 mg Oral Q4H PRN    Or    oxyCODONE IR (ROXICODONE) tablet 10 mg  10 mg Oral Q4H PRN    arformoteroL (BROVANA) neb solution 15 mcg  15 mcg Nebulization BID RT    And    ipratropium (ATROVENT) 0.02 % nebulizer solution 0.5 mg  0.5 mg Nebulization Q6H RT    guaiFENesin ER (MUCINEX) tablet 1,200 mg  1,200 mg Oral Q12H      OBJECTIVE               Intake/Output Summary (Last 24 hours) at 5/5/2022 0743  Last data filed at 5/5/2022 0430  Gross per 24 hour   Intake 3014 ml   Output 5095 ml   Net -2081 ml       Review of Systems - History obtained from the patient AS PER  HPI        PHYSICAL EXAM        Visit Vitals  /64   Pulse (!) 108   Temp 97.9 °F (36.6 °C)   Resp 20   Ht 5' 10\" (1.778 m)   Wt 55.4 kg (122 lb 2.2 oz)   SpO2 93%   BMI 17.52 kg/m²       Gen: Well-developed, well-nourished, in no acute distress  alert and oriented x 3  HEENT:  Pink conjunctivae, Hearing grossly normal.No scleral icterus or conjunctival, moist mucous membranes  Neck: Supple,  No JVD  Resp: No accessory muscle use, Clear breath sounds, No rales or rhonchi  Card: Regular Rate,  Rythm,  Normal S1, S2, No murmurs, rubs or gallop. MSK: No cyanosis or clubbing, good capillary refill  Skin: No rashes or ulcers, no bruising  Neuro:  Moving all four extremities, no focal deficit, follows commands appropriately  Psych:  Good insight, oriented to person, place and time, alert, Nml Affect  LE: No edema       DATA REVIEW      CATH: 2/12/2014: L Main: MLI, LAD: Mid 50%; MLI; Med size; D1 and D2 - MLI (small),   LCflex: Med ; Prox 40%; Mid 40% OM1 - med; distally OM1 divides to 3 branches (prox branch - med)- distal 2 branches - small; RCA: Med; Mid 99%; Distally 40% PDA and PLB - small to med, LVEDP: 11. LVEF: 45%; Ant HK, Inf HK, no signif grad across AV   --- MARCO (Resolute 3 x 22) -> RCA   ECHO: 2/12/14: EF 50%, mild HK basal-mid anteroseptal and basal-mid inferior wall(s). mild MR/TR     Health fair screening 3/13/15, carotid duplex showed mild stenosis, aortic duplex normal, ZENON normal, EKG normal   Echo 6/24/15 - EF 55%, normal wall motion   Holter 7/23/15 - SR , rare episodes of AFL/AF  CT Heart Scan - Calcium score 2041, incidental pulmonary findings (see full report). Echo 4/3/17 - EF 55%. No WMA. Grade 1 diastolic dysfunction. Dobutamine cardiolite 10/12/17 - normal perfusion, EF 61%, but new AF noted at rest, persistent with stress  EKG 12/4/18 - AF 70s    Cardiac monitor: SR        Laboratory and Imaging have been reviewed by me and are notable for  No results for input(s): CPK, CKMB, TROIQ in the last 72 hours.   Recent Labs     05/05/22  0447 05/04/22  0328 05/03/22  1830   * 136 141   K 4.6 4.5 3.8   CO2 37* 39* 38*   BUN 15 12 10   CREA 0.54* 0.57* 0.77   * 174* 123*   PHOS 2.6 3.4  --    MG 2.3 2.4  --    WBC 12.0* 14.0* 18.8*   HGB 10.6* 10.2* 11.5*   HCT 31.8* 31.7* 35.7*    285 298

## 2022-05-05 NOTE — PROGRESS NOTES
General Surgery Daily Progress Note    Patient: Christine Groves MRN: [id number]  SSN: [id number]    YOB: 1950  Age: 73 y.o.   Sex: male      Admit Date: 5/3/2022    POD 2 Day Post-Op    Procedure: Procedure(s):  LAPAROSCOPIC LOW ANTERIOR RESECTION WITH COLOSTOMY    Subjective:   Pain Controlled  Tolerating clears- no N/V- hungry  No gas in ostomy as of yet  Was up and OOB yesterday to chair and attempting up and OOB at time of assessment  Bettencourt in place with good UOP  Seen by pulm and cards yesterday for recs of known COPD and Afib    Current Facility-Administered Medications   Medication Dose Route Frequency    dilTIAZem ER (CARDIZEM CD) capsule 240 mg  240 mg Oral DAILY    lactated Ringers infusion  50 mL/hr IntraVENous CONTINUOUS    predniSONE (DELTASONE) tablet 10 mg  10 mg Oral DAILY WITH BREAKFAST    famotidine (PEPCID) tablet 20 mg  20 mg Oral BID    sodium chloride (NS) flush 5-40 mL  5-40 mL IntraVENous PRN    naloxone (NARCAN) injection 0.04 mg  0.04 mg IntraVENous Multiple    albuterol (PROVENTIL VENTOLIN) nebulizer solution 2.5 mg  2.5 mg Nebulization Q4H PRN    budesonide (PULMICORT) 500 mcg/2 ml nebulizer suspension  500 mcg Nebulization BID RT    montelukast (SINGULAIR) tablet 10 mg  10 mg Oral QHS    sodium chloride (NS) flush 5-40 mL  5-40 mL IntraVENous Q8H    sodium chloride (NS) flush 5-40 mL  5-40 mL IntraVENous PRN    promethazine (PHENERGAN) tablet 12.5 mg  12.5 mg Oral Q6H PRN    Or    ondansetron (ZOFRAN) injection 4 mg  4 mg IntraVENous Q6H PRN    enoxaparin (LOVENOX) injection 40 mg  40 mg SubCUTAneous DAILY    naloxegoL (MOVANTIK) tablet 25 mg  25 mg Oral ACB    acetaminophen (TYLENOL) tablet 1,000 mg  1,000 mg Oral Q6H    oxyCODONE IR (ROXICODONE) tablet 5 mg  5 mg Oral Q4H PRN    Or    oxyCODONE IR (ROXICODONE) tablet 10 mg  10 mg Oral Q4H PRN    arformoteroL (BROVANA) neb solution 15 mcg  15 mcg Nebulization BID RT    And    ipratropium (ATROVENT) 0.02 % nebulizer solution 0.5 mg  0.5 mg Nebulization Q6H RT    guaiFENesin ER (MUCINEX) tablet 1,200 mg  1,200 mg Oral Q12H        Objective:   No intake/output data recorded. 05/03 1901 - 05/05 0700  In: 4577.3 [P.O.:2014; I.V.:2563.3]  Out: 5311 [Urine:5076; Drains:235]  Patient Vitals for the past 8 hrs:   BP Temp Pulse Resp SpO2   05/05/22 0600 112/64  (!) 108 20 93 %   05/05/22 0500 118/74  (!) 105 20 97 %   05/05/22 0400 111/63 97.9 °F (36.6 °C) (!) 107 17 96 %   05/05/22 0300 125/72  (!) 108 21 (!) 87 %   05/05/22 0200 112/70  (!) 107 20 93 %   05/05/22 0100 102/66  100 19 94 %       Physical Exam:  General: Alert, cooperative, NAD  Lungs: Down to 3L NC mid 90s without evidence of WOB  Heart: On monitor- Afib- low 100s  Abdomen: Soft, mild tenderness as expected, non-distended. Incisions c/d/i.  ERIC drain serosang. Ostomy with pink mildly edematous stoma as expected POD 1 and only scant serosang drain in bag-no gas  Extremities: Warm, moves all, no edema  Skin:  Warm and dry, no rash    Labs:   Recent Labs     05/05/22 0447   WBC 12.0*   HGB 10.6*   HCT 31.8*        Recent Labs     05/05/22  0447 05/04/22  0328 05/04/22  0328   *   < > 136   K 4.6   < > 4.5   CL 97   < > 95*   CO2 37*   < > 39*   *   < > 174*   BUN 15   < > 12   CREA 0.54*   < > 0.57*   CA 9.0   < > 8.4*   MG 2.3   < > 2.4   PHOS 2.6   < > 3.4   ALB  --   --  2.5*   TBILI  --   --  0.4   ALT  --   --  19    < > = values in this interval not displayed.        Assessment / Plan:       Procedure: Procedure(s):  LAPAROSCOPIC LOW ANTERIOR RESECTION WITH COLOSTOMY      Cont current pain regimen  DC quinones  Dec IVF to 48- off later today if tolerating diet  Adv to GI lite diet- add own supplements BID (at bedside)  Cards following- asymptomatic afib- off home eliquis and ASA currently- on lovenox ppx dosing, cardizem  Pulm following- back on daily pred dosing  Cont Incentive spirometer  Must mobilize TID and into chair TID  Can transfer out of unit today      Pt seen and discussed with Dr Antonia Romo, NP

## 2022-05-05 NOTE — PROGRESS NOTES
TRANSFER - OUT REPORT:    Verbal report given to Babar BROWNING on Erickson Sands  being transferred to [facility] for remote tele level of care. Report consisted of patients Situation, Background, Assessment and   Recommendations(SBAR). Information from the following report(s) SBAR, Kardex, OR Summary, Intake/Output, MAR, Cardiac Rhythm Afib and Alarm Parameters  was reviewed with the receiving nurse. Lines:   Peripheral IV 05/03/22 Left;Posterior Forearm (Active)   Site Assessment Intact 05/05/22 1200   Phlebitis Assessment 0 05/05/22 1200   Infiltration Assessment 0 05/05/22 1200   Dressing Status Intact 05/05/22 1200   Dressing Type Tape;Transparent 05/05/22 1200   Hub Color/Line Status Pink;Flushed; Infusing; End cap changed 05/05/22 1200   Action Taken Open ports on tubing capped 05/05/22 1200   Alcohol Cap Used Yes 05/05/22 1200        Opportunity for questions and clarification was provided.       Patient transported with:   Monitor  O2 @ 4 liters  Patient-specific medications from Pharmacy  Registered Nurse  Patient Chart

## 2022-05-05 NOTE — CONSULTS
Name: Tonja Montenegro: [address]   : 1950 Admit Date: 5/3/2022   Phone: [phone]  Room: [address] [place] [address]   PCP: Enma Em MD  MRN: [id number]   Date: 2022  Code: Full Code        HPI:      Chart and notes reviewed. Data reviewed. I review the patient's current medications in the medical record at each encounter.  I have evaluated and examined the patient. 8:44 AM       History was obtained from patient. I was asked by Patty Babinski, MD to see Byron Hernandez in consultation for a chief complaint of COPD, followed by PAR. History of Present Illness:   is a very pleasant, 72 yo gentleman followed by  at [facility] for severe COPD, chronic hypoxic and hypercapnic respiratory failure on 2.5L, s/p thoracotomy in  with AFB + lesion removed, CAD, MI s/p stent placement, and atrial fibrillation.  He is managed on Bevespi, Budesonide, 10mg prednisone, and albuterol as needed.  He also has a Trilogy machine that he intermittently uses. He was admitted for resection of rectal CA with diverting colostomy. Surgery without perioperative complications. He reports that he feels pretty good from a breathing standpoint. Receiving nebs here regularly. Denies increased cough or SOB. His prednisone was resumed last night. Images:    CXR 5/3: ET tube in appropriate position, lungs clear    ROS:  Reports feeling a little dizzy this morning. Having some pain at surgical site. Dyspnea at baseline.       Past Medical History:   Diagnosis Date    Acute on chronic respiratory failure with hypoxemia ([facility] 2/15/2019    Arrhythmia     Asthma     Pt denies    Atrial fibrillation (HCC)     COPD (chronic obstructive pulmonary disease) (HCC)     severe    Hypertension     Insulin resistance 3/27/2014    Lung mass 2012    MAY resection, + mycobacterial orgs, neg malignancy    Melanoma ([facility]     Non-STEMI (non-ST elevated myocardial infarction) ([facility]     On home O2 2.5 lpm continuously    since about     Pneumonia     Snoring     Tinnitus     Tuberculosis     Vitamin D deficiency 3/27/2014       Past Surgical History:   Procedure Laterality Date    COLONOSCOPY N/A 3/3/2022    COLONOSCOPY performed by Sandip Ornelas MD at [address]      HX OTHER SURGICAL      EXC MELANOMA RIGHT CHEEK    HX OTHER SURGICAL      NEEDLE BX LEFT LUNG    HX OTHER SURGICAL  2012    apical segmentectomy, MAY       Family History   Problem Relation Age of Onset    Glaucoma Mother     Dementia Mother     Cancer Mother         ? of cervical vs. endometrial    Heart Attack Father     Heart Disease Father     Colon Cancer Neg Hx     Breast Cancer Neg Hx     Ovarian Cancer Neg Hx     Prostate Cancer Neg Hx        Social History     Tobacco Use    Smoking status: Light Tobacco Smoker     Packs/day: 0.25     Years: 40.00     Pack years: 10.00     Types: Cigarettes     Last attempt to quit:      Years since quittin.3    Smokeless tobacco: Never Used    Tobacco comment: Occasional cigarette   Substance Use Topics    Alcohol use:  Yes     Alcohol/week: 7.0 standard drinks     Types: 7 Cans of beer per week       No Known Allergies    Current Facility-Administered Medications   Medication Dose Route Frequency    dilTIAZem ER (CARDIZEM CD) capsule 240 mg  240 mg Oral DAILY    lactated Ringers infusion  50 mL/hr IntraVENous CONTINUOUS    predniSONE (DELTASONE) tablet 10 mg  10 mg Oral DAILY WITH BREAKFAST    famotidine (PEPCID) tablet 20 mg  20 mg Oral BID    sodium chloride (NS) flush 5-40 mL  5-40 mL IntraVENous PRN    naloxone (NARCAN) injection 0.04 mg  0.04 mg IntraVENous Multiple    albuterol (PROVENTIL VENTOLIN) nebulizer solution 2.5 mg  2.5 mg Nebulization Q4H PRN    budesonide (PULMICORT) 500 mcg/2 ml nebulizer suspension  500 mcg Nebulization BID RT    montelukast (SINGULAIR) tablet 10 mg  10 mg Oral QHS    sodium chloride (NS) flush 5-40 mL  5-40 mL IntraVENous Q8H    sodium chloride (NS) flush 5-40 mL  5-40 mL IntraVENous PRN    promethazine (PHENERGAN) tablet 12.5 mg  12.5 mg Oral Q6H PRN    Or    ondansetron (ZOFRAN) injection 4 mg  4 mg IntraVENous Q6H PRN    enoxaparin (LOVENOX) injection 40 mg  40 mg SubCUTAneous DAILY    naloxegoL (MOVANTIK) tablet 25 mg  25 mg Oral ACB    acetaminophen (TYLENOL) tablet 1,000 mg  1,000 mg Oral Q6H    oxyCODONE IR (ROXICODONE) tablet 5 mg  5 mg Oral Q4H PRN    Or    oxyCODONE IR (ROXICODONE) tablet 10 mg  10 mg Oral Q4H PRN    arformoteroL (BROVANA) neb solution 15 mcg  15 mcg Nebulization BID RT    And    ipratropium (ATROVENT) 0.02 % nebulizer solution 0.5 mg  0.5 mg Nebulization Q6H RT    guaiFENesin ER (MUCINEX) tablet 1,200 mg  1,200 mg Oral Q12H         REVIEW OF SYSTEMS   Negative except as stated in the HPI. Physical Exam:   Visit Vitals  /64   Pulse (!) 108   Temp 97.9 °F (36.6 °C)   Resp 20   Ht 5' 10\" (1.778 m)   Wt 55.4 kg (122 lb 2.2 oz)   SpO2 93%   BMI 17.52 kg/m²       General:  Alert, cooperative, no distress, appears stated age, frail. Head:  Normocephalic, without obvious abnormality, atraumatic. Eyes:  Conjunctivae/corneas clear. PERRL, EOMs intact. Nose: Nares normal. Septum midline. Mucosa normal.    Throat: Lips, mucosa, and tongue normal. Teeth and gums normal.   Neck: Supple, symmetrical, trachea midline, no adenopathy   Lungs:   Clear to auscultation bilaterally. Chest wall:  No tenderness or deformity. Heart:  Regular rate and rhythm, S1, S2 normal, no murmur, click, rub or gallop. Abdomen:   Soft, non-tender. Bowel sounds normal. Colostomy in place. Extremities: Extremities normal, atraumatic, no cyanosis or edema. Pulses: 2+ and symmetric all extremities.    Skin: Skin color, texture, turgor normal.   Lymph nodes: Cervical  nodes normal.   Neurologic: Grossly nonfocal       Lab Results   Component Value Date/Time Sodium 135 (L) 05/05/2022 04:47 AM    Potassium 4.6 05/05/2022 04:47 AM    Chloride 97 05/05/2022 04:47 AM    CO2 37 (H) 05/05/2022 04:47 AM    BUN 15 05/05/2022 04:47 AM    Creatinine 0.54 (L) 05/05/2022 04:47 AM    Glucose 102 (H) 05/05/2022 04:47 AM    Calcium 9.0 05/05/2022 04:47 AM    Magnesium 2.3 05/05/2022 04:47 AM    Phosphorus 2.6 05/05/2022 04:47 AM    Lactic acid 1.4 02/15/2019 10:51 PM       Lab Results   Component Value Date/Time    WBC 12.0 (H) 05/05/2022 04:47 AM    HGB 10.6 (L) 05/05/2022 04:47 AM    PLATELET 261 41/89/9708 04:47 AM    MCV 94.6 05/05/2022 04:47 AM       Lab Results   Component Value Date/Time    INR 1.0 02/13/2014 03:00 AM    aPTT 27.3 02/13/2014 03:00 AM    Alk.  phosphatase 78 05/04/2022 03:28 AM    Protein, total 5.4 (L) 05/04/2022 03:28 AM    Albumin 2.5 (L) 05/04/2022 03:28 AM    Globulin 2.9 05/04/2022 03:28 AM       No results found for: IRON, FE, TIBC, IBCT, PSAT, FERR    No results found for: SR, CRP, ELIZABETH, ANAIGG, RA, RPR, RPRT, VDRLT, VDRLS, TSH, TSHEXT     No results found for: PH, PHI, PCO2, PCO2I, PO2, PO2I, HCO3, HCO3I, FIO2, FIO2I    Lab Results   Component Value Date/Time     10/28/2014 09:25 AM    CK-MB Index 2.7 (H) 10/28/2014 09:25 AM    Troponin-I, Qt. <0.05 02/22/2021 03:05 PM        Lab Results   Component Value Date/Time    Culture result: HEAVY NORMAL RESPIRATORY AYO 02/16/2019 11:00 AM    Culture result: NO GROWTH 6 DAYS 02/15/2019 10:45 PM    Culture result: MIXED SKIN AYO ISOLATED 06/04/2015 11:15 PM       No results found for: TOXA1, RPR, HBCM, HBSAG, HAAB, HCAB1, HAAT, G6PD, CRYAC, HIVGT, HIVR, HIV1, HIV12, HIVPC, HIVRPI    Lab Results   Component Value Date/Time     10/28/2014 09:25 AM     02/12/2014 06:00 AM       Lab Results   Component Value Date/Time    Color DARK YELLOW 06/04/2015 11:15 PM    Appearance CLEAR 06/04/2015 11:15 PM    pH (UA) 6.0 06/04/2015 11:15 PM    Protein TRACE (A) 06/04/2015 11:15 PM    Glucose NEGATIVE 06/04/2015 11:15 PM    Ketone 40 (A) 06/04/2015 11:15 PM    Blood NEGATIVE  06/04/2015 11:15 PM    Urobilinogen 0.2 06/04/2015 11:15 PM    Nitrites NEGATIVE  06/04/2015 11:15 PM    Leukocyte Esterase NEGATIVE  06/04/2015 11:15 PM    WBC 0-4 06/04/2015 11:15 PM    RBC 5-10 06/04/2015 11:15 PM    Bacteria 1+ (A) 06/04/2015 11:15 PM       IMPRESSION  · Rectal CA; s/p laparoscopic resection with colostomy  · Severe COPD; not in acute exacerbation  · Chronic Hypoxic/Hypercapnic Respiratory Failure  · Former Tobacco Use  · Chronic A. Fib    PLAN  · Supplemental oxygen to keep sats >88%, on 2.5L at baseline  · Continue Brovana/Pulmicort nebs  · Scheduled Atrovent  · Daily prednisone  · Cardiology has evaluated        Thanks very much for the consult.     Tali Alvarado NP

## 2022-05-05 NOTE — PROGRESS NOTES
2300 transfer of care for patient from [facility]. Patient currently in AFIB. Turned Q2. Patient compliant with care and cooperative. Dressing changed on ERIC drain. See flowsheets for full assessment, and eMAR for medical administration     Bedside and Verbal shift change report given to 3455 Roman Nichole (oncoming nurse) by Vincent Euceda  (offgoing nurse). Report included the following information SBAR, Kardex, Intake/Output, MAR, Recent Results and Cardiac Rhythm A fib.

## 2022-05-06 PROCEDURE — 94640 AIRWAY INHALATION TREATMENT: CPT

## 2022-05-06 PROCEDURE — 74011250636 HC RX REV CODE- 250/636: Performed by: INTERNAL MEDICINE

## 2022-05-06 PROCEDURE — 65270000029 HC RM PRIVATE

## 2022-05-06 PROCEDURE — 77030037255 HC PCH OST FLX SENSURA COLO -A

## 2022-05-06 PROCEDURE — 74011250637 HC RX REV CODE- 250/637: Performed by: COLON & RECTAL SURGERY

## 2022-05-06 PROCEDURE — 97116 GAIT TRAINING THERAPY: CPT

## 2022-05-06 PROCEDURE — 94664 DEMO&/EVAL PT USE INHALER: CPT

## 2022-05-06 PROCEDURE — 74011000250 HC RX REV CODE- 250: Performed by: COLON & RECTAL SURGERY

## 2022-05-06 PROCEDURE — 77010033678 HC OXYGEN DAILY

## 2022-05-06 PROCEDURE — 94761 N-INVAS EAR/PLS OXIMETRY MLT: CPT

## 2022-05-06 PROCEDURE — 74011250636 HC RX REV CODE- 250/636: Performed by: COLON & RECTAL SURGERY

## 2022-05-06 PROCEDURE — 74011000250 HC RX REV CODE- 250: Performed by: INTERNAL MEDICINE

## 2022-05-06 PROCEDURE — 97161 PT EVAL LOW COMPLEX 20 MIN: CPT

## 2022-05-06 RX ADMIN — ACETAMINOPHEN 1000 MG: 325 TABLET ORAL at 18:59

## 2022-05-06 RX ADMIN — FAMOTIDINE 20 MG: 20 TABLET, FILM COATED ORAL at 18:58

## 2022-05-06 RX ADMIN — Medication 10 ML: at 20:20

## 2022-05-06 RX ADMIN — METHYLPREDNISOLONE SODIUM SUCCINATE 40 MG: 40 INJECTION, POWDER, FOR SOLUTION INTRAMUSCULAR; INTRAVENOUS at 06:02

## 2022-05-06 RX ADMIN — NALOXEGOL OXALATE 25 MG: 25 TABLET, FILM COATED ORAL at 06:02

## 2022-05-06 RX ADMIN — ENOXAPARIN SODIUM 40 MG: 100 INJECTION SUBCUTANEOUS at 09:02

## 2022-05-06 RX ADMIN — DILTIAZEM HYDROCHLORIDE 240 MG: 120 CAPSULE, COATED, EXTENDED RELEASE ORAL at 09:02

## 2022-05-06 RX ADMIN — IPRATROPIUM BROMIDE 0.5 MG: 0.5 SOLUTION RESPIRATORY (INHALATION) at 07:39

## 2022-05-06 RX ADMIN — BUDESONIDE 500 MCG: 0.5 SUSPENSION RESPIRATORY (INHALATION) at 20:29

## 2022-05-06 RX ADMIN — MONTELUKAST 10 MG: 10 TABLET, FILM COATED ORAL at 20:19

## 2022-05-06 RX ADMIN — Medication 10 ML: at 14:00

## 2022-05-06 RX ADMIN — GUAIFENESIN 1200 MG: 600 TABLET ORAL at 20:19

## 2022-05-06 RX ADMIN — METHYLPREDNISOLONE SODIUM SUCCINATE 40 MG: 40 INJECTION, POWDER, FOR SOLUTION INTRAMUSCULAR; INTRAVENOUS at 14:45

## 2022-05-06 RX ADMIN — ARFORMOTEROL TARTRATE 15 MCG: 15 SOLUTION RESPIRATORY (INHALATION) at 07:39

## 2022-05-06 RX ADMIN — GUAIFENESIN 1200 MG: 600 TABLET ORAL at 09:02

## 2022-05-06 RX ADMIN — FAMOTIDINE 20 MG: 20 TABLET, FILM COATED ORAL at 09:02

## 2022-05-06 RX ADMIN — ARFORMOTEROL TARTRATE 15 MCG: 15 SOLUTION RESPIRATORY (INHALATION) at 20:29

## 2022-05-06 RX ADMIN — IPRATROPIUM BROMIDE 0.5 MG: 0.5 SOLUTION RESPIRATORY (INHALATION) at 20:23

## 2022-05-06 RX ADMIN — Medication 10 ML: at 06:02

## 2022-05-06 RX ADMIN — BUDESONIDE 500 MCG: 0.5 SUSPENSION RESPIRATORY (INHALATION) at 07:39

## 2022-05-06 RX ADMIN — METHYLPREDNISOLONE SODIUM SUCCINATE 40 MG: 40 INJECTION, POWDER, FOR SOLUTION INTRAMUSCULAR; INTRAVENOUS at 20:19

## 2022-05-06 RX ADMIN — ACETAMINOPHEN 1000 MG: 325 TABLET ORAL at 06:02

## 2022-05-06 NOTE — WOUND CARE
Ostomy visit: post op day # 3 S/P LAR with end colostomy for anal cancer. Discussed with Babar and we were in with patient for teaching/ostomy care/assessment earlier this morning. He is alert, oriented x 4, most pleasant. Assessment:  LLQ colostomy - moist red stoma budded well above skin level, small amount serosanguinous output in appliance, patient reports he is passing gas - put sticker on filter to help identify any air in pouch from gas. Midline incision well approximated with dermal glue, no drainage. LLQ ERIC site with saturated split guaze pads around site, serosanguinous, little output in bulb, charged. Teaching/Care:  Used mirror for patient to be able to see stoma well. We measured and Angel Rodríguez cut out and using mirror assisted him to place wafer to skin. Showed him how to line up pouch to stick on to wafer. Goal is going home with two piece appliance and switching to disposable pouches as his ostomy should put out nearly formed stool and will be easy to use disposable pouches. Educated on changing wafer about every 4 - 5 days; insurance will reimburse for 2 disposable pouches each day. He will have drainable pouches going home; to keep some for future if he has illness or has to take contrast or clean out for colonoscopy. Plan: Will check back on him today to see if he has questions on what we did earlier. Addendum:  Checked back with patient this afternoon, appliance/pouch filled with gas and noted more after releasing. Patient reports no nausea and tolerating diet. Fecal output not yet but gas. Will work with him Monday.   Trell Stratton RN,CWON

## 2022-05-06 NOTE — PROGRESS NOTES
Problem: Mobility Impaired (Adult and Pediatric)  Goal: *Acute Goals and Plan of Care (Insert Text)  Description: FUNCTIONAL STATUS PRIOR TO ADMISSION: Patient was independent and active without use of DME. No recent falls    HOME SUPPORT PRIOR TO ADMISSION: Has friends that check on him. Home O2 at 2.5 L/min    Physical Therapy Goals  Initiated 5/6/2022  1. Patient will ambulate with independence for 100 feet with the least restrictive device within 7 day(s). 2.  Patient will ascend/descend 3 stairs with handrail(s) with modified independence within 7 day(s). Outcome: Progressing Towards Goal     PHYSICAL THERAPY EVALUATION  Patient: Jim Tirado (25 y.o. male)  Date: 5/6/2022  Primary Diagnosis: Rectal cancer ([facility] [address] [C20]  Procedure(s) (LRB):  LAPAROSCOPIC LOW ANTERIOR RESECTION WITH COLOSTOMY (N/A) 3 Days Post-Op   Precautions: Universal       ASSESSMENT  Based on the objective data described below, the patient presents with decreased mobility and decreased balance with gait. He is POD 3 lap with Colostomy. He has a history of COPD using 2.5 L/min at baseline. He has a pulse oximeter at home. He reports he has been getting up to the chair and the bathroom without difficulty. Performed transfers assessment and gait training in the valera. Patient with path deviations during walking and with increased rafa. He has increased HR with gait training and returned to room and to the bed. Back in bed he is independent with pursed lip breathing. He could benefit from energy conservation techniques and pacing instructions.   Continue to assess his balance and need for Assistive Device    Documentation for home O2:     ROOM AIR    AT REST   O2 SATS  89 HR  96         3 LITERS OF O2 WITH ACTIVITY O2 SATS  79 HR  140's   3 LITERS OF 02 PATIENT LEFT COMFORTABLY  SITTING/SUPINE 02 SATS  90 HR  116        Current Level of Function Impacting Discharge (mobility/balance): contact guard    Functional Outcome Measure: The patient scored 24 out of 28 on the Tinetti outcome measure which is indicative of moderate fall risk. Other factors to consider for discharge: continue to monitor pulse ox, lives alone     Patient will benefit from skilled therapy intervention to address the above noted impairments. PLAN :  Recommendations and Planned Interventions: gait training and therapeutic exercises      Frequency/Duration: Patient will be followed by physical therapy:  5 times a week to address goals. Recommendation for discharge: (in order for the patient to meet his/her long term goals)  To be determined: likely no needs at discharge    This discharge recommendation:  Has not yet been discussed the attending provider and/or case management    IF patient discharges home will need the following DME: none         SUBJECTIVE:   Patient stated I can get everything I need delivered to my house.     OBJECTIVE DATA SUMMARY:   HISTORY:    Past Medical History:   Diagnosis Date    Acute on chronic respiratory failure with hypoxemia ([facility] [address] 2/15/2019    Arrhythmia     Asthma     Pt denies    Atrial fibrillation (HCC)     COPD (chronic obstructive pulmonary disease) ([facility] [address]     severe    Hypertension     Insulin resistance 3/27/2014    Lung mass 9/2012    MAY resection, + mycobacterial orgs, neg malignancy    Melanoma ([facility] [address] 2007    Non-STEMI (non-ST elevated myocardial infarction) ([facility] [address] 2/14    On home O2 2.5 lpm continuously    since about 2014    Pneumonia     Snoring     Tinnitus     Tuberculosis     Vitamin D deficiency 3/27/2014     Past Surgical History:   Procedure Laterality Date    COLONOSCOPY N/A 3/3/2022    COLONOSCOPY performed by Jennie Contreras MD at [facility]  2014    HX OTHER SURGICAL      EXC MELANOMA RIGHT CHEEK    HX OTHER SURGICAL      NEEDLE BX LEFT LUNG    HX OTHER SURGICAL  9/11/2012    apical segmentectomy, MAY       Personal factors and/or comorbidities impacting plan of care:     Home Situation  Home Environment: Private residence  # Steps to Enter: 3  One/Two Story Residence: One story  Living Alone: Yes  Support Systems: Other Family Member(s)  Patient Expects to be Discharged to[de-identified] Home with home health  Current DME Used/Available at Home: None    EXAMINATION/PRESENTATION/DECISION MAKING:   Critical Behavior:  Neurologic State: Alert  Orientation Level: Oriented X4  Cognition: Follows commands     Hearing: Auditory  Auditory Impairment: None    Range Of Motion:  AROM: Within functional limits           PROM: Within functional limits           Strength:    Strength:  Within functional limits                    Tone & Sensation:   Tone: Normal                              Coordination:  Coordination: Within functional limits  Vision:      Functional Mobility:  Bed Mobility:     Supine to Sit: Independent  Sit to Supine: Independent     Transfers:  Sit to Stand: Independent  Stand to Sit: Independent                       Balance:   Sitting: Intact  Standing: Intact  Ambulation/Gait Training:  Distance (ft): 150 Feet (ft)  Assistive Device: Gait belt  Ambulation - Level of Assistance: Contact guard assistance;Assist x1        Gait Abnormalities: Path deviations                         Functional Measure:  Tinetti test:    Sitting Balance: 1  Arises: 2  Attempts to Rise: 2  Immediate Standing Balance: 2  Standing Balance: 2  Nudged: 2  Eyes Closed: 0  Turn 360 Degrees - Continuous/Discontinuous: 1  Turn 360 Degrees - Steady/Unsteady: 1  Sitting Down: 2  Balance Score: 15 Balance total score  Indication of Gait: 1  R Step Length/Height: 1  L Step Length/Height: 1  R Foot Clearance: 1  L Foot Clearance: 1  Step Symmetry: 1  Step Continuity: 1  Path: 1  Trunk: 1  Walking Time: 0  Gait Score: 9 Gait total score  Total Score: 24/28 Overall total score         Tinetti Tool Score Risk of Falls  <19 = High Fall Risk  19-24 = Moderate Fall Risk  25-28 = Low Fall Risk  Jacky GUNTER. Performance-Oriented Assessment of Mobility Problems in Elderly Patients. [facility]; [id number]. (Scoring Description: PT Bulletin Feb. 10, 1993)    Older adults: Sheryl Weeks et al, 2009; n = 1000 [facility] elderly evaluated with ABC, FRED, ADL, and IADL)  · Mean FRED score for males aged 69-68 years = 26.21(3.40)  · Mean FRED score for females age 69-68 years = 25.16(4.30)  · Mean FRED score for males over 80 years = 23.29(6.02)  · Mean FRED score for females over 80 years = 17.20(8.32)           Physical Therapy Evaluation Charge Determination   History Examination Presentation Decision-Making   LOW Complexity : Zero comorbidities / personal factors that will impact the outcome / POC LOW Complexity : 1-2 Standardized tests and measures addressing body structure, function, activity limitation and / or participation in recreation  LOW Complexity : Stable, uncomplicated  LOW Complexity : FOTO score of       Based on the above components, the patient evaluation is determined to be of the following complexity level: LOW     Pain Rating:  None reported    Activity Tolerance:   desaturates with exertion and requires oxygen    After treatment patient left in no apparent distress:   Supine in bed, Call bell within reach, and Side rails x 3    COMMUNICATION/EDUCATION:   The patients plan of care was discussed with: Registered nurse. Fall prevention education was provided and the patient/caregiver indicated understanding., Patient/family have participated as able in goal setting and plan of care. , and Patient/family agree to work toward stated goals and plan of care.     Thank you for this referral.  Awa Carson, PT   Time Calculation: 23 mins

## 2022-05-06 NOTE — PROGRESS NOTES
5/6/2022 2:15 PM [facility] has accepted pt. CM will follow. 5/6/2022 12:07 PM Home health RN order for ostomy care received. Met with pt to discuss. Pt is agreeable to home health, choice provided, pt selected [facility], choice letter signed. Referral sent to [facility] via [email or web address] E Shahrzad Aguilar. AARON Mathias    Care Management Progress Note      ICD-10-CM ICD-9-CM    1. Persistent atrial fibrillation (HCC)  I48.19 427.31        RUR:  15%  Risk Level: []Low [x]Moderate []High  Value-based purchasing: [] Yes [x] No  Bundle patient: [] Yes [x] No   Specify:     Transition of care plan:  1. Ongoing management by [facility], POD 3 laparoscopic low anterior resection with colostomy   2. Home health RN arranged through [facility]   3. Outpatient follow-up.   4. Pt's family to transport

## 2022-05-06 NOTE — PROGRESS NOTES
Bedside, Verbal and Written shift change report given to Babar RN (oncoming nurse) by Lindsay Watkins RN (offgoing nurse). Report included the following information SBAR, Kardex, ED Summary, Procedure Summary, Intake/Output, MAR, Recent Results and Med Rec Status.

## 2022-05-06 NOTE — PROGRESS NOTES
Problem: Falls - Risk of  Goal: *Absence of Falls  Description: Document Beverly Burleson Fall Risk and appropriate interventions in the flowsheet. Outcome: Progressing Towards Goal  Note: Fall Risk Interventions:  Mobility Interventions: Bed/chair exit alarm    Mentation Interventions: Bed/chair exit alarm    Medication Interventions: Patient to call before getting OOB    Elimination Interventions: Bed/chair exit alarm,Call light in reach    History of Falls Interventions: Bed/chair exit alarm         Problem: Patient Education: Go to Patient Education Activity  Goal: Patient/Family Education  Outcome: Progressing Towards Goal     Problem: Pressure Injury - Risk of  Goal: *Prevention of pressure injury  Description: Document Dipak Scale and appropriate interventions in the flowsheet.   Outcome: Progressing Towards Goal  Note: Pressure Injury Interventions:  Sensory Interventions: Assess changes in LOC,Assess need for specialty bed    Moisture Interventions: Absorbent underpads    Activity Interventions: Increase time out of bed    Mobility Interventions: PT/OT evaluation    Nutrition Interventions: Document food/fluid/supplement intake    Friction and Shear Interventions: HOB 30 degrees or less                Problem: Pain  Goal: *Control of Pain  Outcome: Progressing Towards Goal  Goal: *PALLIATIVE CARE:  Alleviation of Pain  Outcome: Progressing Towards Goal     Problem: Pain  Goal: *Control of Pain  Outcome: Progressing Towards Goal

## 2022-05-06 NOTE — PROGRESS NOTES
Name: Avelino Linda: [address]   : 1950 Admit Date: 5/3/2022   Phone: [phone]  Room: [facility]01   PCP: Ambika Lazo MD  MRN: [id number]   Date: 2022  Code: Full Code        HPI:      Chart and notes reviewed. Data reviewed. I review the patient's current medications in the medical record at each encounter.  I have evaluated and examined the patient. 8:44 AM       History was obtained from patient. I was asked by Stacey Jolley MD to see Jaimee Shirtelma in consultation for a chief complaint of COPD, followed by PAR. History of Present Illness:   is a very pleasant, 74 yo gentleman followed by  at [facility] for severe COPD, chronic hypoxic and hypercapnic respiratory failure on 2.5L, s/p thoracotomy in  with AFB + lesion removed, CAD, MI s/p stent placement, and atrial fibrillation.  He is managed on Bevespi, Budesonide, 10mg prednisone, and albuterol as needed.  He also has a Trilogy machine that he intermittently uses. He was admitted for resection of rectal CA with diverting colostomy. Surgery without perioperative complications. He reports that he feels pretty good from a breathing standpoint. Receiving nebs here regularly. Denies increased cough or SOB. His prednisone was resumed last night. Images:    CXR 5/3: ET tube in appropriate position, lungs clear    ROS:  Reports feeling a little dizzy this morning. Having some pain at surgical site. Dyspnea at baseline. Interval History:    Afebrile  Bp stable  Sats 94% on 2L NC  WBC 12--decreased  No new labs today    ROS:  Working with ostomy nurse.       Past Medical History:   Diagnosis Date    Acute on chronic respiratory failure with hypoxemia ([facility] [address] 2/15/2019    Arrhythmia     Asthma     Pt denies    Atrial fibrillation (HCC)     COPD (chronic obstructive pulmonary disease) (HCC)     severe    Hypertension     Insulin resistance 3/27/2014    Lung mass 2012    MAY resection, + mycobacterial orgs, neg malignancy    Melanoma ([facility] [address]     Non-STEMI (non-ST elevated myocardial infarction) ([facility] [address]     On home O2 2.5 lpm continuously    since about     Pneumonia     Snoring     Tinnitus     Tuberculosis     Vitamin D deficiency 3/27/2014       Past Surgical History:   Procedure Laterality Date    COLONOSCOPY N/A 3/3/2022    COLONOSCOPY performed by Christiano Red MD at [address] [facility] HX CORONARY STENT PLACEMENT      HX OTHER SURGICAL      EXC MELANOMA RIGHT CHEEK    HX OTHER SURGICAL      NEEDLE BX LEFT LUNG    HX OTHER SURGICAL  2012    apical segmentectomy, MAY       Family History   Problem Relation Age of Onset    Glaucoma Mother     Dementia Mother     Cancer Mother         ? of cervical vs. endometrial    Heart Attack Father     Heart Disease Father     Colon Cancer Neg Hx     Breast Cancer Neg Hx     Ovarian Cancer Neg Hx     Prostate Cancer Neg Hx        Social History     Tobacco Use    Smoking status: Light Tobacco Smoker     Packs/day: 0.25     Years: 40.00     Pack years: 10.00     Types: Cigarettes     Last attempt to quit:      Years since quittin.3    Smokeless tobacco: Never Used    Tobacco comment: Occasional cigarette   Substance Use Topics    Alcohol use:  Yes     Alcohol/week: 7.0 standard drinks     Types: 7 Cans of beer per week       No Known Allergies    Current Facility-Administered Medications   Medication Dose Route Frequency    methylPREDNISolone (PF) (SOLU-MEDROL) injection 40 mg  40 mg IntraVENous Q8H    benzonatate (TESSALON) capsule 200 mg  200 mg Oral TID PRN    ipratropium (ATROVENT) 0.02 % nebulizer solution 0.5 mg  0.5 mg Nebulization Q6HWA RT    And    arformoteroL (BROVANA) neb solution 15 mcg  15 mcg Nebulization BID RT    dilTIAZem ER (CARDIZEM CD) capsule 240 mg  240 mg Oral DAILY    famotidine (PEPCID) tablet 20 mg  20 mg Oral BID    sodium chloride (NS) flush 5-40 mL  5-40 mL IntraVENous PRN    naloxone (NARCAN) injection 0.04 mg  0.04 mg IntraVENous Multiple    albuterol (PROVENTIL VENTOLIN) nebulizer solution 2.5 mg  2.5 mg Nebulization Q4H PRN    budesonide (PULMICORT) 500 mcg/2 ml nebulizer suspension  500 mcg Nebulization BID RT    montelukast (SINGULAIR) tablet 10 mg  10 mg Oral QHS    sodium chloride (NS) flush 5-40 mL  5-40 mL IntraVENous Q8H    sodium chloride (NS) flush 5-40 mL  5-40 mL IntraVENous PRN    promethazine (PHENERGAN) tablet 12.5 mg  12.5 mg Oral Q6H PRN    Or    ondansetron (ZOFRAN) injection 4 mg  4 mg IntraVENous Q6H PRN    enoxaparin (LOVENOX) injection 40 mg  40 mg SubCUTAneous DAILY    naloxegoL (MOVANTIK) tablet 25 mg  25 mg Oral ACB    acetaminophen (TYLENOL) tablet 1,000 mg  1,000 mg Oral Q6H    oxyCODONE IR (ROXICODONE) tablet 5 mg  5 mg Oral Q4H PRN    Or    oxyCODONE IR (ROXICODONE) tablet 10 mg  10 mg Oral Q4H PRN    guaiFENesin ER (MUCINEX) tablet 1,200 mg  1,200 mg Oral Q12H         REVIEW OF SYSTEMS   Negative except as stated in the HPI. Physical Exam:   Visit Vitals  /70 (BP 1 Location: Left upper arm)   Pulse (!) 107   Temp 97.7 °F (36.5 °C)   Resp 12   Ht 5' 10\" (1.778 m)   Wt 55.4 kg (122 lb 2.2 oz)   SpO2 94%   BMI 17.52 kg/m²       General:  Alert, cooperative, no distress, appears stated age, frail. Head:  Normocephalic, without obvious abnormality, atraumatic. Eyes:  Conjunctivae/corneas clear. PERRL   Nose: Nares normal. Septum midline. Mucosa normal.    Throat: Lips, mucosa, and tongue normal. Teeth and gums normal.   Neck: Supple, symmetrical, trachea midline, no adenopathy   Lungs:   Clear to auscultation bilaterally. Chest wall:  No tenderness or deformity. Heart:  Regular rate and rhythm, S1, S2 normal, no murmur, click, rub or gallop. Abdomen:   Soft, non-tender. Bowel sounds normal. Colostomy in place. Extremities: Extremities normal, atraumatic, no cyanosis or edema. Pulses: 2+ and symmetric all extremities. Skin: Skin color, texture, turgor normal.   Lymph nodes: Cervical  nodes normal.   Neurologic: Grossly nonfocal       Lab Results   Component Value Date/Time    Sodium 135 (L) 05/05/2022 04:47 AM    Potassium 4.6 05/05/2022 04:47 AM    Chloride 97 05/05/2022 04:47 AM    CO2 37 (H) 05/05/2022 04:47 AM    BUN 15 05/05/2022 04:47 AM    Creatinine 0.54 (L) 05/05/2022 04:47 AM    Glucose 102 (H) 05/05/2022 04:47 AM    Calcium 9.0 05/05/2022 04:47 AM    Magnesium 2.3 05/05/2022 04:47 AM    Phosphorus 2.6 05/05/2022 04:47 AM    Lactic acid 1.4 02/15/2019 10:51 PM       Lab Results   Component Value Date/Time    WBC 12.0 (H) 05/05/2022 04:47 AM    HGB 10.6 (L) 05/05/2022 04:47 AM    PLATELET 032 39/95/8461 04:47 AM    MCV 94.6 05/05/2022 04:47 AM       Lab Results   Component Value Date/Time    INR 1.0 02/13/2014 03:00 AM    aPTT 27.3 02/13/2014 03:00 AM    Alk.  phosphatase 78 05/04/2022 03:28 AM    Protein, total 5.4 (L) 05/04/2022 03:28 AM    Albumin 2.5 (L) 05/04/2022 03:28 AM    Globulin 2.9 05/04/2022 03:28 AM       No results found for: IRON, FE, TIBC, IBCT, PSAT, FERR    No results found for: SR, CRP, ELIZABETH, ANAIGG, RA, RPR, RPRT, VDRLT, VDRLS, TSH, TSHEXT, TSHEXT     No results found for: PH, PHI, PCO2, PCO2I, PO2, PO2I, HCO3, HCO3I, FIO2, FIO2I    Lab Results   Component Value Date/Time     10/28/2014 09:25 AM    CK-MB Index 2.7 (H) 10/28/2014 09:25 AM    Troponin-I, Qt. <0.05 02/22/2021 03:05 PM        Lab Results   Component Value Date/Time    Culture result: HEAVY NORMAL RESPIRATORY AYO 02/16/2019 11:00 AM    Culture result: NO GROWTH 6 DAYS 02/15/2019 10:45 PM    Culture result: MIXED SKIN AYO ISOLATED 06/04/2015 11:15 PM       No results found for: TOXA1, RPR, HBCM, HBSAG, HAAB, HCAB1, HAAT, G6PD, CRYAC, HIVGT, HIVR, HIV1, HIV12, HIVPC, HIVRPI    Lab Results   Component Value Date/Time     10/28/2014 09:25 AM     02/12/2014 06:00 AM       Lab Results   Component Value Date/Time    Color DARK YELLOW 06/04/2015 11:15 PM    Appearance CLEAR 06/04/2015 11:15 PM    pH (UA) 6.0 06/04/2015 11:15 PM    Protein TRACE (A) 06/04/2015 11:15 PM    Glucose NEGATIVE  06/04/2015 11:15 PM    Ketone 40 (A) 06/04/2015 11:15 PM    Blood NEGATIVE  06/04/2015 11:15 PM    Urobilinogen 0.2 06/04/2015 11:15 PM    Nitrites NEGATIVE  06/04/2015 11:15 PM    Leukocyte Esterase NEGATIVE  06/04/2015 11:15 PM    WBC 0-4 06/04/2015 11:15 PM    RBC 5-10 06/04/2015 11:15 PM    Bacteria 1+ (A) 06/04/2015 11:15 PM       IMPRESSION  · Rectal CA; s/p laparoscopic resection with colostomy  · Severe COPD; not in acute exacerbation  · Chronic Hypoxic/Hypercapnic Respiratory Failure  · Former Tobacco Use  · Chronic A. Fib    PLAN  · Supplemental oxygen to keep sats >88%, on 2.5L at baseline  · Continue Brovana/Pulmicort nebs  · Scheduled Atrovent  · Daily prednisone  · Cardiology has evaluated    Will sign off. Please consult pulmonary again if any issues arise.       Fortunato Ferro NP

## 2022-05-06 NOTE — PROGRESS NOTES
General Surgery Daily Progress Note    Patient: Elsie Perera MRN: [id number]  SSN: [id number]    YOB: 1950  Age: 73 y.o.   Sex: male      Admit Date: 5/3/2022    POD 3 Day Post-Op    Procedure: Procedure(s):  LAPAROSCOPIC LOW ANTERIOR RESECTION WITH COLOSTOMY    Subjective:   Pain Controlled  Tolerating gi lite- no N/V- hungry  Ostomy starting to be productive  Up and OOB with mobility   Seen by pulm and cards today for recs of known COPD and Afib    Current Facility-Administered Medications   Medication Dose Route Frequency    methylPREDNISolone (PF) (SOLU-MEDROL) injection 40 mg  40 mg IntraVENous Q8H    benzonatate (TESSALON) capsule 200 mg  200 mg Oral TID PRN    ipratropium (ATROVENT) 0.02 % nebulizer solution 0.5 mg  0.5 mg Nebulization Q6HWA RT    And    arformoteroL (BROVANA) neb solution 15 mcg  15 mcg Nebulization BID RT    dilTIAZem ER (CARDIZEM CD) capsule 240 mg  240 mg Oral DAILY    famotidine (PEPCID) tablet 20 mg  20 mg Oral BID    sodium chloride (NS) flush 5-40 mL  5-40 mL IntraVENous PRN    naloxone (NARCAN) injection 0.04 mg  0.04 mg IntraVENous Multiple    albuterol (PROVENTIL VENTOLIN) nebulizer solution 2.5 mg  2.5 mg Nebulization Q4H PRN    budesonide (PULMICORT) 500 mcg/2 ml nebulizer suspension  500 mcg Nebulization BID RT    montelukast (SINGULAIR) tablet 10 mg  10 mg Oral QHS    sodium chloride (NS) flush 5-40 mL  5-40 mL IntraVENous Q8H    sodium chloride (NS) flush 5-40 mL  5-40 mL IntraVENous PRN    promethazine (PHENERGAN) tablet 12.5 mg  12.5 mg Oral Q6H PRN    Or    ondansetron (ZOFRAN) injection 4 mg  4 mg IntraVENous Q6H PRN    enoxaparin (LOVENOX) injection 40 mg  40 mg SubCUTAneous DAILY    naloxegoL (MOVANTIK) tablet 25 mg  25 mg Oral ACB    acetaminophen (TYLENOL) tablet 1,000 mg  1,000 mg Oral Q6H    oxyCODONE IR (ROXICODONE) tablet 5 mg  5 mg Oral Q4H PRN    Or    oxyCODONE IR (ROXICODONE) tablet 10 mg  10 mg Oral Q4H PRN    guaiFENesin ER (MUCINEX) tablet 1,200 mg  1,200 mg Oral Q12H        Objective:   No intake/output data recorded. 05/04 1901 - 05/06 0700  In: 1992.5 [P.O.:480; I.V.:1512.5]  Out: 6403 [KUHMZ:5390; Drains:315]  Patient Vitals for the past 8 hrs:   BP Temp Pulse Resp SpO2   05/06/22 0845 122/70 97.7 °F (36.5 °C)  12 94 %   05/06/22 0744     92 %   05/06/22 0700   (!) 107         Physical Exam:  General: Alert, cooperative, NAD  Lungs: 3.5L NC mid 90s without evidence of WOB  Heart: On monitor- Afib- low 100s  Abdomen: Soft, mild tenderness as expected, non-distended. Incisions c/d/i.  ERIC drain serosang. Ostomy with pink mildly edematous stoma as expected POD 1 and scant output in bag  Extremities: Warm, moves all, no edema  Skin:  Warm and dry, no rash    Labs:   Recent Labs     05/05/22 0447   WBC 12.0*   HGB 10.6*   HCT 31.8*        Recent Labs     05/05/22  0447 05/04/22  0328 05/04/22  0328   *   < > 136   K 4.6   < > 4.5   CL 97   < > 95*   CO2 37*   < > 39*   *   < > 174*   BUN 15   < > 12   CREA 0.54*   < > 0.57*   CA 9.0   < > 8.4*   MG 2.3   < > 2.4   PHOS 2.6   < > 3.4   ALB  --   --  2.5*   TBILI  --   --  0.4   ALT  --   --  19    < > = values in this interval not displayed.        Assessment / Plan:       Procedure: Procedure(s):  LAPAROSCOPIC LOW ANTERIOR RESECTION WITH COLOSTOMY      Cont current pain regimen  GI lite diet- add own supplements BID (at bedside)  Cards - asymptomatic afib- off home eliquis and ASA currently- on lovenox ppx dosing, cardizem; will restart eliquis tomorrow am  Pulm- back on daily pred dosing; maintain O2 sats >88;  Baseline 2.5L  Cont Incentive spirometer  Must mobilize TID and into chair TID      Pt seen and discussed with Dr Daron Yang, NP

## 2022-05-06 NOTE — PROGRESS NOTES
Problem: Falls - Risk of  Goal: *Absence of Falls  Description: Document Tyler Valdovinos Fall Risk and appropriate interventions in the flowsheet.   Outcome: Progressing Towards Goal  Note: Fall Risk Interventions:  Mobility Interventions: Bed/chair exit alarm    Mentation Interventions: Bed/chair exit alarm    Medication Interventions: Patient to call before getting OOB    Elimination Interventions: Bed/chair exit alarm,Call light in reach    History of Falls Interventions: Bed/chair exit alarm    Requires reminders to call before getting off of the bed  Problem: Pain  Goal: *Control of Pain  Outcome: Progressing Towards Goal

## 2022-05-07 LAB
GLUCOSE BLD STRIP.AUTO-MCNC: 123 MG/DL (ref 65–117)
SERVICE CMNT-IMP: ABNORMAL

## 2022-05-07 PROCEDURE — 74011250636 HC RX REV CODE- 250/636: Performed by: INTERNAL MEDICINE

## 2022-05-07 PROCEDURE — 77010033678 HC OXYGEN DAILY

## 2022-05-07 PROCEDURE — 97116 GAIT TRAINING THERAPY: CPT

## 2022-05-07 PROCEDURE — 82962 GLUCOSE BLOOD TEST: CPT

## 2022-05-07 PROCEDURE — 74011000250 HC RX REV CODE- 250: Performed by: COLON & RECTAL SURGERY

## 2022-05-07 PROCEDURE — 94640 AIRWAY INHALATION TREATMENT: CPT

## 2022-05-07 PROCEDURE — 74011250637 HC RX REV CODE- 250/637: Performed by: COLON & RECTAL SURGERY

## 2022-05-07 PROCEDURE — 94664 DEMO&/EVAL PT USE INHALER: CPT

## 2022-05-07 PROCEDURE — 74011250637 HC RX REV CODE- 250/637: Performed by: NURSE PRACTITIONER

## 2022-05-07 PROCEDURE — 65270000029 HC RM PRIVATE

## 2022-05-07 PROCEDURE — 74011000250 HC RX REV CODE- 250: Performed by: INTERNAL MEDICINE

## 2022-05-07 RX ADMIN — ACETAMINOPHEN 1000 MG: 325 TABLET ORAL at 00:12

## 2022-05-07 RX ADMIN — BUDESONIDE 500 MCG: 0.5 SUSPENSION RESPIRATORY (INHALATION) at 08:28

## 2022-05-07 RX ADMIN — ARFORMOTEROL TARTRATE 15 MCG: 15 SOLUTION RESPIRATORY (INHALATION) at 08:28

## 2022-05-07 RX ADMIN — APIXABAN 5 MG: 5 TABLET, FILM COATED ORAL at 08:37

## 2022-05-07 RX ADMIN — IPRATROPIUM BROMIDE 0.5 MG: 0.5 SOLUTION RESPIRATORY (INHALATION) at 20:55

## 2022-05-07 RX ADMIN — Medication 10 ML: at 22:00

## 2022-05-07 RX ADMIN — IPRATROPIUM BROMIDE 0.5 MG: 0.5 SOLUTION RESPIRATORY (INHALATION) at 13:12

## 2022-05-07 RX ADMIN — METHYLPREDNISOLONE SODIUM SUCCINATE 40 MG: 40 INJECTION, POWDER, FOR SOLUTION INTRAMUSCULAR; INTRAVENOUS at 20:21

## 2022-05-07 RX ADMIN — ACETAMINOPHEN 1000 MG: 325 TABLET ORAL at 05:51

## 2022-05-07 RX ADMIN — FAMOTIDINE 20 MG: 20 TABLET, FILM COATED ORAL at 08:37

## 2022-05-07 RX ADMIN — METHYLPREDNISOLONE SODIUM SUCCINATE 40 MG: 40 INJECTION, POWDER, FOR SOLUTION INTRAMUSCULAR; INTRAVENOUS at 05:51

## 2022-05-07 RX ADMIN — IPRATROPIUM BROMIDE 0.5 MG: 0.5 SOLUTION RESPIRATORY (INHALATION) at 08:29

## 2022-05-07 RX ADMIN — FAMOTIDINE 20 MG: 20 TABLET, FILM COATED ORAL at 17:41

## 2022-05-07 RX ADMIN — APIXABAN 5 MG: 5 TABLET, FILM COATED ORAL at 17:41

## 2022-05-07 RX ADMIN — GUAIFENESIN 1200 MG: 600 TABLET ORAL at 08:37

## 2022-05-07 RX ADMIN — Medication 10 ML: at 14:05

## 2022-05-07 RX ADMIN — MONTELUKAST 10 MG: 10 TABLET, FILM COATED ORAL at 20:19

## 2022-05-07 RX ADMIN — GUAIFENESIN 1200 MG: 600 TABLET ORAL at 20:19

## 2022-05-07 RX ADMIN — METHYLPREDNISOLONE SODIUM SUCCINATE 40 MG: 40 INJECTION, POWDER, FOR SOLUTION INTRAMUSCULAR; INTRAVENOUS at 14:02

## 2022-05-07 RX ADMIN — Medication 10 ML: at 05:57

## 2022-05-07 RX ADMIN — NALOXEGOL OXALATE 25 MG: 25 TABLET, FILM COATED ORAL at 05:51

## 2022-05-07 RX ADMIN — DILTIAZEM HYDROCHLORIDE 240 MG: 120 CAPSULE, COATED, EXTENDED RELEASE ORAL at 08:37

## 2022-05-07 RX ADMIN — ACETAMINOPHEN 1000 MG: 325 TABLET ORAL at 12:53

## 2022-05-07 RX ADMIN — OXYCODONE 10 MG: 5 TABLET ORAL at 17:41

## 2022-05-07 RX ADMIN — ARFORMOTEROL TARTRATE 15 MCG: 15 SOLUTION RESPIRATORY (INHALATION) at 21:02

## 2022-05-07 RX ADMIN — BUDESONIDE 500 MCG: 0.5 SUSPENSION RESPIRATORY (INHALATION) at 21:02

## 2022-05-07 NOTE — PROGRESS NOTES
Problem: Mobility Impaired (Adult and Pediatric)  Goal: *Acute Goals and Plan of Care (Insert Text)  Description: FUNCTIONAL STATUS PRIOR TO ADMISSION: Patient was independent and active without use of DME. No recent falls    HOME SUPPORT PRIOR TO ADMISSION: Has friends that check on him. Home O2 at 2.5 L/min    Physical Therapy Goals  Initiated 5/6/2022  1. Patient will ambulate with independence for 100 feet with the least restrictive device within 7 day(s). 2.  Patient will ascend/descend 3 stairs with handrail(s) with modified independence within 7 day(s). Outcome: Progressing Towards Goal  Note:   PHYSICAL THERAPY TREATMENT  Patient: Moy Pena (89 y.o. male)  Date: 5/7/2022  Diagnosis: Rectal cancer ([facility] [address] [C20] <principal problem not specified>  Procedure(s) (LRB):  LAPAROSCOPIC LOW ANTERIOR RESECTION WITH COLOSTOMY (N/A) 4 Days Post-Op  Precautions:    Chart, physical therapy assessment, plan of care and goals were reviewed. ASSESSMENT  Patient continues with skilled PT services and is progressing towards goals. Patient ambulated twice in halls with 3l NCO2 and without assistive device with LOB 4x due to scissoring. He becomes SOB on 3L and HR to 135-140 bpm SPO2 dips to 86% during recovery phase. Needs near 10 min recover from +PATEL. HR recovers to 120bpm. Although patient does recover with step strategy during LOB, occassional min -CG A needed and we discussed temporary use of RW or SPC until his balance is back to baseline. Patient pleasant, gives excellent effort, non committal about A. Device. He lives alone in single level homes 3 HECTOR and on 2.5 L oxygen at baseline. Current Level of Function Impacting Discharge (mobility/balance): CG A for amb otherwise MOD I bed mob, transfers    Other factors to consider for discharge: lives alone         PLAN :  Patient continues to benefit from skilled intervention to address the above impairments.   Continue treatment per established plan of care.  to address goals. Recommendation for discharge: (in order for the patient to meet his/her long term goals)  Physical therapy at least 2 days/week in the home     This discharge recommendation:  Has not yet been discussed the attending provider and/or case management    IF patient discharges home will need the following DME: to be determined (TBD)       SUBJECTIVE:   Patient stated .    OBJECTIVE DATA SUMMARY:   Critical Behavior:  Neurologic State: Alert,Appropriate for age  Orientation Level: Oriented X4  Cognition: Appropriate for age attention/concentration,Follows commands     Functional Mobility Training:  Bed Mobility:  Rolling: Modified independent  Supine to Sit: Modified independent     Scooting: Modified independent        Transfers:  Sit to Stand: Modified independent  Stand to Sit: Modified independent  Stand Pivot Transfers: Modified independent                          Balance:  Sitting: Intact  Standing: Impaired  Standing - Static: Good; Unsupported  Standing - Dynamic : Fair;Unsupported (4x LOB but recovers Independently)  Ambulation/Gait Training:  Distance (ft): 150 Feet (ft) (x 2 with long rest break btw)  Assistive Device: Gait belt  Ambulation - Level of Assistance: Contact guard assistance;Minimal assistance; Additional time;Assist x1 (4x LOB but recovers balance- side step strategy)        Gait Abnormalities: Path deviations;Scissoring     Pain Ratin/10 abdomen      Activity Tolerance:   Poor, desaturates with exertion and requires oxygen, requires frequent rest breaks, and observed SOB with activity    After treatment patient left in no apparent distress:   Sitting in chair and Call bell within reach    COMMUNICATION/COLLABORATION:   The patients plan of care was discussed with: Registered nurse.      Jone Gresham, PT, DPT   Time Calculation: 28 mins

## 2022-05-07 NOTE — PROGRESS NOTES
Problem: Falls - Risk of  Goal: *Absence of Falls  Description: Document Artemio Nair Fall Risk and appropriate interventions in the flowsheet. Outcome: Progressing Towards Goal  Note: Fall Risk Interventions:  Mobility Interventions: Bed/chair exit alarm    Mentation Interventions: Bed/chair exit alarm    Medication Interventions: Patient to call before getting OOB    Elimination Interventions: Bed/chair exit alarm    History of Falls Interventions: Bed/chair exit alarm         Problem: Pressure Injury - Risk of  Goal: *Prevention of pressure injury  Description: Document Dipak Scale and appropriate interventions in the flowsheet.   Outcome: Progressing Towards Goal  Note: Pressure Injury Interventions:  Sensory Interventions: Assess changes in LOC,Assess need for specialty bed    Moisture Interventions: Absorbent underpads    Activity Interventions: PT/OT evaluation    Mobility Interventions: PT/OT evaluation    Nutrition Interventions: Document food/fluid/supplement intake    Friction and Shear Interventions: HOB 30 degrees or less                Problem: Pain  Goal: *Control of Pain  Outcome: Progressing Towards Goal  Goal: *PALLIATIVE CARE:  Alleviation of Pain  Outcome: Progressing Towards Goal     Problem: Nutrition Deficit  Goal: *Optimize nutritional status  Outcome: Progressing Towards Goal

## 2022-05-07 NOTE — PROGRESS NOTES
Bedside, Verbal and Written shift change report given to Babar RN (oncoming nurse) by Emy Ansari RN (offgoing nurse). Report included the following information SBAR, Kardex, ED Summary, Procedure Summary, Intake/Output, MAR, Recent Results and Med Rec Status.

## 2022-05-08 LAB
ALBUMIN SERPL-MCNC: 2.6 G/DL (ref 3.5–5)
ALBUMIN/GLOB SERPL: 0.8 {RATIO} (ref 1.1–2.2)
ALP SERPL-CCNC: 93 U/L (ref 45–117)
ALT SERPL-CCNC: 35 U/L (ref 12–78)
ANION GAP SERPL CALC-SCNC: 6 MMOL/L (ref 5–15)
AST SERPL-CCNC: 22 U/L (ref 15–37)
BILIRUB SERPL-MCNC: 0.4 MG/DL (ref 0.2–1)
BUN SERPL-MCNC: 23 MG/DL (ref 6–20)
BUN/CREAT SERPL: 32 (ref 12–20)
CALCIUM SERPL-MCNC: 9.3 MG/DL (ref 8.5–10.1)
CHLORIDE SERPL-SCNC: 92 MMOL/L (ref 97–108)
CO2 SERPL-SCNC: 35 MMOL/L (ref 21–32)
CREAT SERPL-MCNC: 0.72 MG/DL (ref 0.7–1.3)
ERYTHROCYTE [DISTWIDTH] IN BLOOD BY AUTOMATED COUNT: 14.5 % (ref 11.5–14.5)
GLOBULIN SER CALC-MCNC: 3.1 G/DL (ref 2–4)
GLUCOSE BLD STRIP.AUTO-MCNC: 125 MG/DL (ref 65–117)
GLUCOSE SERPL-MCNC: 149 MG/DL (ref 65–100)
HCT VFR BLD AUTO: 37.3 % (ref 36.6–50.3)
HGB BLD-MCNC: 12.8 G/DL (ref 12.1–17)
MCH RBC QN AUTO: 31.6 PG (ref 26–34)
MCHC RBC AUTO-ENTMCNC: 34.3 G/DL (ref 30–36.5)
MCV RBC AUTO: 92.1 FL (ref 80–99)
NRBC # BLD: 0 K/UL (ref 0–0.01)
NRBC BLD-RTO: 0 PER 100 WBC
PLATELET # BLD AUTO: 374 K/UL (ref 150–400)
PMV BLD AUTO: 9 FL (ref 8.9–12.9)
POTASSIUM SERPL-SCNC: 3.8 MMOL/L (ref 3.5–5.1)
PROT SERPL-MCNC: 5.7 G/DL (ref 6.4–8.2)
RBC # BLD AUTO: 4.05 M/UL (ref 4.1–5.7)
SERVICE CMNT-IMP: ABNORMAL
SODIUM SERPL-SCNC: 133 MMOL/L (ref 136–145)
WBC # BLD AUTO: 15.2 K/UL (ref 4.1–11.1)

## 2022-05-08 PROCEDURE — 74011000250 HC RX REV CODE- 250: Performed by: COLON & RECTAL SURGERY

## 2022-05-08 PROCEDURE — 94640 AIRWAY INHALATION TREATMENT: CPT

## 2022-05-08 PROCEDURE — 74011250637 HC RX REV CODE- 250/637: Performed by: COLON & RECTAL SURGERY

## 2022-05-08 PROCEDURE — 77010033678 HC OXYGEN DAILY

## 2022-05-08 PROCEDURE — 82962 GLUCOSE BLOOD TEST: CPT

## 2022-05-08 PROCEDURE — 94664 DEMO&/EVAL PT USE INHALER: CPT

## 2022-05-08 PROCEDURE — 93005 ELECTROCARDIOGRAM TRACING: CPT

## 2022-05-08 PROCEDURE — 80053 COMPREHEN METABOLIC PANEL: CPT

## 2022-05-08 PROCEDURE — 74011000250 HC RX REV CODE- 250: Performed by: INTERNAL MEDICINE

## 2022-05-08 PROCEDURE — 74011250637 HC RX REV CODE- 250/637: Performed by: NURSE PRACTITIONER

## 2022-05-08 PROCEDURE — 36415 COLL VENOUS BLD VENIPUNCTURE: CPT

## 2022-05-08 PROCEDURE — 65270000029 HC RM PRIVATE

## 2022-05-08 PROCEDURE — 74011250636 HC RX REV CODE- 250/636: Performed by: INTERNAL MEDICINE

## 2022-05-08 PROCEDURE — 85027 COMPLETE CBC AUTOMATED: CPT

## 2022-05-08 PROCEDURE — 97535 SELF CARE MNGMENT TRAINING: CPT

## 2022-05-08 PROCEDURE — 83735 ASSAY OF MAGNESIUM: CPT

## 2022-05-08 PROCEDURE — 97165 OT EVAL LOW COMPLEX 30 MIN: CPT

## 2022-05-08 RX ORDER — METOPROLOL TARTRATE 5 MG/5ML
5 INJECTION INTRAVENOUS
Status: DISCONTINUED | OUTPATIENT
Start: 2022-05-08 | End: 2022-05-13 | Stop reason: HOSPADM

## 2022-05-08 RX ADMIN — APIXABAN 5 MG: 5 TABLET, FILM COATED ORAL at 18:59

## 2022-05-08 RX ADMIN — NALOXEGOL OXALATE 25 MG: 25 TABLET, FILM COATED ORAL at 06:46

## 2022-05-08 RX ADMIN — GUAIFENESIN 1200 MG: 600 TABLET ORAL at 22:04

## 2022-05-08 RX ADMIN — GUAIFENESIN 1200 MG: 600 TABLET ORAL at 08:20

## 2022-05-08 RX ADMIN — BUDESONIDE 500 MCG: 0.5 SUSPENSION RESPIRATORY (INHALATION) at 08:38

## 2022-05-08 RX ADMIN — Medication 10 ML: at 22:06

## 2022-05-08 RX ADMIN — OXYCODONE 10 MG: 5 TABLET ORAL at 08:20

## 2022-05-08 RX ADMIN — OXYCODONE 10 MG: 5 TABLET ORAL at 11:45

## 2022-05-08 RX ADMIN — IPRATROPIUM BROMIDE 0.5 MG: 0.5 SOLUTION RESPIRATORY (INHALATION) at 14:35

## 2022-05-08 RX ADMIN — FAMOTIDINE 20 MG: 20 TABLET, FILM COATED ORAL at 18:59

## 2022-05-08 RX ADMIN — METHYLPREDNISOLONE SODIUM SUCCINATE 40 MG: 40 INJECTION, POWDER, FOR SOLUTION INTRAMUSCULAR; INTRAVENOUS at 22:04

## 2022-05-08 RX ADMIN — ARFORMOTEROL TARTRATE 15 MCG: 15 SOLUTION RESPIRATORY (INHALATION) at 20:57

## 2022-05-08 RX ADMIN — MONTELUKAST 10 MG: 10 TABLET, FILM COATED ORAL at 22:04

## 2022-05-08 RX ADMIN — FAMOTIDINE 20 MG: 20 TABLET, FILM COATED ORAL at 08:20

## 2022-05-08 RX ADMIN — ARFORMOTEROL TARTRATE 15 MCG: 15 SOLUTION RESPIRATORY (INHALATION) at 08:38

## 2022-05-08 RX ADMIN — METHYLPREDNISOLONE SODIUM SUCCINATE 40 MG: 40 INJECTION, POWDER, FOR SOLUTION INTRAMUSCULAR; INTRAVENOUS at 06:51

## 2022-05-08 RX ADMIN — METHYLPREDNISOLONE SODIUM SUCCINATE 40 MG: 40 INJECTION, POWDER, FOR SOLUTION INTRAMUSCULAR; INTRAVENOUS at 15:26

## 2022-05-08 RX ADMIN — SODIUM CHLORIDE, PRESERVATIVE FREE 10 ML: 5 INJECTION INTRAVENOUS at 05:38

## 2022-05-08 RX ADMIN — DILTIAZEM HYDROCHLORIDE 240 MG: 120 CAPSULE, COATED, EXTENDED RELEASE ORAL at 08:20

## 2022-05-08 RX ADMIN — ACETAMINOPHEN 1000 MG: 325 TABLET ORAL at 06:46

## 2022-05-08 RX ADMIN — ACETAMINOPHEN 1000 MG: 325 TABLET ORAL at 11:45

## 2022-05-08 RX ADMIN — OXYCODONE 10 MG: 5 TABLET ORAL at 15:25

## 2022-05-08 RX ADMIN — IPRATROPIUM BROMIDE 0.5 MG: 0.5 SOLUTION RESPIRATORY (INHALATION) at 08:37

## 2022-05-08 RX ADMIN — ACETAMINOPHEN 1000 MG: 325 TABLET ORAL at 18:59

## 2022-05-08 RX ADMIN — Medication 10 ML: at 15:26

## 2022-05-08 RX ADMIN — OXYCODONE 10 MG: 5 TABLET ORAL at 18:59

## 2022-05-08 RX ADMIN — APIXABAN 5 MG: 5 TABLET, FILM COATED ORAL at 08:20

## 2022-05-08 RX ADMIN — BUDESONIDE 500 MCG: 0.5 SUSPENSION RESPIRATORY (INHALATION) at 20:57

## 2022-05-08 RX ADMIN — IPRATROPIUM BROMIDE 0.5 MG: 0.5 SOLUTION RESPIRATORY (INHALATION) at 20:52

## 2022-05-08 NOTE — PROGRESS NOTES
OCCUPATIONAL THERAPY EVALUATION/DISCHARGE  Patient: Fernando Govea (66 y.o. male)  Date: 5/8/2022  Primary Diagnosis: Rectal cancer ([facility] [address] [C20]  Procedure(s) (LRB):  LAPAROSCOPIC LOW ANTERIOR RESECTION WITH COLOSTOMY (N/A) 5 Days Post-Op   Precautions:        ASSESSMENT  Based on the objective data described below, the patient presents with baseline ADL performance- Mod I with use of compensatory techniques and energy conservation strategies, DME as needed due to history of COPD. DME needs met. Patient is not in need of OT services at this time. Current Level of Function (ADLs/self-care): Mod I    Functional Outcome Measure: The patient scored 85/100 on the Barthel index outcome measure     Other factors to consider for discharge:      PLAN :  Recommend with staff: ADL in bathroom     Recommendation for discharge: (in order for the patient to meet his/her long term goals)  No skilled occupational therapy/ follow up rehabilitation needs identified at this time.     This discharge recommendation:  Has been made in collaboration with the attending provider and/or case management    IF patient discharges home will need the following DME: none       SUBJECTIVE:   Patient pleasant and cooperative    OBJECTIVE DATA SUMMARY:   HISTORY:   Past Medical History:   Diagnosis Date    Acute on chronic respiratory failure with hypoxemia ([address] 2/15/2019    Arrhythmia     Asthma     Pt denies    Atrial fibrillation ([address]     COPD (chronic obstructive pulmonary disease) ([address]     severe    Hypertension     Insulin resistance 3/27/2014    Lung mass 9/2012    MAY resection, + mycobacterial orgs, neg malignancy    Melanoma ([address] 2007    Non-STEMI (non-ST elevated myocardial infarction) ([address] 2/14    On home O2 2.5 lpm continuously    since about 2014    Pneumonia     Snoring     Tinnitus     Tuberculosis     Vitamin D deficiency 3/27/2014     Past Surgical History:   Procedure Laterality Date    COLONOSCOPY N/A 3/3/2022    COLONOSCOPY performed by Sandip Ornelas MD at [address] [facility]  2014    HX OTHER SURGICAL      EXC MELANOMA RIGHT CHEEK    HX OTHER SURGICAL      NEEDLE BX LEFT LUNG    HX OTHER SURGICAL  9/11/2012    apical segmentectomy, MAY       Prior Level of Function/Environment/Context: Mod I ADL/IADL  Expanded or extensive additional review of patient history:   Home Situation  Home Environment: Private residence  # Steps to Enter: 3  One/Two Story Residence: One story  Living Alone: Yes  Support Systems: Other Family Member(s)  Patient Expects to be Discharged to[de-identified] Home with home health  Current DME Used/Available at Home: Shower chair  Tub or Shower Type: Tub/Shower combination    Hand dominance: Right    EXAMINATION OF PERFORMANCE DEFICITS:  Cognitive/Behavioral Status:  Neurologic State: Alert  Orientation Level: Oriented X4  Cognition: Follows commands; Appropriate decision making             Hearing: Auditory  Auditory Impairment: None    Vision/Perceptual:                                Corrective Lenses: Glasses    Range of Motion:    AROM: Within functional limits                         Strength:  Strength: Within functional limits                Coordination:  Coordination: Within functional limits  Fine Motor Skills-Upper: Left Intact; Right Intact    Gross Motor Skills-Upper: Left Intact; Right Intact    Tone & Sensation:    Tone: Normal                         Balance:  Sitting: Intact; Without support  Standing: Impaired; Without support  Standing - Static: Good; Unsupported  Standing - Dynamic : Fair;Unsupported    Functional Mobility and Transfers for ADLs:  Bed Mobility:  Supine to Sit: Independent  Sit to Supine: Independent    Transfers:  Sit to Stand: Modified independent  Stand to Sit: Modified independent  Bed to Chair: Modified independent    ADL Assessment:  Feeding: Independent    Oral Facial Hygiene/Grooming: Modified Independent    Bathing: Modified independent    Upper Body Dressing: Modified independent    Lower Body Dressing: Modified independent    Toileting: Modified independent                ADL Intervention and task modifications:     Functional Measure:    Barthel Index:  Bathin  Bladder: 10  Bowels: 5  Groomin  Dressing: 10  Feeding: 10  Mobility: 10  Stairs: 5  Toilet Use: 10  Transfer (Bed to Chair and Back): 15  Total: 85/100      The Barthel ADL Index: Guidelines  1. The index should be used as a record of what a patient does, not as a record of what a patient could do. 2. The main aim is to establish degree of independence from any help, physical or verbal, however minor and for whatever reason. 3. The need for supervision renders the patient not independent. 4. A patient's performance should be established using the best available evidence. Asking the patient, friends/relatives and nurses are the usual sources, but direct observation and common sense are also important. However direct testing is not needed. 5. Usually the patient's performance over the preceding 24-48 hours is important, but occasionally longer periods will be relevant. 6. Middle categories imply that the patient supplies over 50 per cent of the effort. 7. Use of aids to be independent is allowed. Score Interpretation (from [address] [address])    Independent   60-79 Minimally independent   40-59 Partially dependent   20-39 Very dependent   <20 Totally dependent     -Titi De Leon., Barthel, D.W. (1965). Functional evaluation: the Barthel Index. [address] [facility] ([address] (1997). The Barthel activities of daily living index: self-reporting versus actual performance in the old (> or = 75 years). Journal of [address] [place] [address] 45(7), [facility], .JGISELLE.F, Kandice Herrera., [facility] [place]. ().  Measuring the change in disability after inpatient rehabilitation; comparison of the responsiveness of the Barthel Index and Functional Osborn Measure. Journal of Neurology, Neurosurgery, and Psychiatry, 66(4), 089-230. KRISTIE Pierre, JUICE Dejesus, & Yamilka Yanez M.A. (2004) Assessment of post-stroke quality of life in cost-effectiveness studies: The usefulness of the Barthel Index and the EuroQoL-5D. Quality of Life Research, 15, 727-75         Occupational Therapy Evaluation Charge Determination   History Examination Decision-Making   LOW Complexity : Brief history review  LOW Complexity : 1-3 performance deficits relating to physical, cognitive , or psychosocial skils that result in activity limitations and / or participation restrictions  LOW Complexity : No comorbidities that affect functional and no verbal or physical assistance needed to complete eval tasks       Based on the above components, the patient evaluation is determined to be of the following complexity level: LOW   Pain Rating:  none    Activity Tolerance:   Fair, requires frequent rest breaks and observed SOB with activity    After treatment patient left in no apparent distress:    Supine in bed and Call bell within reach    COMMUNICATION/EDUCATION:   The patients plan of care was discussed with: Registered nurse.      Thank you for this referral.  Sandy Carvalho OT  Time Calculation: 24 mins

## 2022-05-08 NOTE — PROGRESS NOTES
General Surgery Daily Progress Note    Patient: Christine Groves MRN: [id number]  SSN: [id number]    YOB: 1950  Age: 81 y.o.   Sex: male      Admit Date: 5/3/2022    POD 4 Day Post-Op    Procedure: Procedure(s):  LAPAROSCOPIC LOW ANTERIOR RESECTION WITH COLOSTOMY    Subjective:   Pain Controlled  Tolerating gi lite- no N/V  Up and OOB with mobility   Seen by pulm and cards today for recs of known COPD and Afib    Current Facility-Administered Medications   Medication Dose Route Frequency    apixaban (ELIQUIS) tablet 5 mg  5 mg Oral BID    methylPREDNISolone (PF) (SOLU-MEDROL) injection 40 mg  40 mg IntraVENous Q8H    benzonatate (TESSALON) capsule 200 mg  200 mg Oral TID PRN    ipratropium (ATROVENT) 0.02 % nebulizer solution 0.5 mg  0.5 mg Nebulization Q6HWA RT    And    arformoteroL (BROVANA) neb solution 15 mcg  15 mcg Nebulization BID RT    dilTIAZem ER (CARDIZEM CD) capsule 240 mg  240 mg Oral DAILY    famotidine (PEPCID) tablet 20 mg  20 mg Oral BID    sodium chloride (NS) flush 5-40 mL  5-40 mL IntraVENous PRN    naloxone (NARCAN) injection 0.04 mg  0.04 mg IntraVENous Multiple    albuterol (PROVENTIL VENTOLIN) nebulizer solution 2.5 mg  2.5 mg Nebulization Q4H PRN    budesonide (PULMICORT) 500 mcg/2 ml nebulizer suspension  500 mcg Nebulization BID RT    montelukast (SINGULAIR) tablet 10 mg  10 mg Oral QHS    sodium chloride (NS) flush 5-40 mL  5-40 mL IntraVENous Q8H    sodium chloride (NS) flush 5-40 mL  5-40 mL IntraVENous PRN    promethazine (PHENERGAN) tablet 12.5 mg  12.5 mg Oral Q6H PRN    Or    ondansetron (ZOFRAN) injection 4 mg  4 mg IntraVENous Q6H PRN    naloxegoL (MOVANTIK) tablet 25 mg  25 mg Oral ACB    acetaminophen (TYLENOL) tablet 1,000 mg  1,000 mg Oral Q6H    oxyCODONE IR (ROXICODONE) tablet 5 mg  5 mg Oral Q4H PRN    Or    oxyCODONE IR (ROXICODONE) tablet 10 mg  10 mg Oral Q4H PRN    guaiFENesin ER (MUCINEX) tablet 1,200 mg  1,200 mg Oral Q12H Objective:   05/08 0701 - 05/08 1900  In: 240 [P.O.:240]  Out: 800 [Urine:700; Drains:100]  05/06 1901 - 05/08 0700  In: -   Out: 2670 [Urine:2605; Drains:65]  Patient Vitals for the past 8 hrs:   BP Temp Pulse Resp SpO2   05/08/22 1206 124/88 97.7 °F (36.5 °C) (!) 101 18 92 %   05/08/22 0824 136/84 97.6 °F (36.4 °C) (!) 103 18 92 %   05/08/22 0700   (!) 103     05/08/22 0506 129/78 98.1 °F (36.7 °C) 98 16 95 %       Physical Exam:  General: Alert, cooperative, NAD  Lungs: 3.5L NC mid 90s without evidence of WOB  Heart: On monitor- Afib- low 100s  Abdomen: Soft, mild tenderness as expected, non-distended. Incisions c/d/i.  ERIC drain serosang. Ostomy with no current output in bag but patient reports a lot of gas yesterday  Extremities: Warm, moves all, no edema  Skin:  Warm and dry, no rash    Labs:   No results for input(s): WBC, HGB, HCT, PLT, HGBEXT, HCTEXT, PLTEXT, HGBEXT, HCTEXT, PLTEXT in the last 72 hours. No results for input(s): NA, K, CL, CO2, GLU, BUN, CREA, CA, MG, PHOS, ALB, TBIL, TBILI, ALT in the last 72 hours. No lab exists for component: SGOT    Assessment / Plan:       Procedure: Procedure(s):  LAPAROSCOPIC LOW ANTERIOR RESECTION WITH COLOSTOMY      Cont current pain regimen  GI lite diet- add own supplements BID (at bedside)  Cards - HR up to 130s at times per nurse. Will get repeat labs (CBC/BMP/Mg). Reconsult cardiology   Pulm- signed off.  On solumedrol, pulmicort, atrovent, brovana, and singulair; maintain O2 sats >88;  Baseline 2.5L  Cont Incentive spirometer  Must mobilize TID and into chair TID        Griffin Beavers MD

## 2022-05-08 NOTE — PROGRESS NOTES
Called for elevated HR in setting of afib. 's  On Dilt 240mg po every day + Eliquis. Post op pain. Is receiving IV steroids, inhalers, etc for COPD, which is driving HR. Can increase dilt to 360mg po daily - but I am not very concerned about HRs in low 100s (on average, higher when exerting) given the above.

## 2022-05-09 PROBLEM — E43 UNSPECIFIED SEVERE PROTEIN-CALORIE MALNUTRITION (HCC): Status: ACTIVE | Noted: 2022-05-06

## 2022-05-09 PROBLEM — E43 UNSPECIFIED SEVERE PROTEIN-CALORIE MALNUTRITION (HCC): Chronic | Status: ACTIVE | Noted: 2022-05-06

## 2022-05-09 LAB
ALBUMIN SERPL-MCNC: 2.9 G/DL (ref 3.5–5)
ALBUMIN/GLOB SERPL: 0.8 {RATIO} (ref 1.1–2.2)
ALP SERPL-CCNC: 91 U/L (ref 45–117)
ALT SERPL-CCNC: 45 U/L (ref 12–78)
ANION GAP SERPL CALC-SCNC: 7 MMOL/L (ref 5–15)
AST SERPL-CCNC: 25 U/L (ref 15–37)
ATRIAL RATE: 166 BPM
ATRIAL RATE: 375 BPM
BASOPHILS # BLD: 0 K/UL (ref 0–0.1)
BASOPHILS NFR BLD: 0 % (ref 0–1)
BILIRUB SERPL-MCNC: 0.6 MG/DL (ref 0.2–1)
BUN SERPL-MCNC: 21 MG/DL (ref 6–20)
BUN/CREAT SERPL: 33 (ref 12–20)
CALCIUM SERPL-MCNC: 9.5 MG/DL (ref 8.5–10.1)
CALCULATED R AXIS, ECG10: 65 DEGREES
CALCULATED R AXIS, ECG10: 71 DEGREES
CALCULATED T AXIS, ECG11: 63 DEGREES
CALCULATED T AXIS, ECG11: 65 DEGREES
CHLORIDE SERPL-SCNC: 92 MMOL/L (ref 97–108)
CO2 SERPL-SCNC: 34 MMOL/L (ref 21–32)
COMMENT, HOLDF: NORMAL
CREAT SERPL-MCNC: 0.64 MG/DL (ref 0.7–1.3)
DIAGNOSIS, 93000: NORMAL
DIAGNOSIS, 93000: NORMAL
DIFFERENTIAL METHOD BLD: ABNORMAL
EOSINOPHIL # BLD: 0 K/UL (ref 0–0.4)
EOSINOPHIL NFR BLD: 0 % (ref 0–7)
ERYTHROCYTE [DISTWIDTH] IN BLOOD BY AUTOMATED COUNT: 14.6 % (ref 11.5–14.5)
GLOBULIN SER CALC-MCNC: 3.6 G/DL (ref 2–4)
GLUCOSE SERPL-MCNC: 112 MG/DL (ref 65–100)
HCT VFR BLD AUTO: 42.8 % (ref 36.6–50.3)
HGB BLD-MCNC: 14.5 G/DL (ref 12.1–17)
IMM GRANULOCYTES # BLD AUTO: 0.1 K/UL (ref 0–0.04)
IMM GRANULOCYTES NFR BLD AUTO: 1 % (ref 0–0.5)
LYMPHOCYTES # BLD: 0.5 K/UL (ref 0.8–3.5)
LYMPHOCYTES NFR BLD: 4 % (ref 12–49)
MAGNESIUM SERPL-MCNC: 2.2 MG/DL (ref 1.6–2.4)
MCH RBC QN AUTO: 31.6 PG (ref 26–34)
MCHC RBC AUTO-ENTMCNC: 33.9 G/DL (ref 30–36.5)
MCV RBC AUTO: 93.2 FL (ref 80–99)
MONOCYTES # BLD: 0.9 K/UL (ref 0–1)
MONOCYTES NFR BLD: 7 % (ref 5–13)
NEUTS SEG # BLD: 11.6 K/UL (ref 1.8–8)
NEUTS SEG NFR BLD: 88 % (ref 32–75)
NRBC # BLD: 0 K/UL (ref 0–0.01)
NRBC BLD-RTO: 0 PER 100 WBC
PLATELET # BLD AUTO: 423 K/UL (ref 150–400)
PMV BLD AUTO: 8.9 FL (ref 8.9–12.9)
POTASSIUM SERPL-SCNC: 4 MMOL/L (ref 3.5–5.1)
PROT SERPL-MCNC: 6.5 G/DL (ref 6.4–8.2)
Q-T INTERVAL, ECG07: 290 MS
Q-T INTERVAL, ECG07: 312 MS
QRS DURATION, ECG06: 86 MS
QRS DURATION, ECG06: 90 MS
QTC CALCULATION (BEZET), ECG08: 379 MS
QTC CALCULATION (BEZET), ECG08: 410 MS
RBC # BLD AUTO: 4.59 M/UL (ref 4.1–5.7)
RBC MORPH BLD: ABNORMAL
SAMPLES BEING HELD,HOLD: NORMAL
SODIUM SERPL-SCNC: 133 MMOL/L (ref 136–145)
VENTRICULAR RATE, ECG03: 103 BPM
VENTRICULAR RATE, ECG03: 104 BPM
WBC # BLD AUTO: 13.1 K/UL (ref 4.1–11.1)

## 2022-05-09 PROCEDURE — 65270000029 HC RM PRIVATE

## 2022-05-09 PROCEDURE — 85025 COMPLETE CBC W/AUTO DIFF WBC: CPT

## 2022-05-09 PROCEDURE — 74011250637 HC RX REV CODE- 250/637: Performed by: NURSE PRACTITIONER

## 2022-05-09 PROCEDURE — 74011250637 HC RX REV CODE- 250/637: Performed by: COLON & RECTAL SURGERY

## 2022-05-09 PROCEDURE — 74011250637 HC RX REV CODE- 250/637: Performed by: INTERNAL MEDICINE

## 2022-05-09 PROCEDURE — 74011000250 HC RX REV CODE- 250: Performed by: INTERNAL MEDICINE

## 2022-05-09 PROCEDURE — 74011000250 HC RX REV CODE- 250: Performed by: COLON & RECTAL SURGERY

## 2022-05-09 PROCEDURE — 80053 COMPREHEN METABOLIC PANEL: CPT

## 2022-05-09 PROCEDURE — 77010033678 HC OXYGEN DAILY

## 2022-05-09 PROCEDURE — 97530 THERAPEUTIC ACTIVITIES: CPT

## 2022-05-09 PROCEDURE — 97116 GAIT TRAINING THERAPY: CPT

## 2022-05-09 PROCEDURE — 36415 COLL VENOUS BLD VENIPUNCTURE: CPT

## 2022-05-09 PROCEDURE — 74011250636 HC RX REV CODE- 250/636: Performed by: INTERNAL MEDICINE

## 2022-05-09 PROCEDURE — 94640 AIRWAY INHALATION TREATMENT: CPT

## 2022-05-09 RX ADMIN — ACETAMINOPHEN 1000 MG: 325 TABLET ORAL at 01:00

## 2022-05-09 RX ADMIN — APIXABAN 5 MG: 5 TABLET, FILM COATED ORAL at 17:37

## 2022-05-09 RX ADMIN — BUDESONIDE 500 MCG: 0.5 SUSPENSION RESPIRATORY (INHALATION) at 07:45

## 2022-05-09 RX ADMIN — IPRATROPIUM BROMIDE 0.5 MG: 0.5 SOLUTION RESPIRATORY (INHALATION) at 08:35

## 2022-05-09 RX ADMIN — GUAIFENESIN 1200 MG: 600 TABLET ORAL at 08:05

## 2022-05-09 RX ADMIN — METHYLPREDNISOLONE SODIUM SUCCINATE 40 MG: 40 INJECTION, POWDER, FOR SOLUTION INTRAMUSCULAR; INTRAVENOUS at 06:15

## 2022-05-09 RX ADMIN — GUAIFENESIN 1200 MG: 600 TABLET ORAL at 22:26

## 2022-05-09 RX ADMIN — APIXABAN 5 MG: 5 TABLET, FILM COATED ORAL at 08:06

## 2022-05-09 RX ADMIN — ACETAMINOPHEN 1000 MG: 325 TABLET ORAL at 17:36

## 2022-05-09 RX ADMIN — ACETAMINOPHEN 1000 MG: 325 TABLET ORAL at 06:16

## 2022-05-09 RX ADMIN — ARFORMOTEROL TARTRATE 15 MCG: 15 SOLUTION RESPIRATORY (INHALATION) at 07:45

## 2022-05-09 RX ADMIN — DILTIAZEM HYDROCHLORIDE 300 MG: 180 CAPSULE, COATED, EXTENDED RELEASE ORAL at 08:05

## 2022-05-09 RX ADMIN — NALOXEGOL OXALATE 25 MG: 25 TABLET, FILM COATED ORAL at 06:30

## 2022-05-09 RX ADMIN — ARFORMOTEROL TARTRATE 15 MCG: 15 SOLUTION RESPIRATORY (INHALATION) at 20:20

## 2022-05-09 RX ADMIN — IPRATROPIUM BROMIDE 0.5 MG: 0.5 SOLUTION RESPIRATORY (INHALATION) at 20:15

## 2022-05-09 RX ADMIN — FAMOTIDINE 20 MG: 20 TABLET, FILM COATED ORAL at 08:05

## 2022-05-09 RX ADMIN — Medication 10 ML: at 06:16

## 2022-05-09 RX ADMIN — IPRATROPIUM BROMIDE 0.5 MG: 0.5 SOLUTION RESPIRATORY (INHALATION) at 14:18

## 2022-05-09 RX ADMIN — MONTELUKAST 10 MG: 10 TABLET, FILM COATED ORAL at 22:26

## 2022-05-09 RX ADMIN — METHYLPREDNISOLONE SODIUM SUCCINATE 40 MG: 40 INJECTION, POWDER, FOR SOLUTION INTRAMUSCULAR; INTRAVENOUS at 22:26

## 2022-05-09 RX ADMIN — Medication 10 ML: at 13:58

## 2022-05-09 RX ADMIN — ACETAMINOPHEN 1000 MG: 325 TABLET ORAL at 12:33

## 2022-05-09 RX ADMIN — Medication 10 ML: at 22:26

## 2022-05-09 RX ADMIN — METHYLPREDNISOLONE SODIUM SUCCINATE 40 MG: 40 INJECTION, POWDER, FOR SOLUTION INTRAMUSCULAR; INTRAVENOUS at 13:57

## 2022-05-09 RX ADMIN — BUDESONIDE 500 MCG: 0.5 SUSPENSION RESPIRATORY (INHALATION) at 20:20

## 2022-05-09 RX ADMIN — FAMOTIDINE 20 MG: 20 TABLET, FILM COATED ORAL at 17:37

## 2022-05-09 NOTE — PROGRESS NOTES
Call from telemetry pt HR increased from 120s-140s. Went to assess pt & perform EKG. Current HR was 110s. EKG read afib with RVR. pt denies SOB, denies CP. RRT called @ 2200. Charge nurse, Jessica Cristobal., ICU nurse, Natalia Haynes. , & nursing supervisor, Radha Avalos, [facility]. @ bedside to assess pt.   2220 paged on call for cardiology. With current EKG results. 2228 Orders received from Dr. Gabino Carlson for [id number] PRN made aware from Nursing supervisor Cardizem cannot be pushed on this floor PRN. Orders changed to  metoprolol IV 5 mg EVERY 6 HOURS AS NEEDED for for HR > 130. page cardiology if HR sustains >140 x 1 hour. Called telemetry to verify they call when HR is >130.

## 2022-05-09 NOTE — WOUND CARE
Ostomy visit: post op day # 6 LAR with end colostomy for anal cancer. In with Cinthia Trejo to continue ostomy teaching. He is alert, oriented x 4. Assessment:  LLQ colostomy - moist, red budded stoma. Thick brown stool as output in good amount, passing gas. No surrounding redness in skin. Lap sites well approximated with dermal glue. Teaching:  Cinthia Trejo removed the old appliance, cut out new opening on new appliance, placed to skin and placed pouch to wafer. Reinforced previous teaching and added item numbers for ordering and reviewed process with Cinthia Trejo. Reviewed [facility] program and how to sign up. He is eating full diet well. Recommendations/Plan:  Home health to continue to follow. Will follow up with him tomorrow.   Rosario Mo RN,CWON

## 2022-05-09 NOTE — PROGRESS NOTES
CRS  Pt complains of some chest congestion- he feels like it is improving  flowsheet reviewed  Abd: soft, nt, nd  Drain: thin, serous    Plan:  Recheck CBC. If wbc>15, then will check CT chest/abd/pelvis. I will have a low threshold to re-consult Pulm for chest congestion.  Check abdominal fluid from drain for creat

## 2022-05-09 NOTE — PROGRESS NOTES
5/9/2022 9:27 AM     Care Management Progress Note         ICD-10-CM ICD-9-CM     1. Persistent atrial fibrillation (HCC)  I48.19 427.31           RUR:  15%  Risk Level: []? Low [x]? Moderate []? High  Value-based purchasing: []? Yes [x]? No  Bundle patient: []? Yes [x]? No              Specify:      Transition of care plan:  1. Ongoing management by [facility], POD 6 laparoscopic low anterior resection with colostomy   2. Home health RN arranged through [facility]   3. Outpatient follow-up.   4. Pt's family to transport

## 2022-05-09 NOTE — PROGRESS NOTES
Problem: Mobility Impaired (Adult and Pediatric)  Goal: *Acute Goals and Plan of Care (Insert Text)  Description: FUNCTIONAL STATUS PRIOR TO ADMISSION: Patient was independent and active without use of DME. No recent falls    HOME SUPPORT PRIOR TO ADMISSION: Has friends that check on him. Home O2 at 2.5 L/min    Physical Therapy Goals  Initiated 5/6/2022  1. Patient will ambulate with independence for 100 feet with the least restrictive device within 7 day(s). 2.  Patient will ascend/descend 3 stairs with handrail(s) with modified independence within 7 day(s). Outcome: Progressing Towards Goal   PHYSICAL THERAPY TREATMENT  Patient: Elif Cotter (36 y.o. male)  Date: 5/9/2022  Diagnosis: Rectal cancer ([facility] [address] [C20] <principal problem not specified>  Procedure(s) (LRB):  LAPAROSCOPIC LOW ANTERIOR RESECTION WITH COLOSTOMY (N/A) 6 Days Post-Op  Precautions:  fall,oxygen dependent  on 2 liters at home. Chart, physical therapy assessment, plan of care and goals were reviewed. ASSESSMENT  Patient continues with skilled PT services and is progressing towards goals. Communicated with nurse  cleared for therapy advised to limit HR not more than 130 bpm during activity. Patient supine on bed when received, rolled on the edge of bed SBA, supine to sit SBA, sit to stand CGA, ambulate without assistive in the room and out on the 4th floor hallway CGA. Noted during ambulation patient present with scissoring gait had some unsteady specially when turning. No loss of  balance  ambulate back to the room, OOB to chair as tolerated performed some active range of motion exercise on both LE all planes. Educate and instructed patient to continue active range of motion exercise on both legs while up on chair or on bed multiple times. Recommend patient to be up on the chair at least 3 times a day every meal times as tolerated. Communicated with nurse who agreed to monitor patient.   Documentation for home O2:     (2    ) LITERS OF O2   AT REST   O2 SATS  97% HR  109   (2    ) LITERS OF O2 WITH ACTIVITY 02 SATS  85% HR  127   (2.5    ) LITERS OF O2 PATIENT LEFT COMFORTABLY  SITTING/SUPINE O2 SATS  94% HR  112     Current Level of Function Impacting Discharge (mobility/balance): CGA with ambulation and transfers no assistive device    Other factors to consider for discharge: fall         PLAN :  Patient continues to benefit from skilled intervention to address the above impairments. Continue treatment per established plan of care. to address goals. Recommendation for discharge: (in order for the patient to meet his/her long term goals)  Physical therapy at least 2 days/week in the home     This discharge recommendation:  Has been made in collaboration with the attending provider and/or case management    IF patient discharges home will need the following DME: patient owns DME required for discharge       SUBJECTIVE:   Patient stated [address]     OBJECTIVE DATA SUMMARY:   Critical Behavior:  Neurologic State: Alert  Orientation Level: Oriented X4  Cognition: Appropriate decision making     Functional Mobility Training:  Bed Mobility:  Rolling: Stand-by assistance  Supine to Sit: Stand-by assistance  Sit to Supine: Stand-by assistance  Scooting: Stand-by assistance        Transfers:  Sit to Stand: Contact guard assistance  Stand to Sit: Contact guard assistance  Stand Pivot Transfers: Contact guard assistance     Bed to Chair: Contact guard assistance                    Balance:  Sitting: Intact  Standing: Impaired; Without support  Standing - Static: Good  Standing - Dynamic : Occasional (unbalance during standing)  Ambulation/Gait Training:  Distance (ft): 120 Feet (ft)  Assistive Device: Gait belt  Ambulation - Level of Assistance: Contact guard assistance     Gait Description (WDL): Exceptions to WDL  Gait Abnormalities: Scissoring;Path deviations; Step to gait        Base of Support: Narrowed     Speed/[place]: Pace decreased (<100 feet/min)  Step Length: Right shortened;Left shortened                         Therapeutic Exercises:   Educate and instructed patient to continue active range of motion exercise on both legs while up on chair or on bed multiple times. Recommend patient to be up on the chair at least 3 times a day every meal times as tolerated. Pain Ratin/10    Activity Tolerance:   Good    After treatment patient left in no apparent distress:   Sitting in chair, Heels elevated for pressure relief, and Call bell within reach    COMMUNICATION/COLLABORATION:   The patients plan of care was discussed with: Registered nurse. Gustavo Matamoros, PT,WCC.    Time Calculation: 24 mins

## 2022-05-09 NOTE — PROGRESS NOTES
Comprehensive Nutrition Assessment    Type and Reason for Visit: Reassess    Nutrition Recommendations/Plan:   1. Continue regular, GI Green, low fiber diet as tolerated  2. Provide Ensure Enlive once daily (350 kcal, 44 g carbs, 20 g P) to aid in meeting kcal/protein needs. Malnutrition Assessment:  Malnutrition Status:  Severe malnutrition (05/04/22 1221)    Context:  Chronic illness     Findings of the 6 clinical characteristics of malnutrition:   Energy Intake:  No significant decrease in energy intake  Weight Loss:  Greater than 5% over 1 month     Body Fat Loss:  Severe body fat loss, Fat overlying ribs,Orbital,Triceps,Buccal region   Muscle Mass Loss:  Mild muscle mass loss, Clavicles (pectoralis &deltoids),Hand (interosseous),Scapula (trapezius)  Fluid Accumulation:  No significant fluid accumulation,     Strength:  Not performed     Nutrition Assessment:    5/9: Follow up. POD 5 low anterior resection with colostomy. PO intake averaging 75% or more of all meals. Patient with no c/o N/V/D or pain with chewing/swallowing. Dislikes Gelatein - RD to d/c. States he is consuming 100% of Ensure at lunch. Voices increased appetite compared to last time RD visited patient, and 'better than the last few months' PO intake. If PO intake trend continues, patient likely meeting 100% kcal protein needs. Currently on 3 L O2. Documented Meal intake:  Patient Vitals for the past 168 hrs:   % Diet Eaten   05/09/22 1006 76 - 100%   05/08/22 1859 76 - 100%   05/08/22 1500 76 - 100%   05/08/22 1029 76 - 100%   05/05/22 0800 76 - 100%   05/04/22 1200 76 - 100%   05/04/22 0800 76 - 100%       Documentation of supplement intake:  Patient Vitals for the past 168 hrs:   Supplement intake %   05/05/22 1053 76 - 100%   05/04/22 1200 76 - 100%       5/4: Pt is a 73year old male admitted with [facility] ([facility] [C20].  He  has a past medical history of Acute on chronic respiratory failure with hypoxemia ([facility] (2/15/2019), Arrhythmia, Asthma, Atrial fibrillation ([facility] COPD (chronic obstructive pulmonary disease) ([facility] Hypertension, Insulin resistance (3/27/2014), Lung mass (9/2012), Melanoma ([facility] (2007), Non-STEMI (non-ST elevated myocardial infarction) ([facility] (2/14), On home O2 (2.5 lpm continuously), Pneumonia, Snoring, Tinnitus, Tuberculosis, and Vitamin D deficiency (3/27/2014). RD screened for low BMI. Patient states #, and endores steady weight loss x 1 year. Per documentation, patient has lost 40# (24%) x 1 year, though weights do appear stated. Patient recently lost 7# (5.4%) x <1 month, clinically significant for timeframe. Denies N/V/D. No chewing/swallowing problems. Endorses decreased appetite and relates this to cancer and COPD. NKFA. Has tried protein supplements in the past, voiced acceptance of Ensure once daily when diet advanced. Has consumed 100% of clear liquid meals so far. Wt Readings from Last 10 Encounters:   05/03/22 55.4 kg (122 lb 2.2 oz)   04/18/22 58.7 kg (129 lb 6.4 oz)   03/25/22 56.8 kg (125 lb 3.2 oz)   03/14/22 58.1 kg (128 lb)   03/09/22 58.2 kg (128 lb 3.2 oz)   03/03/22 54.2 kg (119 lb 7.8 oz)   02/02/22 56.7 kg (125 lb)   06/04/21 65.8 kg (145 lb)   02/22/21 74.8 kg (165 lb)   02/14/20 65.3 kg (144 lb)       Nutrition Related Findings:      Wound Type: Surgical incision,Multiple     Last Bowel Movement Date: 05/08/22  Stool Appearance: Formed  Abdominal Assessment: Ostomy (comment)  Appetite: Fair  Bowel Sounds: Active   Edema:Generalized: No Edema (5/8/2022  8:20 PM)      Nutr.  Labs:      Lab Results   Component Value Date/Time    GFR est AA >60 05/08/2022 03:03 PM    GFR est non-AA >60 05/08/2022 03:03 PM    Creatinine (POC) 0.70 03/03/2022 04:22 PM    Creatinine 0.72 05/08/2022 03:03 PM    BUN 23 (H) 05/08/2022 03:03 PM    Sodium 133 (L) 05/08/2022 03:03 PM    Potassium 3.8 05/08/2022 03:03 PM    Chloride 92 (L) 05/08/2022 03:03 PM    CO2 35 (H) 05/08/2022 03:03 PM       Lab Results   Component Value Date/Time    Glucose 149 (H) 05/08/2022 03:03 PM    Glucose (POC) 125 (H) 05/08/2022 01:54 AM       Lab Results   Component Value Date/Time    Hemoglobin A1c 5.5 04/18/2022 03:29 PM       Nutr. Meds:  Eliquis, Cardizem, Pepcid, lopressor, Singulair, zofran PRN     Current Nutrition Intake & Therapies:  Average Meal Intake: %  Average Supplement Intake: %  ADULT DIET Regular; GI Josephine (GERD/Peptic Ulcer); Low Fiber  ADULT ORAL NUTRITION SUPPLEMENT Lunch; Standard High Calorie/High Protein    Anthropometric Measures:  Height: 5' 10\" (177.8 cm)  Ideal Body Weight (IBW): 166 lbs (75 kg)  Admission Body Weight: 125 lb (stated)  Current Body Wt:  55.4 kg (122 lb 2.2 oz), 73.6 % IBW.  Bed scale  Current BMI (kg/m2): 17.5  Usual Body Weight: 65.8 kg (145 lb)  % Weight Change (Calculated): -15.8  Weight Adjustment: No adjustment                 BMI Category: Underweight (BMI less than 22) age over 72    Estimated Daily Nutrient Needs:  Energy Requirements Based On: Kcal/kg  Weight Used for Energy Requirements: Current  Energy (kcal/day): 0731-7247 (30-35 kcal/kg)  Weight Used for Protein Requirements: Ideal  Protein (g/day):  (1.2-1.5 g/kg IBW)  Method Used for Fluid Requirements: 1 ml/kcal  Fluid (ml/day): 2552-9018    Nutrition Diagnosis:   · Severe malnutrition,In context of chronic illness related to catabolic illness,increased demand for energy/nutrients as evidenced by weight loss,poor intake prior to admission,BMI,moderate loss of subcutaneous fat      Nutrition Interventions:   Food and/or Nutrient Delivery: Continue current diet,Continue oral nutrition supplement  Nutrition Education/Counseling: No recommendations at this time  Coordination of Nutrition Care: Continue to monitor while inpatient,Interdisciplinary rounds  Plan of Care discussed with: nursing, patient    Goals:  Previous Goal Met: Goal(s) achieved  Goals: Meet at least 75% of estimated needs,by next RD assessment       Nutrition Monitoring and Evaluation:   Behavioral-Environmental Outcomes: None identified  Food/Nutrient Intake Outcomes: Food and nutrient intake,Supplement intake  Physical Signs/Symptoms Outcomes: Biochemical data,GI status,Weight,Skin    Discharge Planning:    Continue oral nutrition supplement    Og Arnold MS, RD  Contact: Ext: [phone], or via [employer]

## 2022-05-10 LAB
ANION GAP SERPL CALC-SCNC: 7 MMOL/L (ref 5–15)
BASOPHILS # BLD: 0 K/UL (ref 0–0.1)
BASOPHILS NFR BLD: 0 % (ref 0–1)
BUN SERPL-MCNC: 20 MG/DL (ref 6–20)
BUN/CREAT SERPL: 40 (ref 12–20)
CALCIUM SERPL-MCNC: 8.6 MG/DL (ref 8.5–10.1)
CHLORIDE SERPL-SCNC: 93 MMOL/L (ref 97–108)
CO2 SERPL-SCNC: 34 MMOL/L (ref 21–32)
COMMENT, HOLDF: NORMAL
CREAT FLD-MCNC: 0.39 MG/DL
CREAT SERPL-MCNC: 0.5 MG/DL (ref 0.7–1.3)
DIFFERENTIAL METHOD BLD: ABNORMAL
EOSINOPHIL # BLD: 0 K/UL (ref 0–0.4)
EOSINOPHIL NFR BLD: 0 % (ref 0–7)
ERYTHROCYTE [DISTWIDTH] IN BLOOD BY AUTOMATED COUNT: 14.2 % (ref 11.5–14.5)
GLUCOSE SERPL-MCNC: 110 MG/DL (ref 65–100)
HCT VFR BLD AUTO: 37.2 % (ref 36.6–50.3)
HGB BLD-MCNC: 12.9 G/DL (ref 12.1–17)
IMM GRANULOCYTES # BLD AUTO: 0 K/UL (ref 0–0.04)
IMM GRANULOCYTES NFR BLD AUTO: 0 % (ref 0–0.5)
LYMPHOCYTES # BLD: 0.4 K/UL (ref 0.8–3.5)
LYMPHOCYTES NFR BLD: 3 % (ref 12–49)
MCH RBC QN AUTO: 31.3 PG (ref 26–34)
MCHC RBC AUTO-ENTMCNC: 34.7 G/DL (ref 30–36.5)
MCV RBC AUTO: 90.3 FL (ref 80–99)
MONOCYTES # BLD: 0.7 K/UL (ref 0–1)
MONOCYTES NFR BLD: 5 % (ref 5–13)
NEUTS SEG # BLD: 12 K/UL (ref 1.8–8)
NEUTS SEG NFR BLD: 92 % (ref 32–75)
NRBC # BLD: 0 K/UL (ref 0–0.01)
NRBC BLD-RTO: 0 PER 100 WBC
PLATELET # BLD AUTO: 370 K/UL (ref 150–400)
PMV BLD AUTO: 8.7 FL (ref 8.9–12.9)
POTASSIUM SERPL-SCNC: 3.7 MMOL/L (ref 3.5–5.1)
RBC # BLD AUTO: 4.12 M/UL (ref 4.1–5.7)
RBC MORPH BLD: ABNORMAL
RBC MORPH BLD: ABNORMAL
SAMPLES BEING HELD,HOLD: NORMAL
SODIUM SERPL-SCNC: 134 MMOL/L (ref 136–145)
SPECIMEN SOURCE FLD: NORMAL
WBC # BLD AUTO: 13.1 K/UL (ref 4.1–11.1)

## 2022-05-10 PROCEDURE — 97116 GAIT TRAINING THERAPY: CPT

## 2022-05-10 PROCEDURE — 80048 BASIC METABOLIC PNL TOTAL CA: CPT

## 2022-05-10 PROCEDURE — 94664 DEMO&/EVAL PT USE INHALER: CPT

## 2022-05-10 PROCEDURE — 82570 ASSAY OF URINE CREATININE: CPT

## 2022-05-10 PROCEDURE — 74011000250 HC RX REV CODE- 250: Performed by: INTERNAL MEDICINE

## 2022-05-10 PROCEDURE — 74011000250 HC RX REV CODE- 250: Performed by: COLON & RECTAL SURGERY

## 2022-05-10 PROCEDURE — 97530 THERAPEUTIC ACTIVITIES: CPT

## 2022-05-10 PROCEDURE — 74011250637 HC RX REV CODE- 250/637: Performed by: INTERNAL MEDICINE

## 2022-05-10 PROCEDURE — 65270000029 HC RM PRIVATE

## 2022-05-10 PROCEDURE — 74011250637 HC RX REV CODE- 250/637: Performed by: NURSE PRACTITIONER

## 2022-05-10 PROCEDURE — 74011250637 HC RX REV CODE- 250/637: Performed by: COLON & RECTAL SURGERY

## 2022-05-10 PROCEDURE — 94761 N-INVAS EAR/PLS OXIMETRY MLT: CPT

## 2022-05-10 PROCEDURE — 94640 AIRWAY INHALATION TREATMENT: CPT

## 2022-05-10 PROCEDURE — 77010033678 HC OXYGEN DAILY

## 2022-05-10 PROCEDURE — 74011250636 HC RX REV CODE- 250/636: Performed by: INTERNAL MEDICINE

## 2022-05-10 PROCEDURE — 85025 COMPLETE CBC W/AUTO DIFF WBC: CPT

## 2022-05-10 RX ADMIN — FAMOTIDINE 20 MG: 20 TABLET, FILM COATED ORAL at 08:39

## 2022-05-10 RX ADMIN — FAMOTIDINE 20 MG: 20 TABLET, FILM COATED ORAL at 18:27

## 2022-05-10 RX ADMIN — DILTIAZEM HYDROCHLORIDE 300 MG: 180 CAPSULE, COATED, EXTENDED RELEASE ORAL at 08:39

## 2022-05-10 RX ADMIN — OXYCODONE 5 MG: 5 TABLET ORAL at 21:33

## 2022-05-10 RX ADMIN — Medication 10 ML: at 07:36

## 2022-05-10 RX ADMIN — ARFORMOTEROL TARTRATE 15 MCG: 15 SOLUTION RESPIRATORY (INHALATION) at 07:24

## 2022-05-10 RX ADMIN — GUAIFENESIN 1200 MG: 600 TABLET ORAL at 21:33

## 2022-05-10 RX ADMIN — Medication 10 ML: at 13:38

## 2022-05-10 RX ADMIN — IPRATROPIUM BROMIDE 0.5 MG: 0.5 SOLUTION RESPIRATORY (INHALATION) at 13:19

## 2022-05-10 RX ADMIN — IPRATROPIUM BROMIDE 0.5 MG: 0.5 SOLUTION RESPIRATORY (INHALATION) at 21:50

## 2022-05-10 RX ADMIN — BUDESONIDE 500 MCG: 0.5 SUSPENSION RESPIRATORY (INHALATION) at 07:23

## 2022-05-10 RX ADMIN — BUDESONIDE 500 MCG: 0.5 SUSPENSION RESPIRATORY (INHALATION) at 20:55

## 2022-05-10 RX ADMIN — ACETAMINOPHEN 1000 MG: 325 TABLET ORAL at 06:49

## 2022-05-10 RX ADMIN — METHYLPREDNISOLONE SODIUM SUCCINATE 40 MG: 40 INJECTION, POWDER, FOR SOLUTION INTRAMUSCULAR; INTRAVENOUS at 13:38

## 2022-05-10 RX ADMIN — MONTELUKAST 10 MG: 10 TABLET, FILM COATED ORAL at 21:33

## 2022-05-10 RX ADMIN — ARFORMOTEROL TARTRATE 15 MCG: 15 SOLUTION RESPIRATORY (INHALATION) at 20:55

## 2022-05-10 RX ADMIN — GUAIFENESIN 1200 MG: 600 TABLET ORAL at 08:39

## 2022-05-10 RX ADMIN — METHYLPREDNISOLONE SODIUM SUCCINATE 40 MG: 40 INJECTION, POWDER, FOR SOLUTION INTRAMUSCULAR; INTRAVENOUS at 06:00

## 2022-05-10 RX ADMIN — APIXABAN 5 MG: 5 TABLET, FILM COATED ORAL at 18:27

## 2022-05-10 RX ADMIN — ACETAMINOPHEN 1000 MG: 325 TABLET ORAL at 00:00

## 2022-05-10 RX ADMIN — METHYLPREDNISOLONE SODIUM SUCCINATE 40 MG: 40 INJECTION, POWDER, FOR SOLUTION INTRAMUSCULAR; INTRAVENOUS at 21:33

## 2022-05-10 RX ADMIN — APIXABAN 5 MG: 5 TABLET, FILM COATED ORAL at 08:39

## 2022-05-10 RX ADMIN — IPRATROPIUM BROMIDE 0.5 MG: 0.5 SOLUTION RESPIRATORY (INHALATION) at 07:13

## 2022-05-10 RX ADMIN — ACETAMINOPHEN 1000 MG: 325 TABLET ORAL at 18:27

## 2022-05-10 RX ADMIN — Medication 10 ML: at 21:37

## 2022-05-10 NOTE — WOUND CARE
Ostomy visit: post op day # 7 LAR with end colostomy for anal cancer. Checked on Glory Shields; colostomy appliance placed yesterday by patient with education remains intact. Brown thick stool in pouch. Glory Shields reports back information taught; we will work with him tomorrow to allow him opportunity to cut and change appliance again himself with verbal guidance. He works well using hand held [address] [place].   Rashid Haynes RN,CWON

## 2022-05-10 NOTE — PROGRESS NOTES
Problem: Mobility Impaired (Adult and Pediatric)  Goal: *Acute Goals and Plan of Care (Insert Text)  Description: FUNCTIONAL STATUS PRIOR TO ADMISSION: Patient was independent and active without use of DME. No recent falls    HOME SUPPORT PRIOR TO ADMISSION: Has friends that check on him. Home O2 at 2.5 L/min    Physical Therapy Goals  Initiated 5/6/2022  1. Patient will ambulate with independence for 100 feet with the least restrictive device within 7 day(s). 2.  Patient will ascend/descend 3 stairs with handrail(s) with modified independence within 7 day(s). Outcome: Progressing Towards Goal   PHYSICAL THERAPY TREATMENT  Patient: Leoncio Michelle (89 y.o. male)  Date: 5/10/2022  Diagnosis: Rectal cancer ([facility] [C20] <principal problem not specified>  Procedure(s) (LRB):  LAPAROSCOPIC LOW ANTERIOR RESECTION WITH COLOSTOMY (N/A) 7 Days Post-Op  Precautions:  fall  Chart, physical therapy assessment, plan of care and goals were reviewed. ASSESSMENT  Patient continues with skilled PT services and is progressing towards goals. Communicated with nurse cleared for therapy. Patient supine on bed when received. Rolled on the edge of bed SBA, supine to sit SBA, sit to stand  SBA, ambulation without assistive device in the room and out on the 4th floor hallway. Assisted patient back to the room and asked to just go back to bed. Performed some  active range of motion exercise on both LE all planes. Educate and instructed patient to continue active range of motion exercise on both legs while up on chair or on bed multiple times. Recommend patient to be up on the chair at least 3 times a day every meal times as tolerated. Communicated with nurse who agreed to  monitor patient. Current Level of Function Impacting Discharge (mobility/balance): SBA with ambulation and transfers without assistive device.      Other factors to consider for discharge: fall         PLAN :  Patient continues to benefit from skilled intervention to address the above impairments. Continue treatment per established plan of care. to address goals. Recommendation for discharge: (in order for the patient to meet his/her long term goals)  Physical therapy at least 2 days/week in the home     This discharge recommendation:  Has been made in collaboration with the attending provider and/or case management    IF patient discharges home will need the following DME: patient owns DME required for discharge       SUBJECTIVE:   Patient stated Laurice Angelucci.     OBJECTIVE DATA SUMMARY:   Critical Behavior:  Neurologic State: Alert  Orientation Level: Oriented X4  Cognition: Follows commands     Functional Mobility Training:  Bed Mobility:  Rolling: Independent  Supine to Sit: Independent  Sit to Supine: Independent  Scooting: Independent        Transfers:  Sit to Stand: Stand-by assistance  Stand to Sit: Stand-by assistance  Stand Pivot Transfers: Stand-by assistance                          Balance:  Sitting: Intact  Standing: Intact; Without support  Standing - Static: Good  Standing - Dynamic : Fair  Ambulation/Gait Training:  Distance (ft): 200 Feet (ft)  Assistive Device: Gait belt  Ambulation - Level of Assistance: Stand-by assistance     Gait Description (WDL): Exceptions to WDL  Gait Abnormalities: Path deviations              Speed/Antonieta: Pace decreased (<100 feet/min)                      Therapeutic Exercises:   Educate and instructed patient to continue active range of motion exercise on both legs while up on chair or on bed multiple times. Recommend patient to be up on the chair at least 3 times a day every meal times as tolerated. Pain Ratin/10    Activity Tolerance:   Good    After treatment patient left in no apparent distress:   Supine in bed, Heels elevated for pressure relief, and Call bell within reach    COMMUNICATION/COLLABORATION:   The patients plan of care was discussed with: Registered nurse.      Ailyn Sun, PT,WCC.    Time Calculation: 24 mins

## 2022-05-10 NOTE — PROGRESS NOTES
CRS Daily Progress Note    Patient: Timothy Brunner MRN: [id number]  SSN: [id number]    YOB: 1950  Age: 81 y.o.   Sex: male      Admit Date: 5/3/2022    POD 7 Day Post-Op    Procedure: Procedure(s):  LAPAROSCOPIC LOW ANTERIOR RESECTION WITH COLOSTOMY    Subjective:   Pain Controlled  Tolerating gi lite- no N/V  Up and OOB with mobility and PT   No pulm or cardiac events overnight per nurse    Current Facility-Administered Medications   Medication Dose Route Frequency    dilTIAZem ER (CARDIZEM CD) capsule 300 mg  300 mg Oral DAILY    metoprolol (LOPRESSOR) injection 5 mg  5 mg IntraVENous Q6H PRN    apixaban (ELIQUIS) tablet 5 mg  5 mg Oral BID    methylPREDNISolone (PF) (SOLU-MEDROL) injection 40 mg  40 mg IntraVENous Q8H    benzonatate (TESSALON) capsule 200 mg  200 mg Oral TID PRN    ipratropium (ATROVENT) 0.02 % nebulizer solution 0.5 mg  0.5 mg Nebulization Q6HWA RT    And    arformoteroL (BROVANA) neb solution 15 mcg  15 mcg Nebulization BID RT    famotidine (PEPCID) tablet 20 mg  20 mg Oral BID    sodium chloride (NS) flush 5-40 mL  5-40 mL IntraVENous PRN    naloxone (NARCAN) injection 0.04 mg  0.04 mg IntraVENous Multiple    albuterol (PROVENTIL VENTOLIN) nebulizer solution 2.5 mg  2.5 mg Nebulization Q4H PRN    budesonide (PULMICORT) 500 mcg/2 ml nebulizer suspension  500 mcg Nebulization BID RT    montelukast (SINGULAIR) tablet 10 mg  10 mg Oral QHS    sodium chloride (NS) flush 5-40 mL  5-40 mL IntraVENous Q8H    sodium chloride (NS) flush 5-40 mL  5-40 mL IntraVENous PRN    promethazine (PHENERGAN) tablet 12.5 mg  12.5 mg Oral Q6H PRN    Or    ondansetron (ZOFRAN) injection 4 mg  4 mg IntraVENous Q6H PRN    acetaminophen (TYLENOL) tablet 1,000 mg  1,000 mg Oral Q6H    oxyCODONE IR (ROXICODONE) tablet 5 mg  5 mg Oral Q4H PRN    Or    oxyCODONE IR (ROXICODONE) tablet 10 mg  10 mg Oral Q4H PRN    guaiFENesin ER (MUCINEX) tablet 1,200 mg  1,200 mg Oral Q12H        Objective: No intake/output data recorded. 05/08 1901 - 05/10 0700  In: 1500 [P.O.:1500]  Out: 2090 [Urine:1300; Drains:760]  Patient Vitals for the past 8 hrs:   BP Temp Pulse Resp SpO2   05/10/22 0714     95 %   05/10/22 0700   93     05/10/22 0304 118/77 97.5 °F (36.4 °C) 95 16 94 %       Physical Exam:  General: Alert, cooperative, NAD  Lungs: 95% on baseline 2.5L O2 without evidence of WOB  Heart:  NSR  Abdomen: Soft, mild tenderness as expected, non-distended. Incisions c/d/i.  ERIC drain serosang. Ostomy with no current dark liquid output in bag   Extremities: Warm, moves all, no edema  Skin:  Warm and dry, no rash    Labs:   Recent Labs     05/10/22  0356   WBC 13.1*   HGB 12.9   HCT 37.2        Recent Labs     05/10/22  0356 05/09/22  1210 05/09/22  1210 05/08/22  1503 05/08/22  1503   *   < > 133*   < > 133*   K 3.7   < > 4.0   < > 3.8   CL 93*   < > 92*   < > 92*   CO2 34*   < > 34*   < > 35*   *   < > 112*   < > 149*   BUN 20   < > 21*   < > 23*   CREA 0.50*   < > 0.64*   < > 0.72   CA 8.6   < > 9.5   < > 9.3   MG  --   --   --   --  2.2   ALB  --   --  2.9*   < > 2.6*   TBILI  --   --  0.6   < > 0.4   ALT  --   --  45   < > 35    < > = values in this interval not displayed.        Assessment / Plan:       Procedure: Procedure(s):  LAPAROSCOPIC LOW ANTERIOR RESECTION WITH COLOSTOMY      Cont current pain regimen  GI lite diet- add own supplements BID (at bedside)  Wbc stable today at 13.1 (same as yesterday)  If WBC remains stable or lower tomorrow will DC  If WBC increases tomorrow will order CT chest/ab/pelvis with IV and oral contrast for eval  Lab order placed for Cr-body fluid from ERIC- await results  Cont Incentive spirometer  Must mobilize TID and into chair TID      Pt seen and discussed with Dr Susana Benson, NP

## 2022-05-10 NOTE — PROGRESS NOTES
Problem: Falls - Risk of  Goal: *Absence of Falls  Description: Document Michelle Lewis Fall Risk and appropriate interventions in the flowsheet. Outcome: Progressing Towards Goal  Note: Fall Risk Interventions:  Mobility Interventions: Assess mobility with egress test,Bed/chair exit alarm    Mentation Interventions: Bed/chair exit alarm,Door open when patient unattended    Medication Interventions: Bed/chair exit alarm,Evaluate medications/consider consulting pharmacy    Elimination Interventions: Call light in reach    History of Falls Interventions: Bed/chair exit alarm,Consult care management for discharge planning  Problem: Patient Education: Go to Patient Education Activity  Goal: Patient/Family Education  Outcome: Progressing Towards Goal     Problem: Pressure Injury - Risk of  Goal: *Prevention of pressure injury  Description: Document Dipak Scale and appropriate interventions in the flowsheet.   Outcome: Progressing Towards Goal  Note: Pressure Injury Interventions:  Sensory Interventions: Assess changes in LOC,Assess need for specialty bed    Moisture Interventions: Absorbent underpads    Activity Interventions: Increase time out of bed,Pressure redistribution bed/mattress(bed type)    Mobility Interventions: HOB 30 degrees or less,Pressure redistribution bed/mattress (bed type)    Nutrition Interventions: Document food/fluid/supplement intake    Friction and Shear Interventions: Apply protective barrier, creams and emollients,HOB 30 degrees or less    Problem: Patient Education: Go to Patient Education Activity  Goal: Patient/Family Education  Outcome: Progressing Towards Goal

## 2022-05-10 NOTE — PROGRESS NOTES
5/10/2022 9:05 AM      Care Management Progress Note         ICD-10-CM ICD-9-CM     1. Persistent atrial fibrillation (HCC)  I48.19 427.31           RUR:  15%  Risk Level: []? ?Low [x]? ? Moderate []? ? High  Value-based purchasing: []?? Yes [x]? ? No  Bundle patient: []?? Yes [x]? ? No              Specify:      Transition of care plan:  1. Ongoing management by ColoRectal, POD 7 laparoscopic low anterior resection with colostomy   2. Home health RN/OT/PT arranged through [facility]. Order received this AM and sent via [employer] to [facility]. 3. Outpatient follow-up.   4. Pt's family to transport

## 2022-05-10 NOTE — PROGRESS NOTES
A Spiritual Care Partner Volunteer visited patient in [place] [address] on 5/10/2022  Documented by:  Chaplain Flores MDiv, MS, [facility]

## 2022-05-11 ENCOUNTER — APPOINTMENT (OUTPATIENT)
Dept: CT IMAGING | Age: 72
DRG: 330 | End: 2022-05-11
Attending: NURSE PRACTITIONER
Payer: MEDICARE

## 2022-05-11 LAB
ALBUMIN SERPL-MCNC: 2.4 G/DL (ref 3.5–5)
ALBUMIN/GLOB SERPL: 0.9 {RATIO} (ref 1.1–2.2)
ALP SERPL-CCNC: 75 U/L (ref 45–117)
ALT SERPL-CCNC: 45 U/L (ref 12–78)
ANION GAP SERPL CALC-SCNC: 5 MMOL/L (ref 5–15)
AST SERPL-CCNC: 19 U/L (ref 15–37)
BASOPHILS # BLD: 0 K/UL (ref 0–0.1)
BASOPHILS NFR BLD: 0 % (ref 0–1)
BILIRUB SERPL-MCNC: 0.7 MG/DL (ref 0.2–1)
BUN SERPL-MCNC: 25 MG/DL (ref 6–20)
BUN/CREAT SERPL: 49 (ref 12–20)
CALCIUM SERPL-MCNC: 8.8 MG/DL (ref 8.5–10.1)
CHLORIDE SERPL-SCNC: 91 MMOL/L (ref 97–108)
CO2 SERPL-SCNC: 35 MMOL/L (ref 21–32)
CREAT SERPL-MCNC: 0.51 MG/DL (ref 0.7–1.3)
DIFFERENTIAL METHOD BLD: ABNORMAL
EOSINOPHIL # BLD: 0 K/UL (ref 0–0.4)
EOSINOPHIL NFR BLD: 0 % (ref 0–7)
ERYTHROCYTE [DISTWIDTH] IN BLOOD BY AUTOMATED COUNT: 14.3 % (ref 11.5–14.5)
GLOBULIN SER CALC-MCNC: 2.7 G/DL (ref 2–4)
GLUCOSE SERPL-MCNC: 107 MG/DL (ref 65–100)
HCT VFR BLD AUTO: 38.4 % (ref 36.6–50.3)
HGB BLD-MCNC: 13.5 G/DL (ref 12.1–17)
IMM GRANULOCYTES # BLD AUTO: 0 K/UL (ref 0–0.04)
IMM GRANULOCYTES NFR BLD AUTO: 0 % (ref 0–0.5)
LYMPHOCYTES # BLD: 0.3 K/UL (ref 0.8–3.5)
LYMPHOCYTES NFR BLD: 2 % (ref 12–49)
MCH RBC QN AUTO: 31.5 PG (ref 26–34)
MCHC RBC AUTO-ENTMCNC: 35.2 G/DL (ref 30–36.5)
MCV RBC AUTO: 89.5 FL (ref 80–99)
MONOCYTES # BLD: 1.1 K/UL (ref 0–1)
MONOCYTES NFR BLD: 7 % (ref 5–13)
NEUTS SEG # BLD: 14.6 K/UL (ref 1.8–8)
NEUTS SEG NFR BLD: 91 % (ref 32–75)
NRBC # BLD: 0 K/UL (ref 0–0.01)
NRBC BLD-RTO: 0 PER 100 WBC
PLATELET # BLD AUTO: 369 K/UL (ref 150–400)
PMV BLD AUTO: 8.5 FL (ref 8.9–12.9)
POTASSIUM SERPL-SCNC: 4 MMOL/L (ref 3.5–5.1)
PROT SERPL-MCNC: 5.1 G/DL (ref 6.4–8.2)
RBC # BLD AUTO: 4.29 M/UL (ref 4.1–5.7)
RBC MORPH BLD: ABNORMAL
SODIUM SERPL-SCNC: 131 MMOL/L (ref 136–145)
WBC # BLD AUTO: 16 K/UL (ref 4.1–11.1)

## 2022-05-11 PROCEDURE — 94761 N-INVAS EAR/PLS OXIMETRY MLT: CPT

## 2022-05-11 PROCEDURE — 80053 COMPREHEN METABOLIC PANEL: CPT

## 2022-05-11 PROCEDURE — 74011000250 HC RX REV CODE- 250: Performed by: INTERNAL MEDICINE

## 2022-05-11 PROCEDURE — 71260 CT THORAX DX C+: CPT

## 2022-05-11 PROCEDURE — 85025 COMPLETE CBC W/AUTO DIFF WBC: CPT

## 2022-05-11 PROCEDURE — 74011250637 HC RX REV CODE- 250/637: Performed by: NURSE PRACTITIONER

## 2022-05-11 PROCEDURE — 74011250637 HC RX REV CODE- 250/637: Performed by: COLON & RECTAL SURGERY

## 2022-05-11 PROCEDURE — 74011000636 HC RX REV CODE- 636: Performed by: RADIOLOGY

## 2022-05-11 PROCEDURE — 65270000029 HC RM PRIVATE

## 2022-05-11 PROCEDURE — 94640 AIRWAY INHALATION TREATMENT: CPT

## 2022-05-11 PROCEDURE — 74011250636 HC RX REV CODE- 250/636: Performed by: INTERNAL MEDICINE

## 2022-05-11 PROCEDURE — 36415 COLL VENOUS BLD VENIPUNCTURE: CPT

## 2022-05-11 PROCEDURE — 51798 US URINE CAPACITY MEASURE: CPT

## 2022-05-11 PROCEDURE — 77010033678 HC OXYGEN DAILY

## 2022-05-11 PROCEDURE — 77030005513 HC CATH URETH FOL11 MDII -B

## 2022-05-11 PROCEDURE — 74011250637 HC RX REV CODE- 250/637: Performed by: INTERNAL MEDICINE

## 2022-05-11 PROCEDURE — 74011000250 HC RX REV CODE- 250: Performed by: COLON & RECTAL SURGERY

## 2022-05-11 RX ORDER — OXYCODONE HYDROCHLORIDE 5 MG/1
5 TABLET ORAL
Qty: 12 TABLET | Refills: 0 | Status: SHIPPED | OUTPATIENT
Start: 2022-05-11 | End: 2022-05-14

## 2022-05-11 RX ORDER — TAMSULOSIN HYDROCHLORIDE 0.4 MG/1
0.4 CAPSULE ORAL DAILY
Status: DISCONTINUED | OUTPATIENT
Start: 2022-05-11 | End: 2022-05-13 | Stop reason: HOSPADM

## 2022-05-11 RX ORDER — TAMSULOSIN HYDROCHLORIDE 0.4 MG/1
0.4 CAPSULE ORAL DAILY
Qty: 30 CAPSULE | Refills: 0 | Status: SHIPPED | OUTPATIENT
Start: 2022-05-11 | End: 2022-06-10

## 2022-05-11 RX ADMIN — ACETAMINOPHEN 1000 MG: 325 TABLET ORAL at 02:16

## 2022-05-11 RX ADMIN — IOPAMIDOL 100 ML: 755 INJECTION, SOLUTION INTRAVENOUS at 10:39

## 2022-05-11 RX ADMIN — FAMOTIDINE 20 MG: 20 TABLET, FILM COATED ORAL at 17:23

## 2022-05-11 RX ADMIN — APIXABAN 5 MG: 5 TABLET, FILM COATED ORAL at 17:24

## 2022-05-11 RX ADMIN — METHYLPREDNISOLONE SODIUM SUCCINATE 40 MG: 40 INJECTION, POWDER, FOR SOLUTION INTRAMUSCULAR; INTRAVENOUS at 22:39

## 2022-05-11 RX ADMIN — DILTIAZEM HYDROCHLORIDE 300 MG: 180 CAPSULE, COATED, EXTENDED RELEASE ORAL at 08:37

## 2022-05-11 RX ADMIN — GUAIFENESIN 1200 MG: 600 TABLET ORAL at 22:39

## 2022-05-11 RX ADMIN — METHYLPREDNISOLONE SODIUM SUCCINATE 40 MG: 40 INJECTION, POWDER, FOR SOLUTION INTRAMUSCULAR; INTRAVENOUS at 06:36

## 2022-05-11 RX ADMIN — METHYLPREDNISOLONE SODIUM SUCCINATE 40 MG: 40 INJECTION, POWDER, FOR SOLUTION INTRAMUSCULAR; INTRAVENOUS at 14:25

## 2022-05-11 RX ADMIN — Medication 10 ML: at 06:36

## 2022-05-11 RX ADMIN — BUDESONIDE 500 MCG: 0.5 SUSPENSION RESPIRATORY (INHALATION) at 20:52

## 2022-05-11 RX ADMIN — IPRATROPIUM BROMIDE 0.5 MG: 0.5 SOLUTION RESPIRATORY (INHALATION) at 13:12

## 2022-05-11 RX ADMIN — ACETAMINOPHEN 1000 MG: 325 TABLET ORAL at 11:34

## 2022-05-11 RX ADMIN — ARFORMOTEROL TARTRATE 15 MCG: 15 SOLUTION RESPIRATORY (INHALATION) at 20:52

## 2022-05-11 RX ADMIN — ACETAMINOPHEN 1000 MG: 325 TABLET ORAL at 06:35

## 2022-05-11 RX ADMIN — Medication 10 ML: at 14:00

## 2022-05-11 RX ADMIN — GUAIFENESIN 1200 MG: 600 TABLET ORAL at 08:37

## 2022-05-11 RX ADMIN — Medication 10 ML: at 22:41

## 2022-05-11 RX ADMIN — MONTELUKAST 10 MG: 10 TABLET, FILM COATED ORAL at 22:39

## 2022-05-11 RX ADMIN — DIATRIZOATE MEGLUMINE AND DIATRIZOATE SODIUM 30 ML: 660; 100 LIQUID ORAL; RECTAL at 08:35

## 2022-05-11 RX ADMIN — APIXABAN 5 MG: 5 TABLET, FILM COATED ORAL at 08:37

## 2022-05-11 RX ADMIN — FAMOTIDINE 20 MG: 20 TABLET, FILM COATED ORAL at 08:37

## 2022-05-11 RX ADMIN — TAMSULOSIN HYDROCHLORIDE 0.4 MG: 0.4 CAPSULE ORAL at 13:29

## 2022-05-11 RX ADMIN — ARFORMOTEROL TARTRATE 15 MCG: 15 SOLUTION RESPIRATORY (INHALATION) at 07:28

## 2022-05-11 RX ADMIN — BUDESONIDE 500 MCG: 0.5 SUSPENSION RESPIRATORY (INHALATION) at 07:28

## 2022-05-11 RX ADMIN — IPRATROPIUM BROMIDE 0.5 MG: 0.5 SOLUTION RESPIRATORY (INHALATION) at 07:28

## 2022-05-11 RX ADMIN — IPRATROPIUM BROMIDE 0.5 MG: 0.5 SOLUTION RESPIRATORY (INHALATION) at 20:40

## 2022-05-11 NOTE — PROGRESS NOTES
5/11/2022 12:47 PM   Care Management Progress Note         ICD-10-CM ICD-9-CM     1. Persistent atrial fibrillation (HCC)  I48.19 427.31           RUR:  15%  Risk Level: []? ? ?Low [x]? ? ? Moderate []? ? ? High  Value-based purchasing: []??? Yes [x]? ?? No  Bundle patient: []??? Yes [x]? ?? No              Specify:      Possible discharge home tomorrow     Transition of care plan:  1. Ongoing management by ColoRectal, POD 8 laparoscopic low anterior resection with colostomy   2. Home health RN/OT/PT arranged through [facility]. Order received this AM and sent via [employer] to [facility]. 3. Pt to discharge home with stacie, needing 2 week follow up with Urology per primary team. CM called and scheduled follow up appt for 5/25 at 8AM. Appt added to pt's avs.   4. Medicare pt has received, reviewed, and signed 2nd IM letter informing them of their right to appeal the discharge. Signed copy has been placed on pt's hard chart. Pt was provided a copy for their records. 5. Outpatient follow-up.   6. Pt's family to transport

## 2022-05-11 NOTE — PROGRESS NOTES
Physician Progress Note      PATIENT:               Taylor Alvares  CSN #:                  [id number]  :                       1950  ADMIT DATE:       5/3/2022 10:52 AM  DISCH DATE:  RESPONDING  PROVIDER #:        Luna Gil MD          QUERY TEXT:    Good Morning,    This patient admitted for rectal cancer. Planned Laparoscopic low anterior resection with colostomy. The patient is noted to have A-fib and is maintained with Eliquis. Pre-op instructions notes that the patient Eliquis held before surgery and after surgery. If possible, please document in progress notes and discharge summary if you are evaluating and/or treating any of the following: The medical record reflects the following:  Risk Factors: A-FIb and A-flutter, CAD, SVT maintained on Eliquis  Clinical Indicators: PT with A-fib Atrial flutter and CAD, SVT and these are  maintained on Eliquis Pre op instructions to hold Eliquis before surgery and still on hold post op for now. Treatment: Eliquis on hold  pre op and post op initially. Thank you  Sarah Irby,Rn, [address]  [phone]  Options provided:  -- Secondary hypercoagulable state in a patient with atrial fibrillation  -- Other - I will add my own diagnosis  -- Disagree - Not applicable / Not valid  -- Disagree - Clinically unable to determine / Unknown  -- Refer to Clinical Documentation Reviewer    PROVIDER RESPONSE TEXT:    Provider disagreed with this query. pre-existing conditions. I am not treating any cardiac conditions- another provider is    Query created by: Sandra Felix on 2022 8:40 AM      QUERY TEXT:    Dear Attending,    Patient admitted with rectal cancer for laparoscopic low anterior resection with colostomy, noted to have 5/ RD assessment. If possible, please document in progress notes and discharge summary if you are evaluating and /or treating any of the following:     The medical record reflects the following:  Risk Factors: rectal ca, COPD, chronic resp failure  Clinical Indicators: 5/4 RD- Malnutrition Status:  Severe malnutrition (05/04/22 1221)  Context:  Chronic illness  Findings of the 6 clinical characteristics of malnutrition:  Weight Loss:  Greater than 5% over 1 month  Body Fat Loss:  Severe body fat loss, Fat overlying ribs,Orbital,Triceps,Buccal region  Muscle Mass Loss:  Mild muscle mass loss, Clavicles (pectoralis &deltoids),Hand (interosseous),Scapula (trapezius)  Treatment: RD assessment, Ensure Enlive with lunch, Gelatein with dinner, monitoring    ASPEN Criteria:  https://aspenjournals. onlinelibrary. de souza. com/doi/full/10.1177/3716235516626245      Thank you,    Sterling Paiz RN  [facility]  [phone]  Options provided:  -- Protein calorie malnutrition mild  -- Protein calorie malnutrition moderate  -- Protein calorie malnutrition severe  -- Underweight with BMI 17.52  -- Other - I will add my own diagnosis  -- Disagree - Not applicable / Not valid  -- Disagree - Clinically unable to determine / Unknown  -- Refer to Clinical Documentation Reviewer    PROVIDER RESPONSE TEXT:    Provider is clinically unable to determine a response to this query.     Query created by: Julieta David on 5/6/2022 10:46 AM      Electronically signed by:  Sarah Witt MD 5/11/2022 11:49 AM

## 2022-05-11 NOTE — PROGRESS NOTES
CRS Daily Progress Note    Patient: Erickson Sands MRN: [id number]  SSN: [id number]    YOB: 1950  Age: 81 y.o. Sex: male      Admit Date: 5/3/2022    POD 8 Day Post-Op    Procedure: Procedure(s):  LAPAROSCOPIC LOW ANTERIOR RESECTION WITH COLOSTOMY    Subjective:   Pain Controlled  Tolerating gi lite- no N/V  Up and OOB with mobility and PT   No pulm or cardiac events overnight per nurse  WBC trending upwards however.    Cr from drain 0.39    Current Facility-Administered Medications   Medication Dose Route Frequency    iopamidoL (ISOVUE-370) 76 % injection 100 mL  100 mL IntraVENous RAD ONCE    dilTIAZem ER (CARDIZEM CD) capsule 300 mg  300 mg Oral DAILY    metoprolol (LOPRESSOR) injection 5 mg  5 mg IntraVENous Q6H PRN    apixaban (ELIQUIS) tablet 5 mg  5 mg Oral BID    methylPREDNISolone (PF) (SOLU-MEDROL) injection 40 mg  40 mg IntraVENous Q8H    benzonatate (TESSALON) capsule 200 mg  200 mg Oral TID PRN    ipratropium (ATROVENT) 0.02 % nebulizer solution 0.5 mg  0.5 mg Nebulization Q6HWA RT    And    arformoteroL (BROVANA) neb solution 15 mcg  15 mcg Nebulization BID RT    famotidine (PEPCID) tablet 20 mg  20 mg Oral BID    sodium chloride (NS) flush 5-40 mL  5-40 mL IntraVENous PRN    naloxone (NARCAN) injection 0.04 mg  0.04 mg IntraVENous Multiple    albuterol (PROVENTIL VENTOLIN) nebulizer solution 2.5 mg  2.5 mg Nebulization Q4H PRN    budesonide (PULMICORT) 500 mcg/2 ml nebulizer suspension  500 mcg Nebulization BID RT    montelukast (SINGULAIR) tablet 10 mg  10 mg Oral QHS    sodium chloride (NS) flush 5-40 mL  5-40 mL IntraVENous Q8H    sodium chloride (NS) flush 5-40 mL  5-40 mL IntraVENous PRN    promethazine (PHENERGAN) tablet 12.5 mg  12.5 mg Oral Q6H PRN    Or    ondansetron (ZOFRAN) injection 4 mg  4 mg IntraVENous Q6H PRN    acetaminophen (TYLENOL) tablet 1,000 mg  1,000 mg Oral Q6H    oxyCODONE IR (ROXICODONE) tablet 5 mg  5 mg Oral Q4H PRN    Or    oxyCODONE IR (ROXICODONE) tablet 10 mg  10 mg Oral Q4H PRN    guaiFENesin ER (MUCINEX) tablet 1,200 mg  1,200 mg Oral Q12H        Objective:   No intake/output data recorded. 05/09 1901 - 05/11 0700  In: 900 [P.O.:900]  Out: 1515 [Urine:950; Drains:510]  Patient Vitals for the past 8 hrs:   BP Temp Pulse Resp SpO2   05/11/22 0743 124/73 97.5 °F (36.4 °C) 74 18 94 %   05/11/22 0728     96 %   05/11/22 0700   67     05/11/22 0319 133/84 97.6 °F (36.4 °C) 100 20 96 %       Physical Exam:  General: Alert, cooperative, NAD  Lungs: 95% on baseline 2.5L O2 without evidence of WOB  Heart:  NSR  Abdomen: Soft, mild tenderness as expected, non-distended. Incisions c/d/i.  ERIC drain serosang. Ostomy with no current dark mod thick liquid output in bag   Extremities: Warm, moves all, no edema  Skin:  Warm and dry, no rash    Labs:   Recent Labs     05/11/22  0729   WBC 16.0*   HGB 13.5   HCT 38.4        Recent Labs     05/11/22  0729 05/09/22  1210 05/08/22  1503   *   < > 133*   K 4.0   < > 3.8   CL 91*   < > 92*   CO2 35*   < > 35*   *   < > 149*   BUN 25*   < > 23*   CREA 0.51*   < > 0.72   CA 8.8   < > 9.3   MG  --   --  2.2   ALB 2.4*   < > 2.6*   TBILI 0.7   < > 0.4   ALT 45   < > 35    < > = values in this interval not displayed.        Assessment / Plan:       Procedure: Procedure(s):  LAPAROSCOPIC LOW ANTERIOR RESECTION WITH COLOSTOMY    Wbc cont to trend upwards  Obtain CT chest/ab/pelvis w/contrast to eval leukocytosis  Cont current pain regimen  NPO until CT results back then if no concerns back toGI lite diet- add own supplements BID (at bedside)  Cont Incentive spirometer  Must mobilize TID and into chair TID      Pt discussed with Dr Daron Yang, NP

## 2022-05-11 NOTE — WOUND CARE
Ostomy Progress Note:   8th post-op day  Type of Ostomy; end colostomy  Location:[facility] Surgeon:     Treatments:  Pouch removed, stoma and peristomal skin cleaned and assessed, new pouch prepared and applied. Findings:  Current appliance:2 piece  Stoma size:36  Stoma color:red  Characteristics:moist budded  Peristomal skin:clean , no redness  Output:liquid brown    Teaching/subjects covered today:  Encouraged patient to review handout given to patient/family and ask questions regarding material on next ostomy nurse visit. General anatomy and expectations of output  Normal and abnormal stoma characteristics & changes over time. Normal abnormal peristomal characteristics. When/how to clean stoma & peristomal skin. When/how to empty pouch  Crusting technique  When/how to change appliance   When to notify nurse/Physician  Where/how to obtain supplies  Manipulated/practiced with clean pouch and pouch closure  reviewed ADL's(bathing,mattress cover,car pillow to protect from seatbelt,etc.)    Patient demonstrated how to cut out wafer , measure stoma and place wafer on stoma. Recommendations:  1. Empty appliance when 1/3 full and PRN. Encourage patient/family to notify nurse and assist w/ pouch emptying to promote self care. Change appliance twice weekly and PRN for leaking ASAP. DO NOT REINFORCE LEAKS to avoid skin breakdown. Transition of Care:  Ostomy nurse to return and continue teaching.   Lior Sheriff RN

## 2022-05-12 LAB
ANION GAP SERPL CALC-SCNC: 6 MMOL/L (ref 5–15)
BASOPHILS # BLD: 0 K/UL (ref 0–0.1)
BASOPHILS NFR BLD: 0 % (ref 0–1)
BUN SERPL-MCNC: 22 MG/DL (ref 6–20)
BUN/CREAT SERPL: 39 (ref 12–20)
CALCIUM SERPL-MCNC: 8.6 MG/DL (ref 8.5–10.1)
CHLORIDE SERPL-SCNC: 90 MMOL/L (ref 97–108)
CO2 SERPL-SCNC: 36 MMOL/L (ref 21–32)
CREAT SERPL-MCNC: 0.57 MG/DL (ref 0.7–1.3)
DIFFERENTIAL METHOD BLD: ABNORMAL
EOSINOPHIL # BLD: 0 K/UL (ref 0–0.4)
EOSINOPHIL NFR BLD: 0 % (ref 0–7)
ERYTHROCYTE [DISTWIDTH] IN BLOOD BY AUTOMATED COUNT: 14.3 % (ref 11.5–14.5)
GLUCOSE SERPL-MCNC: 127 MG/DL (ref 65–100)
HCT VFR BLD AUTO: 36.8 % (ref 36.6–50.3)
HGB BLD-MCNC: 12.8 G/DL (ref 12.1–17)
IMM GRANULOCYTES # BLD AUTO: 0 K/UL
IMM GRANULOCYTES NFR BLD AUTO: 0 %
LYMPHOCYTES # BLD: 0.7 K/UL (ref 0.8–3.5)
LYMPHOCYTES NFR BLD: 3 % (ref 12–49)
MCH RBC QN AUTO: 31.9 PG (ref 26–34)
MCHC RBC AUTO-ENTMCNC: 34.8 G/DL (ref 30–36.5)
MCV RBC AUTO: 91.8 FL (ref 80–99)
MONOCYTES # BLD: 2.2 K/UL (ref 0–1)
MONOCYTES NFR BLD: 10 % (ref 5–13)
NEUTS SEG # BLD: 18.9 K/UL (ref 1.8–8)
NEUTS SEG NFR BLD: 87 % (ref 32–75)
NRBC # BLD: 0 K/UL (ref 0–0.01)
NRBC BLD-RTO: 0 PER 100 WBC
PLATELET # BLD AUTO: 362 K/UL (ref 150–400)
PMV BLD AUTO: 8.9 FL (ref 8.9–12.9)
POTASSIUM SERPL-SCNC: 3.7 MMOL/L (ref 3.5–5.1)
RBC # BLD AUTO: 4.01 M/UL (ref 4.1–5.7)
RBC MORPH BLD: ABNORMAL
SODIUM SERPL-SCNC: 132 MMOL/L (ref 136–145)
WBC # BLD AUTO: 21.8 K/UL (ref 4.1–11.1)

## 2022-05-12 PROCEDURE — 36415 COLL VENOUS BLD VENIPUNCTURE: CPT

## 2022-05-12 PROCEDURE — 74011000250 HC RX REV CODE- 250: Performed by: INTERNAL MEDICINE

## 2022-05-12 PROCEDURE — 97530 THERAPEUTIC ACTIVITIES: CPT

## 2022-05-12 PROCEDURE — 94761 N-INVAS EAR/PLS OXIMETRY MLT: CPT

## 2022-05-12 PROCEDURE — 74011250636 HC RX REV CODE- 250/636: Performed by: NURSE PRACTITIONER

## 2022-05-12 PROCEDURE — 2709999900 HC NON-CHARGEABLE SUPPLY

## 2022-05-12 PROCEDURE — 74011250637 HC RX REV CODE- 250/637: Performed by: COLON & RECTAL SURGERY

## 2022-05-12 PROCEDURE — 94664 DEMO&/EVAL PT USE INHALER: CPT

## 2022-05-12 PROCEDURE — 65270000029 HC RM PRIVATE

## 2022-05-12 PROCEDURE — 97116 GAIT TRAINING THERAPY: CPT

## 2022-05-12 PROCEDURE — 94640 AIRWAY INHALATION TREATMENT: CPT

## 2022-05-12 PROCEDURE — 74011250637 HC RX REV CODE- 250/637: Performed by: NURSE PRACTITIONER

## 2022-05-12 PROCEDURE — 77010033678 HC OXYGEN DAILY

## 2022-05-12 PROCEDURE — 74011000250 HC RX REV CODE- 250: Performed by: COLON & RECTAL SURGERY

## 2022-05-12 PROCEDURE — 74011250636 HC RX REV CODE- 250/636: Performed by: INTERNAL MEDICINE

## 2022-05-12 PROCEDURE — 99232 SBSQ HOSP IP/OBS MODERATE 35: CPT | Performed by: INTERNAL MEDICINE

## 2022-05-12 PROCEDURE — 85025 COMPLETE CBC W/AUTO DIFF WBC: CPT

## 2022-05-12 PROCEDURE — 74011250637 HC RX REV CODE- 250/637: Performed by: INTERNAL MEDICINE

## 2022-05-12 PROCEDURE — 97168 OT RE-EVAL EST PLAN CARE: CPT

## 2022-05-12 PROCEDURE — 97535 SELF CARE MNGMENT TRAINING: CPT

## 2022-05-12 PROCEDURE — APPSS30 APP SPLIT SHARED TIME 16-30 MINUTES: Performed by: NURSE PRACTITIONER

## 2022-05-12 PROCEDURE — 80048 BASIC METABOLIC PNL TOTAL CA: CPT

## 2022-05-12 RX ORDER — PREDNISONE 20 MG/1
40 TABLET ORAL
Status: DISCONTINUED | OUTPATIENT
Start: 2022-05-13 | End: 2022-05-13 | Stop reason: HOSPADM

## 2022-05-12 RX ORDER — PREDNISONE 10 MG/1
10 TABLET ORAL
Qty: 18 TABLET | Refills: 0 | Status: SHIPPED | OUTPATIENT
Start: 2022-05-12

## 2022-05-12 RX ORDER — DILTIAZEM HYDROCHLORIDE 180 MG/1
360 CAPSULE, COATED, EXTENDED RELEASE ORAL DAILY
Status: DISCONTINUED | OUTPATIENT
Start: 2022-05-13 | End: 2022-05-13 | Stop reason: HOSPADM

## 2022-05-12 RX ORDER — DIGOXIN 0.25 MG/ML
250 INJECTION INTRAMUSCULAR; INTRAVENOUS ONCE
Status: COMPLETED | OUTPATIENT
Start: 2022-05-12 | End: 2022-05-12

## 2022-05-12 RX ADMIN — ARFORMOTEROL TARTRATE 15 MCG: 15 SOLUTION RESPIRATORY (INHALATION) at 20:35

## 2022-05-12 RX ADMIN — ARFORMOTEROL TARTRATE 15 MCG: 15 SOLUTION RESPIRATORY (INHALATION) at 07:28

## 2022-05-12 RX ADMIN — FAMOTIDINE 20 MG: 20 TABLET, FILM COATED ORAL at 10:42

## 2022-05-12 RX ADMIN — BUDESONIDE 500 MCG: 0.5 SUSPENSION RESPIRATORY (INHALATION) at 20:35

## 2022-05-12 RX ADMIN — Medication 10 ML: at 06:52

## 2022-05-12 RX ADMIN — IPRATROPIUM BROMIDE 0.5 MG: 0.5 SOLUTION RESPIRATORY (INHALATION) at 13:11

## 2022-05-12 RX ADMIN — ACETAMINOPHEN 1000 MG: 325 TABLET ORAL at 15:34

## 2022-05-12 RX ADMIN — TAMSULOSIN HYDROCHLORIDE 0.4 MG: 0.4 CAPSULE ORAL at 10:42

## 2022-05-12 RX ADMIN — ACETAMINOPHEN 1000 MG: 325 TABLET ORAL at 18:40

## 2022-05-12 RX ADMIN — APIXABAN 5 MG: 5 TABLET, FILM COATED ORAL at 10:42

## 2022-05-12 RX ADMIN — IPRATROPIUM BROMIDE 0.5 MG: 0.5 SOLUTION RESPIRATORY (INHALATION) at 07:17

## 2022-05-12 RX ADMIN — METHYLPREDNISOLONE SODIUM SUCCINATE 40 MG: 40 INJECTION, POWDER, FOR SOLUTION INTRAMUSCULAR; INTRAVENOUS at 06:49

## 2022-05-12 RX ADMIN — FAMOTIDINE 20 MG: 20 TABLET, FILM COATED ORAL at 18:40

## 2022-05-12 RX ADMIN — APIXABAN 5 MG: 5 TABLET, FILM COATED ORAL at 18:40

## 2022-05-12 RX ADMIN — Medication 10 ML: at 15:34

## 2022-05-12 RX ADMIN — GUAIFENESIN 1200 MG: 600 TABLET ORAL at 10:42

## 2022-05-12 RX ADMIN — IPRATROPIUM BROMIDE 0.5 MG: 0.5 SOLUTION RESPIRATORY (INHALATION) at 20:35

## 2022-05-12 RX ADMIN — DILTIAZEM HYDROCHLORIDE 300 MG: 180 CAPSULE, COATED, EXTENDED RELEASE ORAL at 10:42

## 2022-05-12 RX ADMIN — BUDESONIDE 500 MCG: 0.5 SUSPENSION RESPIRATORY (INHALATION) at 07:28

## 2022-05-12 RX ADMIN — DIGOXIN 250 MCG: 0.25 INJECTION INTRAMUSCULAR; INTRAVENOUS at 15:34

## 2022-05-12 NOTE — PROGRESS NOTES
Problem: Falls - Risk of  Goal: *Absence of Falls  Description: Document Tyler Valdovinos Fall Risk and appropriate interventions in the flowsheet.   Note: Fall Risk Interventions:  Mobility Interventions: Bed/chair exit alarm    Mentation Interventions: Adequate sleep, hydration, pain control    Medication Interventions: Bed/chair exit alarm    Elimination Interventions: Call light in reach    History of Falls Interventions: Bed/chair exit alarm,Consult care management for discharge planning

## 2022-05-12 NOTE — PROGRESS NOTES
Occupational Therapy Re-EVALUATION/discharge  Patient: Erickson Sands (21 y.o. male)  Date: 5/12/2022  Primary Diagnosis: Rectal cancer ([address] [C20]  Procedure(s) (LRB):  LAPAROSCOPIC LOW ANTERIOR RESECTION WITH COLOSTOMY (N/A) 9 Days Post-Op   Precautions: Fall       ASSESSMENT AND RECOMMENDATIONS:  Based on the objective data described below, the patient presents with decreased activity and cardiopulmonary tolerance following admission on 5/3/2022 for surgical intervention due to rectal cancer. Pt initially evaluated by OT on 5/8/2022 and pt was POD 5 s/p resection with colostomy creation, was found to be MOD I at that time. New orders replaced due to ongoing hospital stay and pt lives alone at baseline    Patient today is received in bed, is pleasant and agreeable to participate with OT. He demonstrates serial ADL tasks and ADL transfers with overall supervision to MOD I and he is able to manage his own O2 line during all mobility. Pt with occasional fair dynamic standing balance, but he does not require physical assistance throughout. He demonstrates intact ROM and coordination to complete ADL tasks and reports his training with wound care for ostomy mgmt was going well. SpO2 ranged down into 80s with activity (baseline 2.5L) and HR elevated to 168 bpm with minimal activity (RN aware). Pt is highly receptive to education on energy conservation techniques, activity pacing, and ADL modifications to which pt demonstrates good carry-over. No further skilled acute OT services indicated at this time, but recommend [facility] OT follow up to ensure safety and good carry-over in the home. Skilled acute occupational therapy is no longer indicated at this time.   Discharge Recommendations: Home Health  Further Equipment Recommendations for Discharge: none for OT      SUBJECTIVE:   Patient stated I feel like I'll get better once I'm able to get up and move around my home more instead of being in that bed all the time.    OBJECTIVE DATA SUMMARY:   Hospital course since last seen and reason for reevaluation: prolonged hospitalization and pt lives alone at baseline    Cognitive/Behavioral Status:  Neurologic State: Alert  Orientation Level: Oriented X4  Cognition: Appropriate decision making; Appropriate for age attention/concentration; Appropriate safety awareness; Follows commands  Perception: Appears intact  Perseveration: No perseveration noted  Safety/Judgement: Awareness of environment; Fall prevention;Good awareness of safety precautions; Home safety; Insight into deficits    Vision/Perceptual:    Corrective Lenses: Glasses    Range of Motion:  AROM: Within functional limits  PROM: Within functional limits    Strength:  Strength: Within functional limits    Coordination:  Coordination: Within functional limits  Fine Motor Skills-Upper: Left Intact; Right Intact    Gross Motor Skills-Upper: Left Intact; Right Intact     Tone:  Tone: Normal    Balance:  Sitting: Intact  Standing: Intact; Without support  Standing - Static: Good  Standing - Dynamic : Good;Fair    Functional Mobility and Transfers for ADLs:  Bed Mobility:  Rolling: Independent  Supine to Sit: Independent  Sit to Supine: Independent  Scooting: Independent    Transfers:  Sit to Stand: Modified independent  Functional Transfers  Bathroom Mobility: Stand-by assistance  Toilet Transfer : Modified independent  Shower Transfer: Supervision;Stand-by assistance   Bed to Chair: Stand-by assistance    ADL Assessment:  Feeding: Independent    Oral Facial Hygiene/Grooming: Modified Independent    Bathing: Supervision;Modified independent    Upper Body Dressing: Modified independent    Lower Body Dressing: Modified independent    Toileting: Stand by assistance (still completing colostomy training with wound care)    ADL Intervention:  Grooming  Grooming Assistance: Modified independent  Position Performed: Standing (at sink)  Washing Hands: Modified independent    Lower Body Dressing Assistance  Socks: Modified independent    Toileting  Bladder Hygiene:  (quinones)  Bowel Hygiene:  (working on colostomy mgmt with wound care)    Cognitive Retraining  Safety/Judgement: Awareness of environment; Fall prevention;Good awareness of safety precautions; Home safety; Insight into deficits    Patient instructed and indicated understanding energy conservation techniques to increase independence and safety during ADLs. Functional Measure:    Barthel Index:  Bathin  Bladder: 0 (quinones)  Bowels: 0 (colostomy)  Groomin  Dressing: 10  Feeding: 10  Mobility: 10  Stairs: 5  Toilet Use: 10  Transfer (Bed to Chair and Back): 15  Total: 70/100      The Barthel ADL Index: Guidelines  1. The index should be used as a record of what a patient does, not as a record of what a patient could do. 2. The main aim is to establish degree of independence from any help, physical or verbal, however minor and for whatever reason. 3. The need for supervision renders the patient not independent. 4. A patient's performance should be established using the best available evidence. Asking the patient, friends/relatives and nurses are the usual sources, but direct observation and common sense are also important. However direct testing is not needed. 5. Usually the patient's performance over the preceding 24-48 hours is important, but occasionally longer periods will be relevant. 6. Middle categories imply that the patient supplies over 50 per cent of the effort. 7. Use of aids to be independent is allowed. Score Interpretation (from [address] [address])    Independent   60-79 Minimally independent   40-59 Partially dependent   20-39 Very dependent   <20 Totally dependent     -Titi De Leon., Barthel, D.W. (1965). Functional evaluation: the Barthel Index. [address] [facility] ([address] (1997).  The Barthel activities of daily living index: self-reporting versus actual performance in the old (> or = 75 years). Journal of [address] [place] [address] 45(5), [address] [facility], ...F, Dianna Baron., Valentín Montanez. (1999). Measuring the change in disability after inpatient rehabilitation; comparison of the responsiveness of the Barthel Index and Functional Rickreall Measure. Journal of Neurology, Neurosurgery, and Psychiatry, 66(4), 911-123. Omero KRISTIE Ruiz, JUICE Dejesus, & Matthias Cui M.A. (2004) Assessment of post-stroke quality of life in cost-effectiveness studies: The usefulness of the Barthel Index and the EuroQoL-5D. Quality of Life Research, 15, 779-27     Occupational Therapy Evaluation Charge Determination   History Examination Decision-Making   LOW Complexity : Brief history review  LOW Complexity : 1-3 performance deficits relating to physical, cognitive , or psychosocial skils that result in activity limitations and / or participation restrictions  MEDIUM Complexity : Patient may present with comorbidities that affect occupational performnce. Miniml to moderate modification of tasks or assistance (eg, physical or verbal ) with assesment(s) is necessary to enable patient to complete evaluation       Based on the above components, the patient evaluation is determined to be of the following complexity level: LOW   Pain:  Pt reporting minimal pain    Activity Tolerance:   Please refer to the flowsheet for vital signs taken during this treatment. After treatment:   [x]  Patient left in no apparent distress sitting up in chair  []  Patient left in no apparent distress in bed  [x]  Call bell left within reach  [x]  Nursing notified  []  Caregiver present  []  Bed alarm activated    COMMUNICATION/EDUCATION:   Communication/Collaboration:  [x]      Home safety education was provided and the patient/caregiver indicated understanding. [x]      Patient/family have participated as able and agree with findings and recommendations.   []      Patient is unable to participate in plan of care at this time.   Findings and recommendations were discussed with: Physical Therapist and Registered Nurse    Maria Elena Hewitt OT  Time Calculation: 34 mins

## 2022-05-12 NOTE — PROGRESS NOTES
CRS  Pt without complaints. Severe back pain resolved since quinones placed  Flowsheet reviewed  Abd: soft, nt  Colostomy: pink    Plan:  1) dispo needs. Pt lives by himself. Is he safe to go up and down stairs independently? Care for colostomy? Empty and care for quinones catheter? Has he demonstrated basic fundamental knowledge with regards to colostomy changing and quinones emptying above what Home Health will provide? I will have PT/OT evaluate, WOCN evaluate ability (above what HH will provide)    2) Pt is on solu-medrol per Pulmonary, Dr. Carol Hernández since 5/5. Can we just stop solu-medrol? Does this need to be converted to Prednisone? If prednisone, does he need a Prednisone taper? We will check with Pulmonary for their recommendations. He will need outpatient follow up with pulmonary    3) urinary retention. Home with quinones leg bag and on flomax. Follow up Urology    4) recheck labs.  Leukocytosis may in part be due to solumedrol

## 2022-05-12 NOTE — PROGRESS NOTES
CARDIOLOGY PROGRESS NOTE        [address], [place], [address] [facility]  Phone [phone]; Fax [phone]          2022 7:43 AM       Admit Date:           5/3/2022  Admit Diagnosis:  Rectal cancer ([address], YOB: 1950   MRN:          [id number]        Assessment/Plan  1. Chronic atrial fibrillation: on dilt 240 mg and eliquis at home. Rates back up to 130-150's today, increase dilt to 360 mg. Had prev discussed using IV dig for rate control - will order 250 mcg IV. Resumed eliquis. Would keep another day to monitor rate response    2. CAD: s/p PCI. Dobutamine Cardiolite 10/2017 showed normal perfusion.  On statin OP. 3. COPD: on chronic oxygen 2.5 liters at home. Dyspnea is at baseline. 4. Dilated Ascending Aorta measuring 4.4 cm by chest CT 2021.  5. Dyslipidemia: on atrovastatin 40 mg every day as OP. 6. S/p LAPAROSCOPIC LOW ANTERIOR RESECTION WITH COLOSTOMY: cleared for d/c by gen surgery                Agree with advanced NP's history, exam and  A/P with changes/additons. Blood pressure 122/67, pulse 95, temperature 97.7 °F (36.5 °C), resp. rate 18, height 5' 10\" (1.778 m), weight 122 lb 2.2 oz (55.4 kg), SpO2 95 %. A/P:  Afib - persistent; Diltiazem dose adjusted; Rate improved; Eliquis restarted  Rectal Ca - s/p abd surgery      Dillan Pugh MD, [facility]      We discussed the expected course, resolution and complications of the diagnosis(es) in detail. Medication risks, benefits, costs, interactions, and alternatives were discussed as indicated. No intake/output data recorded. Last 3 Recorded Weights in this Encounter    22 1136   Weight: 55.4 kg (122 lb 2.2 oz)         05/10 1901 -  0700  In: 0   Out: Jose Francisco Blend [XQXWT:7084; Drains:290]    SUBJECTIVE      Admitted for planned abd surgery. Zhen Peralta reports cont PATEL.        Current Facility-Administered Medications   Medication Dose Route Frequency    [START ON 5/13/2022] predniSONE (DELTASONE) tablet 40 mg  40 mg Oral DAILY WITH BREAKFAST    tamsulosin (FLOMAX) capsule 0.4 mg  0.4 mg Oral DAILY    dilTIAZem ER (CARDIZEM CD) capsule 300 mg  300 mg Oral DAILY    metoprolol (LOPRESSOR) injection 5 mg  5 mg IntraVENous Q6H PRN    apixaban (ELIQUIS) tablet 5 mg  5 mg Oral BID    benzonatate (TESSALON) capsule 200 mg  200 mg Oral TID PRN    ipratropium (ATROVENT) 0.02 % nebulizer solution 0.5 mg  0.5 mg Nebulization Q6HWA RT    And    arformoteroL (BROVANA) neb solution 15 mcg  15 mcg Nebulization BID RT    famotidine (PEPCID) tablet 20 mg  20 mg Oral BID    sodium chloride (NS) flush 5-40 mL  5-40 mL IntraVENous PRN    naloxone (NARCAN) injection 0.04 mg  0.04 mg IntraVENous Multiple    albuterol (PROVENTIL VENTOLIN) nebulizer solution 2.5 mg  2.5 mg Nebulization Q4H PRN    budesonide (PULMICORT) 500 mcg/2 ml nebulizer suspension  500 mcg Nebulization BID RT    montelukast (SINGULAIR) tablet 10 mg  10 mg Oral QHS    sodium chloride (NS) flush 5-40 mL  5-40 mL IntraVENous Q8H    sodium chloride (NS) flush 5-40 mL  5-40 mL IntraVENous PRN    promethazine (PHENERGAN) tablet 12.5 mg  12.5 mg Oral Q6H PRN    Or    ondansetron (ZOFRAN) injection 4 mg  4 mg IntraVENous Q6H PRN    acetaminophen (TYLENOL) tablet 1,000 mg  1,000 mg Oral Q6H    oxyCODONE IR (ROXICODONE) tablet 5 mg  5 mg Oral Q4H PRN    Or    oxyCODONE IR (ROXICODONE) tablet 10 mg  10 mg Oral Q4H PRN    guaiFENesin ER (MUCINEX) tablet 1,200 mg  1,200 mg Oral Q12H      OBJECTIVE               Intake/Output Summary (Last 24 hours) at 5/12/2022 1438  Last data filed at 5/12/2022 0246  Gross per 24 hour   Intake    Output 5000 ml   Net -5000 ml       Review of Systems - History obtained from the patient AS PER  HPI        PHYSICAL EXAM        Visit Vitals  /76   Pulse (!) 106   Temp 97.6 °F (36.4 °C)   Resp 18   Ht 5' 10\" (1.778 m)   Wt 55.4 kg (122 lb 2.2 oz)   SpO2 91% BMI 17.52 kg/m²       Gen: Well-developed, well-nourished, in no acute distress  alert and oriented x 3  HEENT:  Pink conjunctivae, Hearing grossly normal.No scleral icterus or conjunctival, moist mucous membranes  Neck: Supple,  No JVD  Resp: No accessory muscle use, diminished breath sounds, No rales or rhonchi  Card: Irregular Rate,  Rythm,  Normal S1, S2, No murmurs, rubs or gallop. MSK: No cyanosis or clubbing, good capillary refill  Skin: No rashes or ulcers, no bruising  Neuro:  Moving all four extremities, no focal deficit, follows commands appropriately  Psych:  Good insight, oriented to person, place and time, alert, Nml Affect  LE: No edema       DATA REVIEW      CATH: 2/12/2014: L Main: MLI, LAD: Mid 50%; MLI; Med size; D1 and D2 - MLI (small),   LCflex: Med ; Prox 40%; Mid 40% OM1 - med; distally OM1 divides to 3 branches (prox branch - med)- distal 2 branches - small; RCA: Med; Mid 99%; Distally 40% PDA and PLB - small to med, LVEDP: 11. LVEF: 45%; Ant HK, Inf HK, no signif grad across AV   --- MARCO (Resolute 3 x 22) -> RCA   ECHO: 2/12/14: EF 50%, mild HK basal-mid anteroseptal and basal-mid inferior wall(s). mild MR/TR     Health fair screening 3/13/15, carotid duplex showed mild stenosis, aortic duplex normal, ZENON normal, EKG normal   Echo 6/24/15 - EF 55%, normal wall motion   Holter 7/23/15 - SR , rare episodes of AFL/AF  CT Heart Scan - Calcium score 2041, incidental pulmonary findings (see full report). Echo 4/3/17 - EF 55%. No WMA. Grade 1 diastolic dysfunction. Dobutamine cardiolite 10/12/17 - normal perfusion, EF 61%, but new AF noted at rest, persistent with stress  EKG 12/4/18 - AF 70s    Cardiac monitor: SR        Laboratory and Imaging have been reviewed by me and are notable for  No results for input(s): CPK, CKMB, TROIQ in the last 72 hours.   Recent Labs     05/12/22  1144 05/11/22  0729 05/10/22  0356   * 131* 134*   K 3.7 4.0 3.7   CO2 36* 35* 34*   BUN 22* 25* 20   CREA 0.57* 0.51* 0.50*   * 107* 110*   WBC 21.8* 16.0* 13.1*   HGB 12.8 13.5 12.9   HCT 36.8 38.4 37.2    369 370

## 2022-05-12 NOTE — PROGRESS NOTES
Problem: Mobility Impaired (Adult and Pediatric)  Goal: *Acute Goals and Plan of Care (Insert Text)  Description: FUNCTIONAL STATUS PRIOR TO ADMISSION: Patient was independent and active without use of DME. No recent falls    HOME SUPPORT PRIOR TO ADMISSION: Has friends that check on him. Home O2 at 2.5 L/min    Physical Therapy Goals  Initiated 5/6/2022  1. Patient will ambulate with independence for 100 feet with the least restrictive device within 7 day(s). 2.  Patient will ascend/descend 3 stairs with handrail(s) with modified independence within 7 day(s). Outcome: Progressing Towards Goal   PHYSICAL THERAPY TREATMENT  Patient: Clifton Rodriguez (38 y.o. male)  Date: 5/12/2022  Diagnosis: Rectal cancer ([facility] [facility] [C20] <principal problem not specified>  Procedure(s) (LRB):  LAPAROSCOPIC LOW ANTERIOR RESECTION WITH COLOSTOMY (N/A) 9 Days Post-Op  Precautions:    Chart, physical therapy assessment, plan of care and goals were reviewed. ASSESSMENT  Patient continues with skilled PT services and is progressing towards goals. Pt requires increased time and repetitive verbal cueing for PLBing as pt is a mouth breather. Ambulates and completes stair training on 3L/min. SpO2 dropping as low as 87% briefly with increased activity. Pt is at an elevated fall risk and ambulates with wide MULU, increased trunk sway and requires 2 standing rest breaks x 3 minutes. Pt is mobilizing at a level safe for discharge with activity pacing and HHPT follow up. Current Level of Function Impacting Discharge (mobility/balance): SBA for mobility    Other factors to consider for discharge: slightly increased home O2 needs         PLAN :  Patient continues to benefit from skilled intervention to address the above impairments. Continue treatment per established plan of care. to address goals.     Recommendation for discharge: (in order for the patient to meet his/her long term goals)  Physical therapy at least 2 days/week in the home This discharge recommendation:  Has been made in collaboration with the attending provider and/or case management    IF patient discharges home will need the following DME: patient owns DME required for discharge       SUBJECTIVE:   Patient stated I can get a walker if I need it.     OBJECTIVE DATA SUMMARY:   Critical Behavior:  Neurologic State: Alert  Orientation Level: Oriented X4  Cognition: Appropriate decision making,Appropriate for age attention/concentration,Appropriate safety awareness,Follows commands  Safety/Judgement: Awareness of environment,Fall prevention,Good awareness of safety precautions,Home safety,Insight into deficits  Functional Mobility Training:  Bed Mobility:  Rolling: Independent  Supine to Sit: Independent  Sit to Supine: Independent  Scooting: Independent        Transfers:  Sit to Stand: Stand-by assistance  Stand to Sit: Stand-by assistance        Bed to Chair: Stand-by assistance                    Balance:  Sitting: Intact  Standing: Impaired  Standing - Static: Good  Standing - Dynamic : Fair  Ambulation/Gait Training:  Distance (ft): 175 Feet (ft)  Assistive Device: Gait belt  Ambulation - Level of Assistance: Contact guard assistance        Gait Abnormalities: Path deviations        Base of Support: Widened     Speed/[place]: Pace decreased (<100 feet/min)                       Stairs:  Number of Stairs Trained: 3  Stairs - Level of Assistance: Stand-by assistance   Rail Use: Right       Activity Tolerance:   Fair, desaturates with exertion and requires oxygen, and requires rest breaks    After treatment patient left in no apparent distress:   Supine in bed and Call bell within reach    COMMUNICATION/COLLABORATION:   The patients plan of care was discussed with: Registered nurse.      Shay Nelson, PT   Time Calculation: 17 mins

## 2022-05-13 VITALS
TEMPERATURE: 98.1 F | HEART RATE: 69 BPM | WEIGHT: 122.14 LBS | BODY MASS INDEX: 17.49 KG/M2 | DIASTOLIC BLOOD PRESSURE: 62 MMHG | OXYGEN SATURATION: 96 % | SYSTOLIC BLOOD PRESSURE: 111 MMHG | RESPIRATION RATE: 19 BRPM | HEIGHT: 70 IN

## 2022-05-13 PROCEDURE — 99231 SBSQ HOSP IP/OBS SF/LOW 25: CPT | Performed by: INTERNAL MEDICINE

## 2022-05-13 PROCEDURE — 94664 DEMO&/EVAL PT USE INHALER: CPT

## 2022-05-13 PROCEDURE — 94640 AIRWAY INHALATION TREATMENT: CPT

## 2022-05-13 PROCEDURE — 74011250637 HC RX REV CODE- 250/637: Performed by: COLON & RECTAL SURGERY

## 2022-05-13 PROCEDURE — 74011000250 HC RX REV CODE- 250: Performed by: COLON & RECTAL SURGERY

## 2022-05-13 PROCEDURE — 74011250637 HC RX REV CODE- 250/637: Performed by: NURSE PRACTITIONER

## 2022-05-13 PROCEDURE — 74011636637 HC RX REV CODE- 636/637: Performed by: NURSE PRACTITIONER

## 2022-05-13 PROCEDURE — 77010033678 HC OXYGEN DAILY

## 2022-05-13 PROCEDURE — 74011000250 HC RX REV CODE- 250: Performed by: INTERNAL MEDICINE

## 2022-05-13 PROCEDURE — APPSS30 APP SPLIT SHARED TIME 16-30 MINUTES: Performed by: NURSE PRACTITIONER

## 2022-05-13 RX ORDER — DILTIAZEM HYDROCHLORIDE 360 MG/1
360 CAPSULE, EXTENDED RELEASE ORAL DAILY
Qty: 30 CAPSULE | Refills: 1 | Status: SHIPPED | OUTPATIENT
Start: 2022-05-14 | End: 2022-06-06

## 2022-05-13 RX ADMIN — IPRATROPIUM BROMIDE 0.5 MG: 0.5 SOLUTION RESPIRATORY (INHALATION) at 08:31

## 2022-05-13 RX ADMIN — MONTELUKAST 10 MG: 10 TABLET, FILM COATED ORAL at 01:31

## 2022-05-13 RX ADMIN — TAMSULOSIN HYDROCHLORIDE 0.4 MG: 0.4 CAPSULE ORAL at 08:32

## 2022-05-13 RX ADMIN — ARFORMOTEROL TARTRATE 15 MCG: 15 SOLUTION RESPIRATORY (INHALATION) at 08:31

## 2022-05-13 RX ADMIN — Medication 10 ML: at 06:19

## 2022-05-13 RX ADMIN — DILTIAZEM HYDROCHLORIDE 360 MG: 180 CAPSULE, EXTENDED RELEASE ORAL at 08:37

## 2022-05-13 RX ADMIN — PREDNISONE 40 MG: 20 TABLET ORAL at 08:31

## 2022-05-13 RX ADMIN — ACETAMINOPHEN 1000 MG: 325 TABLET ORAL at 01:31

## 2022-05-13 RX ADMIN — Medication 10 ML: at 01:31

## 2022-05-13 RX ADMIN — APIXABAN 5 MG: 5 TABLET, FILM COATED ORAL at 08:31

## 2022-05-13 RX ADMIN — GUAIFENESIN 1200 MG: 600 TABLET ORAL at 01:31

## 2022-05-13 RX ADMIN — FAMOTIDINE 20 MG: 20 TABLET, FILM COATED ORAL at 08:31

## 2022-05-13 RX ADMIN — GUAIFENESIN 1200 MG: 600 TABLET ORAL at 08:31

## 2022-05-13 RX ADMIN — BUDESONIDE 500 MCG: 0.5 SUSPENSION RESPIRATORY (INHALATION) at 08:31

## 2022-05-13 NOTE — PROGRESS NOTES
5/13/2022 11:20 AM   Care Management Progress Note         ICD-10-CM ICD-9-CM     1. Persistent atrial fibrillation (HCC)  I48.19 427.31           RUR:  15%  Risk Level: []?? ??Low [x]? ???Moderate []?? ?? High  Value-based purchasing: []???? Yes [x]???? No  Bundle patient: []???? Yes [x]???? No              Specify:      Possible discharge home tomorrow      Transition of care plan:  1. Ongoing management by [facility]Rectal, POD 10 laparoscopic low anterior resection with colostomy   2. [facility] health [facility] not[facility] pt's discharge home today and sent discharge clinicals via [employer]. 3. Pt to discharge home with quinones, needing 2 week follow up with Urology per primary team. CM called and scheduled follow up appt for 5/25 at 8AM. Appt added to pt's avs.   4. Medicare pt has received, reviewed, and signed 2nd IM letter informing them of their right to appeal the discharge. Signed copy has been placed on pt's hard chart. Pt was provided a copy for their records. 5. Pt was provided transport options for getting to and from appts. This was printed for pt and also added to pt's avs.   6. Outpatient follow-up. 7. Pt's friend to transport      Care Management Interventions  PCP Verified by CM:  Yes (Dr Agustin [facility])  Transition of Care Consult (CM Consult): [address]: No  Reason Outside [place]: Patient already serviced by other home care/hospice agency  Support Systems: Other Family Member(s)  Confirm Follow Up Transport: Family  The Plan for Transition of Care is Related to the Following Treatment Goals : home health  The Patient and/or Patient Representative was Provided with a Choice of Provider and Agrees with the Discharge Plan?: Yes  Name of the Patient Representative Who was Provided with a Choice of Provider and Agrees with the Discharge Plan: Shawn Maker of Choice List was Provided with Basic Dialogue that Supports the Patient's Individualized Plan of Care/Goals, Treatment Preferences and Shares the Quality Data Associated with the Providers?: Yes  Discharge Location  8.  Patient Expects to be Discharged to[de-identified] Home with home health  9.

## 2022-05-13 NOTE — PROGRESS NOTES
CARDIOLOGY PROGRESS NOTE        [address] [place] [address]., [address], [place], [address] [facility]  Phone [phone]; Fax [phone]          2022 7:43 AM       Admit Date:           5/3/2022  Admit Diagnosis:  Rectal cancer ([facility] [address] Kim Fossa  :          1950   MRN:          [id number]        Assessment/Plan  1. Chronic atrial fibrillation: on dilt 240 mg and eliquis at home. Dilt increased yesterday to 360 mg every day Cont eliquis. 2. CAD: s/p PCI. Dobutamine Cardiolite 10/2017 showed normal perfusion. On statin OP. 3. COPD: on chronic oxygen 2.5 liters at home. Dyspnea is at baseline. 4. Dilated Ascending Aorta measuring 4.4 cm by chest CT 2021.  5. Dyslipidemia: on atrovastatin 40 mg every day as OP. 6. S/p LAPAROSCOPIC LOW ANTERIOR RESECTION WITH COLOSTOMY: cleared for d/c by gen surgery      Jm Soni for d/c   Follow-up Information       Follow up With Specialties Details Why [address], If you haven't recieved a phone call within 24, hours. Please contact the agency directly. Zackary Tate MD Colon and Rectal Surgery Call in 2 weeks call to make appt in 2 weeks after discharge [address]      Aruna Riojas MD Urology On 2022 8AM Urology follow up for voiding trial  [phone] [id number]  [phone]                          Pt personally seen and examined. Chart reviewed. Agree with advanced NP's history, exam and  A/P with changes/additons. Blood pressure 111/62, pulse 69, temperature 98.1 °F (36.7 °C), resp. rate 19, height 5' 10\" (1.778 m), weight 122 lb 2.2 oz (55.4 kg), SpO2 96 %.     CVS-S1-S2 present, Irr  RS-   CTAB          A/P:  Afib - persistent; Rate controlled; on DOAC      Discussed with patient/nursing    Barbara Delgado Pavel BARRERA, [facility]    We discussed the expected course, resolution and complications of the diagnosis(es) in detail. Medication risks, benefits, costs, interactions, and alternatives were discussed as indicated. No intake/output data recorded. Last 3 Recorded Weights in this Encounter    05/03/22 1136   Weight: 55.4 kg (122 lb 2.2 oz)         05/11 1901 - 05/13 0700  In: -   Out: 3706 [Urine:7625]    SUBJECTIVE      Admitted for planned abd surgery. Fernando Govea reports cont PATEL.        Current Facility-Administered Medications   Medication Dose Route Frequency    predniSONE (DELTASONE) tablet 40 mg  40 mg Oral DAILY WITH BREAKFAST    dilTIAZem ER (CARDIZEM CD) capsule 360 mg  360 mg Oral DAILY    tamsulosin (FLOMAX) capsule 0.4 mg  0.4 mg Oral DAILY    metoprolol (LOPRESSOR) injection 5 mg  5 mg IntraVENous Q6H PRN    apixaban (ELIQUIS) tablet 5 mg  5 mg Oral BID    benzonatate (TESSALON) capsule 200 mg  200 mg Oral TID PRN    ipratropium (ATROVENT) 0.02 % nebulizer solution 0.5 mg  0.5 mg Nebulization Q6HWA RT    And    arformoteroL (BROVANA) neb solution 15 mcg  15 mcg Nebulization BID RT    famotidine (PEPCID) tablet 20 mg  20 mg Oral BID    sodium chloride (NS) flush 5-40 mL  5-40 mL IntraVENous PRN    naloxone (NARCAN) injection 0.04 mg  0.04 mg IntraVENous Multiple    albuterol (PROVENTIL VENTOLIN) nebulizer solution 2.5 mg  2.5 mg Nebulization Q4H PRN    budesonide (PULMICORT) 500 mcg/2 ml nebulizer suspension  500 mcg Nebulization BID RT    montelukast (SINGULAIR) tablet 10 mg  10 mg Oral QHS    sodium chloride (NS) flush 5-40 mL  5-40 mL IntraVENous Q8H    sodium chloride (NS) flush 5-40 mL  5-40 mL IntraVENous PRN    promethazine (PHENERGAN) tablet 12.5 mg  12.5 mg Oral Q6H PRN    Or    ondansetron (ZOFRAN) injection 4 mg  4 mg IntraVENous Q6H PRN    acetaminophen (TYLENOL) tablet 1,000 mg  1,000 mg Oral Q6H    oxyCODONE IR (ROXICODONE) tablet 5 mg  5 mg Oral Q4H PRN    Or    oxyCODONE IR (ROXICODONE) tablet 10 mg  10 mg Oral Q4H PRN    guaiFENesin ER (MUCINEX) tablet 1,200 mg  1,200 mg Oral Q12H      OBJECTIVE               Intake/Output Summary (Last 24 hours) at 5/13/2022 0801  Last data filed at 5/13/2022 0630  Gross per 24 hour   Intake --   Output 4135 ml   Net -4135 ml       Review of Systems - History obtained from the patient AS PER  HPI        PHYSICAL EXAM        Visit Vitals  /68 (BP 1 Location: Right upper arm, BP Patient Position: At rest)   Pulse 88   Temp 97.8 °F (36.6 °C)   Resp 18   Ht 5' 10\" (1.778 m)   Wt 55.4 kg (122 lb 2.2 oz)   SpO2 96%   BMI 17.52 kg/m²       Gen: Well-developed, well-nourished, in no acute distress  alert and oriented x 3  HEENT:  Pink conjunctivae, Hearing grossly normal.No scleral icterus or conjunctival, moist mucous membranes  Neck: Supple,  No JVD  Resp: No accessory muscle use, diminished breath sounds, No rales or rhonchi  Card: Irregular Rate,  Rythm,  Normal S1, S2, No murmurs, rubs or gallop. MSK: No cyanosis or clubbing, good capillary refill  Skin: No rashes or ulcers, no bruising  Neuro:  Moving all four extremities, no focal deficit, follows commands appropriately  Psych:  Good insight, oriented to person, place and time, alert, Nml Affect  LE: No edema       DATA REVIEW      CATH: 2/12/2014: L Main: MLI, LAD: Mid 50%; MLI; Med size; D1 and D2 - MLI (small),   LCflex: Med ; Prox 40%; Mid 40% OM1 - med; distally OM1 divides to 3 branches (prox branch - med)- distal 2 branches - small; RCA: Med; Mid 99%; Distally 40% PDA and PLB - small to med, LVEDP: 11. LVEF: 45%; Ant HK, Inf HK, no signif grad across AV   --- MARCO (Resolute 3 x 22) -> RCA   ECHO: 2/12/14: EF 50%, mild HK basal-mid anteroseptal and basal-mid inferior wall(s).   mild MR/TR     Health fair screening 3/13/15, carotid duplex showed mild stenosis, aortic duplex normal, ZENON normal, EKG normal   Echo 6/24/15 - EF 55%, normal wall motion   Holter 7/23/15 - SR , rare episodes of AFL/AF  CT Heart Scan - Calcium score 2041, incidental pulmonary findings (see full report). Echo 4/3/17 - EF 55%. No WMA. Grade 1 diastolic dysfunction. Dobutamine cardiolite 10/12/17 - normal perfusion, EF 61%, but new AF noted at rest, persistent with stress  EKG 12/4/18 - AF 70s    Cardiac monitor: SR        Laboratory and Imaging have been reviewed by me and are notable for  No results for input(s): CPK, CKMB, TROIQ in the last 72 hours.   Recent Labs     05/12/22  1144 05/11/22  0729   * 131*   K 3.7 4.0   CO2 36* 35*   BUN 22* 25*   CREA 0.57* 0.51*   * 107*   WBC 21.8* 16.0*   HGB 12.8 13.5   HCT 36.8 38.4    369

## 2022-05-13 NOTE — PROGRESS NOTES
0730-bedside report received from off going RN and care resumed for blaise VÁZQUEZ completed and whiteboard updated,introductions made, pt appears to be resting in bed at this time, ambulatory to chair and bathroom, pt denies pain at this time, will continue to monitor and act accordingly  1130-this nurse went over discharge paperwork with patient, PIV removed, quinones container changed to a leg bag, pt educated on quinones catheter care, al questions answered at this time  1200-quinones care completed again, charted

## 2022-05-13 NOTE — DISCHARGE SUMMARY
Discharge Summary    Patient: Jim Tirado               Sex: male          DOA: [unfilled]       YOB: 1950      Age:  81 y.o.        LOS:  LOS: 10 days                Admit Date: 5/3/2022    Discharge Date: 5/13/2022    Admission Diagnoses: Rectal cancer ([facility] [C20]    Discharge Diagnoses:    Problem List as of 5/13/2022 Date Reviewed: 3/25/2022          Codes Class Noted - Resolved    Unspecified severe protein-calorie malnutrition ([facility] (Chronic) ICD-10-CM: E43  ICD-9-CM: 262  5/6/2022 - Present        Rectal cancer ([facility] ICD-10-CM: C20  ICD-9-CM: 154.1  5/3/2022 - Present        Other fatigue ICD-10-CM: R53.83  ICD-9-CM: 780.79  3/25/2022 - Present        Rectosigmoid cancer ([facility] ICD-10-CM: C19  ICD-9-CM: 154.0  3/10/2022 - Present        Centrilobular emphysema ([place] [address] ICD-10-CM: J43.2  ICD-9-CM: 492.8  3/10/2022 - Present        Elevated CEA ICD-10-CM: R97.0  ICD-9-CM: 795.81  3/10/2022 - Present        Gastric dysplasia ICD-10-CM: Q40.3  ICD-9-CM: 750.9  3/10/2022 - Present        Bloody stools ICD-10-CM: K92.1  ICD-9-CM: 578.1  3/10/2022 - Present        Ascending aorta dilation ([facility] ICD-10-CM: I77.810  ICD-9-CM: 447.71  2/15/2020 - Present        Pneumonia ICD-10-CM: J18.9  ICD-9-CM: 875  2/15/2019 - Present        Atrial flutter, paroxysmal (HCC) (Chronic) ICD-10-CM: I48.92  ICD-9-CM: 427.32  3/31/2016 - Present        SVT (supraventricular tachycardia) ([facility] ICD-10-CM: I47.1  ICD-9-CM: 427.89  6/24/2015 - Present        Vitamin D deficiency ICD-10-CM: E55.9  ICD-9-CM: 268.9  3/27/2014 - Present        Dyslipidemia ICD-10-CM: E78.5  ICD-9-CM: 272.4  3/27/2014 - Present        CAD (coronary artery disease), native coronary artery (Chronic) ICD-10-CM: I25.10  ICD-9-CM: 414.01  3/12/2014 - Present        Chronic obstructive pulmonary disease ([facility] ICD-10-CM: J44.9  ICD-9-CM: 877  2/27/2014 - Present        Atrial fibrillation ([facility] ICD-10-CM: I48.91  ICD-9-CM: 427.31  2/16/2014 - Present RESOLVED: Acute on chronic respiratory failure with hypoxemia (HCC) ICD-10-CM: J96.21  ICD-9-CM: 518.84  2/15/2019 - 2/15/2020        RESOLVED: Elevated lactic acid level ICD-10-CM: R79.89  ICD-9-CM: 276.2  2/15/2019 - 2/17/2019        RESOLVED: SIRS (systemic inflammatory response syndrome) ([facility] ICD-10-CM: R65.10  ICD-9-CM: 995.90  2/15/2019 - 2/21/2019        RESOLVED: Acute and chronic respiratory failure (acute-on-chronic) ([facility] ICD-10-CM: J96.20  ICD-9-CM: 518.84  10/31/2014 - 12/23/2014        RESOLVED: COPD with exacerbation ([facility] ICD-10-CM: J44.1  ICD-9-CM: 491.21  10/28/2014 - 12/18/2014        RESOLVED: Insulin resistance ICD-10-CM: E88.81  ICD-9-CM: 277.7  3/27/2014 - 6/24/2015        RESOLVED: Acute respiratory failure (HCC) ICD-10-CM: J96.00  ICD-9-CM: 518.81  2/12/2014 - 2/27/2014        RESOLVED: NSTEMI (non-ST elevated myocardial infarction) ([facility] ICD-10-CM: I21.4  ICD-9-CM: 410.70  2/12/2014 - 2/27/2014        RESOLVED: Chronic airway obstruction, not elsewhere classified (Chronic) ICD-10-CM: J44.9  ICD-9-CM: 496  2/12/2014 - 3/12/2014              Discharge Condition: Good    Hospital Course:     Rebecca Diaz is a 73 y.o. male who is well known to CRS for a history of rectal cancer at 10cm. Given comorbids, poor candidate for neoadjuvant therapy. Proceed with upfront surgery diverting colostomy on 5/3. Pt tolerated surgery well, extubated evening after surgery, kept in ICU for pulm monitoring. Pulm followed for baseline COPD. Cardiology consulted for known a-fib, meds adjusted. Advanced diet slowly, tolerated, ostomy continues to be productive. Worked with PT/OT and recs for [facility] PT/OT christopher considering pt baseline needs for O2. Pt had CT after concerns for leukocytosis, no concerning bowel etiology, but bladder extremely distended and needed quinones for retention. Pt will go home with quinones and to have outpatient void trial.  Leukocytosis most likely due to IV solumedrol.   Pt tapering off with recs from pulm. Cardiology again consulted for HR and inc diltiazem. Rate controlled and pt to go home with inc dose per cards. Pt to follow up with all physicians. All discharge instructions given to patient and all questions answered. Case management confirmed [facility] set up, pt given list of transportation services to help pt follow up for appts. Consults: Cardiology, pulm    Significant Diagnostic Studies: see full electronic records for all CTs, EKGs etc    Discharge Medications:     Current Discharge Medication List      START taking these medications    Details   predniSONE (DELTASONE) 10 mg tablet Take 10 mg by mouth daily (with breakfast). Take 4 tablets for 3 days. Then take 2 tablets for 3 days. Then continue with 1 tablet daily as usually directed  Indications: worsening chronic obstructive pulmonary disease  Qty: 18 Tablet, Refills: 0    Comments: Take 4 tablets for 3 days Then take 2 tablets for 3 days Then continue with 1 tablet daily as usually directed      oxyCODONE IR (Roxicodone) 5 mg immediate release tablet Take 1 Tablet by mouth every six (6) hours as needed for Pain for up to 3 days. Max Daily Amount: 20 mg.  Qty: 12 Tablet, Refills: 0    Associated Diagnoses: Rectal cancer (HCC)      tamsulosin (Flomax) 0.4 mg capsule Take 1 Capsule by mouth daily for 30 days. Qty: 30 Capsule, Refills: 0         CONTINUE these medications which have CHANGED    Details   dilTIAZem ER (CARDIZEM CD) 360 mg capsule Take 1 Capsule by mouth daily. Qty: 30 Capsule, Refills: 1         CONTINUE these medications which have NOT CHANGED    Details   albuterol (PROVENTIL HFA, VENTOLIN HFA, PROAIR HFA) 90 mcg/actuation inhaler Take  by inhalation every four (4) hours as needed for Wheezing. atorvastatin (LIPITOR) 40 mg tablet Take 1 Tablet by mouth daily. Qty: 90 Tablet, Refills: 3      budesonide (PULMICORT) 0.5 mg/2 mL nbsp 500 mcg by Nebulization route two (2) times a day.       montelukast (SINGULAIR) 10 mg tablet Take 10 mg by mouth nightly. albuterol (PROVENTIL VENTOLIN) 2.5 mg /3 mL (0.083 %) nebulizer solution 2.5 mg by Nebulization route every four (4) hours as needed for Wheezing or Shortness of Breath.      guaiFENesin (MUCINEX) 1,200 mg TM12 ER tablet Take 1,200 mg by mouth two (2) times a day. apixaban (Eliquis) 5 mg tablet Take 1 Tablet by mouth two (2) times a day. Qty: 180 Tablet, Refills: 4    Associated Diagnoses: Atrial flutter, paroxysmal (HCC)      nitroglycerin (NITROSTAT) 0.4 mg SL tablet 1 Tab by SubLINGual route every five (5) minutes as needed for Chest Pain. Up to 3 doses. Qty: 1 Bottle, Refills: 6      aspirin 81 mg chewable tablet Take 81 mg by mouth nightly. omega 3-DHA-EPA-fish oil 1,000 mg (120 mg-180 mg) capsule Take 2 Caps by mouth daily. cholecalciferol (VITAMIN D3) 1,000 unit tablet Take 2,000 Units by mouth daily. glycopyrrolate-formoterol (BEVESPI AEROSPHERE) 9-4.8 mcg HFAA Take 2 Puffs by inhalation two (2) times a day. multivitamin (ONE A DAY) tablet Take 1 tablet by mouth daily. Activity: No lifting, Driving, or Strenuous exercise for 2 weeks    Diet: Regular Diet- low fiber    Wound Care: Keep wound clean and dry, work with [facility] for ostomy    Follow-up: 2 weeks for colorectal f/u; 5/25/22 urology f/u; please call to schedule pulmonary and cardiac follow up appts.       Pt seen and discharge discussed with Dr Daron Yang, NP

## 2022-05-13 NOTE — PROGRESS NOTES
No Hospital Follow Up Scheduled due to PCP Kennieth Crigler, MD is a [employer] Provider. Do not schedule follow up appointments with Urgent Care Facilities.

## 2022-05-13 NOTE — WOUND CARE
Ostomy visit (late entry): in with Jovon Sadler earlier today before discharge. He voiced confidence in caring for colostomy at home; supplies ample for discharge and he is prepared to order once home. Reminded him he can always reach out to us for assistance or questions and he has our number.   Candace Whiting RN,CWON

## 2022-05-13 NOTE — DISCHARGE INSTRUCTIONS
Alexus Mckinnon MD, FACS  Aroldo Graham MD, FACS  Guerda Alvarado. Natividad Solis MD, [facility] Tyron Raya MD, [facility] Yoni Toledo MD, FACS  [facility]. MD Luz Sanchez MD    [facility] Discharge Instructions for Colon Surgery Patients    1. Diagnosis: rectal cancer  2. Restricted fiber diet. 3. Do not drive for 2 weeks or until after your next doctors appointment. 4. Leave surgical glue on. It will fall off on own  5. May take a shower. 6. No lifting any objects weighing more than 10 pounds. Do not do any housework, such as vacuuming, scrubbing, etc for at least a month. 7. When you get tired during the day, take naps, as you need your rest.  8. Multiple bowel movements are normal each day for a while. 9. May walk as desired. May go up and down stairs. 10. Take pain medication as prescribed: (NO DRIVING WHILE ON PAIN MEDICATIONS). *** EVERY 4-6 HOURS AS NEEDED. Other Medications: pain medication and bladder medication; you also will need to taper your prednisone down since you were on IV prednisone in the hospital.  There is a new prescription to cover the few tablets extra that you will need. Take 4 tablets for 2 days  Then take 2 tablets for 3 days. Then continue with 1 tablet daily as usually directed. Ostomy Supplies: should have and will have with home health  11. See me in the office in 10-14 days. Call as soon as discharged for an appointment 21 [phone]. IF SURGERY INVOLVED AN OSTOMY BAG, PLEASE BRING YOUR SUPPLIES TO YOUR 1ST VISIT! 12.  Call the Exchange [phone], if you have any questions or problems after office hours. [facility]/ED Visit Follow-Up Instructions/Information    You may have an in home follow up visit set up with [facility] or may wish to contact [facility] to set-up a visit:    What are we? [facility] is an in-home urgent care service staffed with emergency trained medical teams.   We come to your home in a vehicle stocked with medical supplies and technology. An ER physician is always available if needed. When? As a part of your hospital follow-up, an appointment has been/ or can be set up for us to come see you. Usually, this will be 24-72 hours after you leave the hospital or as needed. [facility] is open 7am-9pm, 7 days a week, 365 days a year, including holidays. Why? We know that you cannot always get to your doctor after being in the hospital and that your doctor is not always available when you need them. Once your workup is complete, we'll call in your prescriptions, update your family doctor, and handle billing with your insurance so you can focus on feeling better, faster without leaving home. How much? We accept most major health insurance plans, including Medicaid, Medicare, and Medicare Advantage [place], [address] [place] [address], [employer] [place], and [employer]. We also accept: credit, debit, health savings account (HSA), health reimbursement account (HRA) and flexible spending account (FSA) payments. [facility]'s prices compare to conventional urgent care facilities, but we bring the care to you. How to reach us? Getting care is easy- use our mobile rylan ([employer]), website ([email or web address]) or call us [phone]. Transportation options for appointments:  Hospital to Home- [facility] Transportation- [phone]  [facility] [address]- [phone]  These companies are private pay. You will need to call and schedule transportation at least 3 days prior to your appointment. Patient Education        Low-Fiber Diet: Care Instructions  Overview     When your bowels are irritated, you may need to limit fiber in your diet until the problem clears up. Your doctor and dietitian can help you design a low-fiber diet based on your health and what you prefer to eat. Ask your doctor how long you should stay on a low-fiber diet.  Your doctor probably will have you start adding more fiber to your diet as you get better. Always talk with your doctor or dietitian before you make changes in your diet. Follow-up care is a key part of your treatment and safety. Be sure to make and go to all appointments, and call your doctor if you are having problems. It's also a good idea to know your test results and keep a list of the medicines you take. How can you care for yourself at home? · Choose white or refined grains, and avoid whole grains. That means eating white or refined cereals, breads, crackers, rice, or pasta. · Choose fruits and vegetables that have been peeled and cooked. Avoid fruits and vegetables that are raw or that have skins, seeds, or hulls. ? Eat cooked or canned fruit. Low-fiber fruits include applesauce, canned peaches, canned pears, and fruit juice without pulp. ? Eat low-fiber vegetables, which include well-cooked vegetables and vegetable juice. · Drink or eat milk, yogurt, or other milk products if you can digest dairy without too many problems. Your doctor may limit milk and milk products for a while. If so, they may recommend a calcium and vitamin D supplement. · Eat well-cooked meat, such as chicken, turkey, fish, or lean meat. You also can eat eggs and tofu. · Avoid these foods:  ? Bran, brown or wild rice, oatmeal, granola, corn, hayley crackers, barley, and whole wheat and other whole-grain breads, such as rye bread  ? Cereals with more than 2 grams of fiber a serving  ? Berries, rhubarb, prunes, prune juice, and all dried fruits  ? Raw vegetables and fruits  ? Foods that may cause gas, such as raw or cooked cabbage, broccoli, brussels sprouts, and cauliflower  ? Cooked dried beans, lentils, and split peas  ? Crunchy peanut butter  ? Ice cream with fruit pieces in it  ? Coconut, nuts, popcorn, raisins, and seeds, or any ice cream, yogurt, or cheese with these in them  Where can you learn more?   Go to http://www.gray.com/  Enter E773 in the search box to learn more about \"Low-Fiber Diet: Care Instructions. \"  Current as of: September 8, 2021               Content Version: 13.2  © 1867-8499 Healthwise, Incorporated. Care instructions adapted under license by [employer] (which disclaims liability or warranty for this information). If you have questions about a medical condition or this instruction, always ask your healthcare professional. [address] any warranty or liability for your use of this information.

## 2022-06-06 RX ORDER — DILTIAZEM HYDROCHLORIDE 360 MG/1
CAPSULE, EXTENDED RELEASE ORAL
Qty: 30 CAPSULE | Refills: 1 | Status: SHIPPED | OUTPATIENT
Start: 2022-06-06 | End: 2022-06-28

## 2022-06-27 ENCOUNTER — OFFICE VISIT (OUTPATIENT)
Dept: ONCOLOGY | Age: 72
End: 2022-06-27
Payer: MEDICARE

## 2022-06-27 VITALS
SYSTOLIC BLOOD PRESSURE: 111 MMHG | TEMPERATURE: 98 F | HEIGHT: 70 IN | OXYGEN SATURATION: 93 % | WEIGHT: 122 LBS | RESPIRATION RATE: 20 BRPM | DIASTOLIC BLOOD PRESSURE: 70 MMHG | HEART RATE: 74 BPM | BODY MASS INDEX: 17.47 KG/M2

## 2022-06-27 DIAGNOSIS — C20 RECTAL CANCER (HCC): Primary | ICD-10-CM

## 2022-06-27 DIAGNOSIS — J43.2 CENTRILOBULAR EMPHYSEMA (HCC): ICD-10-CM

## 2022-06-27 DIAGNOSIS — Q40.3 GASTRIC DYSPLASIA: ICD-10-CM

## 2022-06-27 DIAGNOSIS — R97.0 ELEVATED CEA: ICD-10-CM

## 2022-06-27 DIAGNOSIS — R53.83 OTHER FATIGUE: ICD-10-CM

## 2022-06-27 PROCEDURE — 1123F ACP DISCUSS/DSCN MKR DOCD: CPT | Performed by: INTERNAL MEDICINE

## 2022-06-27 PROCEDURE — G9711 PT HX TOT COL OR COLON CA: HCPCS | Performed by: INTERNAL MEDICINE

## 2022-06-27 PROCEDURE — G0463 HOSPITAL OUTPT CLINIC VISIT: HCPCS | Performed by: INTERNAL MEDICINE

## 2022-06-27 PROCEDURE — G8536 NO DOC ELDER MAL SCRN: HCPCS | Performed by: INTERNAL MEDICINE

## 2022-06-27 PROCEDURE — 1101F PT FALLS ASSESS-DOCD LE1/YR: CPT | Performed by: INTERNAL MEDICINE

## 2022-06-27 PROCEDURE — G8427 DOCREV CUR MEDS BY ELIG CLIN: HCPCS | Performed by: INTERNAL MEDICINE

## 2022-06-27 PROCEDURE — G8419 CALC BMI OUT NRM PARAM NOF/U: HCPCS | Performed by: INTERNAL MEDICINE

## 2022-06-27 PROCEDURE — G8432 DEP SCR NOT DOC, RNG: HCPCS | Performed by: INTERNAL MEDICINE

## 2022-06-27 PROCEDURE — 99214 OFFICE O/P EST MOD 30 MIN: CPT | Performed by: INTERNAL MEDICINE

## 2022-06-27 NOTE — PROGRESS NOTES
[facility] at [address], [address] [place] [address] Road [address]  W: [phone]  F: [phone] Patient ID  Name: Carter Lora  YOB: 1950  MRN: [id number]  Referring Provider:   Dr. Cj Espinoza MD  Primary Care Provider:   Tali Mcleod MD       HEMATOLOGY/MEDICAL ONCOLOGY  NOTE   Date of Visit: 06/27/22  Reason for Evaluation:     Chief Complaint   Patient presents with    Follow-up       Hematology/Oncology Summary:  Please review original records for clinical decision making. This summary highlights focused aspects of patient's ongoing care and may have a recurring section in notes with either updates or remain unchanged as a longitudinal care summary. --------------------------------------------------------------------------------------------------------------------------------------------------------------------------------------------------------------------------  DIAGNOSIS:   Adenocarcinoma  Rectal     ORIGINAL STAGING:   T3 N0 M0    SITES OF DISEASE:  CT CHEST ABD PELV W CONT 3/3/2022 FINDINGS: CHEST W 1. Severe emphysema. Stable ascending aortic aneurysm. 2. Numerous hypoattenuating hepatic lesions appear unchanged in size and number, probable cysts. 3. Stable nonspecific 10 mm sized cortically-based lesion of inferior pole of right kidney. 4. Questioned area of mural thickening at rectosigmoid junction, possibly representing the mass in question. Sigmoid diverticulosis. MRI PELV W WO CONT  3/15/2022  FINDINGS: PRIMARY TUMOR: MORPHOLOGY, LOCATION, AND CHARACTERISTICS: Distance to the anal verge:  8.8 cm Distance to the top of sphincter complex/anorectal junction:  5.5 cm Relationship to anterior peritoneal reflection:  Superior Craniocaudal length:  5.4 cm Morphology:  C shaped partial apple core with focally of the neoplasm in the left aspect of the rectal wall. Overall volume of neoplasm measures approximately 4.8 x 4.6 x 5.4 cm.  Mucinous: Borderline, approximately 50% of the tumor/tumor pool has very high T2 signal intensity compared to perirectal muscle. T category Extramural depth of invasion: Approximately 7 mm MR-T category: T3c Structures with possible invasion: (if applicable) : none Pelvic sidewall: none  Pelvic floor: none Sacrum: none  Vessels: none Nerves: none  EXTRAMURAL VENOUS INVASION (EMVI): Close approximation, no evidence of direct invasion. CIRCUMFERENTIAL RESECTION estimated VOLUME: 8.0 x 5.2 x 6.7 cm. Shortest distance of tumor to MRF: 0.2 cm. MESORECTAL LYMPH NODES (superior rectal and mesorectal only) AND TUMOR DEPOSITS: None measurable. LUIS FERNANDO node absent. EXTRA-MESORECAL LYMPH nodes: None. OTHER: No significant free fluid. Bones are within normal limits. Urinary bladder is nondistended. Prostate gland is not enlarged. 4.8 x 4.6 x 5.4 cm proximal rectal carcinoma is located 5.5 cm from the proximal anal sphincter. Transmural extension. No measurable lymphadenopathy. Imaging category Category: T   3 N  0 CRM: 0.2 cm. Sphincter involvement:  No anal or involvement.    CURRENT TREATMENT:   Post-Operative Follow-up     PRIOR TREATMENT:   Colonoscopy 3/3/2022  · Laparoscopic low anterior resection with colostomy on 5/3/22 with Dr. Gaston Maria Luz:  1. Rectal cancer at approximately 10 cm from the anal verge. 2.  Large rectal cancer abutting the peritoneal reflection anteriorly    GOALS OF CARE:   Curative Intent    PATHOLOGY:  Specimen #:[id number]  Collect: 3/3/2022   GE junction, biopsy:        Glandular mucosa with intestinal metaplasia and focal high-grade   dysplasia   Squamous mucosa with squamous hyperplasia     4.   Sigmoid mass, biopsy:        Colonic adenocarcinoma, moderately differentiated   Fragments of villous adenoma     Specimen #:[id number]  Collect: 5/3/2022   * * *FINAL PATHOLOGIC DIAGNOSIS* * *   Low anterior colon, resection:        Invasive adenocarcinoma, moderately differentiated (see synoptic   report and comment). Diverticulosis. Seventeen benign lymph nodes (0/17). COLON AND RECTUM: Resection    SPECIMEN       Procedure: Low anterior resection       Macroscopic Evaluation of Mesorectum: Incomplete    TUMOR       Tumor Site: Rectum       Histologic Type: Adenocarcinoma       Histologic Grade: G2: Moderately differentiated       Tumor Size: 7.8 cm       Tumor Extension: Tumor invades submucosa       Macroscopic Tumor Perforation: Not identified       Lymphovascular Invasion: Not identified       Perineural Invasion: Not identified       Treatment Effect: No known presurgical therapy    MARGINS       Margins: All margins are uninvolved by invasive carcinoma           Margins Examined: Proximal, Distal, Radial (circumferential) or             Mesenteric           Distance of Tumor from Radial (circumferential) Margin: 3.5 mm    LYMPH NODES       Number of Lymph Nodes Involved: 0       Number of Lymph Nodes Examined: 17       Tumor Deposits: Not identified    PATHOLOGIC STAGE CLASSIFICATION (pTNM, AJCC 8th Edition)       Primary Tumor (pT): pT3       Regional Lymph Nodes (pN): pN0       * * *Comment* * *   Testing for MMR by immunohistochemical staining is being performed and the   results will be issued in an addendum report. [facility]/5/6/2022   *Electronically Signed Out By Angela Klein M.D.*   Interpretation:   MLH1, MSH2, MSH6 AND PMS2 ALL SHOW INTACT NUCLEAR EXPRESSION BY   IMMUNOHISTOCHEMISTRY. THERE IS NO EVIDENCE OF MISMATCH REPAIR DEFICIENCY   IN THIS SPECIMEN. PERTINENT CARE EVENTS   Bloody Stools in 2021   Colonoscopy in March 2022      Subjective:     History of Present Illness:     Micah Dee is a 81 y.o. M who presents for a follow-up evaluation for rectal cancer. Patient overall reports feeling improved.     -  Fatigue:Grade 1  Itching:Grade 1  Swelling:Grade 1  -  Past Medical History:   Diagnosis Date    Acute on chronic respiratory failure with hypoxemia ([facility] [address] 2/15/2019  Arrhythmia     Asthma     Pt denies    Atrial fibrillation (HCC)     COPD (chronic obstructive pulmonary disease) (HCC)     severe    Hypertension     Insulin resistance 3/27/2014    Lung mass 2012    MAY resection, + mycobacterial orgs, neg malignancy    [facility]anoma ([facility] [address]     Non-STEMI (non-ST elevated [facility] infarction) [facility]     On home O2 2.5 lpm continuously    since about     Pneumonia     Snoring     Tinnitus     Tuberculosis     Vitamin D deficiency 3/27/2014      Past Surgical History:   Procedure Laterality Date    COLONOSCOPY N/A 3/3/2022    COLONOSCOPY performed by Shelby Sierra MD at [address]      HX OTHER SURGICAL      EXC MELANOMA RIGHT CHEEK    HX OTHER SURGICAL      NEEDLE BX LEFT LUNG    HX OTHER SURGICAL  2012    apical segmentectomy, MAY      Social History     Tobacco Use    Smoking status: Light Tobacco Smoker     Packs/day: 0.25     Years: 40.00     Pack years: 10.00     Types: Cigarettes     Last attempt to quit:      Years since quittin.4    Smokeless tobacco: Never Used    Tobacco comment: Occasional cigarette   Substance Use Topics    Alcohol use: Yes     Alcohol/week: 7.0 standard drinks     Types: 7 Cans of beer per week      Family History   Problem Relation Age of Onset    Glaucoma Mother     Dementia Mother     Cancer Mother         ? of cervical vs. endometrial    Heart Attack Father     Heart Disease Father     Colon Cancer Neg Hx     Breast Cancer Neg Hx     Ovarian Cancer Neg Hx     Prostate Cancer Neg Hx      Current Outpatient Medications   Medication Sig    dilTIAZem ER (CARDIZEM CD) 360 mg capsule TAKE 1 CAPSULE BY MOUTH EVERY DAY    predniSONE (DELTASONE) 10 mg tablet Take 10 mg by mouth daily (with breakfast). Take 4 tablets for 3 days. Then take 2 tablets for 3 days.  Then continue with 1 tablet daily as usually directed  Indications: worsening chronic obstructive pulmonary disease    albuterol (PROVENTIL HFA, VENTOLIN HFA, PROAIR HFA) 90 mcg/actuation inhaler Take  by inhalation every four (4) hours as needed for Wheezing.  atorvastatin (LIPITOR) 40 mg tablet Take 1 Tablet by mouth daily.  apixaban (Eliquis) 5 mg tablet Take 1 Tablet by mouth two (2) times a day.  nitroglycerin (NITROSTAT) 0.4 mg SL tablet 1 Tab by SubLINGual route every five (5) minutes as needed for Chest Pain. Up to 3 doses.  aspirin 81 mg chewable tablet Take 81 mg by mouth nightly.  omega 3-DHA-EPA-fish oil 1,000 mg (120 mg-180 mg) capsule Take 2 Caps by mouth daily.  cholecalciferol (VITAMIN D3) 1,000 unit tablet Take 2,000 Units by mouth daily.  glycopyrrolate-formoterol (BEVESPI AEROSPHERE) 9-4.8 mcg HFAA Take 2 Puffs by inhalation two (2) times a day.  budesonide (PULMICORT) 0.5 mg/2 mL nbsp 500 mcg by Nebulization route two (2) times a day.  montelukast (SINGULAIR) 10 mg tablet Take 10 mg by mouth nightly.  multivitamin (ONE A DAY) tablet Take 1 tablet by mouth daily.  albuterol (PROVENTIL VENTOLIN) 2.5 mg /3 mL (0.083 %) nebulizer solution 2.5 mg by Nebulization route every four (4) hours as needed for Wheezing or Shortness of Breath.  guaiFENesin (MUCINEX) 1,200 mg TM12 ER tablet Take 1,200 mg by mouth two (2) times a day. No current facility-administered medications for this visit. No Known Allergies   -  Review of Systems Provided by:  Patient  Review of Systems: A complete review of systems was obtained, reviewed. Pertinent findings reviewed above. Objective:     Visit Vitals  /70 (BP 1 Location: Right arm, BP Patient Position: Sitting, BP Cuff Size: Large adult)   Pulse 74   Temp 98 °F (36.7 °C)   Resp 20   Ht 5' 10\" (1.778 m)   Wt 122 lb (55.3 kg)   SpO2 93% Comment: 2 liters   BMI 17.51 kg/m²     ECOG PS: 3- Capable of only limited selfcare; confined to bed or chair more than 50% of waking hours.   Physical Exam  Constitutional: No acute distress. , Non-toxic appearance. and Chronic ill appearance. HENT: Normocephalic and atraumatic head. Wearing supplemental oxygen nasal cannula. Eyes: Normal Conjunctivae. Anicteric sclerae. Cardiovascular: S1,S2 auscultated. No pitting edema. Pulmonary: Normal Respiratory Effort. No wheezing. No rhonchi. No rales. Abdominal: Normal bowel sounds. Soft Abdomen to palpation. No abdominal tenderness. Skin: No jaundice. No rash. Musculoskeletal: No muscle pain on palpation. No temporal muscle wasting on inspection. Lymph: No cervical, supraclavicular,or axillary lymph node enlargement or tenderness. Neurological: Alert and oriented. No tremor on inspection. Normal Gait. Psychiatric: mood normal. normal speech rate normal affect      Results:     I personally reviewed MRI Pelvis and placed in above Oncology Summary. Lab Results   Component Value Date/Time    WBC 21.8 (H) 05/12/2022 11:44 AM    HGB 12.8 05/12/2022 11:44 AM    HCT 36.8 05/12/2022 11:44 AM    PLATELET 972 44/24/5382 11:44 AM    MCV 91.8 05/12/2022 11:44 AM    ABS. NEUTROPHILS 18.9 (H) 05/12/2022 11:44 AM     Lab Results   Component Value Date/Time    Sodium 132 (L) 05/12/2022 11:44 AM    Potassium 3.7 05/12/2022 11:44 AM    Chloride 90 (L) 05/12/2022 11:44 AM    CO2 36 (H) 05/12/2022 11:44 AM    Glucose 127 (H) 05/12/2022 11:44 AM    BUN 22 (H) 05/12/2022 11:44 AM    Creatinine 0.57 (L) 05/12/2022 11:44 AM    GFR est AA >60 05/12/2022 11:44 AM    GFR est non-AA >60 05/12/2022 11:44 AM    Calcium 8.6 05/12/2022 11:44 AM    Glucose (POC) 125 (H) 05/08/2022 01:54 AM    Creatinine (POC) 0.70 03/03/2022 04:22 PM     Lab Results   Component Value Date/Time    Bilirubin, total 0.7 05/11/2022 07:29 AM    ALT (SGPT) 45 05/11/2022 07:29 AM    Alk.  phosphatase 75 05/11/2022 07:29 AM    Protein, total 5.1 (L) 05/11/2022 07:29 AM    Albumin 2.4 (L) 05/11/2022 07:29 AM    Globulin 2.7 05/11/2022 07:29 AM     Lab Results   Component Value Date/Time    CEA 6.6 03/03/2022 03:43 PM     I personally reviewed CT scan report from May 2022:      CT CHEST ABD PELV W CONT    Result Date: 5/11/2022  EXAM: CT CHEST ABD PELV W CONT INDICATION: continued leukocytosis-concerns for post op abscess vs leak vs pneumonia? COMPARISON: Preoperative MRI dated 3/14/2022 IV CONTRAST: 100 mL of Isovue-370. ORAL CONTRAST: Oral contrast was administered to better evaluate the bowel. TECHNIQUE: Following the uneventful intravenous administration of contrast, thin axial images were obtained through the chest, abdomen and pelvis. Coronal and sagittal reformats were generated. CT dose reduction was achieved through use of a standardized protocol tailored for this examination and automatic exposure control for dose modulation. FINDINGS: CHEST WALL: No mass or axillary lymphadenopathy. THYROID: No nodule. MEDIASTINUM: No mass or lymphadenopathy. SANTY: No mass or lymphadenopathy. THORACIC AORTA: Atherosclerotic disease MAIN PULMONARY ARTERY: Normal in caliber. TRACHEA/BRONCHI: Patent. ESOPHAGUS: Mildly dilated and filled with contrast HEART: Three-vessel coronary atherosclerotic disease. Heart size. PLEURA: No effusion or pneumothorax. LUNGS: Emphysema. Suture material in the left upper lobe LIVER: Multiple hypodensities in the liver unchanged. BILIARY TREE: Gallbladder is within normal limits. CBD is not dilated. SPLEEN: within normal limits. PANCREAS: No mass or ductal dilatation. ADRENALS: Unremarkable. KIDNEYS: Mild caliectasis of the bilateral kidneys with mild prominence of the ureters likely secondary to a distended urinary bladder STOMACH: Distended stomach SMALL BOWEL: No dilatation or wall thickening. COLON: Moderate amount of stool in the proximal colon. Status post low anterior resection with left lower quadrant ostomy. APPENDIX: Not well visualized PERITONEUM: Presacral edema. RETROPERITONEUM: Atherosclerotic disease.  No evidence of aneurysm REPRODUCTIVE ORGANS: Unremarkable URINARY BLADDER: Markedly distended urinary bladder BONES: No destructive bone lesion. ABDOMINAL WALL: No mass or hernia. ADDITIONAL COMMENTS: N/A     1. No evidence of abscess or other source of infection. 2.  Probable esophageal dysmotility or reflux. 3.  Emphysema. 4.  Status post low anterior resection. Presacral edema. 5.  Markedly distended urinary bladder. Assessment and Recommendations:     1. Rectal cancer [facility])  Reviewed NCCN Guidelines. We discussed for Rectal Tumors pT3pN0 without lymphovasc invasion and moderate differentiated disease that observation is a significant consideration even despite the problem of his poor performance status due to comorbodity. -we discussed NCCN surveillance guidelines. -will add his case to GI tumor board for follow-up discussion (there was prior discussion for radiation therapy; patient poor candidate for systemic therapy, presented with bleeding and underwent potentially curative resection. . Fortunately, tumor margins were negative and he is now s/p LAR in early May 2022.  -we discussed recent data about MSI Rectal Cancer and immunotherapy and how that does not apply to his case. We also discussed no role offically yet to look at Rectal Stage II cancer that was seen with the recent ctDNA data and Stage II colon cancer.    - CBC WITH AUTOMATED DIFF; Future Reviewed recent CMP; will hold off on repeating.  - CEA; Future    2. Centrilobular emphysema ([address]  -stable; follows with pulmonary clinic. 3. Gastric dysplasia  -will see Dr. Ginny Rebolledo later this week for management. 4. Elevated CEA  -repeat CEA. 5. Other fatigue  -has had an elevated WBC; will repeat CBC/diff.  -likley related to his emphysema disease. Will check CBC level.  -I tried to order a Vitamin D level but could not attain approval for colon cancer. Recommended to patient that he follow-up with his primary care provider for a Vitamin D level evaluation.     Follow-up and Dispositions · Return in about 3 months (around 9/27/2022).        Loan Villagran MD  Hematology/Medical Oncology Provider  [facility]  P: [phone]      Signed By:   Ja Mireles MD

## 2022-06-27 NOTE — PROGRESS NOTES
Nkechi Appiah is a 81 y.o. male follow up for adenocarcinoma rectosigmoid junction. 1. Have you been to the ER, urgent care clinic since your last visit? Hospitalized since your last visit?no     2. Have you seen or consulted any other health care providers outside of the [address] [place] [address] since your last visit? Include any pap smears or colon screening.  no

## 2022-06-27 NOTE — LETTER
6/27/2022    Patient: Kimberley Bender   YOB: 1950   Date of Visit: 6/27/2022     Tiffani Duke, [address] [address]  Via Fax: [phone]    Dear Tiffani Duke MD,      Thank you for referring Mr. Dany Aguirre to [facility] for evaluation. My notes for this consultation are attached. If you have questions, please do not hesitate to call me. I look forward to following your patient along with you.       Sincerely,    Humera Garcia MD

## 2022-06-28 RX ORDER — DILTIAZEM HYDROCHLORIDE 360 MG/1
CAPSULE, EXTENDED RELEASE ORAL
Qty: 30 CAPSULE | Refills: 1 | Status: SHIPPED | OUTPATIENT
Start: 2022-06-28 | End: 2022-07-27

## 2022-06-30 ENCOUNTER — HOSPITAL ENCOUNTER (OUTPATIENT)
Age: 72
Setting detail: OUTPATIENT SURGERY
Discharge: HOME | End: 2022-06-30
Attending: INTERNAL MEDICINE | Admitting: INTERNAL MEDICINE
Payer: MEDICARE

## 2022-06-30 ENCOUNTER — ANESTHESIA (OUTPATIENT)
Dept: ENDOSCOPY | Age: 72
End: 2022-06-30
Payer: MEDICARE

## 2022-06-30 ENCOUNTER — ANESTHESIA EVENT (OUTPATIENT)
Dept: ENDOSCOPY | Age: 72
End: 2022-06-30
Payer: MEDICARE

## 2022-06-30 VITALS
TEMPERATURE: 97.6 F | WEIGHT: 114.64 LBS | SYSTOLIC BLOOD PRESSURE: 102 MMHG | RESPIRATION RATE: 18 BRPM | OXYGEN SATURATION: 95 % | HEART RATE: 79 BPM | DIASTOLIC BLOOD PRESSURE: 62 MMHG | BODY MASS INDEX: 16.41 KG/M2 | HEIGHT: 70 IN

## 2022-06-30 PROCEDURE — 2709999900 HC NON-CHARGEABLE SUPPLY: Performed by: INTERNAL MEDICINE

## 2022-06-30 PROCEDURE — 88305 TISSUE EXAM BY PATHOLOGIST: CPT

## 2022-06-30 PROCEDURE — 74011250636 HC RX REV CODE- 250/636: Performed by: NURSE ANESTHETIST, CERTIFIED REGISTERED

## 2022-06-30 PROCEDURE — 74011000250 HC RX REV CODE- 250: Performed by: NURSE ANESTHETIST, CERTIFIED REGISTERED

## 2022-06-30 PROCEDURE — 77030021593 HC FCPS BIOP ENDOSC BSC -A: Performed by: INTERNAL MEDICINE

## 2022-06-30 PROCEDURE — 76060000031 HC ANESTHESIA FIRST 0.5 HR: Performed by: INTERNAL MEDICINE

## 2022-06-30 PROCEDURE — 76040000019: Performed by: INTERNAL MEDICINE

## 2022-06-30 RX ORDER — EPINEPHRINE 0.1 MG/ML
1 INJECTION INTRACARDIAC; INTRAVENOUS
Status: DISCONTINUED | OUTPATIENT
Start: 2022-06-30 | End: 2022-06-30 | Stop reason: HOSPADM

## 2022-06-30 RX ORDER — SODIUM CHLORIDE 9 MG/ML
INJECTION, SOLUTION INTRAVENOUS
Status: DISCONTINUED | OUTPATIENT
Start: 2022-06-30 | End: 2022-06-30 | Stop reason: HOSPADM

## 2022-06-30 RX ORDER — FLUMAZENIL 0.1 MG/ML
0.2 INJECTION INTRAVENOUS
Status: DISCONTINUED | OUTPATIENT
Start: 2022-06-30 | End: 2022-06-30 | Stop reason: HOSPADM

## 2022-06-30 RX ORDER — ATROPINE SULFATE 0.1 MG/ML
0.4 INJECTION INTRAVENOUS
Status: DISCONTINUED | OUTPATIENT
Start: 2022-06-30 | End: 2022-06-30 | Stop reason: HOSPADM

## 2022-06-30 RX ORDER — DEXTROMETHORPHAN/PSEUDOEPHED 2.5-7.5/.8
1.2 DROPS ORAL
Status: DISCONTINUED | OUTPATIENT
Start: 2022-06-30 | End: 2022-06-30 | Stop reason: HOSPADM

## 2022-06-30 RX ORDER — LIDOCAINE HYDROCHLORIDE 20 MG/ML
INJECTION, SOLUTION EPIDURAL; INFILTRATION; INTRACAUDAL; PERINEURAL AS NEEDED
Status: DISCONTINUED | OUTPATIENT
Start: 2022-06-30 | End: 2022-06-30 | Stop reason: HOSPADM

## 2022-06-30 RX ORDER — SODIUM CHLORIDE 9 MG/ML
50 INJECTION, SOLUTION INTRAVENOUS CONTINUOUS
Status: DISCONTINUED | OUTPATIENT
Start: 2022-06-30 | End: 2022-06-30 | Stop reason: HOSPADM

## 2022-06-30 RX ORDER — MIDAZOLAM HYDROCHLORIDE 1 MG/ML
.25-5 INJECTION, SOLUTION INTRAMUSCULAR; INTRAVENOUS
Status: DISCONTINUED | OUTPATIENT
Start: 2022-06-30 | End: 2022-06-30 | Stop reason: HOSPADM

## 2022-06-30 RX ORDER — EPHEDRINE SULFATE/0.9% NACL/PF 50 MG/5 ML
SYRINGE (ML) INTRAVENOUS AS NEEDED
Status: DISCONTINUED | OUTPATIENT
Start: 2022-06-30 | End: 2022-06-30 | Stop reason: HOSPADM

## 2022-06-30 RX ORDER — NALOXONE HYDROCHLORIDE 0.4 MG/ML
0.4 INJECTION, SOLUTION INTRAMUSCULAR; INTRAVENOUS; SUBCUTANEOUS
Status: DISCONTINUED | OUTPATIENT
Start: 2022-06-30 | End: 2022-06-30 | Stop reason: HOSPADM

## 2022-06-30 RX ORDER — PHENYLEPHRINE HCL IN 0.9% NACL 0.4MG/10ML
SYRINGE (ML) INTRAVENOUS AS NEEDED
Status: DISCONTINUED | OUTPATIENT
Start: 2022-06-30 | End: 2022-06-30 | Stop reason: HOSPADM

## 2022-06-30 RX ORDER — PROPOFOL 10 MG/ML
INJECTION, EMULSION INTRAVENOUS AS NEEDED
Status: DISCONTINUED | OUTPATIENT
Start: 2022-06-30 | End: 2022-06-30 | Stop reason: HOSPADM

## 2022-06-30 RX ADMIN — Medication 80 MCG: at 13:48

## 2022-06-30 RX ADMIN — PROPOFOL 20 MG: 10 INJECTION, EMULSION INTRAVENOUS at 13:44

## 2022-06-30 RX ADMIN — SODIUM CHLORIDE: 9 INJECTION, SOLUTION INTRAVENOUS at 12:42

## 2022-06-30 RX ADMIN — PROPOFOL 20 MG: 10 INJECTION, EMULSION INTRAVENOUS at 13:46

## 2022-06-30 RX ADMIN — PROPOFOL 20 MG: 10 INJECTION, EMULSION INTRAVENOUS at 13:48

## 2022-06-30 RX ADMIN — LIDOCAINE HYDROCHLORIDE 80 MG: 20 INJECTION, SOLUTION EPIDURAL; INFILTRATION; INTRACAUDAL; PERINEURAL at 13:43

## 2022-06-30 RX ADMIN — PROPOFOL 60 MG: 10 INJECTION, EMULSION INTRAVENOUS at 13:43

## 2022-06-30 RX ADMIN — Medication 80 MCG: at 13:45

## 2022-06-30 RX ADMIN — Medication 10 MG: at 13:48

## 2022-06-30 NOTE — PERIOP NOTES

## 2022-06-30 NOTE — ANESTHESIA PREPROCEDURE EVALUATION
Relevant Problems   RESPIRATORY SYSTEM   (+) Centrilobular emphysema (HCC)   (+) Chronic obstructive pulmonary disease (HCC)   (+) Pneumonia      CARDIOVASCULAR   (+) Atrial fibrillation (HCC)   (+) Atrial flutter, paroxysmal (HCC)   (+) CAD (coronary artery disease), native coronary artery   (+) SVT (supraventricular tachycardia) (HCC)      PERSONAL HX & FAMILY HX OF CANCER   (+) Rectal cancer (HCC)   (+) Rectosigmoid cancer (HCC)       Anesthetic History   No history of anesthetic complications            Review of Systems / Medical History  Patient summary reviewed, nursing notes reviewed and pertinent labs reviewed    Pulmonary    COPD: severe        Asthma        Neuro/Psych   Within defined limits           Cardiovascular    Hypertension          Past MI and CAD    Exercise tolerance: >4 METS     GI/Hepatic/Renal  Within defined limits              Endo/Other  Within defined limits           Other Findings   Comments: Hx of TB, pneumonia, chronic respiratory failure on home O2.            Physical Exam    Airway  Mallampati: II    Neck ROM: normal range of motion   Mouth opening: Normal     Cardiovascular  Regular rate and rhythm,  S1 and S2 normal,  no murmur, click, rub, or gallop  Rhythm: regular  Rate: normal         Dental  No notable dental hx       Pulmonary  Breath sounds clear to auscultation               Abdominal  GI exam deferred       Other Findings            Anesthetic Plan    ASA: 3  Anesthesia type: MAC          Induction: Intravenous  Anesthetic plan and risks discussed with: Patient

## 2022-06-30 NOTE — H&P
The patient is a 81year old male who presents with a complaint of pt on oxygen. STEFAN MIRANDA 72 EGD CONSULT- ON CONT 02. H/O CAD/STENT. ON ELIQUIS BLOOD THINNER.  3/3/2022 EGD MILD PEPTIC DUODENITIS. GE JUNCTION SHOWS INTESTINAL METAPLASIA AND FOCAL HIGH GRADE DYSPLASIA. SQUAMOUS MUCOSA WITH SQUAMOUS HYPERPLASIA  3/3/2022 COLON: SIGMOID MASS- COLONIC ADENOCARCINOMA MODERATELY DIFFERENTIATED. FRAGEMENTS OF VILLOUS ADNOMA. HE WAS REFERRED BY DR SARKAR TO SURGERY AND ONCOLOGY. He has a colostomy that was placed May 3rd by Dr. Yoli Luna. Reports no issues with colostomy. He states no plans for chemo or radiation. DUE TO BARRETTS- PLAN FOR REPEAT EGD IN 6 MONTHS IF IN GOOD HEALTH WITH DR SARKAR AT [facility]. CT 3/22: SEVERE EMPHYSEMA, NUMEOUS HEPATIC LESIONS- LIKELY CYSTS. LESION RIGHT KIDNEY. Patient asymptomatic feeling well. No abdominal pain, no dysphagia, no black or bloody stools from colostomy, fever, no nausea/vomiting/diarrhea. Still reports chronic weight loss. He reports that he is taking pantoprazole 40mg daily. He was advised to increase to BID given findings on endoscopy. Will send in script  ETOH on occasion  non smoker  denies drug use    PMH colon cancer, COPD 2.5 LPM continuous O2 (Dr. Keyon Pan) Barretts, CAD- stent (2012) ELIQUIS  (dr. Hemal Miranda)  denies Diabetes, denies kidney disease            Past Surgical History Maira Moeller; 6/9/2022 1:31 PM)  removed colon      Medication History Maira Moeller; 6/9/2022 1:31 PM)  Pantoprazole Sodium  (40MG Tablet DR, 1 (one) Oral daily, Taken starting 03/14/2022) Active. Albuterol Sulfate  (Inhalation daily) Specific strength unknown - Active. Budesonide  (Inhalation daily) Specific strength unknown - Active. Diltiazem HCl  (240MG Capsule ER 24HR, 1 tab Oral daily) Active. predniSONE  (10MG Tablet, 1 tab Oral daily) Active. Eliquis  (5MG Tablet, 1 tab Oral BID) Active. Montelukast Sodium  (10MG Tablet, 1 tab Oral daily) Active.   Atorvastatin Calcium  (40MG Tablet, 1 tab Oral daily) Active. Medications Reconciled         Review of Systems Saima Harris; 6/9/2022 1:25 PM)  General Present- Chronic Fatigue. Not Present- Poor Appetite, Weight Gain and Weight Loss. Skin Not Present- Itching, Rash and Skin Color Changes. HEENT Present- Tinnitus. Not Present- Hearing Loss and Vertigo. Respiratory Not Present- Difficulty Breathing and TB exposure. Cardiovascular Not Present- Chest Pain, Use of Antibiotics before Dental Procedures and Use of Blood Thinners. Gastrointestinal Present- See HPI. Musculoskeletal Not Present- Arthritis, Hip Replacement Surgery and Knee Replacement Surgery. Neurological Not Present- Weakness. Psychiatric Not Present- Depression. Endocrine Not Present- Diabetes and Thyroid Problems. Hematology Not Present- Anemia. Vitals Saima Harris; 6/9/2022 1:29 PM)  6/9/2022 1:26 PM  Weight: 125 lb   Height: 70 in   Weight was reported by patient. Height was reported by patient. Body Surface Area: 1.71 m²   Body Mass Index: 17.94 kg/m²    Temp.: 98.2° F    Pulse: 97 (Regular)     BP: 122/64(Sitting, Left Arm, Standard)              Physical Exam (Nahomi Blood AGACNP; 6/9/2022 4:35 PM)  General  Mental Status - Alert. General Appearance - Cooperative, Pleasant and Consistent with stated age, Not Anxious, Not in acute distress. Orientation - Oriented X3. Build & Nutrition - Thin. Chest and Lung Exam  Chest and lung exam reveals  - normal excursion with symmetric chest walls, quiet, even and easy respiratory effort with no use of accessory muscles and on auscultation, normal breath sounds, no adventitious sounds and normal vocal resonance. Cardiovascular  Cardiovascular examination reveals  - normal heart sounds, regular rate and rhythm with no murmurs. Abdomen  Inspection  Inspection of the abdomen reveals - Soft, Non-distended. Incisional scars - No incisional scars. Palpation/Percussion  Tenderness - Non-Tender.  Rebound tenderness - No rebound. Liver - No hepatosplenomegaly. Abdominal Mass Palpable - No masses. Other Characteristics - No Ascites. Auscultation  Auscultation of the abdomen reveals - Bowel sounds normal.    Peripheral Vascular  Lower Extremity  Palpation - Edema - Bilateral - No edema - Bilateral.        Assessment & Plan (Phyllis Espinoza [facility]; 6/9/2022 4:36 PM)  Barretts esophagus (K22.70)  Story: 3/3/2022 EGD MILD PEPTIC DUODENITIS. GE JUNCTION SHOWS INTESTINAL METAPLASIA AND FOCAL HIGH GRADE DYSPLASIA. 3 month repeat ordered. EGD scheduled for 6/30/22 with Dr. Jair Barraza: - increase pantoprazole to BID from daily  - proceed with EGD - the risks, indications and benefits were discussed - patient is agreeable to proceed  Current Plans  EGD W/BIOPSY (78592)  Started Pantoprazole Sodium 40 MG Oral Tablet Delayed Release, 1 (one) Tablet BID, #60, 30 days starting 06/09/2022, Ref. x2.  Local Order:  Pharmacist Notes: increase from 40mg daily  Patient is to call me for any questions or concerns. Plan of care was completed in collaboration with Dr. Rai Barraza.   Coronary artery disease (I25.10)  Impression: Requesting cardiac clearance from cardiologist.    Dr Rylan Silverio  Anticoagulant long-term use (Z79.01)  Impression: takes eliquis- anticoagulation clearance Dr. Renetta Valente - cardiology  COPD  Impression: On continuous 02 @ 2.5 lpm  pulmonary clearance - Dr Nguyen Meals by MANI ByrnesCNP (6/9/2022 4:36 PM)

## 2022-06-30 NOTE — DISCHARGE INSTRUCTIONS
Khanh Holcomb M.D.  [phone]           2022  Valeriy Adams  :  1950  [address] [place] [address] Medical Record Number:  [id number]        ENDOSCOPY FINDINGS:   Your endoscopy showed a significant healing of the esophagus. Biopsies were obtained. EGD DISCHARGE INSTRUCTIONS    DISCOMFORT:  Sore throat- throat lozenges or warm salt water gargle  redness at IV site- apply warm compress to area; if redness or soreness persist- contact your physician  Gaseous discomfort- walking, belching will help relieve any discomfort  You may not operate a vehicle for 12 hours  You may not engage in an occupation involving machinery or appliances for rest of today  You may not drink alcoholic beverages for at least 12 hours  Avoid making any critical decisions for at least 24 hour    DIET:   You may resume your regular diet. ACTIVITY  Spend the remainder of the day resting -  avoid any strenuous activity. Avoid driving or operating machinery. CALL M.D. ANY SIGN OF   Increasing pain, nausea, vomiting  Abdominal distension (swelling)  New increased bleeding (oral or rectal)  Fever (chills)  Pain in chest area  Bloody discharge from nose or mouth  Shortness of breath    Follow-up Instructions:   Call Dr. Chanell Fernandez for any questions or problems. Telephone # [phone]  Biopsies were obtained, the results will be available  in  5 to 7 days. We will call you to notify you of these results. Continue same medications. You can resume Eliquis tomorrow.

## 2022-06-30 NOTE — ANESTHESIA POSTPROCEDURE EVALUATION
Procedure(s):  ESOPHAGOGASTRODUODENOSCOPY (EGD)  ESOPHAGOGASTRODUODENAL (EGD) BIOPSY. MAC    Anesthesia Post Evaluation        Patient location during evaluation: PACU  Level of consciousness: awake  Pain management: adequate  Airway patency: patent  Anesthetic complications: no  Cardiovascular status: acceptable  Respiratory status: acceptable  Hydration status: acceptable  Post anesthesia nausea and vomiting:  none      INITIAL Post-op Vital signs:   Vitals Value Taken Time   /62 06/30/22 1410   Temp 36.4 °C (97.6 °F) 06/30/22 1355   Pulse 85 06/30/22 1412   Resp 15 06/30/22 1412   SpO2 94 % 06/30/22 1412   Vitals shown include unvalidated device data.

## 2022-06-30 NOTE — PROGRESS NOTES
Junior Mckay  1950  [id number]    Situation:  Verbal report received from: Robert Baig RN   Procedure: Procedure(s):  ESOPHAGOGASTRODUODENOSCOPY (EGD)  ESOPHAGOGASTRODUODENAL (EGD) BIOPSY    Background:    Preoperative diagnosis: Bills's esophagus without dysplasia [K22.70]  Postoperative diagnosis: 1.- Bills's Esophgus    :  Dr. Arabella Bueno  Assistant(s): Endoscopy Technician-1: Yu Acuña  Endoscopy RN-1: Cady Milner RN    Specimens:   ID Type Source Tests Collected by Time Destination   1 : [place] Biopsies Preservative   Mary Faust MD 6/30/2022 1342 Pathology     H. Pylori  no    Assessment:    Anesthesia gave intra-procedure sedation and medications, see anesthesia flow sheet no    Intravenous fluids: NS@ KVO     Vital signs stable     Abdominal assessment: round and soft     Recommendation:  Discharge patient per MD order.   Return to floor  Family or Friend   Permission to share finding with family or friend yes

## 2022-06-30 NOTE — PROGRESS NOTES
Endoscopy discharge instructions have been reviewed and given to patient. The patient verbalized understanding and acceptance of instructions. Dr. Maggie Dash discussed with patient procedure findings and next steps.

## 2022-06-30 NOTE — PROCEDURES
Edison Hilton M.D.  [phone]           2022                EGD Operative Report  Guerda Pate  :  1950  [address] [place] [address] Medical Record Number:  [id number]      Indication:  Bills's/esophageal ulcer     : Kathleen Feng MD    Referring Provider:  Rahel Carter MD      Anesthesia/Sedation:  MAC anesthesia    Airway assessment: No airway problems anticipated    Pre-Procedural Exam:      Airway: clear, no airway problems anticipated  Heart: RRR, without gallops or rubs  Lungs: clear bilaterally without wheezes, crackles, or rhonchi  Abdomen: soft, nontender, nondistended, bowel sounds present  Mental Status: awake, alert and oriented to person, place and time       Procedure Details     After infomed consent was obtained for the procedure, with all risks and benefits of procedure explained the patient was taken to the endoscopy suite and placed in the left lateral decubitus position. Following sequential administration of sedation as per above, the endoscope was inserted into the mouth and advanced under direct vision to second portion of the duodenum. A careful inspection was made as the gastroscope was withdrawn, including a retroflexed view of the proximal stomach; findings and interventions are described below. Findings:   Esophagus:Esophageal mucosa appeared within normal in the proximal, mid and distal esophagus, at the level of the GE-junction a short segment of salmon colored mucosa was noted consistent with previously diagnosed Bills's esophagus. However it showed no inflammation, ulcers or nodules. Stomach: Hiatal hernia noted, otherwise mucosa within normal.  Duodenum/jejunum: normal    Therapies:  none    Specimens: GE-junction           Complications:   None; patient tolerated the procedure well. EBL:  None.            Impression:     Bills's Esophagus                            Small size hiatal hernia      Recommendations:    -Continue acid suppression.  -Await pathology.  -Continue with anti-reflux measures    Anju Hall MD

## 2022-07-27 RX ORDER — DILTIAZEM HYDROCHLORIDE 360 MG/1
CAPSULE, EXTENDED RELEASE ORAL
Qty: 30 CAPSULE | Refills: 1 | Status: SHIPPED | OUTPATIENT
Start: 2022-07-27 | End: 2022-08-19

## 2022-08-03 ENCOUNTER — OFFICE VISIT (OUTPATIENT)
Dept: CARDIOLOGY CLINIC | Age: 72
End: 2022-08-03
Payer: MEDICARE

## 2022-08-03 VITALS
SYSTOLIC BLOOD PRESSURE: 110 MMHG | OXYGEN SATURATION: 93 % | BODY MASS INDEX: 19.04 KG/M2 | HEART RATE: 68 BPM | HEIGHT: 70 IN | DIASTOLIC BLOOD PRESSURE: 61 MMHG | WEIGHT: 133 LBS

## 2022-08-03 DIAGNOSIS — E78.5 DYSLIPIDEMIA: ICD-10-CM

## 2022-08-03 DIAGNOSIS — I77.810 ASCENDING AORTA DILATION (HCC): ICD-10-CM

## 2022-08-03 DIAGNOSIS — I48.20 CHRONIC ATRIAL FIBRILLATION (HCC): Primary | ICD-10-CM

## 2022-08-03 DIAGNOSIS — I25.10 CORONARY ARTERY DISEASE INVOLVING NATIVE CORONARY ARTERY OF NATIVE HEART WITHOUT ANGINA PECTORIS: ICD-10-CM

## 2022-08-03 PROCEDURE — G8536 NO DOC ELDER MAL SCRN: HCPCS | Performed by: INTERNAL MEDICINE

## 2022-08-03 PROCEDURE — 1101F PT FALLS ASSESS-DOCD LE1/YR: CPT | Performed by: INTERNAL MEDICINE

## 2022-08-03 PROCEDURE — G9711 PT HX TOT COL OR COLON CA: HCPCS | Performed by: INTERNAL MEDICINE

## 2022-08-03 PROCEDURE — G8432 DEP SCR NOT DOC, RNG: HCPCS | Performed by: INTERNAL MEDICINE

## 2022-08-03 PROCEDURE — G8420 CALC BMI NORM PARAMETERS: HCPCS | Performed by: INTERNAL MEDICINE

## 2022-08-03 PROCEDURE — G8428 CUR MEDS NOT DOCUMENT: HCPCS | Performed by: INTERNAL MEDICINE

## 2022-08-03 PROCEDURE — 99214 OFFICE O/P EST MOD 30 MIN: CPT | Performed by: INTERNAL MEDICINE

## 2022-08-03 PROCEDURE — 1123F ACP DISCUSS/DSCN MKR DOCD: CPT | Performed by: INTERNAL MEDICINE

## 2022-08-03 PROCEDURE — G0463 HOSPITAL OUTPT CLINIC VISIT: HCPCS | Performed by: INTERNAL MEDICINE

## 2022-08-03 NOTE — PROGRESS NOTES
Office Follow-up    NAME: Guerda Pate   :  1950  MRM:  [id number]    Date:  8/3/2022            Assessment:     Problem List  Date Reviewed: 2022            Codes Class Noted    Unspecified severe protein-calorie malnutrition ([facility] (Chronic) ICD-10-CM: E43  ICD-9-CM: 262  2022        Rectal cancer ([facility] ICD-10-CM: C20  ICD-9-CM: 154.1  5/3/2022        Other fatigue ICD-10-CM: R53.83  ICD-9-CM: 780.79  3/25/2022        Rectosigmoid cancer ([facility] ICD-10-CM: C19  ICD-9-CM: 154.0  3/10/2022        Centrilobular emphysema ([facility] ICD-10-CM: J43.2  ICD-9-CM: 492.8  3/10/2022        Elevated CEA ICD-10-CM: R97.0  ICD-9-CM: 795.81  3/10/2022        Gastric dysplasia ICD-10-CM: Q40.3  ICD-9-CM: 750.9  3/10/2022        Bloody stools ICD-10-CM: K92.1  ICD-9-CM: 578.1  3/10/2022        Ascending aorta dilation ([facility] ICD-10-CM: I77.810  ICD-9-CM: 447.71  2/15/2020        Pneumonia ICD-10-CM: J18.9  ICD-9-CM: 238  2/15/2019        Atrial flutter, paroxysmal (HCC) (Chronic) ICD-10-CM: I48.92  ICD-9-CM: 427.32  3/31/2016        SVT (supraventricular tachycardia) ([facility] ICD-10-CM: I47.1  ICD-9-CM: 427.89  2015        Vitamin D deficiency ICD-10-CM: E55.9  ICD-9-CM: 268.9  3/27/2014        Dyslipidemia ICD-10-CM: E78.5  ICD-9-CM: 272.4  3/27/2014        CAD (coronary artery disease), native coronary artery (Chronic) ICD-10-CM: I25.10  ICD-9-CM: 414.01  3/12/2014        Chronic obstructive pulmonary disease ([facility] ICD-10-CM: J44.9  ICD-9-CM: 072  2014        Atrial fibrillation ([facility] ICD-10-CM: I48.91  ICD-9-CM: 427.31  2014              Plan:     CAD with MI  treated with RCA stenting. Dobutamine Cardiolite 10/2017 showed normal perfusion. He has atypical pattern of random chest pain, nitrate responsive but no exertional angina. His functional capacity is limited by severe COPD.  - Continue ASA 81mg/d, and statins     Chronic AF, rate controlled and completely asx.  Rhythm control unlikely due to advanced COPD.    - Continue Diltiazem plus Eliquis     Severe COPD managed by Dr. Valdemar Scruggs. He has class 3 sxs, but stable w/ chronic oxygen 2.5L. Hospitalized greater than 1 year ago with PNA. Dilated Ascending Aorta measuring 4.4 cm by chest CT May 2022. Stable. No change in dimension. Dyslipidemia. Updated labs with [facility] (advanced lipid testing) demonstrate optimized lipids and lipoproteins. Interval increase in FBS from 92 to 115.   - Continue Atorva 40 mg/d.s     Had recent diagnosis of colon cancer and underwent colostomy on May 3rd, 2022. See Dr. Marty Beltran in 6 months. ATTENTION:   This medical record was transcribed using an electronic medical records/speech recognition system. Although proofread, it may and can contain electronic, spelling and other errors. Corrections may be executed at a later time. Please feel free to contact us for any clarifications as needed. Subjective:     Gabe Alamo, a 81y.o. year-old who presents for followup. He has known history of CAD status post PCI to RCA, COPD, atrial fibrillation, hypertension, dyslipidemia and ascending aortic aneurysm. Is returning today for follow-up. He used to see Dr. Blain Sacks in the past.  Currently has no symptoms of palpitations or lightheadedness. He occasionally will get chest pain which improved with nitroglycerin. He uses oxygen 2 and half liters per minute on a continuous basis.     EKG from May 2022 demonstrate atrial fibrillation with nonspecific ST changes with heart rate of 104 bpm    Exam:     Physical Exam:  Visit Vitals  /61 (BP 1 Location: Right arm, BP Patient Position: Sitting)   Pulse 68   Ht 5' 10\" (1.778 m)   Wt 133 lb (60.3 kg)   SpO2 93% Comment: O2 @2 L/M   BMI 19.08 kg/m²     General appearance - alert, well appearing, and in no distress  Mental status - affect appropriate to mood  Eyes - sclera anicteric, moist mucous membranes  Neck - supple, no significant adenopathy  Chest - clear to auscultation, no wheezes, rales or rhonchi  Heart - normal rate, irregular rhythm, normal S1, S2, no murmurs, rubs, clicks or gallops  Abdomen - soft, nontender, nondistended, no masses or organomegaly  Extremities - peripheral pulses normal, no pedal edema  Skin - normal coloration  no rashes    Medications:     Current Outpatient Medications   Medication Sig    dilTIAZem ER (CARDIZEM CD) 360 mg capsule TAKE 1 CAPSULE BY MOUTH EVERY DAY    predniSONE (DELTASONE) 10 mg tablet Take 10 mg by mouth daily (with breakfast). Take 4 tablets for 3 days. Then take 2 tablets for 3 days. Then continue with 1 tablet daily as usually directed  Indications: worsening chronic obstructive pulmonary disease    albuterol (PROVENTIL HFA, VENTOLIN HFA, PROAIR HFA) 90 mcg/actuation inhaler Take  by inhalation every four (4) hours as needed for Wheezing. atorvastatin (LIPITOR) 40 mg tablet Take 1 Tablet by mouth daily. apixaban (Eliquis) 5 mg tablet Take 1 Tablet by mouth two (2) times a day. aspirin 81 mg chewable tablet Take 81 mg by mouth nightly. omega 3-DHA-EPA-fish oil 1,000 mg (120 mg-180 mg) capsule Take 2 Caps by mouth daily. cholecalciferol (VITAMIN D3) 1,000 unit tablet Take 2,000 Units by mouth daily. budesonide (PULMICORT) 0.5 mg/2 mL nbsp 500 mcg by Nebulization route two (2) times a day. montelukast (SINGULAIR) 10 mg tablet Take 10 mg by mouth nightly. multivitamin (ONE A DAY) tablet Take 1 tablet by mouth daily. albuterol (PROVENTIL VENTOLIN) 2.5 mg /3 mL (0.083 %) nebulizer solution 2.5 mg by Nebulization route every four (4) hours as needed for Wheezing or Shortness of Breath.    guaiFENesin (MUCINEX) 1,200 mg Ta12 ER tablet Take 1,200 mg by mouth two (2) times a day. nitroglycerin (NITROSTAT) 0.4 mg SL tablet 1 Tab by SubLINGual route every five (5) minutes as needed for Chest Pain. Up to 3 doses.  (Patient not taking: No sig reported) glycopyrrolate-formoteroL (BEVESPI AEROSPHERE) 9-4.8 mcg HFA inhaler Take 2 Puffs by inhalation two (2) times a day. (Patient not taking: No sig reported)     No current facility-administered medications for this visit. Diagnostic Data Review:       CATH: 2/12/2014: L Main: MLI, LAD: Mid 50%; MLI; Med size; D1 and D2 - MLI (small),   LCflex: Med ; Prox 40%; Mid 40% OM1 - med; distally OM1 divides to 3 branches (prox branch - med)- distal 2 branches - small; RCA: Med; Mid 99%; Distally 40% PDA and PLB - small to med, LVEDP: 11. LVEF: 45%; Ant HK, Inf HK, no signif grad across AV   --- MARCO (Resolute 3 x 22) -> RCA   ECHO: 2/12/14: EF 50%, mild HK basal-mid anteroseptal and basal-mid inferior wall(s). mild MR/TR     Health fair screening 3/13/15, carotid duplex showed mild stenosis, aortic duplex normal, ZENON normal, EKG normal   Echo 6/24/15 - EF 55%, normal wall motion   Holter 7/23/15 - SR , rare episodes of AFL/AF  CT Heart Scan - Calcium score 2041, incidental pulmonary findings (see full report). Echo 4/3/17 - EF 55%. No WMA. Grade 1 diastolic dysfunction.     Dobutamine cardiolite 10/12/17 - normal perfusion, EF 61%, but new AF noted at rest, persistent with stress  EKG 12/4/18 - AF 70s      Lab Review:     Lab Results   Component Value Date/Time    Cholesterol, total 156 05/21/2019 02:26 PM    HDL Cholesterol 80 05/21/2019 02:26 PM    HDL Cholesterol 80 05/21/2019 02:26 PM    LDL, calculated 56 04/09/2018 12:18 PM    Triglyceride 61 05/21/2019 02:26 PM    CHOL/HDL Ratio 3.0 02/13/2014 03:00 AM     Lab Results   Component Value Date/Time    Creatinine (POC) 0.70 03/03/2022 04:22 PM    Creatinine 0.57 (L) 05/12/2022 11:44 AM     Lab Results   Component Value Date/Time    BUN 22 (H) 05/12/2022 11:44 AM     Lab Results   Component Value Date/Time    Potassium 3.7 05/12/2022 11:44 AM     Lab Results   Component Value Date/Time    Hemoglobin A1c 5.5 04/18/2022 03:29 PM     Lab Results   Component Value Date/Time    HGB 12.8 05/12/2022 11:44 AM     Lab Results   Component Value Date/Time    PLATELET 907 26/90/1107 11:44 AM     No results for input(s): CPK, CKMB, TROIQ in the last 72 hours. No lab exists for component: CKQMB, CPKMB             ___________________________________________________    Kevin Ip.  Ronnie Farrell MD, [facility]

## 2022-08-03 NOTE — PROGRESS NOTES
Chief Complaint   Patient presents with    Cholesterol Problem    Coronary Artery Disease    Irregular Heart Beat     A FIB, SVT    Blood Clot     Visit Vitals  /61 (BP 1 Location: Right arm, BP Patient Position: Sitting)   Pulse 68   Ht 5' 10\" (1.778 m)   Wt 133 lb (60.3 kg)   SpO2 93%   BMI 19.08 kg/m²     Chest pain denied     Palpations denied    SOB ALWAYS    Dizziness denied    Swelling in hands/feet  BILATERAL ANKELS    Recent hospital stays SURGERY FOR COLOSTOMY 5/3/22

## 2022-08-09 NOTE — CONSULTS
Patient with no complaints. Denies vaginal bleeding or contractions.   Good FM. RTC in 1 week.     Baby now vertex on ultrasound.     DM2  On insulin per endocrinology.  Stable from prior notes with improving, but not great control.  Pt s/p BMZ series.  Polyhydramnios  BPP 8/8 with reassuring testing today. Kick counts stressed.  Maternal EKG unremarkable  Plan for delivery this Friday.    H/o DVT  - s/p Lovenox  - IVC filter    Desires permanent sterilization.  Pp versus interval BTL to be determined after delivery.    Vitals signs, FHTs, urine dip, and PE findings documented, reviewed and available in OB flow chart.       I spent a total of 20 minutes on the day of the visit.This includes face to face time and non-face to face time preparing to see the patient (eg, review of tests), Obtaining and/or reviewing separately obtained history, Documenting clinical information in the electronic or other health record, Independently interpreting resultsand communicating results to the patient/family/caregiver, or Care coordination.      Palliative Medicine Consult    Patient Name: Rex Miner  YOB: 1950    Date of Initial Consult: 2/19/19  Reason for Consult: care decisions  Requesting Provider: Dr. Neill Osgood   Primary Care Physician: Lonnell Dubin, MD      SUMMARY:   Rex Miner is a 79 y.o. with a past history of advanced COPD, chronic respiratory failure, CAD, aflutter, who was admitted on 2/15/2019 from home with a diagnosis of a/c respiratory failure. Current medical issues leading to Palliative Medicine involvement include: care decisions. Chart reviewed/HPI-patient admitted to the hospital with a/c respiratory failure/pneumonia/copd exacerbation. Patient has had progressive SOB for several weeks and struggling even doing basic ADLs. NIV(trilogy) ventilation has been arranged for home but delivered the day before admission. SH-lives alone. Sister lives in [facility]. Retired from [employer] at the age of 60 secondary to his COPD       PALLIATIVE DIAGNOSES:   1. GOC discussion  2. DNR discussion  3. Debility  4. SOB  5. ACP review  6. Advanced COPD  7. Chronic respiratory failure       PLAN:   1. Aracelis([facility]) and I met with patient. Reviewed our role in his care and then discussed current medical issues. He seems to have a decent understanding of his medical problems. He does ask about the possibility of a lung transplant. Told him I would let Dr. Annette Shultz know about his question about a lung transplant. 2. GOC-continue all current tx. Hopeful to have some recovery to his prior baseline so that he can at least be able to do ADLs, etc  3. AMD-in the chart with his sister Linda Lomeli as MPOA  4. Code status-reviewed resuscitation and what that would entail. He seemed to lean toward DNAR but does want to think about it  5. Symptom management-inpatient team managing  6. Psychosocial-seems to have limited community support.  Sister does not live local. Our team will continue to follow and see how he is responding to tx  7. Discussed with Dr. Josefina Orta, bedside nurse  8. Initial consult note routed to primary continuity provider  9. Communicated plan of care with: Palliative IDT       GOALS OF CARE / TREATMENT PREFERENCES:   [====Goals of Care====]  GOALS OF CARE:  Patient/Health Care Proxy Stated Goals: Prolong life      TREATMENT PREFERENCES:   Code Status: Full Code    Advance Care Planning:  Advance Care Planning 2/19/2019   Patient's Healthcare Decision Maker is: Named in scanned ACP document   Confirm Advance Directive Yes, on file       Medical Interventions: Full interventions  Other Instructions: The palliative care team has discussed with patient / health care proxy about goals of care / treatment preferences for patient.  [====Goals of Care====]         HISTORY:     History obtained from: chart, patient    CHIEF COMPLAINT: sob    HPI/SUBJECTIVE:    The patient is:   [x] Verbal and participatory  [] Non-participatory due to:   Patient is awake and alert. Denies any pain but is SOB even with talking     Clinical Pain Assessment (nonverbal scale for severity on nonverbal patients):   Clinical Pain Assessment  Severity: 0            FUNCTIONAL ASSESSMENT:     Palliative Performance Scale (PPS):  PPS: 50       PSYCHOSOCIAL/SPIRITUAL SCREENING:     Advance Care Planning:  Advance Care Planning 2/19/2019   Patient's Healthcare Decision Maker is: Named in scanned ACP document   Confirm Advance Directive Yes, on file        Any spiritual / Restoration concerns:  [] Yes /  [x] No    Caregiver Burnout:  [] Yes /  [] No /  [x] No Caregiver Present      Anticipatory grief assessment:   [x] Normal  / [] Maladaptive       ESAS Anxiety: Anxiety: 0    ESAS Depression: Depression: 0        REVIEW OF SYSTEMS:     Positive and pertinent negative findings in ROS are noted above in HPI. The following systems were [x] reviewed / [] unable to be reviewed as noted in HPI  Other findings are noted below.   Systems: constitutional, ears/nose/mouth/throat, respiratory, gastrointestinal, genitourinary, musculoskeletal, integumentary, neurologic, psychiatric, endocrine. Positive findings noted below. Modified ESAS Completed by: provider   Fatigue: 3 Drowsiness: 0   Depression: 0 Pain: 0   Anxiety: 0 Nausea: 0   Anorexia: 0 Dyspnea: 6     Constipation: No              PHYSICAL EXAM:     From RN flowsheet:  Wt Readings from Last 3 Encounters:   02/18/19 149 lb 9.3 oz (67.8 kg)   12/04/18 159 lb (72.1 kg)   06/01/18 155 lb 6.4 oz (70.5 kg)     Blood pressure 125/69, pulse 95, temperature 97.9 °F (36.6 °C), resp. rate 20, height 5' 10\" (1.778 m), weight 149 lb 9.3 oz (67.8 kg), SpO2 95 %.     Pain Scale 1: Numeric (0 - 10)  Pain Intensity 1: 0                 Last bowel movement, if known:     Constitutional: thin, SOB even with talking, alert and oriented  Eyes: pupils equal, anicteric  ENMT: no nasal discharge, moist mucous membranes  Cardiovascular: regular rhythm, distal pulses intact  Respiratory: breathing mildly labored, symmetric  Gastrointestinal: soft non-tender, +bowel sounds  Musculoskeletal: no deformity, no tenderness to palpation  Skin: warm, dry  Neurologic: following commands, moving all extremities  Psychiatric: full affect, no hallucinations  Other:       HISTORY:     Principal Problem:    Pneumonia (2/15/2019)    Active Problems:    COPD exacerbation ([facility] (2/27/2014)      CAD (coronary artery disease), native coronary artery (3/12/2014)      Atrial flutter, paroxysmal (HCC) (3/31/2016)      Acute on chronic respiratory failure with hypoxemia ([facility]) (2/15/2019)      SIRS (systemic inflammatory response syndrome) ([facility]) (2/15/2019)      Past Medical History:   Diagnosis Date    Arrhythmia     Asthma     COPD (chronic obstructive pulmonary disease) ([facility])     severe    Hypertension     Insulin resistance 3/27/2014    Lung mass 9/2012    MAY resection, + mycobacterial orgs, neg malignancy    Melanoma ([facility] 2007    Non-STEMI (non-ST elevated myocardial infarction) ([facility] [facility]     On home O2 prn    Pneumonia     Snoring     Tinnitus     Tuberculosis     Vitamin D deficiency 3/27/2014      Past Surgical History:   Procedure Laterality Date    HX OTHER SURGICAL      EXC MELANOMA RIGHT CHEEK    HX OTHER SURGICAL      NEEDLE BX LEFT LUNG    HX OTHER SURGICAL  2012    apical segmentectomy, MAY      Family History   Problem Relation Age of Onset    Glaucoma Mother     Dementia Mother     Heart Attack Father     Heart Disease Father       History reviewed, no pertinent family history. Social History     Tobacco Use    Smoking status: Former Smoker     Packs/day: 0.25     Years: 40.00     Pack years: 10.00     Types: Cigarettes     Last attempt to quit:      Years since quittin.1    Smokeless tobacco: Never Used   Substance Use Topics    Alcohol use:  Yes     Alcohol/week: 15.6 oz     Types: 21 Cans of beer, 5 Shots of liquor per week     No Known Allergies   Current Facility-Administered Medications   Medication Dose Route Frequency    methylPREDNISolone (PF) (Solu-MEDROL) injection 40 mg  40 mg IntraVENous Q8H    alum-mag hydroxide-simeth (MYLANTA) oral suspension 30 mL  30 mL Oral Q4H PRN    dilTIAZem CD (CARDIZEM CD) capsule 300 mg  300 mg Oral DAILY    dilTIAZem (CARDIZEM) 125 mg in dextrose 5% 125 mL infusion  0-15 mg/hr IntraVENous TITRATE    arformoterol (BROVANA) neb solution 15 mcg  15 mcg Nebulization BID RT    azithromycin (ZITHROMAX) 500 mg in 0.9% sodium chloride (MBP/ADV) 250 mL  500 mg IntraVENous Q24H    cefTRIAXone (ROCEPHIN) 2 g in 0.9% sodium chloride (MBP/ADV) 50 mL  2 g IntraVENous Q24H    sodium chloride (NS) flush 5-40 mL  5-40 mL IntraVENous Q8H    sodium chloride (NS) flush 5-40 mL  5-40 mL IntraVENous PRN    acetaminophen (TYLENOL) tablet 650 mg  650 mg Oral Q6H PRN    naloxone (NARCAN) injection 0.4 mg  0.4 mg IntraVENous PRN    albuterol-ipratropium (DUO-NEB) 2.5 MG-0.5 MG/3 ML 3 mL Nebulization Q4H RT    albuterol (PROVENTIL VENTOLIN) nebulizer solution 2.5 mg  2.5 mg Nebulization Q2H PRN    aspirin chewable tablet 81 mg  81 mg Oral QHS    atorvastatin (LIPITOR) tablet 40 mg  40 mg Oral QHS    budesonide (PULMICORT) 500 mcg/2 ml nebulizer suspension  500 mcg Nebulization BID RT    cholecalciferol (VITAMIN D3) tablet 1,000 Units  1,000 Units Oral QHS    cholecalciferol (VITAMIN D3) tablet 2,000 Units  2,000 Units Oral DAILY    apixaban (ELIQUIS) tablet 5 mg  5 mg Oral BID    guaiFENesin ER (MUCINEX) tablet 1,200 mg  1,200 mg Oral BID    montelukast (SINGULAIR) tablet 10 mg  10 mg Oral QHS    therapeutic multivitamin (THERAGRAN) tablet 1 Tab  1 Tab Oral DAILY    omega 3-DHA-EPA-fish oil 1,000 mg (120 mg-180 mg) capsule 1 Cap  1 Cap Oral QHS    omega 3-DHA-EPA-fish oil 1,000 mg (120 mg-180 mg) capsule 2 Cap  2 Cap Oral DAILY          LAB AND IMAGING FINDINGS:     Lab Results   Component Value Date/Time    WBC 14.9 (H) 02/19/2019 04:30 AM    HGB 12.4 02/19/2019 04:30 AM    PLATELET 941 59/45/5563 04:30 AM     Lab Results   Component Value Date/Time    Sodium 136 02/19/2019 04:30 AM    Potassium 4.6 02/19/2019 04:30 AM    Chloride 99 02/19/2019 04:30 AM    CO2 34 (H) 02/19/2019 04:30 AM    BUN 14 02/19/2019 04:30 AM    Creatinine 0.64 (L) 02/19/2019 04:30 AM    Calcium 9.8 02/19/2019 04:30 AM    Magnesium 1.9 02/18/2019 01:07 AM    Phosphorus 3.4 02/16/2019 04:45 AM      Lab Results   Component Value Date/Time    AST (SGOT) 13 (L) 02/16/2019 04:45 AM    Alk.  phosphatase 93 02/16/2019 04:45 AM    Protein, total 6.8 02/16/2019 04:45 AM    Albumin 2.7 (L) 02/16/2019 04:45 AM    Globulin 4.1 (H) 02/16/2019 04:45 AM     Lab Results   Component Value Date/Time    INR 1.0 02/13/2014 03:00 AM    Prothrombin time 10.7 02/13/2014 03:00 AM    aPTT 27.3 02/13/2014 03:00 AM      No results found for: IRON, FE, TIBC, IBCT, PSAT, FERR   Lab Results   Component Value Date/Time    pH 7.41 02/15/2019 07:25 PM    PCO2 53 (H) 02/15/2019 07:25 PM    PO2 90 02/15/2019 07:25 PM     No components found for: [place]   Lab Results   Component Value Date/Time     10/28/2014 09:25 AM    CK - MB 2.7 10/28/2014 09:25 AM                Total time: 50  Counseling / coordination time, spent as noted above: 45  > 50% counseling / coordination?: yes    Prolonged service was provided for  []30 min   []75 min in face to face time in the presence of the patient, spent as noted above. Time Start:   Time End:   Note: this can only be billed with 15881 (initial) or 78476 (follow up). If multiple start / stop times, list each separately.

## 2022-08-17 DIAGNOSIS — I48.92 ATRIAL FLUTTER, PAROXYSMAL (HCC): ICD-10-CM

## 2022-08-18 RX ORDER — APIXABAN 5 MG/1
TABLET, FILM COATED ORAL
Qty: 180 TABLET | Refills: 4 | Status: SHIPPED | OUTPATIENT
Start: 2022-08-18

## 2022-08-18 NOTE — TELEPHONE ENCOUNTER
Refill per VO of Dr. Alberto Ruffin:    Last appt: 8/3/2022    Future Appointments   Date Time Provider Vickie Her   9/26/2022  1:00 PM Leslie De Paz MD [facility] AMB   2/3/2023  2:40 PM Sheeba Vance MD [facility] AMB       Requested Prescriptions     Pending Prescriptions Disp Refills    Eliquis 5 mg tablet [Pharmacy Med Name: ELIQUIS 5 MG TABLET] 180 Tablet 4     Sig: TAKE 1 TABLET BY MOUTH TWICE A DAY

## 2022-08-19 RX ORDER — DILTIAZEM HYDROCHLORIDE 360 MG/1
CAPSULE, EXTENDED RELEASE ORAL
Qty: 30 CAPSULE | Refills: 1 | Status: SHIPPED | OUTPATIENT
Start: 2022-08-19 | End: 2022-09-19

## 2022-09-19 RX ORDER — DILTIAZEM HYDROCHLORIDE 360 MG/1
CAPSULE, EXTENDED RELEASE ORAL
Qty: 30 CAPSULE | Refills: 1 | Status: SHIPPED | OUTPATIENT
Start: 2022-09-19 | End: 2022-10-13

## 2022-10-11 RX ORDER — ATORVASTATIN CALCIUM 40 MG/1
TABLET, FILM COATED ORAL
Qty: 90 TABLET | Refills: 3 | Status: SHIPPED | OUTPATIENT
Start: 2022-10-11

## 2022-10-11 NOTE — TELEPHONE ENCOUNTER
Refill per VO of Dr. Cierra Guidry:    Last appt: 8/3/2022    Future Appointments   Date Time Provider Vickie Her   2/3/2023  2:40 PM Sean Vance MD [facility] AMB       Requested Prescriptions     Pending Prescriptions Disp Refills    atorvastatin (LIPITOR) 40 mg tablet [Pharmacy Med Name: ATORVASTATIN 40 MG TABLET] 90 Tablet 3     Sig: TAKE 1 TABLET BY MOUTH EVERY DAY

## 2022-10-13 RX ORDER — DILTIAZEM HYDROCHLORIDE 360 MG/1
CAPSULE, EXTENDED RELEASE ORAL
Qty: 30 CAPSULE | Refills: 1 | Status: SHIPPED | OUTPATIENT
Start: 2022-10-13

## 2022-11-07 RX ORDER — DILTIAZEM HYDROCHLORIDE 360 MG/1
CAPSULE, EXTENDED RELEASE ORAL
Qty: 30 CAPSULE | Refills: 1 | Status: SHIPPED | OUTPATIENT
Start: 2022-11-07

## 2023-05-04 ENCOUNTER — OFFICE VISIT (OUTPATIENT)
Dept: CARDIOLOGY CLINIC | Age: 73
End: 2023-05-04
Payer: MEDICARE

## 2023-05-04 VITALS
DIASTOLIC BLOOD PRESSURE: 70 MMHG | OXYGEN SATURATION: 94 % | HEART RATE: 72 BPM | SYSTOLIC BLOOD PRESSURE: 110 MMHG | BODY MASS INDEX: 20.04 KG/M2 | WEIGHT: 140 LBS | HEIGHT: 70 IN

## 2023-05-04 DIAGNOSIS — I48.92 ATRIAL FLUTTER, PAROXYSMAL (HCC): ICD-10-CM

## 2023-05-04 DIAGNOSIS — I48.19 PERSISTENT ATRIAL FIBRILLATION (HCC): Primary | ICD-10-CM

## 2023-05-04 DIAGNOSIS — I25.10 CORONARY ARTERY DISEASE INVOLVING NATIVE CORONARY ARTERY OF NATIVE HEART WITHOUT ANGINA PECTORIS: Chronic | ICD-10-CM

## 2023-05-04 DIAGNOSIS — I77.810 ASCENDING AORTA DILATION (HCC): ICD-10-CM

## 2023-05-04 PROCEDURE — G0463 HOSPITAL OUTPT CLINIC VISIT: HCPCS | Performed by: INTERNAL MEDICINE

## 2023-05-04 PROCEDURE — 93005 ELECTROCARDIOGRAM TRACING: CPT | Performed by: INTERNAL MEDICINE

## 2023-05-17 NOTE — PROGRESS NOTES
Problem: Falls - Risk of 
Goal: *Absence of Falls Document Hetal Marshall Fall Risk and appropriate interventions in the flowsheet. Outcome: Progressing Towards Goal 
Fall Risk Interventions: 
Mobility Interventions: Bed/chair exit alarm, Patient to call before getting OOB, OT consult for ADLs, PT Consult for mobility concerns, Utilize walker, cane, or other assistive device Medication Interventions: Patient to call before getting OOB Elimination Interventions: Call light in reach, Patient to call for help with toileting needs(do to oxygen desaturation) n/a

## 2023-07-07 ENCOUNTER — TELEPHONE (OUTPATIENT)
Age: 73
End: 2023-07-07

## 2023-07-07 RX ORDER — DILTIAZEM HYDROCHLORIDE 360 MG/1
360 CAPSULE, EXTENDED RELEASE ORAL DAILY
Qty: 90 CAPSULE | Refills: 3 | Status: SHIPPED | OUTPATIENT
Start: 2023-07-07

## 2023-07-07 NOTE — TELEPHONE ENCOUNTER
Refill per VO of Dr. Lizzette Borrego:    5/4/2023    Future Appointments   Date Time Provider [address]   [facility]   11/6/2023  3:20 PM Adore Larios, APRN - NP [facility] AMB       Requested Prescriptions     Pending Prescriptions Disp Refills    dilTIAZem (TIAZAC) 360 MG extended release capsule 90 capsule 3     Sig: Take 1 capsule by mouth daily

## 2023-07-07 NOTE — TELEPHONE ENCOUNTER
Pt would like a refill on diltiazem 360mg, pt stated the medication was denied by the pharmacy, pt has a few days left of this medication.        [facility] [phone]      Future Appointments   Date Time Provider [address]   11/6/2023  2:30 PM [facility] ECHO 2 [facility] AMB   11/6/2023  3:20 PM Leeland Meigs, APRN - NP [facility] AMB         Pt# [phone]

## 2023-08-02 RX ORDER — DILTIAZEM HYDROCHLORIDE 360 MG/1
CAPSULE, EXTENDED RELEASE ORAL
Qty: 90 CAPSULE | Refills: 3 | Status: SHIPPED | OUTPATIENT
Start: 2023-08-02

## 2023-08-02 NOTE — TELEPHONE ENCOUNTER
Refill per VO of Dr. Vona Schilder:    5/4/2023    Future Appointments   Date Time Provider [address]   [facility]   11/6/2023  3:20 PM Alex Larios, APRN - NP [facility] AMB       Requested Prescriptions     Pending Prescriptions Disp Refills    dilTIAZem (CARDIZEM CD) 360 MG extended release capsule [Pharmacy Med Name: DILTIAZEM 24H ER(CD) 360 MG CP] 90 capsule 1     Sig: TAKE 1 CAPSULE BY MOUTH EVERY DAY

## 2023-10-16 DIAGNOSIS — I48.92 UNSPECIFIED ATRIAL FLUTTER (HCC): ICD-10-CM

## 2023-10-16 RX ORDER — ATORVASTATIN CALCIUM 40 MG/1
40 TABLET, FILM COATED ORAL DAILY
Qty: 90 TABLET | Refills: 1 | Status: SHIPPED | OUTPATIENT
Start: 2023-10-16

## 2023-10-16 RX ORDER — APIXABAN 5 MG/1
5 TABLET, FILM COATED ORAL 2 TIMES DAILY
Qty: 180 TABLET | Refills: 1 | Status: SHIPPED | OUTPATIENT
Start: 2023-10-16

## 2023-10-16 NOTE — TELEPHONE ENCOUNTER
Refill per VO of Dr. Luz Reynoso:    5/4/2023    Future Appointments   Date Time Provider [address]   [facility]   11/10/2023  1:20 PM TEJINDER Ignacio NP [facility] AMB       Requested Prescriptions     Pending Prescriptions Disp Refills    ELIQUIS 5 MG TABS tablet [Pharmacy Med Name: ELIQUIS 5 MG TABLET] 180 tablet 4     Sig: TAKE 1 TABLET BY MOUTH TWICE A DAY    atorvastatin (LIPITOR) 40 MG tablet [Pharmacy Med Name: ATORVASTATIN 40 MG TABLET] 90 tablet 3     Sig: TAKE 1 TABLET BY MOUTH EVERY DAY

## 2023-11-01 DIAGNOSIS — I77.810 THORACIC AORTIC ECTASIA (HCC): Primary | ICD-10-CM

## 2023-11-09 NOTE — PROGRESS NOTES
Office Follow-up  Name: Giancarlo Gar    : 1950  MRN: [id number]  Date: 11/10/2023      Primary Cardiologist: Raven Chiu. [place] Colon [place] [facility]  Last Office Visit: 23        Assessment and Plan:      CAD with MI 2014 treated with RCA stenting. Dobutamine Cardiolite 10/2017 showed normal perfusion. He has atypical pattern of random chest pain, nitrate responsive but no exertional angina. His functional capacity is limited by severe COPD.  - Continue ASA 81mg/d, and statins     Chronic AF, rate controlled and completely asx. Rhythm control unlikely due to advanced COPD.    - Continue Diltiazem plus Eliquis, denies s/s of bleeding     Severe COPD managed by Dr. Parminder Martinez. He has class 3 sxs, but stable w/ chronic oxygen 2.5L. Dilated Ascending Aorta measuring 4.4 cm by chest CT May 2022. Stable. Will check Echo for surveillance. Echo not able to visualize ascending aorta. Will check chest cta     Dyslipidemia. No recent labs. Does not see PCP.   - Continue Atorva 40 mg/d.s      Had recent diagnosis of colon cancer and underwent colectomy on May 3rd, 2022. See Dr. Leigh Colon in 6 months. Subjective:      Presents today for follow-up. Doing okay. Denies chest pain, worsening shortness of breath, edema. Denies palpitations.   ------------  Giancarlo Gar, a 81y.o. year-old who presents for followup. He has known history of CAD status post PCI to RCA, COPD, atrial fibrillation, hypertension, dyslipidemia and ascending aortic aneurysm. Is returning today for follow-up. He used to see Dr. Reinier Werner in the past.  Currently has no symptoms of palpitations or lightheadedness. He occasionally will get chest pain which improved with nitroglycerin. He uses oxygen 2 and half liters per minute on a continuous basis.      EKG from May 2022 demonstrate atrial fibrillation with nonspecific ST changes with heart rate of 104 bpm

## 2023-11-10 ENCOUNTER — ANCILLARY PROCEDURE (OUTPATIENT)
Age: 73
End: 2023-11-10
Payer: MEDICARE

## 2023-11-10 ENCOUNTER — OFFICE VISIT (OUTPATIENT)
Age: 73
End: 2023-11-10
Payer: MEDICARE

## 2023-11-10 VITALS
HEIGHT: 69 IN | HEART RATE: 89 BPM | DIASTOLIC BLOOD PRESSURE: 62 MMHG | WEIGHT: 135 LBS | OXYGEN SATURATION: 90 % | BODY MASS INDEX: 19.99 KG/M2 | SYSTOLIC BLOOD PRESSURE: 112 MMHG

## 2023-11-10 VITALS
BODY MASS INDEX: 19.33 KG/M2 | DIASTOLIC BLOOD PRESSURE: 62 MMHG | HEIGHT: 70 IN | HEART RATE: 76 BPM | WEIGHT: 135 LBS | SYSTOLIC BLOOD PRESSURE: 112 MMHG

## 2023-11-10 DIAGNOSIS — I77.810 THORACIC AORTIC ECTASIA (HCC): ICD-10-CM

## 2023-11-10 DIAGNOSIS — I25.10 ATHEROSCLEROSIS OF NATIVE CORONARY ARTERY OF NATIVE HEART WITHOUT ANGINA PECTORIS: Primary | ICD-10-CM

## 2023-11-10 DIAGNOSIS — E78.5 HYPERLIPIDEMIA, UNSPECIFIED HYPERLIPIDEMIA TYPE: ICD-10-CM

## 2023-11-10 DIAGNOSIS — I48.20 CHRONIC ATRIAL FIBRILLATION, UNSPECIFIED (HCC): ICD-10-CM

## 2023-11-10 LAB
ECHO AO ROOT DIAM: 3.9 CM
ECHO AO ROOT INDEX: 2.23 CM/M2
ECHO AV AREA PEAK VELOCITY: 3 CM2
ECHO AV AREA VTI: 2.7 CM2
ECHO AV AREA/BSA PEAK VELOCITY: 1.7 CM2/M2
ECHO AV AREA/BSA VTI: 1.5 CM2/M2
ECHO AV MEAN GRADIENT: 3 MMHG
ECHO AV MEAN VELOCITY: 0.8 M/S
ECHO AV PEAK GRADIENT: 5 MMHG
ECHO AV PEAK VELOCITY: 1.1 M/S
ECHO AV VELOCITY RATIO: 0.91
ECHO AV VTI: 23 CM
ECHO BSA: 1.74 M2
ECHO EST RA PRESSURE: 8 MMHG
ECHO LA DIAMETER INDEX: 2.06 CM/M2
ECHO LA DIAMETER: 3.6 CM
ECHO LA TO AORTIC ROOT RATIO: 0.92
ECHO LA VOL A-L A2C: 80 ML (ref 18–58)
ECHO LA VOL A-L A4C: 43 ML (ref 18–58)
ECHO LA VOL MOD A2C: 69 ML (ref 18–58)
ECHO LA VOL MOD A4C: 40 ML (ref 18–58)
ECHO LA VOLUME AREA LENGTH: 60 ML
ECHO LA VOLUME INDEX A-L A2C: 46 ML/M2 (ref 16–34)
ECHO LA VOLUME INDEX A-L A4C: 25 ML/M2 (ref 16–34)
ECHO LA VOLUME INDEX AREA LENGTH: 34 ML/M2 (ref 16–34)
ECHO LA VOLUME INDEX MOD A2C: 39 ML/M2 (ref 16–34)
ECHO LA VOLUME INDEX MOD A4C: 23 ML/M2 (ref 16–34)
ECHO LV EDV A4C: 98 ML
ECHO LV EDV INDEX A4C: 56 ML/M2
ECHO LV EJECTION FRACTION A4C: 57 %
ECHO LV ESV A4C: 42 ML
ECHO LV ESV INDEX A4C: 24 ML/M2
ECHO LV FRACTIONAL SHORTENING: 27 % (ref 28–44)
ECHO LV INTERNAL DIMENSION DIASTOLE INDEX: 2.74 CM/M2
ECHO LV INTERNAL DIMENSION DIASTOLIC: 4.8 CM (ref 4.2–5.9)
ECHO LV INTERNAL DIMENSION SYSTOLIC INDEX: 2 CM/M2
ECHO LV INTERNAL DIMENSION SYSTOLIC: 3.5 CM
ECHO LV IVSD: 0.8 CM (ref 0.6–1)
ECHO LV MASS 2D: 126.7 G (ref 88–224)
ECHO LV MASS INDEX 2D: 72.4 G/M2 (ref 49–115)
ECHO LV POSTERIOR WALL DIASTOLIC: 0.8 CM (ref 0.6–1)
ECHO LV RELATIVE WALL THICKNESS RATIO: 0.33
ECHO LVOT AREA: 3.5 CM2
ECHO LVOT AV VTI INDEX: 0.77
ECHO LVOT DIAM: 2.1 CM
ECHO LVOT MEAN GRADIENT: 2 MMHG
ECHO LVOT PEAK GRADIENT: 4 MMHG
ECHO LVOT PEAK VELOCITY: 1 M/S
ECHO LVOT STROKE VOLUME INDEX: 35.2 ML/M2
ECHO LVOT SV: 61.6 ML
ECHO LVOT VTI: 17.8 CM
ECHO MV E VELOCITY: 0.84 M/S
ECHO RA AREA 4C: 21.6 CM2
ECHO RA END SYSTOLIC VOLUME APICAL 4 CHAMBER INDEX BSA: 37 ML/M2
ECHO RA VOLUME: 64 ML
ECHO RIGHT VENTRICULAR SYSTOLIC PRESSURE (RVSP): 46 MMHG
ECHO RV INTERNAL DIMENSION: 4.5 CM
ECHO RV TAPSE: 2 CM (ref 1.7–?)
ECHO TV REGURGITANT MAX VELOCITY: 3.07 M/S
ECHO TV REGURGITANT PEAK GRADIENT: 38 MMHG

## 2023-11-10 PROCEDURE — G8484 FLU IMMUNIZE NO ADMIN: HCPCS

## 2023-11-10 PROCEDURE — 99214 OFFICE O/P EST MOD 30 MIN: CPT

## 2023-11-10 PROCEDURE — 1036F TOBACCO NON-USER: CPT

## 2023-11-10 PROCEDURE — 93306 TTE W/DOPPLER COMPLETE: CPT | Performed by: INTERNAL MEDICINE

## 2023-11-10 PROCEDURE — 1123F ACP DISCUSS/DSCN MKR DOCD: CPT

## 2023-11-10 PROCEDURE — G8427 DOCREV CUR MEDS BY ELIG CLIN: HCPCS

## 2023-11-10 PROCEDURE — 3017F COLORECTAL CA SCREEN DOC REV: CPT

## 2023-11-10 PROCEDURE — G8420 CALC BMI NORM PARAMETERS: HCPCS

## 2023-11-10 NOTE — PROGRESS NOTES
Chief Complaint   Patient presents with    Follow-up     Echo with [employer]la [employer]     Atrial Fibrillation     Vitals:    11/10/23 1310   BP: 112/62   Position: Sitting   Pulse: 89   SpO2: 90%   Weight: 61.2 kg (135 lb)   Height: 1.753 m (5' 9\")         Chest pain denied     SOB - O2 2.5L     Palpitations denied     Swelling in hands/feet denied     Dizziness denied     Recent hospital stays denied     Refills denied

## 2023-11-28 ENCOUNTER — HOSPITAL ENCOUNTER (OUTPATIENT)
Facility: HOSPITAL | Age: 73
Discharge: HOME | End: 2023-12-01
Payer: MEDICARE

## 2023-11-28 DIAGNOSIS — I77.810 THORACIC AORTIC ECTASIA (HCC): ICD-10-CM

## 2023-11-28 PROCEDURE — 82565 ASSAY OF CREATININE: CPT

## 2023-11-28 PROCEDURE — 6360000004 HC RX CONTRAST MEDICATION: Performed by: RADIOLOGY

## 2023-11-28 PROCEDURE — 71275 CT ANGIOGRAPHY CHEST: CPT

## 2023-11-28 RX ADMIN — IOPAMIDOL 100 ML: 755 INJECTION, SOLUTION INTRAVENOUS at 15:22

## 2023-11-29 LAB — CREAT BLD-MCNC: 0.8 MG/DL (ref 0.6–1.3)

## 2023-11-29 NOTE — RESULT ENCOUNTER NOTE
Dear  Marcus Wilburn,  Your chest CT scan shows stable appearance of your aorta compared to last test in May 2022. It has not gotten any worse. Please let me know if you have questions.   Best Regards,  TEJINDER Ochoa NP

## 2024-04-18 RX ORDER — DILTIAZEM HYDROCHLORIDE 360 MG/1
CAPSULE, EXTENDED RELEASE ORAL DAILY
Qty: 90 CAPSULE | Refills: 1 | Status: SHIPPED | OUTPATIENT
Start: 2024-04-18

## 2024-04-18 RX ORDER — ATORVASTATIN CALCIUM 40 MG/1
40 TABLET, FILM COATED ORAL DAILY
Qty: 90 TABLET | Refills: 1 | Status: SHIPPED | OUTPATIENT
Start: 2024-04-18

## 2024-04-18 NOTE — TELEPHONE ENCOUNTER
Refill per VO of Dr. Danny Bobby:    11/10/2023    Future Appointments   Date Time Provider Department Center   5/10/2024  2:00 PM Danny Bobby MD [facility] AMB       Requested Prescriptions     Pending Prescriptions Disp Refills    atorvastatin (LIPITOR) 40 MG tablet [Pharmacy Med Name: ATORVASTATIN 40 MG TABLET] 90 tablet 1     Sig: TAKE 1 TABLET BY MOUTH EVERY DAY    TIADYLT  MG extended release capsule [Pharmacy Med Name: TIADYLT  MG CAPSULE] 90 capsule 3     Sig: TAKE 1 CAPSULE BY MOUTH EVERY DAY

## 2024-05-10 ENCOUNTER — OFFICE VISIT (OUTPATIENT)
Age: 74
End: 2024-05-10
Payer: MEDICARE

## 2024-05-10 VITALS
DIASTOLIC BLOOD PRESSURE: 68 MMHG | WEIGHT: 135.4 LBS | OXYGEN SATURATION: 90 % | BODY MASS INDEX: 20.06 KG/M2 | HEART RATE: 104 BPM | SYSTOLIC BLOOD PRESSURE: 128 MMHG | HEIGHT: 69 IN

## 2024-05-10 DIAGNOSIS — I77.810 THORACIC AORTIC ECTASIA (HCC): ICD-10-CM

## 2024-05-10 DIAGNOSIS — I25.10 CORONARY ARTERY DISEASE INVOLVING NATIVE CORONARY ARTERY OF NATIVE HEART WITHOUT ANGINA PECTORIS: ICD-10-CM

## 2024-05-10 DIAGNOSIS — I48.20 CHRONIC ATRIAL FIBRILLATION, UNSPECIFIED (HCC): Primary | ICD-10-CM

## 2024-05-10 PROCEDURE — G8427 DOCREV CUR MEDS BY ELIG CLIN: HCPCS | Performed by: INTERNAL MEDICINE

## 2024-05-10 PROCEDURE — 1123F ACP DISCUSS/DSCN MKR DOCD: CPT | Performed by: INTERNAL MEDICINE

## 2024-05-10 PROCEDURE — 1036F TOBACCO NON-USER: CPT | Performed by: INTERNAL MEDICINE

## 2024-05-10 PROCEDURE — G8420 CALC BMI NORM PARAMETERS: HCPCS | Performed by: INTERNAL MEDICINE

## 2024-05-10 PROCEDURE — 93005 ELECTROCARDIOGRAM TRACING: CPT | Performed by: INTERNAL MEDICINE

## 2024-05-10 PROCEDURE — 99214 OFFICE O/P EST MOD 30 MIN: CPT | Performed by: INTERNAL MEDICINE

## 2024-05-10 PROCEDURE — 3017F COLORECTAL CA SCREEN DOC REV: CPT | Performed by: INTERNAL MEDICINE

## 2024-05-10 PROCEDURE — 93010 ELECTROCARDIOGRAM REPORT: CPT | Performed by: INTERNAL MEDICINE

## 2024-05-10 NOTE — PROGRESS NOTES
Office Follow-up  Name: Eduardo Ayoub    : 1950  MRN: [id number]  Date: 5/10/2024            Assessment and Plan:      CAD with MI  treated with RCA stenting. Dobutamine Cardiolite 10/2017 showed normal perfusion. He has atypical pattern of random chest pain, nitrate responsive but no exertional angina. His functional capacity is limited by severe COPD.  - Continue ASA 81mg/d, and statins     Chronic AF, rate controlled and completely asx. Rhythm control unlikely due to advanced COPD.    - Continue Diltiazem plus Eliquis, denies s/s of bleeding     Severe COPD managed by Dr. Adamson. He has class 3 sxs, but stable w/ chronic oxygen 2.5L.      Dilated Ascending Aorta measuring 4.4 cm by chest CT May 2022.  Echo not able to visualize ascending aorta. Will check chest cta for surveillance. Last Chest CTA in 2023. Ao 44x43    Dyslipidemia. No recent labs. Does not see PCP.   - Continue Atorva 40 mg/d.s      Had recent diagnosis of colon cancer and underwent colectomy on May 3rd, 2022.      See Kailyn Govea NP in 6 months.                                                                                                 Subjective:      Presents today for follow-up. Doing okay. Denies chest pain, worsening shortness of breath, edema. Denies palpitations.   ------------  Eduardo Ayoub, a 72 y.o. year-old who presents for followup.  He has known history of CAD status post PCI to RCA, COPD, atrial fibrillation, hypertension, dyslipidemia and ascending aortic aneurysm.  Is returning today for follow-up.  He used to see Dr. Fleming in the past.  Currently has no symptoms of palpitations or lightheadedness.  He occasionally will get chest pain which improved with nitroglycerin.  He uses oxygen 2 and half liters per minute on a continuous basis.     EKG from May 2022 demonstrate atrial fibrillation with nonspecific ST changes with heart rate of 104 bpm           Exam:       Physical Exam:  Visit Vitals  Vitals:

## 2024-05-10 NOTE — PROGRESS NOTES
Chief Complaint   Patient presents with    Atrial Fibrillation    Coronary Artery Disease    Cholesterol Problem    Other     Tricuspid Atresia     Vitals:    05/10/24 1412   BP: 128/68   Site: Left Upper Arm   Position: Sitting   Pulse: (!) 104   SpO2: 90%   Weight: 61.4 kg (135 lb 6.4 oz)   Height: 1.753 m (5' 9\")         Chest pain: DENIED     Recent hospital stays: DENIED     Refills: DENIED

## 2024-05-16 ENCOUNTER — TELEPHONE (OUTPATIENT)
Age: 74
End: 2024-05-16

## 2024-05-16 DIAGNOSIS — I48.92 UNSPECIFIED ATRIAL FLUTTER (HCC): ICD-10-CM

## 2024-05-16 RX ORDER — APIXABAN 5 MG/1
5 TABLET, FILM COATED ORAL 2 TIMES DAILY
Qty: 180 TABLET | Refills: 3 | Status: SHIPPED | OUTPATIENT
Start: 2024-05-16

## 2024-05-16 NOTE — TELEPHONE ENCOUNTER
Patient called to ask what was the name of the medicine that he was to stop taking? He forgot. Thanks     Patient # [phone]

## 2024-05-16 NOTE — TELEPHONE ENCOUNTER
Refill per VO of Dr. Danny Bobby:    5/10/2024    Future Appointments   Date Time Provider Department Center   11/13/2024  2:20 PM Kailyn Govea, APRN - NP [facility] AMB       Requested Prescriptions     Pending Prescriptions Disp Refills    ELIQUIS 5 MG TABS tablet [Pharmacy Med Name: ELIQUIS 5 MG TABLET] 180 tablet 1     Sig: TAKE 1 TABLET BY MOUTH TWICE A DAY

## 2024-07-05 ENCOUNTER — HOSPITAL ENCOUNTER (OUTPATIENT)
Facility: HOSPITAL | Age: 74
End: 2024-07-05
Attending: INTERNAL MEDICINE
Payer: MEDICARE

## 2024-07-05 DIAGNOSIS — F17.211 CIGARETTE NICOTINE DEPENDENCE IN REMISSION: ICD-10-CM

## 2024-07-05 DIAGNOSIS — Z87.891 PERSONAL HISTORY OF TOBACCO USE: ICD-10-CM

## 2024-07-05 PROCEDURE — 71271 CT THORAX LUNG CANCER SCR C-: CPT

## 2024-07-25 ENCOUNTER — TELEPHONE (OUTPATIENT)
Age: 74
End: 2024-07-25

## 2024-08-07 DIAGNOSIS — I77.810 THORACIC AORTIC ECTASIA (HCC): Primary | ICD-10-CM

## 2024-08-22 RX ORDER — DILTIAZEM HYDROCHLORIDE 360 MG/1
CAPSULE, EXTENDED RELEASE ORAL
Qty: 90 CAPSULE | Refills: 1 | Status: SHIPPED | OUTPATIENT
Start: 2024-08-22

## 2024-10-07 RX ORDER — ATORVASTATIN CALCIUM 40 MG/1
40 TABLET, FILM COATED ORAL DAILY
Qty: 90 TABLET | Refills: 1 | Status: SHIPPED | OUTPATIENT
Start: 2024-10-07

## 2024-11-13 ENCOUNTER — INITIAL CONSULT (OUTPATIENT)
Age: 74
End: 2024-11-13
Payer: MEDICARE

## 2024-11-13 VITALS
TEMPERATURE: 97.8 F | HEART RATE: 100 BPM | OXYGEN SATURATION: 90 % | BODY MASS INDEX: 19.35 KG/M2 | WEIGHT: 131 LBS | SYSTOLIC BLOOD PRESSURE: 134 MMHG | DIASTOLIC BLOOD PRESSURE: 78 MMHG

## 2024-11-13 DIAGNOSIS — I77.810 ASCENDING AORTA DILATION (HCC): Primary | ICD-10-CM

## 2024-11-13 PROCEDURE — 1160F RVW MEDS BY RX/DR IN RCRD: CPT | Performed by: NURSE PRACTITIONER

## 2024-11-13 PROCEDURE — 99204 OFFICE O/P NEW MOD 45 MIN: CPT | Performed by: NURSE PRACTITIONER

## 2024-11-13 PROCEDURE — 1036F TOBACCO NON-USER: CPT | Performed by: THORACIC SURGERY (CARDIOTHORACIC VASCULAR SURGERY)

## 2024-11-13 PROCEDURE — G8484 FLU IMMUNIZE NO ADMIN: HCPCS | Performed by: NURSE PRACTITIONER

## 2024-11-13 PROCEDURE — G8427 DOCREV CUR MEDS BY ELIG CLIN: HCPCS | Performed by: NURSE PRACTITIONER

## 2024-11-13 PROCEDURE — 3017F COLORECTAL CA SCREEN DOC REV: CPT | Performed by: THORACIC SURGERY (CARDIOTHORACIC VASCULAR SURGERY)

## 2024-11-13 PROCEDURE — 1159F MED LIST DOCD IN RCRD: CPT | Performed by: NURSE PRACTITIONER

## 2024-11-13 PROCEDURE — 1123F ACP DISCUSS/DSCN MKR DOCD: CPT | Performed by: NURSE PRACTITIONER

## 2024-11-13 PROCEDURE — 1126F AMNT PAIN NOTED NONE PRSNT: CPT | Performed by: NURSE PRACTITIONER

## 2024-11-13 PROCEDURE — G8420 CALC BMI NORM PARAMETERS: HCPCS | Performed by: NURSE PRACTITIONER

## 2024-11-13 NOTE — PROGRESS NOTES
I agree with this consultation note.    I personally interviewed and examined the patient, and reviewed their diagnostic studies in detail.    I have also discussed the case in detail with the referring provider.    His ascending aorta measures approximately 47 mm in diameter. It is aneurysmal in character.    He has mild to moderate MR.    He is severely limited by his COPD and his general frailty. Under these circumstances, a prophylactic ascending aortic replacement is not indicated as he would likely face a very long ICU stay, prolonged ventilation and possible permanent tracheostomy and ventilator support.    He elected not to proceed with surgery, and we felt that periodic surveillance would not be warranted given that he did not wish to proceed with surgery now or in the future.    Kurtis Robertson MD, PhD, [facility].  
Non-STEMI (non-ST elevated myocardial infarction) (HCC) 2/14    On home O2 2.5 lpm continuously    since about 2014    Pneumonia     Snoring     Tinnitus     Tuberculosis     Vitamin D deficiency 3/27/2014     Past Surgical History:   Procedure Laterality Date    COLONOSCOPY N/A 3/3/2022    COLONOSCOPY performed by Efrain Siddiqi MD at [facility] ENDOSCOPY    CORONARY ANGIOPLASTY WITH STENT PLACEMENT  2014    OTHER SURGICAL HISTORY      NEEDLE BX LEFT LUNG    OTHER SURGICAL HISTORY      EXC MELANOMA RIGHT CHEEK    OTHER SURGICAL HISTORY  9/11/2012    apical segmentectomy, LIA      Social History     Tobacco Use    Smoking status: Former     Current packs/day: 0.00     Types: Cigarettes     Quit date: 1/1/2014     Years since quitting: 10.8    Smokeless tobacco: Never   Substance Use Topics    Alcohol use: Yes     Alcohol/week: 7.0 standard drinks of alcohol      Family History   Problem Relation Age of Onset    Heart Disease Father     Glaucoma Mother     Cancer Mother         ? of cervical vs. endometrial    Heart Attack Father     Prostate Cancer Neg Hx     Ovarian Cancer Neg Hx     Breast Cancer Neg Hx     Dementia Mother     Colon Cancer Neg Hx      Prior to Admission medications    Medication Sig Start Date End Date Taking? Authorizing Provider   atorvastatin (LIPITOR) 40 MG tablet TAKE 1 TABLET BY MOUTH EVERY DAY 10/7/24  Yes Danny Bobby MD   dilTIAZem (CARDIZEM CD) 360 MG extended release capsule TAKE 1 CAPSULE BY MOUTH EVERY DAY 8/22/24  Yes Danny Bobby MD   apixaban (ELIQUIS) 5 MG TABS tablet TAKE 1 TABLET BY MOUTH TWICE A DAY 5/16/24  Yes Danny Bobby MD   albuterol sulfate HFA (PROVENTIL;VENTOLIN;PROAIR) 108 (90 Base) MCG/ACT inhaler Inhale into the lungs every 4 hours as needed   Yes Automatic Reconciliation, Ar   albuterol (PROVENTIL) (2.5 MG/3ML) 0.083% nebulizer solution Inhale 3 mLs into the lungs every 4 hours as needed   Yes Automatic Reconciliation, Ar   aspirin 81 MG chewable tablet Take 1

## 2024-11-15 ENCOUNTER — OFFICE VISIT (OUTPATIENT)
Age: 74
End: 2024-11-15
Payer: MEDICARE

## 2024-11-15 VITALS
SYSTOLIC BLOOD PRESSURE: 116 MMHG | DIASTOLIC BLOOD PRESSURE: 70 MMHG | HEART RATE: 70 BPM | WEIGHT: 130 LBS | HEIGHT: 70 IN | OXYGEN SATURATION: 90 % | BODY MASS INDEX: 18.61 KG/M2

## 2024-11-15 DIAGNOSIS — I25.10 CORONARY ARTERY DISEASE INVOLVING NATIVE CORONARY ARTERY OF NATIVE HEART WITHOUT ANGINA PECTORIS: ICD-10-CM

## 2024-11-15 DIAGNOSIS — I77.810 THORACIC AORTIC ECTASIA (HCC): ICD-10-CM

## 2024-11-15 DIAGNOSIS — I48.20 CHRONIC ATRIAL FIBRILLATION, UNSPECIFIED (HCC): Primary | ICD-10-CM

## 2024-11-15 PROCEDURE — 99214 OFFICE O/P EST MOD 30 MIN: CPT | Performed by: INTERNAL MEDICINE

## 2024-11-15 PROCEDURE — 93005 ELECTROCARDIOGRAM TRACING: CPT | Performed by: INTERNAL MEDICINE

## 2024-11-15 NOTE — PROGRESS NOTES
Chief Complaint   Patient presents with    Atrial Fibrillation    Coronary Artery Disease    TAA     Vitals:    11/15/24 1557   BP: 116/70   Site: Left Upper Arm   Position: Sitting   Pulse: 70   SpO2: 90%   Weight: 59 kg (130 lb)   Height: 1.778 m (5' 10\")       Chest pain NO     ER, urgent care, or hospitalized outside of [facility] since your last visit?  NO     Refills NO

## 2024-11-15 NOTE — PROGRESS NOTES
Office Follow-up  Name: Eduardo Ayoub    : 1950  MRN: [id number]  Date: 11/15/2024            Assessment and Plan:      CAD with MI 2014 treated with RCA stenting. Dobutamine Cardiolite 10/2017 showed normal perfusion. He has atypical pattern of random chest pain, nitrate responsive but no exertional angina. His functional capacity is limited by severe COPD.  - Continue ASA 81mg/d, and statins     Chronic AF, rate controlled and completely asx. Rhythm control unlikely due to advanced COPD.    - Continue Diltiazem plus Eliquis, denies s/s of bleeding     Severe COPD managed by Dr. Adamson. He has class 3 sxs, but stable w/ chronic oxygen 2.5L.      Dilated Ascending Aorta measuring 4.4 cm by chest CT May 2022.  Echo not able to visualize ascending aorta. CCTA from 2024 show 4.7cm. Has seen Dr. Robertson and deferred surgery due to severe COPD. Annual surveillance with [facility].     Dyslipidemia. No recent labs. Does not see PCP.   - Continue Atorva 40 mg/d.s      Had recent diagnosis of colon cancer and underwent colectomy on May 3rd, 2022.      See Dr. Bobby in 6 months.                      Subjective:      Presents today for follow-up. Doing okay. Denies chest pain, worsening shortness of breath, edema. Denies palpitations.   ------------  Eduardo Ayoub, a 72 y.o. year-old who presents for followup.  He has known history of CAD status post PCI to RCA, COPD, atrial fibrillation, hypertension, dyslipidemia and ascending aortic aneurysm.  Is returning today for follow-up.  He used to see Dr. Fleming in the past.  Currently has no symptoms of palpitations or lightheadedness.  He occasionally will get chest pain which improved with nitroglycerin.  He uses oxygen 2 and half liters per minute on a continuous basis.     EKG from May 2022 demonstrate atrial fibrillation with nonspecific ST changes with heart rate of 104 bpm           Exam:       Physical Exam:  Visit Vitals  Vitals:    11/15/24 1557   BP: 116/70

## 2025-01-20 ENCOUNTER — TELEPHONE (OUTPATIENT)
Age: 75
End: 2025-01-20

## 2025-01-20 RX ORDER — DILTIAZEM HYDROCHLORIDE 360 MG/1
CAPSULE, EXTENDED RELEASE ORAL
Qty: 90 CAPSULE | Refills: 1 | Status: SHIPPED | OUTPATIENT
Start: 2025-01-20

## 2025-01-20 RX ORDER — DILTIAZEM HYDROCHLORIDE 360 MG/1
CAPSULE, EXTENDED RELEASE ORAL DAILY
Qty: 90 CAPSULE | Refills: 1 | OUTPATIENT
Start: 2025-01-20

## 2025-01-20 NOTE — TELEPHONE ENCOUNTER
Patient needs help getting diltiazem filled and authorized and please call him at [phone] once approved at pharmacy

## 2025-01-20 NOTE — TELEPHONE ENCOUNTER
Called patient and made him aware that his rx was called in this morning.  Patient identified by 2x identifier

## 2025-04-01 ENCOUNTER — APPOINTMENT (OUTPATIENT)
Facility: HOSPITAL | Age: 75
End: 2025-04-01
Payer: MEDICARE

## 2025-04-01 ENCOUNTER — HOSPITAL ENCOUNTER (EMERGENCY)
Facility: HOSPITAL | Age: 75
Discharge: HOME | End: 2025-04-01
Attending: STUDENT IN AN ORGANIZED HEALTH CARE EDUCATION/TRAINING PROGRAM
Payer: MEDICARE

## 2025-04-01 VITALS
WEIGHT: 130 LBS | HEART RATE: 76 BPM | RESPIRATION RATE: 22 BRPM | OXYGEN SATURATION: 92 % | HEIGHT: 70 IN | TEMPERATURE: 97.2 F | SYSTOLIC BLOOD PRESSURE: 128 MMHG | BODY MASS INDEX: 18.61 KG/M2 | DIASTOLIC BLOOD PRESSURE: 77 MMHG

## 2025-04-01 DIAGNOSIS — K59.00 CONSTIPATION, UNSPECIFIED CONSTIPATION TYPE: Primary | ICD-10-CM

## 2025-04-01 LAB
ALBUMIN SERPL-MCNC: 3.5 G/DL (ref 3.5–5)
ALBUMIN/GLOB SERPL: 1.1 (ref 1.1–2.2)
ALP SERPL-CCNC: 93 U/L (ref 45–117)
ALT SERPL-CCNC: 25 U/L (ref 12–78)
ANION GAP SERPL CALC-SCNC: 4 MMOL/L (ref 2–12)
AST SERPL-CCNC: 15 U/L (ref 15–37)
BASOPHILS # BLD: 0.03 K/UL (ref 0–0.1)
BASOPHILS NFR BLD: 0.3 % (ref 0–1)
BILIRUB SERPL-MCNC: 1.1 MG/DL (ref 0.2–1)
BUN SERPL-MCNC: 14 MG/DL (ref 6–20)
BUN/CREAT SERPL: 16 (ref 12–20)
CALCIUM SERPL-MCNC: 10.2 MG/DL (ref 8.5–10.1)
CHLORIDE SERPL-SCNC: 95 MMOL/L (ref 97–108)
CO2 SERPL-SCNC: 34 MMOL/L (ref 21–32)
CREAT SERPL-MCNC: 0.88 MG/DL (ref 0.7–1.3)
DIFFERENTIAL METHOD BLD: ABNORMAL
EOSINOPHIL # BLD: 0.11 K/UL (ref 0–0.4)
EOSINOPHIL NFR BLD: 1.3 % (ref 0–7)
ERYTHROCYTE [DISTWIDTH] IN BLOOD BY AUTOMATED COUNT: 15.2 % (ref 11.5–14.5)
GLOBULIN SER CALC-MCNC: 3.3 G/DL (ref 2–4)
GLUCOSE SERPL-MCNC: 98 MG/DL (ref 65–100)
HCT VFR BLD AUTO: 41.9 % (ref 36.6–50.3)
HGB BLD-MCNC: 14.1 G/DL (ref 12.1–17)
IMM GRANULOCYTES # BLD AUTO: 0.04 K/UL (ref 0–0.04)
IMM GRANULOCYTES NFR BLD AUTO: 0.5 % (ref 0–0.5)
LIPASE SERPL-CCNC: 19 U/L (ref 13–75)
LYMPHOCYTES # BLD: 0.77 K/UL (ref 0.8–3.5)
LYMPHOCYTES NFR BLD: 9 % (ref 12–49)
MCH RBC QN AUTO: 32.1 PG (ref 26–34)
MCHC RBC AUTO-ENTMCNC: 33.7 G/DL (ref 30–36.5)
MCV RBC AUTO: 95.4 FL (ref 80–99)
MONOCYTES # BLD: 1.09 K/UL (ref 0–1)
MONOCYTES NFR BLD: 12.7 % (ref 5–13)
NEUTS SEG # BLD: 6.56 K/UL (ref 1.8–8)
NEUTS SEG NFR BLD: 76.2 % (ref 32–75)
NRBC # BLD: 0 K/UL (ref 0–0.01)
NRBC BLD-RTO: 0 PER 100 WBC
PLATELET # BLD AUTO: 196 K/UL (ref 150–400)
PMV BLD AUTO: 9.1 FL (ref 8.9–12.9)
POTASSIUM SERPL-SCNC: 3.7 MMOL/L (ref 3.5–5.1)
PROT SERPL-MCNC: 6.8 G/DL (ref 6.4–8.2)
RBC # BLD AUTO: 4.39 M/UL (ref 4.1–5.7)
RBC MORPH BLD: ABNORMAL
SODIUM SERPL-SCNC: 133 MMOL/L (ref 136–145)
WBC # BLD AUTO: 8.6 K/UL (ref 4.1–11.1)

## 2025-04-01 PROCEDURE — 74177 CT ABD & PELVIS W/CONTRAST: CPT

## 2025-04-01 PROCEDURE — 85025 COMPLETE CBC W/AUTO DIFF WBC: CPT

## 2025-04-01 PROCEDURE — 36415 COLL VENOUS BLD VENIPUNCTURE: CPT

## 2025-04-01 PROCEDURE — 80053 COMPREHEN METABOLIC PANEL: CPT

## 2025-04-01 PROCEDURE — 99285 EMERGENCY DEPT VISIT HI MDM: CPT

## 2025-04-01 PROCEDURE — 83690 ASSAY OF LIPASE: CPT

## 2025-04-01 PROCEDURE — 6360000004 HC RX CONTRAST MEDICATION: Performed by: STUDENT IN AN ORGANIZED HEALTH CARE EDUCATION/TRAINING PROGRAM

## 2025-04-01 RX ORDER — IOPAMIDOL 755 MG/ML
100 INJECTION, SOLUTION INTRAVASCULAR
Status: COMPLETED | OUTPATIENT
Start: 2025-04-01 | End: 2025-04-01

## 2025-04-01 RX ADMIN — IOPAMIDOL 100 ML: 755 INJECTION, SOLUTION INTRAVENOUS at 13:31

## 2025-04-01 ASSESSMENT — PAIN - FUNCTIONAL ASSESSMENT: PAIN_FUNCTIONAL_ASSESSMENT: NONE - DENIES PAIN

## 2025-04-01 ASSESSMENT — ENCOUNTER SYMPTOMS: SHORTNESS OF BREATH: 0

## 2025-04-01 NOTE — DISCHARGE INSTRUCTIONS
Please take MiraLAX daily for the next 5 days.  Please return to the emergency room immediately if you develop any significant pain in your abdomen or if you develop nausea or vomiting.

## 2025-04-01 NOTE — ED PROVIDER NOTES
[facility] EMERGENCY DEPARTMENT  EMERGENCY DEPARTMENT ENCOUNTER      Pt Name: Eduardo Ayoub  MRN: [id number]  Birthdate 1950  Date of evaluation: 4/1/2025  Provider: Reji Middleton MD    CHIEF COMPLAINT       Chief Complaint   Patient presents with    Constipation         HISTORY OF PRESENT ILLNESS   74-year-old male with history significant for A-fib, ostomy secondary to colon cancer presents to the ED with chief complaint of diminished ostomy output.  Patient says he has had no output from his ostomy for the past 5 days.  It was placed 2 years ago and he has never had this issue before that has not improved with laxatives.  He denies any associated pain.  He has tried taking oral laxative without relief.  No fevers, chills, chest pain, difficulty breathing, abdominal pain, or urinary symptoms.    The history is provided by the patient.       Review of External Medical Records:     Nursing Notes were reviewed.    REVIEW OF SYSTEMS       Review of Systems   Respiratory:  Negative for shortness of breath.    Cardiovascular:  Negative for chest pain.       Except as noted above the remainder of the review of systems was reviewed and negative.       PAST MEDICAL HISTORY     Past Medical History:   Diagnosis Date    Acute on chronic respiratory failure with hypoxemia (HCC) 2/15/2019    Arrhythmia     Asthma     Pt denies    Atrial fibrillation (HCC)     COPD (chronic obstructive pulmonary disease) (HCC)     severe    Hypertension     Insulin resistance 3/27/2014    Lung mass 9/2012    LIA resection, + mycobacterial orgs, neg malignancy    Melanoma (HCC) 2007    Non-STEMI (non-ST elevated myocardial infarction) (HCC) 2/14    On home O2 2.5 lpm continuously    since about 2014    Pneumonia     Snoring     Tinnitus     Tuberculosis     Vitamin D deficiency 3/27/2014         SURGICAL HISTORY       Past Surgical History:   Procedure Laterality Date    COLONOSCOPY N/A 3/3/2022    COLONOSCOPY

## 2025-04-01 NOTE — ED TRIAGE NOTES
Patient reports unable to get any fecal matter into his ostomy x 5 days. States he took a laxative without success. Patient denies abdominal pain

## 2025-04-07 ENCOUNTER — TELEPHONE (OUTPATIENT)
Age: 75
End: 2025-04-07

## 2025-04-07 NOTE — TELEPHONE ENCOUNTER
Patient requested a call back he stated his blood pressure just spiked to the 100's    And needs direction on how to get it back to normal     Contact Information  [phone] (Home Phone)  [phone] (Mobile)

## 2025-05-21 RX ORDER — ATORVASTATIN CALCIUM 40 MG/1
40 TABLET, FILM COATED ORAL DAILY
Qty: 90 TABLET | Refills: 1 | Status: SHIPPED | OUTPATIENT
Start: 2025-05-21

## 2025-07-07 ENCOUNTER — HOSPITAL ENCOUNTER (OUTPATIENT)
Facility: HOSPITAL | Age: 75
Discharge: HOME | End: 2025-07-10
Attending: INTERNAL MEDICINE
Payer: MEDICARE

## 2025-07-07 DIAGNOSIS — F17.211 CIGARETTE NICOTINE DEPENDENCE IN REMISSION: ICD-10-CM

## 2025-07-07 DIAGNOSIS — Z87.891 PERSONAL HISTORY OF TOBACCO USE: ICD-10-CM

## 2025-07-07 PROCEDURE — 71271 CT THORAX LUNG CANCER SCR C-: CPT

## 2025-08-20 RX ORDER — DILTIAZEM HYDROCHLORIDE 360 MG/1
CAPSULE, EXTENDED RELEASE ORAL DAILY
Qty: 90 CAPSULE | Refills: 0 | Status: SHIPPED | OUTPATIENT
Start: 2025-08-20

## (undated) DEVICE — KIT COLON W/ 1.1OZ LUB AND 2 END

## (undated) DEVICE — CATH IV AUTOGRD BC PNK 20GA 25 -- INSYTE

## (undated) DEVICE — BLADE ASSEMB CLP HAIR FINE --

## (undated) DEVICE — RELOAD STPL L60MM H1.5-3.6MM REG TISS BLU GRIPPING SURF B

## (undated) DEVICE — BASIN EMSIS 16OZ GRAPHITE PLAS KID SHP MOLD GRAD FOR ORAL

## (undated) DEVICE — CONTAINER SPEC 20 ML LID NEUT BUFF FORMALIN 10 % POLYPR STS

## (undated) DEVICE — BAG SPEC BIOHZRD 10 X 10 IN --

## (undated) DEVICE — SYR LR LCK 1ML GRAD NSAF 30ML --

## (undated) DEVICE — SPONGE DRAIN NONWOVEN 4X4IN -- 2/PK

## (undated) DEVICE — 3M™ IOBAN™ 2 ANTIMICROBIAL INCISE DRAPE 6650EZ: Brand: IOBAN™ 2

## (undated) DEVICE — INTENDED FOR TISSUE SEPARATION, AND OTHER PROCEDURES THAT REQUIRE A SHARP SURGICAL BLADE TO PUNCTURE OR CUT.: Brand: BARD-PARKER ® CARBON RIB-BACK BLADES

## (undated) DEVICE — NEEDLE SCLERO 23GA L4MM CATH L240CM CNTRST SHTH DIA1.8MM

## (undated) DEVICE — SUTURE PROL SZ 2-0 L36IN NONABSORBABLE BLU SH L26MM 1/2 CIR 8523H

## (undated) DEVICE — APPLICATOR FLEXIBLE 360D 40CM --

## (undated) DEVICE — ELECTRODE,RADIOTRANSLUCENT,FOAM,3PK: Brand: MEDLINE

## (undated) DEVICE — CATH IV AUTOGRD BC BLU 22GA 25 -- INSYTE

## (undated) DEVICE — CANISTER, RIGID, 3000CC: Brand: MEDLINE INDUSTRIES, INC.

## (undated) DEVICE — Device

## (undated) DEVICE — SURGICAL PROCEDURE PACK TISS 3X5 IN ABSORBABLE SEPRAFILM

## (undated) DEVICE — VISUALIZATION SYSTEM: Brand: CLEARIFY

## (undated) DEVICE — 3M™ TEGADERM™ TRANSPARENT FILM DRESSING FRAME STYLE, 1626W, 4 IN X 4-3/4 IN (10 CM X 12 CM), 50/CT 4CT/CASE: Brand: 3M™ TEGADERM™

## (undated) DEVICE — 1200 GUARD II KIT W/5MM TUBE W/O VAC TUBE: Brand: GUARDIAN

## (undated) DEVICE — TROCAR ENDOSCP L100MM DIA5MM BLDELSS STBL SL THRD OPT VW

## (undated) DEVICE — SYRINGE BLB FEED IV POLE BG 60ML

## (undated) DEVICE — SYR 5ML 1/5 GRAD LL NSAF LF --

## (undated) DEVICE — (D)SENSOR RMFG 02 PULS OXMTR -- DISC BY MFR USE ITEM 133445

## (undated) DEVICE — MARKER SKN REG TIP W/RULER -- STRL

## (undated) DEVICE — GLOVE ORANGE PI 7 1/2   MSG9075

## (undated) DEVICE — SUTURE PDS II SZ 1 L36IN ABSRB VLT CT L40MM 1/2 CIR TAPR Z359T

## (undated) DEVICE — BAG BELONG PT PERS CLEAR HANDL

## (undated) DEVICE — STERILE-Z MAYO STAND COVERS CLEAR POLYETHYLENE STERILE UNIVERSAL FIT 20 PER CASE: Brand: STERILE-Z

## (undated) DEVICE — ROCKER SWITCH PENCIL BLADE ELECTRODE, HOLSTER: Brand: EDGE

## (undated) DEVICE — FORCEPS BX L240CM JAW DIA2.8MM L CAP W/ NDL MIC MESH TOOTH

## (undated) DEVICE — SUTURE PERMAHAND SZ 0 L30IN NONABSORBABLE BLK SILK BRAID A306H

## (undated) DEVICE — BLUNTFILL WITH FILTER: Brand: MONOJECT

## (undated) DEVICE — STAIN INDIA INK IN NACL 10ML --

## (undated) DEVICE — GYN LAPAROSCOPY-SFMC: Brand: MEDLINE INDUSTRIES, INC.

## (undated) DEVICE — SOLIDIFIER MEDC 1200ML -- CONVERT TO 356117

## (undated) DEVICE — DERMABOND SKIN ADH 0.7ML -- DERMABOND ADVANCED 12/BX

## (undated) DEVICE — SYR 3ML LL TIP 1/10ML GRAD --

## (undated) DEVICE — TUBING, SUCTION, 1/4" X 10', STRAIGHT: Brand: MEDLINE

## (undated) DEVICE — SOLUTION IRRIG 1000ML 0.9% SOD CHL USP POUR PLAS BTL

## (undated) DEVICE — SUT VCRL + 0 36IN UR6 VIO --

## (undated) DEVICE — UNDERPANTS MAT L/XL KNIT SEAMLESS CLR CODE WAISTBAND

## (undated) DEVICE — COLON CLOSING PACK: Brand: MEDLINE INDUSTRIES, INC.

## (undated) DEVICE — DRAPE FLD WRM W44XL66IN C6L FOR INTRATEMP SYS THERMABASIN

## (undated) DEVICE — SET ADMIN 16ML TBNG L100IN 2 Y INJ SITE IV PIGGY BK DISP

## (undated) DEVICE — DRAIN SURG 19FR 0.25IN SIL RND W/ TRCR INDIC DOT RADPQ FULL

## (undated) DEVICE — CUFF RMFG BP INF SZ 11 DISP -- LAWSON OEM ITEM 238915

## (undated) DEVICE — SUTURE SZ 0 27IN 5/8 CIR UR-6  TAPER PT VIOLET ABSRB VICRYL J603H

## (undated) DEVICE — DRESSING FOAM DISK DIA1IN H 7MM HYDRPHLC CHG IMPREG IN SL

## (undated) DEVICE — PAD BD MATTRESS 73X32 IN STD CONVOLUTED FOAM LTX FREE

## (undated) DEVICE — BANDAGE COBAN 4 IN COMPR W4INXL5YD FOAM COHESIVE QUIK STK SELF ADH SFT

## (undated) DEVICE — SOL INJ L R 1000ML BG --

## (undated) DEVICE — BITEBLOCK ENDOSCP 60FR MAXI WHT POLYETH STURDY W/ VELC WVN

## (undated) DEVICE — SYR 10ML LUER LOK 1/5ML GRAD --

## (undated) DEVICE — TROCAR ENDOSCP L100MM DIA12MM STBL SL BLDELSS ENDOPATH XCEL

## (undated) DEVICE — RELOAD STPL H4.1X2MM DIA60MM THCK TISS GRN 6 ROW PWR GST B

## (undated) DEVICE — STAPLER INT L34CM 60MM LNG ENDOSCP ARTC PWR + ECHELON FLX

## (undated) DEVICE — JELLY,LUBE,STERILE,FLIP TOP,TUBE,4-OZ: Brand: MEDLINE

## (undated) DEVICE — LAPAROTOMY-SFMC: Brand: MEDLINE INDUSTRIES, INC.

## (undated) DEVICE — SUTURE VCRL SZ 4-0 L27IN ABSRB UD L26MM SH 1/2 CIR J415H

## (undated) DEVICE — CANNULA CUSH AD W/ 14FT TBG

## (undated) DEVICE — EVAC SMOKE SEECLEAR XCL -- SEE CLEAR

## (undated) DEVICE — RESERVOIR,SUCTION,100CC,SILICONE: Brand: MEDLINE

## (undated) DEVICE — TROCAR ENDOSCP L100MM DIA5MM BLDELSS STBL SL OBT RADLUC

## (undated) DEVICE — PAD,ABDOMINAL,8"X10",ST,LF: Brand: MEDLINE

## (undated) DEVICE — BLUNTFILL: Brand: MONOJECT

## (undated) DEVICE — SIMPLICITY FLUFF UNDERPAD 23X36, MODERATE: Brand: SIMPLICITY

## (undated) DEVICE — SUTURE PERMAHAND SZ 3-0 L18IN NONABSORBABLE BLK L26MM SH C013D

## (undated) DEVICE — TRAY PREP DRY W/ PREM GLV 2 APPL 6 SPNG 2 UNDPD 1 OVERWRAP

## (undated) DEVICE — GLOVE ORANGE PI 7   MSG9070

## (undated) DEVICE — GENERAL LAPAROSCOPY-SFMC: Brand: MEDLINE INDUSTRIES, INC.

## (undated) DEVICE — RELOAD STPL L60MM H1-2.6MM MESENTERY THN TISS WHT 6 ROW

## (undated) DEVICE — 3M™ CUROS™ DISINFECTING CAP FOR NEEDLELESS CONNECTORS 270/CARTON 20 CARTONS/CASE CFF1-270: Brand: CUROS™

## (undated) DEVICE — ACCESS PLATFORM FOR MINIMALLY INVASIVE SURGERY.: Brand: GELPORT® LAPAROSCOPIC  SYSTEM

## (undated) DEVICE — INSTRMT SET WND CLSR SUT PASS --

## (undated) DEVICE — CONTAINER,SPECIMEN,3OZ,OR STRL: Brand: MEDLINE